# Patient Record
Sex: MALE | Race: WHITE | NOT HISPANIC OR LATINO | Employment: OTHER | URBAN - METROPOLITAN AREA
[De-identification: names, ages, dates, MRNs, and addresses within clinical notes are randomized per-mention and may not be internally consistent; named-entity substitution may affect disease eponyms.]

---

## 2017-08-28 ENCOUNTER — APPOINTMENT (EMERGENCY)
Dept: RADIOLOGY | Facility: HOSPITAL | Age: 77
DRG: 310 | End: 2017-08-28
Payer: MEDICARE

## 2017-08-28 ENCOUNTER — GENERIC CONVERSION - ENCOUNTER (OUTPATIENT)
Dept: OTHER | Facility: OTHER | Age: 77
End: 2017-08-28

## 2017-08-28 ENCOUNTER — HOSPITAL ENCOUNTER (INPATIENT)
Facility: HOSPITAL | Age: 77
LOS: 1 days | Discharge: HOME | DRG: 310 | End: 2017-08-29
Attending: EMERGENCY MEDICINE | Admitting: INTERNAL MEDICINE
Payer: MEDICARE

## 2017-08-28 DIAGNOSIS — R07.9 CHEST PAIN: ICD-10-CM

## 2017-08-28 DIAGNOSIS — R53.1 WEAKNESS: ICD-10-CM

## 2017-08-28 DIAGNOSIS — R42 DIZZY: ICD-10-CM

## 2017-08-28 DIAGNOSIS — R06.02 SHORTNESS OF BREATH: ICD-10-CM

## 2017-08-28 DIAGNOSIS — I48.91 ATRIAL FIBRILLATION WITH RVR (HCC): Primary | ICD-10-CM

## 2017-08-28 PROBLEM — I10 HTN (HYPERTENSION): Status: ACTIVE | Noted: 2017-08-28

## 2017-08-28 LAB
ALBUMIN SERPL BCP-MCNC: 3.8 G/DL (ref 3.5–5)
ALP SERPL-CCNC: 48 U/L (ref 46–116)
ALT SERPL W P-5'-P-CCNC: 27 U/L (ref 12–78)
ANION GAP SERPL CALCULATED.3IONS-SCNC: 11 MMOL/L (ref 4–13)
AST SERPL W P-5'-P-CCNC: 26 U/L (ref 5–45)
BASOPHILS # BLD AUTO: 0.04 THOUSANDS/ΜL (ref 0–0.1)
BASOPHILS NFR BLD AUTO: 0 % (ref 0–1)
BILIRUB SERPL-MCNC: 0.33 MG/DL (ref 0.2–1)
BUN SERPL-MCNC: 26 MG/DL (ref 5–25)
CALCIUM SERPL-MCNC: 9.4 MG/DL (ref 8.3–10.1)
CHLORIDE SERPL-SCNC: 101 MMOL/L (ref 100–108)
CO2 SERPL-SCNC: 26 MMOL/L (ref 21–32)
CREAT SERPL-MCNC: 1.11 MG/DL (ref 0.6–1.3)
EOSINOPHIL # BLD AUTO: 0.19 THOUSAND/ΜL (ref 0–0.61)
EOSINOPHIL NFR BLD AUTO: 2 % (ref 0–6)
ERYTHROCYTE [DISTWIDTH] IN BLOOD BY AUTOMATED COUNT: 14.7 % (ref 11.6–15.1)
GFR SERPL CREATININE-BSD FRML MDRD: 65 ML/MIN/1.73SQ M
GLUCOSE SERPL-MCNC: 113 MG/DL (ref 65–140)
HCT VFR BLD AUTO: 41.1 % (ref 36.5–49.3)
HGB BLD-MCNC: 14.2 G/DL (ref 12–17)
LYMPHOCYTES # BLD AUTO: 1.29 THOUSANDS/ΜL (ref 0.6–4.47)
LYMPHOCYTES NFR BLD AUTO: 12 % (ref 14–44)
MCH RBC QN AUTO: 31.5 PG (ref 26.8–34.3)
MCHC RBC AUTO-ENTMCNC: 34.5 G/DL (ref 31.4–37.4)
MCV RBC AUTO: 91 FL (ref 82–98)
MONOCYTES # BLD AUTO: 1.06 THOUSAND/ΜL (ref 0.17–1.22)
MONOCYTES NFR BLD AUTO: 9 % (ref 4–12)
NEUTROPHILS # BLD AUTO: 8.66 THOUSANDS/ΜL (ref 1.85–7.62)
NEUTS SEG NFR BLD AUTO: 77 % (ref 43–75)
NRBC BLD AUTO-RTO: 0 /100 WBCS
NT-PROBNP SERPL-MCNC: 131 PG/ML
PLATELET # BLD AUTO: 342 THOUSANDS/UL (ref 149–390)
PMV BLD AUTO: 10 FL (ref 8.9–12.7)
POTASSIUM SERPL-SCNC: 4.5 MMOL/L (ref 3.5–5.3)
PROT SERPL-MCNC: 6.8 G/DL (ref 6.4–8.2)
RBC # BLD AUTO: 4.51 MILLION/UL (ref 3.88–5.62)
SODIUM SERPL-SCNC: 138 MMOL/L (ref 136–145)
SPECIMEN SOURCE: NORMAL
TROPONIN I BLD-MCNC: 0 NG/ML (ref 0–0.08)
TSH SERPL DL<=0.05 MIU/L-ACNC: 3.84 UIU/ML (ref 0.36–3.74)
WBC # BLD AUTO: 11.24 THOUSAND/UL (ref 4.31–10.16)

## 2017-08-28 PROCEDURE — 84484 ASSAY OF TROPONIN QUANT: CPT

## 2017-08-28 PROCEDURE — 99285 EMERGENCY DEPT VISIT HI MDM: CPT

## 2017-08-28 PROCEDURE — 93005 ELECTROCARDIOGRAM TRACING: CPT

## 2017-08-28 PROCEDURE — 93005 ELECTROCARDIOGRAM TRACING: CPT | Performed by: EMERGENCY MEDICINE

## 2017-08-28 PROCEDURE — 84443 ASSAY THYROID STIM HORMONE: CPT | Performed by: EMERGENCY MEDICINE

## 2017-08-28 PROCEDURE — 80053 COMPREHEN METABOLIC PANEL: CPT

## 2017-08-28 PROCEDURE — 36415 COLL VENOUS BLD VENIPUNCTURE: CPT

## 2017-08-28 PROCEDURE — 96374 THER/PROPH/DIAG INJ IV PUSH: CPT

## 2017-08-28 PROCEDURE — 83880 ASSAY OF NATRIURETIC PEPTIDE: CPT | Performed by: EMERGENCY MEDICINE

## 2017-08-28 PROCEDURE — 71020 HB CHEST X-RAY 2VW FRONTAL&LATL: CPT

## 2017-08-28 PROCEDURE — 85025 COMPLETE CBC W/AUTO DIFF WBC: CPT

## 2017-08-28 RX ORDER — TAMSULOSIN HYDROCHLORIDE 0.4 MG/1
0.4 CAPSULE ORAL
Status: DISCONTINUED | OUTPATIENT
Start: 2017-08-29 | End: 2017-08-29 | Stop reason: HOSPADM

## 2017-08-28 RX ORDER — DILTIAZEM HYDROCHLORIDE 5 MG/ML
10 INJECTION INTRAVENOUS ONCE
Status: COMPLETED | OUTPATIENT
Start: 2017-08-28 | End: 2017-08-28

## 2017-08-28 RX ORDER — GABAPENTIN 300 MG/1
300 CAPSULE ORAL 2 TIMES DAILY
COMMUNITY
End: 2019-11-27 | Stop reason: ALTCHOICE

## 2017-08-28 RX ORDER — VALSARTAN 80 MG/1
80 TABLET ORAL DAILY
COMMUNITY
End: 2017-08-29 | Stop reason: HOSPADM

## 2017-08-28 RX ORDER — ACETAMINOPHEN 325 MG/1
650 TABLET ORAL EVERY 6 HOURS PRN
Status: DISCONTINUED | OUTPATIENT
Start: 2017-08-28 | End: 2017-08-29 | Stop reason: HOSPADM

## 2017-08-28 RX ORDER — GABAPENTIN 300 MG/1
300 CAPSULE ORAL 2 TIMES DAILY
Status: DISCONTINUED | OUTPATIENT
Start: 2017-08-28 | End: 2017-08-29 | Stop reason: HOSPADM

## 2017-08-28 RX ORDER — SIMVASTATIN 20 MG
20 TABLET ORAL
COMMUNITY
End: 2020-09-09

## 2017-08-28 RX ORDER — PRAVASTATIN SODIUM 40 MG
40 TABLET ORAL
Status: DISCONTINUED | OUTPATIENT
Start: 2017-08-29 | End: 2017-08-29 | Stop reason: HOSPADM

## 2017-08-28 RX ORDER — LORAZEPAM 2 MG/ML
0.5 INJECTION INTRAMUSCULAR 4 TIMES DAILY PRN
Status: DISCONTINUED | OUTPATIENT
Start: 2017-08-28 | End: 2017-08-29 | Stop reason: HOSPADM

## 2017-08-28 RX ORDER — TRAMADOL HYDROCHLORIDE 50 MG/1
50 TABLET ORAL EVERY 6 HOURS PRN
Status: DISCONTINUED | OUTPATIENT
Start: 2017-08-28 | End: 2017-08-29 | Stop reason: HOSPADM

## 2017-08-28 RX ORDER — ONDANSETRON 2 MG/ML
4 INJECTION INTRAMUSCULAR; INTRAVENOUS EVERY 6 HOURS PRN
Status: DISCONTINUED | OUTPATIENT
Start: 2017-08-28 | End: 2017-08-29 | Stop reason: HOSPADM

## 2017-08-28 RX ORDER — DILTIAZEM HYDROCHLORIDE 5 MG/ML
15 INJECTION INTRAVENOUS EVERY 4 HOURS PRN
Status: DISCONTINUED | OUTPATIENT
Start: 2017-08-28 | End: 2017-08-29 | Stop reason: HOSPADM

## 2017-08-28 RX ORDER — LEVOTHYROXINE SODIUM 0.03 MG/1
25 TABLET ORAL DAILY
COMMUNITY

## 2017-08-28 RX ORDER — TRAMADOL HYDROCHLORIDE 50 MG/1
50 TABLET ORAL EVERY 6 HOURS PRN
COMMUNITY
End: 2019-05-21

## 2017-08-28 RX ORDER — LEVOTHYROXINE SODIUM 0.03 MG/1
25 TABLET ORAL
Status: DISCONTINUED | OUTPATIENT
Start: 2017-08-29 | End: 2017-08-29 | Stop reason: HOSPADM

## 2017-08-28 RX ORDER — TAMSULOSIN HYDROCHLORIDE 0.4 MG/1
0.4 CAPSULE ORAL
COMMUNITY
End: 2018-05-24 | Stop reason: SDUPTHER

## 2017-08-28 RX ADMIN — GABAPENTIN 300 MG: 300 CAPSULE ORAL at 23:37

## 2017-08-28 RX ADMIN — ENOXAPARIN SODIUM 60 MG: 60 INJECTION SUBCUTANEOUS at 23:44

## 2017-08-28 RX ADMIN — METOPROLOL TARTRATE 25 MG: 25 TABLET ORAL at 22:02

## 2017-08-28 RX ADMIN — DILTIAZEM HYDROCHLORIDE 10 MG: 5 INJECTION INTRAVENOUS at 15:41

## 2017-08-29 ENCOUNTER — APPOINTMENT (INPATIENT)
Dept: NON INVASIVE DIAGNOSTICS | Facility: HOSPITAL | Age: 77
DRG: 310 | End: 2017-08-29
Payer: MEDICARE

## 2017-08-29 ENCOUNTER — GENERIC CONVERSION - ENCOUNTER (OUTPATIENT)
Dept: OTHER | Facility: OTHER | Age: 77
End: 2017-08-29

## 2017-08-29 VITALS
SYSTOLIC BLOOD PRESSURE: 136 MMHG | DIASTOLIC BLOOD PRESSURE: 83 MMHG | BODY MASS INDEX: 21.83 KG/M2 | HEART RATE: 60 BPM | TEMPERATURE: 97.7 F | WEIGHT: 127.87 LBS | HEIGHT: 64 IN | RESPIRATION RATE: 18 BRPM | OXYGEN SATURATION: 99 %

## 2017-08-29 LAB
ANION GAP SERPL CALCULATED.3IONS-SCNC: 9 MMOL/L (ref 4–13)
ATRIAL RATE: 159 BPM
ATRIAL RATE: 91 BPM
BUN SERPL-MCNC: 25 MG/DL (ref 5–25)
CALCIUM SERPL-MCNC: 8.9 MG/DL (ref 8.3–10.1)
CHLORIDE SERPL-SCNC: 105 MMOL/L (ref 100–108)
CO2 SERPL-SCNC: 27 MMOL/L (ref 21–32)
CREAT SERPL-MCNC: 1.04 MG/DL (ref 0.6–1.3)
GFR SERPL CREATININE-BSD FRML MDRD: 70 ML/MIN/1.73SQ M
GLUCOSE SERPL-MCNC: 92 MG/DL (ref 65–140)
MAGNESIUM SERPL-MCNC: 2.1 MG/DL (ref 1.6–2.6)
P AXIS: 84 DEGREES
PHOSPHATE SERPL-MCNC: 3.9 MG/DL (ref 2.3–4.1)
POTASSIUM SERPL-SCNC: 4 MMOL/L (ref 3.5–5.3)
QRS AXIS: 13 DEGREES
QRS AXIS: 25 DEGREES
QRSD INTERVAL: 74 MS
QRSD INTERVAL: 76 MS
QT INTERVAL: 288 MS
QT INTERVAL: 330 MS
QTC INTERVAL: 392 MS
QTC INTERVAL: 405 MS
SODIUM SERPL-SCNC: 141 MMOL/L (ref 136–145)
T WAVE AXIS: 53 DEGREES
T WAVE AXIS: 61 DEGREES
VENTRICULAR RATE: 119 BPM
VENTRICULAR RATE: 85 BPM

## 2017-08-29 PROCEDURE — 83735 ASSAY OF MAGNESIUM: CPT | Performed by: PHYSICIAN ASSISTANT

## 2017-08-29 PROCEDURE — 93306 TTE W/DOPPLER COMPLETE: CPT

## 2017-08-29 PROCEDURE — 84100 ASSAY OF PHOSPHORUS: CPT | Performed by: PHYSICIAN ASSISTANT

## 2017-08-29 PROCEDURE — 80048 BASIC METABOLIC PNL TOTAL CA: CPT | Performed by: PHYSICIAN ASSISTANT

## 2017-08-29 RX ORDER — METOPROLOL SUCCINATE 25 MG/1
12.5 TABLET, EXTENDED RELEASE ORAL 2 TIMES DAILY
Qty: 30 TABLET | Refills: 2 | Status: SHIPPED | OUTPATIENT
Start: 2017-08-29 | End: 2018-06-07

## 2017-08-29 RX ORDER — TRAMADOL HYDROCHLORIDE 50 MG/1
TABLET ORAL
Status: ON HOLD | COMMUNITY
Start: 2016-07-18 | End: 2017-08-29 | Stop reason: CLARIF

## 2017-08-29 RX ORDER — SIMVASTATIN 20 MG
TABLET ORAL
Status: ON HOLD | COMMUNITY
Start: 2016-07-09 | End: 2017-08-29 | Stop reason: CLARIF

## 2017-08-29 RX ORDER — EZETIMIBE AND SIMVASTATIN 10; 10 MG/1; MG/1
TABLET ORAL
Status: ON HOLD | COMMUNITY
Start: 2012-08-21 | End: 2017-08-29 | Stop reason: CLARIF

## 2017-08-29 RX ORDER — TAMSULOSIN HYDROCHLORIDE 0.4 MG/1
CAPSULE ORAL
Status: ON HOLD | COMMUNITY
Start: 2016-06-15 | End: 2017-08-29 | Stop reason: CLARIF

## 2017-08-29 RX ORDER — ASPIRIN 81 MG/1
81 TABLET ORAL DAILY
Refills: 0
Start: 2017-08-29 | End: 2018-06-07

## 2017-08-29 RX ADMIN — BIMATOPROST 1 DROP: 0.1 SOLUTION/ DROPS OPHTHALMIC at 00:36

## 2017-08-29 RX ADMIN — LORAZEPAM 0.5 MG: 2 INJECTION, SOLUTION INTRAMUSCULAR; INTRAVENOUS at 02:06

## 2017-08-29 RX ADMIN — ENOXAPARIN SODIUM 60 MG: 60 INJECTION SUBCUTANEOUS at 09:00

## 2017-08-29 RX ADMIN — METOPROLOL TARTRATE 25 MG: 25 TABLET ORAL at 08:59

## 2017-08-29 RX ADMIN — LEVOTHYROXINE SODIUM 25 MCG: 25 TABLET ORAL at 07:25

## 2017-08-29 RX ADMIN — GABAPENTIN 300 MG: 300 CAPSULE ORAL at 08:59

## 2017-10-02 ENCOUNTER — ALLSCRIPTS OFFICE VISIT (OUTPATIENT)
Dept: OTHER | Facility: OTHER | Age: 77
End: 2017-10-02

## 2017-10-27 NOTE — PROGRESS NOTES
Assessment  Assessed    1  PAF (paroxysmal atrial fibrillation) (427 31) (I48 0)   2  Hypertension (401 9) (I10)    Plan  Hypertension    · Metoprolol Succinate ER 25 MG Oral Tablet Extended Release 24 Hour; TAKE  1/2 TABLET TWICE DAILY   Rx By: Dasha Chauhan; Dispense: 90 Days ; #:90 Tablet Extended Release 24 Hour; Refill: 3;For: Hypertension; BHARTI = N; Faxed To: [facility]   PAF (paroxysmal atrial fibrillation)    · Eliquis 5 MG Oral Tablet; Take 1 tablet twice daily   Rx By: Dasha Chauhan; Dispense: 90 Days ; #:180 Tablet; Refill: 3;For: PAF (paroxysmal atrial fibrillation); BHARTI = N; Faxed To: [facility]    · (1) BASIC METABOLIC PROFILE; Status:Active; Requested for:13Nov2017;    Perform:[facility]; WEM:[id number]; Ordered; For:PAF (paroxysmal atrial fibrillation); Ordered By:Sarahy Mathur;   · (1) CBC/PLT/DIFF; Status:Active; Requested for:13Nov2017;    Perform:[facility]; ELEN:[id number]; Ordered; For:PAF (paroxysmal atrial fibrillation); Ordered By:Sarahy Mathur;   · (1) NT- BNP (PRO BRAIN NATRIURETIC PEPTIDE); Status:Active; Requested  for:13Nov2017;    Perform:[facility]; CDE:[id number]; Ordered; For:PAF (paroxysmal atrial fibrillation); Ordered By:Sarahy Mathur;    Discussion/Summary  Cardiology Discussion Summary Free Text Note Form [facility]:   1  Paroxysmal atrial fibrillation: Maintaining sinus rhythm, actually sinus bradycardia on ECG  Continue low-dose metoprolol  Had long discussion regarding indications, risks, benefits of anticoagulation, and patient/spouse are agreeable  CHADSVASC score = 3 (age, htn)  Top Will initiate Eliquis 5 mg twice daily  Will contact hematologist office to get prior progress note  Patient states he was cleared for anticoagulation  Normal left ventricular systolic function with ejection fraction 60% 08/2017    Hypertension: Well controlled, continue low-dose metoprolol  Dyslipidemia: No lipid panel on file, continue lovastatin, manage per PCP for now  Chief Complaint  Chief Complaint Free Text Note Form: PAF      History of Present Illness  Cardiology HPI Free Text Note Form [facility]: This is a very pleasant 71-year-old male, hospitalized at [facility] in August 2017 with chest discomfort and palpitations, noted to have new onset atrial fibrillation with rapid ventricular response  He went back into sinus rhythm, actually sinus bradycardia with diltiazem, and was maintained on low-dose metoprolol 12 5 mg twice daily for rate control  He has been following hematology, for an elevated PTT without a known diagnosis  Therefore anticoagulation was not started, and he was supposed to follow up with Hematology for clearance  states he recently saw Hematology, and there was no objection of starting anticoagulation  He states, his hematologist wanted to defer the final recommendations of anticoagulations to me  a symptomatic standpoint Esvin Marquez is doing very well with no symptoms at rest or with exertion  He has not had recurrent symptoms since discharge  Her      Review of Systems  Cardiology Male ROS:     Cardiac: No complaints of chest pain, no palpitations, no fainiting  Skin: No complaints of nonhealing sores or skin rash  Genitourinary: prostate problems   Psychological: No complaints of feeling depressed, anxiety, panic attacks, or difficulty concentrating  General: trouble sleeping, but-- No complaints of trouble sleeping, lack of energy, fatigue, appetite changes, weight changes, fever, frequent infections, or night sweats  Respiratory: cough/sputum  HEENT: No complaints of serious problems, hearing problems, nose problems, throat problems, or snoring  Gastrointestinal: No complaints of liver problems, nausea, vomiting, heartburn, constipation, bloody stools, diarrhea, problems swallowing, adbominal pain, or rectal bleeding     Hematologic: No complaints of bleeding disorders, anemia, blood clots, or excessive brusing  Neurological: No complaints of numbness, tingling, dizziness, weakness, seizures, headaches, syncope or fainting, AM fatigue, daytime sleepiness, no witnessed apnea episodes  Musculoskeletal: back pain       ROS Reviewed:   ROS reviewed  Active Problems  Problems    1  Acute URI (465 9) (J06 9)   2  Back pain (724 5) (M54 9)   3  Hypertension (401 9) (I10)    Past Medical History  Active Problems And Past Medical History Reviewed: The active problems and past medical history were reviewed and updated today  Family History  Family History Reviewed: The family history was reviewed and updated today  Social History  Problems    · Denied: History of Alcohol use   · Denied: History of Drug use   · Never a smoker  Social History Reviewed: The social history was reviewed and updated today  Current Meds   1  Aspirin 81 MG Oral Tablet Chewable; TAKE 1 TABLET DAILY; Therapy: 93NAJ7118 to Recorded   2  Centrum Silver Oral Tablet; TAKE 1 TABLET DAILY; Therapy: 72JSY4083 to Recorded   3  Fish Oil 1000 MG Oral Capsule; one po qd; Therapy: 80WNX1329 to Recorded   4  Gabapentin 300 MG Oral Capsule; TAKE 1 CAPSULE 3 TIMES DAILY; Therapy: 39DKQ3164 to Recorded   5  Levothyroxine Sodium 25 MCG Oral Tablet; TAKE 1 TABLET DAILY; Therapy: 03WXU5696 to Recorded   6  Lumigan 0 01 % Ophthalmic Solution; Therapy: 81WEX1362 to Recorded   7  Metoprolol Succinate ER 25 MG Oral Tablet Extended Release 24 Hour; TAKE 1/2   TABLET TWICE DAILY; Therapy: 49AJW1851 to ([id number])  Requested for: 97CKZ7714; Last   Rx:28Sep2017; Status: ACTIVE - Transmit to Pharmacy - Awaiting Verification Ordered   8  Simvastatin 20 MG Oral Tablet; TAKE 1 TABLET DAILY; Therapy: 24KZX4457 to (Evaluate:12Igs6503) Recorded   9  Tamsulosin HCl - 0 4 MG Oral Capsule; TAKE 1 CAPSULE Daily; Therapy: 06KQK7906 to (Evaluate:87Ewn9738) Recorded   10  Valsartan 80 MG Oral Tablet;  Take 1 tablet daily; Therapy: 19KWP2081 to Recorded  Medication List Reviewed: The medication list was reviewed and updated today  Allergies  Medication    1  No Known Drug Allergies    Vitals  Vital Signs    Recorded: 82IWU9475 01:14PM   Heart Rate 52, Apical   Pulse Quality Regular, Apical   Systolic 948, LUE, Sitting   Diastolic 76, LUE, Sitting   Height 5 ft 5 in   Weight 137 lb 2 oz   BMI Calculated 22 82   BSA Calculated 1 68     Physical Exam    Constitutional - General appearance: No acute distress, well appearing and well nourished  Eyes - Conjunctiva and Sclera examination: Conjunctiva pink, sclera anicteric  Neck - Normal, no JVD   Pulmonary - Respiratory effort: No signs of respiratory distress  -- Auscultation of lungs: Clear to auscultation  Cardiovascular - Auscultation of heart: Normal rate and rhythm, normal S1 and S2, no murmurs  -- Pedal pulses: Normal, 2+ bilaterally  -- Examination of extremities for edema and/or varicosities: Normal     Abdomen - Soft  Musculoskeletal - Gait and station: Normal gait  Skin - Skin: Normal without rashes  Skin is warm and well perfused  Neurologic - Speech normal  No focal deficits     Psychiatric - Orientation to person, place, and time: Normal       Results/Data  ECG Report: Sinus bradycardia, otherwise normal      Future Appointments    Date/Time Provider Specialty Site   02/21/2018 10:30 AM Lesly Duckworth MD Urology [address]     Signatures   Electronically signed by : Dhiraj Flores DO; Oct  2 2017  1:46PM EST                       (Author)

## 2017-11-08 ENCOUNTER — GENERIC CONVERSION - ENCOUNTER (OUTPATIENT)
Dept: OTHER | Facility: OTHER | Age: 77
End: 2017-11-08

## 2017-11-08 ENCOUNTER — TRANSCRIBE ORDERS (OUTPATIENT)
Dept: LAB | Facility: CLINIC | Age: 77
End: 2017-11-08

## 2017-11-08 ENCOUNTER — APPOINTMENT (OUTPATIENT)
Dept: LAB | Facility: CLINIC | Age: 77
End: 2017-11-08
Payer: MEDICARE

## 2017-11-08 DIAGNOSIS — I48.0 PAROXYSMAL ATRIAL FIBRILLATION (HCC): ICD-10-CM

## 2017-11-08 LAB
ANION GAP SERPL CALCULATED.3IONS-SCNC: 5 MMOL/L (ref 4–13)
BASOPHILS # BLD AUTO: 0.07 THOUSANDS/ΜL (ref 0–0.1)
BASOPHILS NFR BLD AUTO: 1 % (ref 0–1)
BUN SERPL-MCNC: 24 MG/DL (ref 5–25)
CALCIUM SERPL-MCNC: 9.4 MG/DL (ref 8.3–10.1)
CHLORIDE SERPL-SCNC: 103 MMOL/L (ref 100–108)
CO2 SERPL-SCNC: 32 MMOL/L (ref 21–32)
CREAT SERPL-MCNC: 1.15 MG/DL (ref 0.6–1.3)
EOSINOPHIL # BLD AUTO: 0.46 THOUSAND/ΜL (ref 0–0.61)
EOSINOPHIL NFR BLD AUTO: 6 % (ref 0–6)
ERYTHROCYTE [DISTWIDTH] IN BLOOD BY AUTOMATED COUNT: 13.3 % (ref 11.6–15.1)
GFR SERPL CREATININE-BSD FRML MDRD: 61 ML/MIN/1.73SQ M
GLUCOSE P FAST SERPL-MCNC: 72 MG/DL (ref 65–99)
HCT VFR BLD AUTO: 41.3 % (ref 36.5–49.3)
HGB BLD-MCNC: 13.7 G/DL (ref 12–17)
LYMPHOCYTES # BLD AUTO: 2.21 THOUSANDS/ΜL (ref 0.6–4.47)
LYMPHOCYTES NFR BLD AUTO: 31 % (ref 14–44)
MCH RBC QN AUTO: 30.9 PG (ref 26.8–34.3)
MCHC RBC AUTO-ENTMCNC: 33.2 G/DL (ref 31.4–37.4)
MCV RBC AUTO: 93 FL (ref 82–98)
MONOCYTES # BLD AUTO: 0.87 THOUSAND/ΜL (ref 0.17–1.22)
MONOCYTES NFR BLD AUTO: 12 % (ref 4–12)
NEUTROPHILS # BLD AUTO: 3.57 THOUSANDS/ΜL (ref 1.85–7.62)
NEUTS SEG NFR BLD AUTO: 50 % (ref 43–75)
NRBC BLD AUTO-RTO: 0 /100 WBCS
NT-PROBNP SERPL-MCNC: 119 PG/ML
PLATELET # BLD AUTO: 403 THOUSANDS/UL (ref 149–390)
PMV BLD AUTO: 9.7 FL (ref 8.9–12.7)
POTASSIUM SERPL-SCNC: 4.7 MMOL/L (ref 3.5–5.3)
RBC # BLD AUTO: 4.44 MILLION/UL (ref 3.88–5.62)
SODIUM SERPL-SCNC: 140 MMOL/L (ref 136–145)
WBC # BLD AUTO: 7.21 THOUSAND/UL (ref 4.31–10.16)

## 2017-11-08 PROCEDURE — 80048 BASIC METABOLIC PNL TOTAL CA: CPT

## 2017-11-08 PROCEDURE — 83880 ASSAY OF NATRIURETIC PEPTIDE: CPT

## 2017-11-08 PROCEDURE — 36415 COLL VENOUS BLD VENIPUNCTURE: CPT

## 2017-11-08 PROCEDURE — 85025 COMPLETE CBC W/AUTO DIFF WBC: CPT

## 2017-11-13 DIAGNOSIS — I48.0 PAROXYSMAL ATRIAL FIBRILLATION (HCC): ICD-10-CM

## 2017-11-29 ENCOUNTER — ALLSCRIPTS OFFICE VISIT (OUTPATIENT)
Dept: OTHER | Facility: OTHER | Age: 77
End: 2017-11-29

## 2017-11-30 NOTE — PROGRESS NOTES
Assessment  Assessed    1  No pertinent family history : Family History   2  Hypertension (401 9) (I10)   3  PAF (paroxysmal atrial fibrillation) (427 31) (I48 0)    Plan  PAF (paroxysmal atrial fibrillation)    · EKG/ECG- POC; Status:Complete;   Done: 31KMO7638 11:30AM   Perform: In Office; Last Updated By:Francia Lyman; 11/29/2017 11:30:31 AM;Ordered;(paroxysmal atrial fibrillation); Ordered By:Sarahy Mathur;   · Follow-up visit in 6 months Evaluation and Treatment  Follow-up  Status: Hold For -Scheduling  Requested for: 48HLB0344   Ordered;PAF (paroxysmal atrial fibrillation); Ordered By: Romina Sahu Performed:  Due: 14AVA9932    Discussion/Summary  Cardiology Discussion Summary Free Text Note Form [facility]:   1  Paroxysmal atrial fibrillation: Maintaining sinus rhythm, continue low-dose metoprolol  CHADSVASC score = 3 (age, htn)  Continue Eliquis 5 mg twice daily  Normal left ventricular systolic function with ejection fraction 60% 08/2017  Hypertension: Well controlled, continue low-dose metoprolol  Dyslipidemia: No lipid panel on file, continue lovastatin, manage per PCP for now    followup in 6 months  Routine blood work per PCP and Hematology  Chief Complaint  Chief Complaint Free Text Note Form: AF      History of Present Illness  Cardiology HPI Free Text Note Form [facility]: This is a very pleasant 52-year-old male, hospitalized at [address] [place] [address] in August 2017 with chest discomfort and palpitations, noted to have new onset atrial fibrillation with rapid ventricular response  He went back into sinus rhythm, actually sinus bradycardia with diltiazem, and was maintained on low-dose metoprolol 12 5 mg twice daily for rate control  He has been following hematology, for an elevated PTT without a known diagnosis  Therefore anticoagulation was not started, and he was supposed to follow up with Hematology for clearance    he was started on Eliquis, after he had told me he was cleared by his hematologist to start anticoagulation  At not been able to get a progress note as of yet  initiating Eliquis, the patient feels well without abnormal bleeding or heavy bruising  Follow-up blood work revealed stability of his hemoglobin  a symptomatic standpoint Juan M Galvan is doing very well with no symptoms at rest or with exertion  He has not had recurrent symptoms since discharge  Review of Systems  Cardiology Male ROS:    Cardiac: No complaints of chest pain, no palpitations, no fainiting  Skin: No complaints of nonhealing sores or skin rash  Genitourinary: No complaints of recurrent urinary tract infections, frequent urination at night, difficult urination, blood in urine, kidney stones, loss of bladder control, no kidney or prostate problems, no erectile dysfunction  Psychological: No complaints of feeling depressed, anxiety, panic attacks, or difficulty concentrating  General: trouble sleeping  Respiratory: cough/sputum, but-- no shortness of breath-- and-- no phlegm  HEENT: No complaints of serious problems, hearing problems, nose problems, throat problems, or snoring  Gastrointestinal: No complaints of liver problems, nausea, vomiting, heartburn, constipation, bloody stools, diarrhea, problems swallowing, adbominal pain, or rectal bleeding  Hematologic: No complaints of bleeding disorders, anemia, blood clots, or excessive brusing  Neurological: No complaints of numbness, tingling, dizziness, weakness, seizures, headaches, syncope or fainting, AM fatigue, daytime sleepiness, no witnessed apnea episodes  Musculoskeletal: back pain, but-- no arthritis-- and-- no swelling/pain    ROS Reviewed:   ROS reviewed  Active Problems  Problems    1  Acute URI (465 9) (J06 9)   2  Back pain (724 5) (M54 9)   3  Hypertension (401 9) (I10)   4  PAF (paroxysmal atrial fibrillation) (427 31) (I48 0)    Past Medical History  Active Problems And Past Medical History Reviewed:    The active problems and past medical history were reviewed and updated today  Family History  Family History    1  No pertinent family history  Family History Reviewed: The family history was reviewed and updated today  Social History  Problems    · Denied: History of Alcohol use   · Denied: History of Drug use   · Never a smoker  Social History Reviewed: The social history was reviewed and updated today  Current Meds   1  Centrum Silver Oral Tablet; TAKE 1 TABLET DAILY; Therapy: 65ADB7772 to Recorded   2  Eliquis 5 MG Oral Tablet; Take 1 tablet twice daily; Therapy: 39JFY4875 to (Evaluate:61Nuw4816); Last Rx:02Oct2017 Ordered   3  Fish Oil 1000 MG Oral Capsule; one po qd; Therapy: 86WPK9101 to Recorded   4  Gabapentin 300 MG Oral Capsule; TAKE 1 CAPSULE 3 TIMES DAILY; Therapy: 27LQS3974 to Recorded   5  Levothyroxine Sodium 25 MCG Oral Tablet; TAKE 1 TABLET DAILY; Therapy: 28RJW6863 to Recorded   6  Lumigan 0 01 % Ophthalmic Solution; Therapy: 23NUK2685 to Recorded   7  Metoprolol Succinate ER 25 MG Oral Tablet Extended Release 24 Hour; TAKE 1/2 TABLET TWICE DAILY; Therapy: 75IPX4721 to (Evaluate:87Gvr1708)  Requested for: 96XBT4766; Last Rx:02Oct2017 Ordered   8  Simvastatin 20 MG Oral Tablet; TAKE 1 TABLET DAILY; Therapy: 33OXY8480 to (Evaluate:03Vvx4445) Recorded   9  Tamsulosin HCl - 0 4 MG Oral Capsule; TAKE 1 CAPSULE Daily; Therapy: 30DKP4126 to (Evaluate:41Jbt1306) Recorded   10  Valsartan 80 MG Oral Tablet; Take 1 tablet daily; Therapy: 05BEL8181 to Recorded  Medication List Reviewed: The medication list was reviewed and updated today  Allergies  Medication    1  No Known Drug Allergies    Vitals  Vital Signs    Recorded: 16DFO4258 11:22AM   Heart Rate 60   Systolic 354, RUE   Diastolic 70, RUE   Height 5 ft 5 in   Weight 139 lb    BMI Calculated 23 13   BSA Calculated 1 69       Physical Exam   Constitutional - General appearance: No acute distress, well appearing and well nourished    Eyes - Conjunctiva and Sclera examination: Conjunctiva pink, sclera anicteric  Neck - Normal, no JVD   Pulmonary - Respiratory effort: No signs of respiratory distress  -- Auscultation of lungs: Clear to auscultation  Cardiovascular - Auscultation of heart: Normal rate and rhythm, normal S1 and S2, no murmurs  -- Pedal pulses: Normal, 2+ bilaterally  -- Examination of extremities for edema and/or varicosities: Normal    Abdomen - Soft  Musculoskeletal - Gait and station: Normal gait  Skin - Skin: Normal without rashes  Skin is warm and well perfused  Neurologic - Speech normal  No focal deficits  Psychiatric - Orientation to person, place, and time: Normal       Results/Data  ECG Report: A 12 lead ECG was performed and was normal  (1) CBC/PLT/DIFF 33TSN8335 10:11AM [facility] Number: [id number]     Test Name Result Flag Reference   WBC COUNT 7 21 Thousand/uL  4 31-10 16   RBC COUNT 4 44 Million/uL  3 88-5 62   HEMOGLOBIN 13 7 g/dL  12 0-17 0   HEMATOCRIT 41 3 %  36 5-49 3   MCV 93 fL  82-98   MCH 30 9 pg  26 8-34 3   MCHC 33 2 g/dL  31 4-37 4   RDW 13 3 %  11 6-15 1   MPV 9 7 fL  8 9-12 7   PLATELET COUNT 231 Thousands/uL H 149-390   nRBC AUTOMATED 0 /100 WBCs     NEUTROPHILS RELATIVE PERCENT 50 %  43-75   LYMPHOCYTES RELATIVE PERCENT 31 %  14-44   MONOCYTES RELATIVE PERCENT 12 %  4-12   EOSINOPHILS RELATIVE PERCENT 6 %  0-6   BASOPHILS RELATIVE PERCENT 1 %  0-1   NEUTROPHILS ABSOLUTE COUNT 3 57 Thousands/? ??L  1 85-7 62   LYMPHOCYTES ABSOLUTE COUNT 2 21 Thousands/? ??L  0 60-4 47   MONOCYTES ABSOLUTE COUNT 0 87 Thousand/? ??L  0 17-1 22   EOSINOPHILS ABSOLUTE COUNT 0 46 Thousand/? ??L  0 00-0 61   BASOPHILS ABSOLUTE COUNT 0 07 Thousands/? ??L  0 00-0 10     (1) BASIC METABOLIC PROFILE 86ZEC1434 10:11AM [facility] Order Number: [id number]     Test Name Result Flag Reference   SODIUM 140 mmol/L  136-145   POTASSIUM 4 7 mmol/L  3 5-5 3   CHLORIDE 103 mmol/L  100-108   CARBON DIOXIDE 32 mmol/L 21-32   ANION GAP (CALC) 5 mmol/L  4-13   BLOOD UREA NITROGEN 24 mg/dL  5-25   CREATININE 1 15 mg/dL  0 60-1 30   Standardized to IDMS reference method   CALCIUM 9 4 mg/dL  8 3-10 1   eGFR 61 ml/min/1 73sq m       [facility] Disease Education Program recommendations are as follows: GFR calculation is accurate only with a steady state creatinine Chronic Kidney disease less than 60 ml/min/1 73 sq  meters Kidney failure less than 15 ml/min/1 73 sq  meters  GLUCOSE FASTING 72 mg/dL  65-99   Specimen collection should occur prior to Sulfasalazine administration due to the potential for falsely depressed results  Specimen collection should occur prior to Sulfapyridine administration due to the potential for falsely elevated results       (1) NT- BNP (PRO BRAIN NATRIURETIC PEPTIDE) 66HBM9530 10:11AM Betty Nagel Order Number: [id number]     Test Name Result Flag Reference   NT-PRO  pg/mL  <450     Future Appointments    Date/Time Provider Specialty Site   02/21/2018 10:30 AM Whit Whittington MD Urology [address]       Signatures   Electronically signed by : Bob Phillips DO; Nov 29 2017 11:47AM EST                       (Author)

## 2018-01-13 VITALS
HEART RATE: 52 BPM | BODY MASS INDEX: 22.85 KG/M2 | DIASTOLIC BLOOD PRESSURE: 76 MMHG | HEIGHT: 65 IN | WEIGHT: 137.13 LBS | SYSTOLIC BLOOD PRESSURE: 130 MMHG

## 2018-01-14 VITALS
BODY MASS INDEX: 23.16 KG/M2 | HEIGHT: 65 IN | HEART RATE: 60 BPM | SYSTOLIC BLOOD PRESSURE: 126 MMHG | DIASTOLIC BLOOD PRESSURE: 70 MMHG | WEIGHT: 139 LBS

## 2018-01-17 NOTE — MISCELLANEOUS
Message   Recorded as Task   Date: 11/08/2017 02:06 PM, Created By: Jose Carlson   Task Name: Follow Up   Assigned To: Janeen Lyman   Regarding Patient: Bishop Quezada, Status: Active   Comment:    Janeen Lyman - 08 Nov 2017 2:06 PM     TASK CREATED  Caller: Self; General Medical Question; [phone] (Home)  phone call from patient  Seen in Oct by RP   started on Eliquis 5mg bid  Had requested blood work this AM   He was sched to have a tooth pulled today by dentist wouldn't do it today because of the blood thinne  It is tooth with crown, not able to recrown, not enough tooth left  He needs to have it pulled and an implant initiated  Is it too soon to have procedure done so soon after starting eliquis? Also is in need of knee surgery  He has appt with surgeon next foziaes  Will have surgeon forward request   please advise   Sarahy Mathur - 08 Nov 2017 2:19 PM     TASK REPLIED TO: Previously Assigned To Sarahy Mathur   Eliquis Takes effect right away, need to be off this medication 2 days before pulling tooth  Will also need to hold Eliquis 2 days prior to knee surgery  phone call to patient  Per Dr Murrieta Jump need to be off eliquis 2 days prior to having tooth pulled  Patient will notify dentist  patient understands and agrees  Active Problems    1  Acute URI (465 9) (J06 9)   2  Back pain (724 5) (M54 9)   3  Hypertension (401 9) (I10)   4  PAF (paroxysmal atrial fibrillation) (427 31) (I48 0)    Current Meds   1  Centrum Silver Oral Tablet; TAKE 1 TABLET DAILY; Therapy: 05XKS3539 to Recorded   2  Eliquis 5 MG Oral Tablet; Take 1 tablet twice daily; Therapy: 86ERM3898 to (Evaluate:42Lbf9316); Last Rx:02Oct2017 Ordered   3  Fish Oil 1000 MG Oral Capsule; one po qd; Therapy: 52OCI3178 to Recorded   4  Gabapentin 300 MG Oral Capsule; TAKE 1 CAPSULE 3 TIMES DAILY; Therapy: 96WDZ9107 to Recorded   5  Levothyroxine Sodium 25 MCG Oral Tablet; TAKE 1 TABLET DAILY;    Therapy: 21RVZ9355 to Recorded   6  Lumigan 0 01 % Ophthalmic Solution; Therapy: 78XBH8457 to Recorded   7  Metoprolol Succinate ER 25 MG Oral Tablet Extended Release 24 Hour; TAKE 1/2   TABLET TWICE DAILY; Therapy: 38QYD6255 to (Evaluate:27Sep2018)  Requested for: 63ZOX8300; Last   Rx:30Edy8105 Ordered   8  Simvastatin 20 MG Oral Tablet; TAKE 1 TABLET DAILY; Therapy: 47AKO7510 to (Evaluate:46Qpj0594) Recorded   9  Tamsulosin HCl - 0 4 MG Oral Capsule; TAKE 1 CAPSULE Daily; Therapy: 07LGS5631 to (Evaluate:21Glc8895) Recorded   10  Valsartan 80 MG Oral Tablet; Take 1 tablet daily; Therapy: 54VCS9717 to Recorded    Allergies    1   No Known Drug Allergies    Signatures   Electronically signed by : Luis Boggs, ; Nov 8 2017  3:52PM EST                       (Administrative)

## 2018-01-18 NOTE — PROGRESS NOTES
Assessment    1  Acute URI (465 9) (J06 9)    Plan  Acute URI    · Azithromycin 250 MG Oral Tablet; TAKE 2 TABLETS ON DAY 1 THEN TAKE 1  TABLET A DAY FOR 4 DAYS    Discussion/Summary  Discussion Summary:   Symptoms are likely consistent with acute upper respiratory viral infection  Advised to use Advil cold and sinus over-the-counter per label instructions for not more than 5 days  If trial of antibiotics given  Watch for any worsening of the symptoms or high-grade fever chills and if any, advised followup with PCP for reevaluation in 5-7 days  Medication Side Effects Reviewed: Possible side effects of new medications were reviewed with the patient/guardian today  Understands and agrees with treatment plan: The treatment plan was reviewed with the patient/guardian  The patient/guardian understands and agrees with the treatment plan      Chief Complaint    1  Cold Symptoms  Chief Complaint Free Text Note Form: pt reports having cold symptoms for the last few weeks      History of Present Illness  Hospital Based Practices Required Assessment:   Pain Assessment   the patient states they do not have pain  Abuse And Domestic Violence Screen    Yes, the patient is safe at home  The patient states no one is hurting them  Depression And Suicide Screen  No, the patient has not had thoughts of hurting themself  No, the patient has not felt depressed in the past 7 days  Prefered Language is  english  Primary Language is  english  Cold Symptoms: Toni Em presents with complaints of cold symptoms  Associated symptoms include sneezing, nasal congestion, runny nose, post nasal drainage, sore throat, dry cough, productive cough, facial pressure and fatigue, but no scratchy throat, no hoarseness, no facial pain, no headache, no plugged ear(s), no ear pain, no swollen lymph nodes, no wheezing, no shortness of breath, no weakness, no nausea, no vomiting, no diarrhea, no fever and no chills        Review of Systems  Focused-Male:   Constitutional: no fever or chills, feels well, no tiredness, no recent weight loss or weight gain  Active Problems    1  Back pain (724 5) (M54 9)    Social History    · Denied: History of Alcohol use   · Denied: History of Drug use   · Never a smoker    Current Meds   1  TraMADol HCl TABS; Therapy: (Recorded:10His4370) to Recorded    Allergies    1  No Known Drug Allergies    Vitals  Signs [Data Includes: Current Encounter]   Recorded: 09JMQ3664 12:45PM   Temperature: 97 1 F  Heart Rate: 97  Respiration: 20  Systolic: 305  Diastolic: 88  Height: 5 ft 5 in  Weight: 130 lb   BMI Calculated: 21 63  BSA Calculated: 1 65  O2 Saturation: 97  Pain Scale: 0    Physical Exam    Constitutional   General appearance: No acute distress, well appearing and well nourished  Ears, Nose, Mouth, and Throat   External inspection of ears and nose: Normal     Otoscopic examination: Tympanic membrance translucent with normal light reflex  Canals patent without erythema  Nasal mucosa, septum, and turbinates: Abnormal   There was a mucoid discharge from both nares  The bilateral nasal mucosa was edematous  Oropharynx: Normal with no erythema, edema, exudate or lesions  Pulmonary   Respiratory effort: No increased work of breathing or signs of respiratory distress  Auscultation of lungs: Clear to auscultation  Cardiovascular   Auscultation of heart: Normal rate and rhythm, normal S1 and S2, without murmurs         Signatures   Electronically signed by : MAGALIS Daniel ; Jan 14 2016  6:25PM EST                       (Author)

## 2018-05-10 NOTE — PROGRESS NOTES
GOt call from Enoc at Dr Joaquim Lamb office, pt w/ sinus philip 50 BPM today and feels fatigue, occasional LH  Advised he stop metoprolol (currently succinate 12 5 bid)  He will see him again soon to recheck BP and adjust BP meds if needed  Pt to make f/u appt w/ me

## 2018-05-12 ENCOUNTER — APPOINTMENT (EMERGENCY)
Dept: CT IMAGING | Facility: HOSPITAL | Age: 78
End: 2018-05-12
Payer: MEDICARE

## 2018-05-12 ENCOUNTER — HOSPITAL ENCOUNTER (EMERGENCY)
Facility: HOSPITAL | Age: 78
Discharge: HOME | End: 2018-05-12
Attending: EMERGENCY MEDICINE | Admitting: EMERGENCY MEDICINE
Payer: MEDICARE

## 2018-05-12 ENCOUNTER — APPOINTMENT (EMERGENCY)
Dept: RADIOLOGY | Facility: HOSPITAL | Age: 78
End: 2018-05-12
Payer: MEDICARE

## 2018-05-12 VITALS
SYSTOLIC BLOOD PRESSURE: 178 MMHG | TEMPERATURE: 97.7 F | RESPIRATION RATE: 18 BRPM | DIASTOLIC BLOOD PRESSURE: 82 MMHG | OXYGEN SATURATION: 99 % | HEART RATE: 52 BPM

## 2018-05-12 DIAGNOSIS — R42 DIZZINESS: Primary | ICD-10-CM

## 2018-05-12 DIAGNOSIS — R53.1 WEAKNESS: ICD-10-CM

## 2018-05-12 LAB
ALBUMIN SERPL BCP-MCNC: 4 G/DL (ref 3.5–5)
ALP SERPL-CCNC: 54 U/L (ref 46–116)
ALT SERPL W P-5'-P-CCNC: 23 U/L (ref 12–78)
ANION GAP SERPL CALCULATED.3IONS-SCNC: 5 MMOL/L (ref 4–13)
APTT PPP: 44 SECONDS (ref 23–35)
AST SERPL W P-5'-P-CCNC: 19 U/L (ref 5–45)
BASOPHILS # BLD AUTO: 0.05 THOUSANDS/ΜL (ref 0–0.1)
BASOPHILS NFR BLD AUTO: 1 % (ref 0–1)
BILIRUB SERPL-MCNC: 0.42 MG/DL (ref 0.2–1)
BILIRUB UR QL STRIP: NEGATIVE
BUN SERPL-MCNC: 19 MG/DL (ref 5–25)
CALCIUM SERPL-MCNC: 9.2 MG/DL (ref 8.3–10.1)
CHLORIDE SERPL-SCNC: 104 MMOL/L (ref 100–108)
CLARITY UR: CLEAR
CO2 SERPL-SCNC: 31 MMOL/L (ref 21–32)
COLOR UR: YELLOW
COLOR, POC: YELLOW
CREAT SERPL-MCNC: 1.14 MG/DL (ref 0.6–1.3)
EOSINOPHIL # BLD AUTO: 0.25 THOUSAND/ΜL (ref 0–0.61)
EOSINOPHIL NFR BLD AUTO: 5 % (ref 0–6)
ERYTHROCYTE [DISTWIDTH] IN BLOOD BY AUTOMATED COUNT: 14.2 % (ref 11.6–15.1)
GFR SERPL CREATININE-BSD FRML MDRD: 62 ML/MIN/1.73SQ M
GLUCOSE SERPL-MCNC: 101 MG/DL (ref 65–140)
GLUCOSE UR STRIP-MCNC: NEGATIVE MG/DL
HCT VFR BLD AUTO: 41 % (ref 36.5–49.3)
HGB BLD-MCNC: 13.7 G/DL (ref 12–17)
HGB UR QL STRIP.AUTO: NEGATIVE
INR PPP: 1.28 (ref 0.86–1.16)
KETONES UR STRIP-MCNC: NEGATIVE MG/DL
LEUKOCYTE ESTERASE UR QL STRIP: NEGATIVE
LYMPHOCYTES # BLD AUTO: 1.54 THOUSANDS/ΜL (ref 0.6–4.47)
LYMPHOCYTES NFR BLD AUTO: 28 % (ref 14–44)
MAGNESIUM SERPL-MCNC: 2.3 MG/DL (ref 1.6–2.6)
MCH RBC QN AUTO: 31.4 PG (ref 26.8–34.3)
MCHC RBC AUTO-ENTMCNC: 33.4 G/DL (ref 31.4–37.4)
MCV RBC AUTO: 94 FL (ref 82–98)
MONOCYTES # BLD AUTO: 0.62 THOUSAND/ΜL (ref 0.17–1.22)
MONOCYTES NFR BLD AUTO: 11 % (ref 4–12)
NEUTROPHILS # BLD AUTO: 3.09 THOUSANDS/ΜL (ref 1.85–7.62)
NEUTS SEG NFR BLD AUTO: 55 % (ref 43–75)
NITRITE UR QL STRIP: NEGATIVE
NRBC BLD AUTO-RTO: 0 /100 WBCS
NT-PROBNP SERPL-MCNC: 97 PG/ML
PH UR STRIP.AUTO: 7.5 [PH] (ref 4.5–8)
PLATELET # BLD AUTO: 317 THOUSANDS/UL (ref 149–390)
PMV BLD AUTO: 10.1 FL (ref 8.9–12.7)
POTASSIUM SERPL-SCNC: 4.6 MMOL/L (ref 3.5–5.3)
PROT SERPL-MCNC: 7.4 G/DL (ref 6.4–8.2)
PROT UR STRIP-MCNC: NEGATIVE MG/DL
PROTHROMBIN TIME: 16.1 SECONDS (ref 12.1–14.4)
RBC # BLD AUTO: 4.36 MILLION/UL (ref 3.88–5.62)
SODIUM SERPL-SCNC: 140 MMOL/L (ref 136–145)
SP GR UR STRIP.AUTO: 1.01 (ref 1–1.03)
TROPONIN I SERPL-MCNC: <0.02 NG/ML
TROPONIN I SERPL-MCNC: <0.02 NG/ML
UROBILINOGEN UR QL STRIP.AUTO: 0.2 E.U./DL
WBC # BLD AUTO: 5.55 THOUSAND/UL (ref 4.31–10.16)

## 2018-05-12 PROCEDURE — 85730 THROMBOPLASTIN TIME PARTIAL: CPT | Performed by: PHYSICIAN ASSISTANT

## 2018-05-12 PROCEDURE — 84484 ASSAY OF TROPONIN QUANT: CPT | Performed by: EMERGENCY MEDICINE

## 2018-05-12 PROCEDURE — 84484 ASSAY OF TROPONIN QUANT: CPT | Performed by: PHYSICIAN ASSISTANT

## 2018-05-12 PROCEDURE — 85610 PROTHROMBIN TIME: CPT | Performed by: PHYSICIAN ASSISTANT

## 2018-05-12 PROCEDURE — 36415 COLL VENOUS BLD VENIPUNCTURE: CPT | Performed by: EMERGENCY MEDICINE

## 2018-05-12 PROCEDURE — 80053 COMPREHEN METABOLIC PANEL: CPT | Performed by: EMERGENCY MEDICINE

## 2018-05-12 PROCEDURE — 83735 ASSAY OF MAGNESIUM: CPT | Performed by: PHYSICIAN ASSISTANT

## 2018-05-12 PROCEDURE — 96360 HYDRATION IV INFUSION INIT: CPT

## 2018-05-12 PROCEDURE — 71046 X-RAY EXAM CHEST 2 VIEWS: CPT

## 2018-05-12 PROCEDURE — 93005 ELECTROCARDIOGRAM TRACING: CPT

## 2018-05-12 PROCEDURE — 70450 CT HEAD/BRAIN W/O DYE: CPT

## 2018-05-12 PROCEDURE — 85025 COMPLETE CBC W/AUTO DIFF WBC: CPT | Performed by: EMERGENCY MEDICINE

## 2018-05-12 PROCEDURE — 99285 EMERGENCY DEPT VISIT HI MDM: CPT

## 2018-05-12 PROCEDURE — 81003 URINALYSIS AUTO W/O SCOPE: CPT

## 2018-05-12 PROCEDURE — 96361 HYDRATE IV INFUSION ADD-ON: CPT

## 2018-05-12 PROCEDURE — 83880 ASSAY OF NATRIURETIC PEPTIDE: CPT | Performed by: PHYSICIAN ASSISTANT

## 2018-05-12 RX ADMIN — SODIUM CHLORIDE 1000 ML: 0.9 INJECTION, SOLUTION INTRAVENOUS at 12:32

## 2018-05-12 NOTE — ED PROVIDER NOTES
History  Chief Complaint   Patient presents with    Dizziness     Patient reports ongoing dizziness since Sunday  Patient was seen at the pcp and had blood work done as well as an EKG  Blood work is currently still pending  Patient was also taken off Metoprolol after his cardiologist was consulted  Patients symptoms continued to be present so his pcp referred him to come to the department  Denies RINA, SHELIA, n/v  The patient is a 27-year-old male presents for evaluation dizziness, weakness and low heart rate  Patient was actually seen by his PCP this morning and sent to the emergency room for ongoing symptoms  He was seen by his cardiologist three days ago for low heart rate and metoprolol hypertension was held at that time  He has not taken any of this medications since  His symptoms are rather vague on history  He denies any chest pain or shortness of breath  He denies any abdominal pain  He denies any significant back pain or difficulty breathing  Does have a history of atrial fibrillation and is on anticoagulation  Denies any lower extremity edema  Denies any dysuria or hematuria  Denies any dark stools or blood in the urine  Prior to Admission Medications   Prescriptions Last Dose Informant Patient Reported? Taking?    Cholecalciferol (VITAMIN D3 PO)   Yes No   Sig: Take by mouth   Multiple Vitamins-Minerals (CENTRUM SILVER 50+MEN PO)   Yes No   Sig: Take by mouth   aspirin (ECOTRIN LOW STRENGTH) 81 mg EC tablet   No No   Sig: Take 1 tablet by mouth daily   bimatoprost (LUMIGAN) 0 01 % ophthalmic drops   Yes No   Sig: Administer 1 drop into the left eye daily at bedtime   gabapentin (NEURONTIN) 300 mg capsule   Yes No   Sig: Take 300 mg by mouth 2 (two) times a day   levothyroxine 25 mcg tablet   Yes No   Sig: Take 25 mcg by mouth daily   metoprolol succinate (TOPROL-XL) 25 mg 24 hr tablet   No No   Sig: Take 0 5 tablets by mouth 2 (two) times a day   simvastatin (ZOCOR) 20 mg tablet   Yes No   Sig: Take 20 mg by mouth daily at bedtime   tamsulosin (FLOMAX) 0 4 mg   Yes No   Sig: Take 0 4 mg by mouth daily with dinner   traMADol (ULTRAM) 50 mg tablet   Yes No   Sig: Take 50 mg by mouth every 6 (six) hours as needed for moderate pain      Facility-Administered Medications: None       Past Medical History:   Diagnosis Date    Back pain     Benign prostatic hypertrophy     Disease of thyroid gland     Hypertension     Restless leg        Past Surgical History:   Procedure Laterality Date    HERNIA REPAIR         History reviewed  No pertinent family history  I have reviewed and agree with the history as documented  Social History   Substance Use Topics    Smoking status: Never Smoker    Smokeless tobacco: Not on file    Alcohol use No        Review of Systems   Constitutional: Negative for activity change, appetite change and fatigue  HENT: Negative for nosebleeds, sneezing, sore throat, trouble swallowing and voice change  Eyes: Negative for photophobia, pain and visual disturbance  Respiratory: Negative for apnea, choking and stridor  Cardiovascular: Negative for palpitations and leg swelling  Gastrointestinal: Negative for anal bleeding and constipation  Endocrine: Negative for cold intolerance, heat intolerance, polydipsia and polyphagia  Genitourinary: Negative for decreased urine volume, enuresis, frequency, genital sores and urgency  Musculoskeletal: Negative for joint swelling and myalgias  Allergic/Immunologic: Negative for environmental allergies and food allergies  Neurological: Positive for dizziness, weakness and light-headedness  Negative for tremors, seizures and speech difficulty  Hematological: Negative for adenopathy  Psychiatric/Behavioral: Negative for behavioral problems, decreased concentration, dysphoric mood and hallucinations         Physical Exam  ED Triage Vitals   Temperature Pulse Respirations Blood Pressure SpO2   05/12/18 1138 05/12/18 1136 05/12/18 1136 05/12/18 1136 05/12/18 1136   97 7 °F (36 5 °C) 56 16 (!) 180/86 98 %      Temp Source Heart Rate Source Patient Position - Orthostatic VS BP Location FiO2 (%)   05/12/18 1138 05/12/18 1136 05/12/18 1136 05/12/18 1136 --   Oral Monitor Sitting Right arm       Pain Score       05/12/18 1136       No Pain           Orthostatic Vital Signs  Vitals:    05/12/18 1330 05/12/18 1345 05/12/18 1445 05/12/18 1447   BP:  (!) 179/79 160/80 160/80   Pulse: (!) 54 (!) 54 62 67   Patient Position - Orthostatic VS:           Physical Exam   Constitutional: He is oriented to person, place, and time  He appears well-developed and well-nourished  No distress  HENT:   Head: Normocephalic and atraumatic  Right Ear: External ear normal    Left Ear: External ear normal    Nose: Nose normal    Mouth/Throat: Oropharynx is clear and moist    Eyes: Conjunctivae and EOM are normal  Pupils are equal, round, and reactive to light  Neck: Normal range of motion  Neck supple  Cardiovascular: Normal rate, regular rhythm and normal heart sounds  Exam reveals no gallop and no friction rub  No murmur heard  Pulmonary/Chest: Effort normal and breath sounds normal  No respiratory distress  He has no wheezes  Abdominal: Soft  Bowel sounds are normal    Musculoskeletal: He exhibits no tenderness  Neurological: He is alert and oriented to person, place, and time  No cranial nerve deficit  Skin: Skin is warm and dry  He is not diaphoretic  Psychiatric: He has a normal mood and affect  His behavior is normal    Vitals reviewed        ED Medications  Medications   sodium chloride 0 9 % bolus 1,000 mL (0 mL Intravenous Stopped 5/12/18 1437)       Diagnostic Studies  Results Reviewed     Procedure Component Value Units Date/Time    Troponin I [[id number]]  (Normal) Collected:  05/12/18 1450    Lab Status:  Final result Specimen:  Blood from Arm, Right Updated:  05/12/18 1515     Troponin I <0 02 ng/mL     Narrative: Siemens Chemistry analyzer 99% cutoff is > 0 04 ng/mL in network labs    o cTnI 99% cutoff is useful only when applied to patients in the clinical setting of myocardial ischemia  o cTnI 99% cutoff should be interpreted in the context of clinical history, ECG findings and possibly cardiac imaging to establish correct diagnosis  o cTnI 99% cutoff may be suggestive but clearly not indicative of a coronary event without the clinical setting of myocardial ischemia      POCT urinalysis dipstick [[id number]]  (Normal) Resulted:  05/12/18 1316    Lab Status:  Final result Specimen:  Urine Updated:  05/12/18 1316     Color, UA yellow    B-type natriuretic peptide [[id number]]  (Normal) Collected:  05/12/18 1231    Lab Status:  Final result Specimen:  Blood from Arm, Right Updated:  05/12/18 1304     NT-proBNP 97 pg/mL     Magnesium [[id number]]  (Normal) Collected:  05/12/18 1231    Lab Status:  Final result Specimen:  Blood from Arm, Right Updated:  05/12/18 1304     Magnesium 2 3 mg/dL     ED Urine Macroscopic [[id number]] Collected:  05/12/18 1305    Lab Status:  Final result Specimen:  Urine Updated:  05/12/18 1303     Color, UA Yellow     Clarity, UA Clear     pH, UA 7 5     Leukocytes, UA Negative     Nitrite, UA Negative     Protein, UA Negative mg/dl      Glucose, UA Negative mg/dl      Ketones, UA Negative mg/dl      Urobilinogen, UA 0 2 E U /dl      Bilirubin, UA Negative     Blood, UA Negative     Specific Gravity, UA 1 010    Narrative:       CLINITEK RESULT    Protime-INR [[id number]]  (Abnormal) Collected:  05/12/18 1231    Lab Status:  Final result Specimen:  Blood from Arm, Right Updated:  05/12/18 1255     Protime 16 1 (H) seconds      INR 1 28 (H)    APTT [[id number]]  (Abnormal) Collected:  05/12/18 1231    Lab Status:  Final result Specimen:  Blood from Arm, Right Updated:  05/12/18 1255     PTT 44 (H) seconds     Troponin I [[id number]]  (Normal) Collected:  05/12/18 1145    Lab Status:  Final result Specimen: Blood from Arm, Right Updated:  05/12/18 1213     Troponin I <0 02 ng/mL     Narrative:         Siemens Chemistry analyzer 99% cutoff is > 0 04 ng/mL in network labs    o cTnI 99% cutoff is useful only when applied to patients in the clinical setting of myocardial ischemia  o cTnI 99% cutoff should be interpreted in the context of clinical history, ECG findings and possibly cardiac imaging to establish correct diagnosis  o cTnI 99% cutoff may be suggestive but clearly not indicative of a coronary event without the clinical setting of myocardial ischemia  Comprehensive metabolic panel [[id number]] Collected:  05/12/18 1145    Lab Status:  Final result Specimen:  Blood from Arm, Right Updated:  05/12/18 1210     Sodium 140 mmol/L      Potassium 4 6 mmol/L      Chloride 104 mmol/L      CO2 31 mmol/L      Anion Gap 5 mmol/L      BUN 19 mg/dL      Creatinine 1 14 mg/dL      Glucose 101 mg/dL      Calcium 9 2 mg/dL      AST 19 U/L      ALT 23 U/L      Alkaline Phosphatase 54 U/L      Total Protein 7 4 g/dL      Albumin 4 0 g/dL      Total Bilirubin 0 42 mg/dL      eGFR 62 ml/min/1 73sq m     Narrative:         National Kidney Disease Education Program recommendations are as follows:  GFR calculation is accurate only with a steady state creatinine  Chronic Kidney disease less than 60 ml/min/1 73 sq  meters  Kidney failure less than 15 ml/min/1 73 sq  meters      CBC and differential [[id number]] Collected:  05/12/18 1145    Lab Status:  Final result Specimen:  Blood from Arm, Right Updated:  05/12/18 1152     WBC 5 55 Thousand/uL      RBC 4 36 Million/uL      Hemoglobin 13 7 g/dL      Hematocrit 41 0 %      MCV 94 fL      MCH 31 4 pg      MCHC 33 4 g/dL      RDW 14 2 %      MPV 10 1 fL      Platelets 923 Thousands/uL      nRBC 0 /100 WBCs      Neutrophils Relative 55 %      Lymphocytes Relative 28 %      Monocytes Relative 11 %      Eosinophils Relative 5 %      Basophils Relative 1 %      Neutrophils Absolute 3 09 Thousands/µL      Lymphocytes Absolute 1 54 Thousands/µL      Monocytes Absolute 0 62 Thousand/µL      Eosinophils Absolute 0 25 Thousand/µL      Basophils Absolute 0 05 Thousands/µL                  X-ray chest 2 views   Final Result by Jennifer Campa MD (05/12 2336)      No acute cardiopulmonary disease  Workstation performed: [id number]         CT head without contrast   Final Result by Felipa Bingham DO (05/12 2732)      No acute intracranial abnormality  Workstation performed: [id number]                    Procedures  ECG 12 Lead Documentation  Date/Time: 5/12/2018 12:09 PM  Performed by: Margo Marshall by: Merlyn Macias     Previous ECG:     Previous ECG:  Compared to current    Comparison ECG info:  5-10-18    Similarity:  No change  Rate:     ECG rate:  51    ECG rate assessment: bradycardic    Rhythm:     Rhythm: sinus bradycardia    Ectopy:     Ectopy: PVCs      PVCs:  Frequent  ST segments:     ST segments:  Normal  T waves:     T waves: normal             Phone Contacts  ED Phone Contact    ED Course                               MDM  Number of Diagnoses or Management Options  Diagnosis management comments: Patient notes feeling much better after fluid administration  Was able to ambulate to the bathroom without difficulty  Notes he has urinated three times and dizziness has resolved  Reviewed lab work with patient and patient has follow up with PCP  He also reports that he has an appointment with cardiology soon       CritCare Time    Disposition  Final diagnoses:   Dizziness   Weakness     Time reflects when diagnosis was documented in both MDM as applicable and the Disposition within this note     Time User Action Codes Description Comment    5/12/2018  2:51 PM [place] Add [R42] Dizziness     5/12/2018  2:51 PM Sherbarbarae Sinks Add [R53 1] Weakness       ED Disposition     ED Disposition Condition Comment    Discharge  Yovany Tyrone discharge to home/self care  Condition at discharge: Stable        Follow-up Information     Follow up With Specialties Details Why [address] , DO Family Medicine Schedule an appointment as soon as possible for a visit  [address]  [facility]  [phone]          Patient's Medications   Discharge Prescriptions    No medications on file     No discharge procedures on file      ED Provider  Electronically Signed by           Cathlean Snellen, PA-C  05/12/18 0826

## 2018-05-12 NOTE — DISCHARGE INSTRUCTIONS
Dizziness   AMBULATORY CARE:   Dizziness  is a feeling of being off balance or unsteady  Common causes of dizziness are an inner ear fluid imbalance or a lack of oxygen in your blood  Dizziness may be acute (lasts 3 days or less) or chronic (lasts longer than 3 days)  You may have dizzy spells that last from seconds to a few hours  Common symptoms that may happen with dizziness:   · A feeling that your surroundings are moving even though you are standing still    · Ringing in your ears or hearing loss     · Feeling faint or lightheaded     · Weakness or unsteadiness     · Double vision or eye movements you cannot control    · Nausea or vomiting     · Confusion  Seek care immediately if:   · You have a headache and a stiff neck  · You have shaking chills and a fever  · You vomit over and over with no relief  · Your vomit or bowel movements are red or black  · You have pain in your chest, back, or abdomen  · You have numbness, especially in your face, arms, or legs  · You have trouble moving your arms or legs  · You are confused  Contact your healthcare provider if:   · You have a fever  · Your symptoms do not get better with treatment  · You have questions or concerns about your condition or care  Treatment for dizziness  depends on the cause  Your healthcare provider may give you oxygen or medicines to decrease your dizziness and nausea  He may also refer you to a specialist  Yael Matamoros may need to be admitted to the hospital for treatment  Manage your symptoms:   · Do not drive  or operate heavy machinery when you are dizzy  · Get up slowly  from sitting or lying down  · Drink plenty of liquids  Liquids help prevent dehydration  Ask how much liquid to drink each day and which liquids are best for you  Follow up with your healthcare provider as directed:  Write down your questions so you remember to ask them during your visits     © 2017 [address]  Information is for End User's use only and may not be sold, redistributed or otherwise used for commercial purposes  All illustrations and images included in CareNotes® are the copyrighted property of A D [employer] M [employer]  or Daniel Leahy  The above information is an  only  It is not intended as medical advice for individual conditions or treatments  Talk to your doctor, nurse or pharmacist before following any medical regimen to see if it is safe and effective for you

## 2018-05-13 LAB
ATRIAL RATE: 51 BPM
P AXIS: 66 DEGREES
PR INTERVAL: 128 MS
QRS AXIS: 55 DEGREES
QRSD INTERVAL: 70 MS
QT INTERVAL: 412 MS
QTC INTERVAL: 379 MS
T WAVE AXIS: 58 DEGREES
VENTRICULAR RATE: 51 BPM

## 2018-05-13 PROCEDURE — 93010 ELECTROCARDIOGRAM REPORT: CPT | Performed by: INTERNAL MEDICINE

## 2018-05-21 RX ORDER — APIXABAN 5 MG/1
TABLET, FILM COATED ORAL
COMMUNITY
Start: 2018-03-14 | End: 2018-09-10 | Stop reason: SDUPTHER

## 2018-05-21 RX ORDER — CHLORAL HYDRATE 500 MG
CAPSULE ORAL DAILY
COMMUNITY
Start: 2017-10-02

## 2018-05-21 RX ORDER — VALSARTAN 80 MG/1
TABLET ORAL EVERY 24 HOURS
COMMUNITY
End: 2018-08-15

## 2018-05-21 RX ORDER — METHOCARBAMOL 500 MG/1
TABLET, FILM COATED ORAL
COMMUNITY
Start: 2018-02-28 | End: 2019-05-21

## 2018-05-21 RX ORDER — PANTOPRAZOLE SODIUM 40 MG/1
GRANULE, DELAYED RELEASE ORAL
COMMUNITY
Start: 2012-08-21 | End: 2019-05-21

## 2018-05-21 RX ORDER — EZETIMIBE AND SIMVASTATIN 10; 10 MG/1; MG/1
1 TABLET ORAL
COMMUNITY
Start: 2012-08-21 | End: 2018-06-07

## 2018-05-21 RX ORDER — SENNOSIDES 8.6 MG
650 CAPSULE ORAL EVERY 8 HOURS
COMMUNITY
End: 2019-05-21

## 2018-05-24 ENCOUNTER — OFFICE VISIT (OUTPATIENT)
Dept: UROLOGY | Facility: MEDICAL CENTER | Age: 78
End: 2018-05-24
Payer: MEDICARE

## 2018-05-24 VITALS
BODY MASS INDEX: 23.22 KG/M2 | DIASTOLIC BLOOD PRESSURE: 92 MMHG | WEIGHT: 136 LBS | HEIGHT: 64 IN | SYSTOLIC BLOOD PRESSURE: 148 MMHG

## 2018-05-24 DIAGNOSIS — N13.8 BPH WITH OBSTRUCTION/LOWER URINARY TRACT SYMPTOMS: Primary | ICD-10-CM

## 2018-05-24 DIAGNOSIS — N40.1 BPH WITH OBSTRUCTION/LOWER URINARY TRACT SYMPTOMS: Primary | ICD-10-CM

## 2018-05-24 LAB
SL AMB  POCT GLUCOSE, UA: NORMAL
SL AMB LEUKOCYTE ESTERASE,UA: NORMAL
SL AMB POCT BILIRUBIN,UA: NORMAL
SL AMB POCT BLOOD,UA: NORMAL
SL AMB POCT CLARITY,UA: CLEAR
SL AMB POCT COLOR,UA: YELLOW
SL AMB POCT KETONES,UA: NORMAL
SL AMB POCT NITRITE,UA: NORMAL
SL AMB POCT PH,UA: 6.5
SL AMB POCT SPECIFIC GRAVITY,UA: 1
SL AMB POCT URINE PROTEIN: NORMAL
SL AMB POCT UROBILINOGEN: 0.2

## 2018-05-24 PROCEDURE — 81003 URINALYSIS AUTO W/O SCOPE: CPT | Performed by: UROLOGY

## 2018-05-24 PROCEDURE — 99214 OFFICE O/P EST MOD 30 MIN: CPT | Performed by: UROLOGY

## 2018-05-24 RX ORDER — TAMSULOSIN HYDROCHLORIDE 0.4 MG/1
0.4 CAPSULE ORAL
Qty: 90 CAPSULE | Refills: 3 | Status: SHIPPED | OUTPATIENT
Start: 2018-05-24 | End: 2019-05-21

## 2018-05-24 NOTE — PROGRESS NOTES
IPSS Questionnaire (AUA-7): Over the past month    1)  How often have you had a sensation of not emptying your bladder completely after you finish urinating? 0 - Not at all   2)  How often have you had to urinate again less than two hours after you finished urinating? 2 - Less than half the time   3)  How often have you found you stopped and started again several times when you urinated? 2 - Less than half the time   4) How difficult have you found it to postpone urination? 0 - Not at all   5) How often have you had a weak urinary stream?  0 - Not at all   6) How often have you had to push or strain to begin urination? 0 - Not at all   7) How many times did you most typically get up to urinate from the time you went to bed until the time you got up in the morning?   2 - 2 times   Total Score:  6

## 2018-05-24 NOTE — PROGRESS NOTES
Assessment/Plan:  1  BPH with obstructive symptoms  The patient continues to utilize tamsulosin without side effect although the patient did have a recent emergency room admission due to syncope but he notes that his blood pressure was high and his pulse was low  His urinary flow is good his AUA symptom score is low and Flomax seems to be effective  He will report any further lightheadedness and will discontinue this if this continues but for the time being we will assume that there is no relation between his lightheadedness and Flomax  No problem-specific Assessment & Plan notes found for this encounter  Diagnoses and all orders for this visit:    BPH with obstruction/lower urinary tract symptoms  -     POCT urine dip auto non-scope  -     tamsulosin (FLOMAX) 0 4 mg; Take 1 capsule (0 4 mg total) by mouth daily with dinner  -     Comprehensive metabolic panel; Future    Other orders  -     acetaminophen (TYLENOL) 650 mg CR tablet; Take 650 mg by mouth every 8 (eight) hours  -     ELIQUIS 5 MG;   -     ezetimibe-simvastatin (VYTORIN) 10-10 mg per tablet; Take 1 tablet by mouth  -     Omega-3 Fatty Acids (FISH OIL) 1,000 mg; Take by mouth daily  -     Bimatoprost (LUMIGAN OP); Lumigan  -     methocarbamol (ROBAXIN) 500 mg tablet;   -     Multiple Vitamins-Minerals (MULTIVITAMIN ADULT PO); i po daily  -     pantoprazole (PROTONIX) 40 mg;   -     valsartan (DIOVAN) 80 mg tablet; every 24 hours          Subjective:      Patient ID: Westley Johnson is a 69 y o  male  HPI 78-year-old male with weakening of the urinary stream urinary hesitancy intermittency postvoid dribbling and nocturia treated with alpha blockade utilizing tamsulosin 0 4 mg p o  daily now noting resolution of the weakening of the urinary stream with a good flow no intermittency or postvoid dribbling and no appreciable nocturia  AUA symptom score remains low    The patient reports an episode of lightheadedness for which he was seen in the emergency room and later discharged  The patient noted that he was hypertensive a slow pulse and denied any hypotension that may have been related to something like Flomax  We did discuss Flomax and hypotension and if symptoms continue Flomax can be suspended to see if the symptoms go away  At the present time the patient has no lightheadedness  The following portions of the patient's history were reviewed and updated as appropriate: allergies, current medications, past family history, past medical history, past social history, past surgical history and problem list     Review of Systems   Neurological: Positive for dizziness and light-headedness  All other systems reviewed and are negative  Objective:      /92   Ht 5' 4" (1 626 m)   Wt 61 7 kg (136 lb)   BMI 23 34 kg/m²          Physical Exam   Constitutional: He is oriented to person, place, and time  He appears well-developed and well-nourished  HENT:   Head: Normocephalic and atraumatic  Eyes: Conjunctivae and EOM are normal    Neck: Neck supple  Pulmonary/Chest: Effort normal  No respiratory distress  Abdominal: Soft  Genitourinary: Penis normal    Genitourinary Comments: Phallus normal uncircumcised without cutaneous lesions  Meatus patent and normally placed  Scrotum normal without cutaneous lesions  Testes and adnexa palpably normal without masses induration or palpable fluid collections  Perineum palpably normal   INDIRA reveals a normal anal verge with nonthrombosed external hemorrhoids circumferentially  Anal sphincter tone is normal   No rectal masses appreciated  Prostate 30 g a nodular nontender  Neurological: He is alert and oriented to person, place, and time  Psychiatric: He has a normal mood and affect   His behavior is normal  Judgment and thought content normal

## 2018-05-24 NOTE — LETTER
May 24, 2018     Anna Schmidt, [address] [place] [address]    Patient: Taylor Patricia   YOB: 1940   Date of Visit: 5/24/2018       Dear Dr Arturo Gruber: Thank you for referring Taylor Patricia to me for evaluation  Below are my notes for this consultation  If you have questions, please do not hesitate to call me  I look forward to following your patient along with you  Sincerely,        Diane Carver MD        CC: No Recipients  Diane Carver MD  5/24/2018 10:47 AM  Sign at close encounter  Assessment/Plan:  1  BPH with obstructive symptoms  The patient continues to utilize tamsulosin without side effect although the patient did have a recent emergency room admission due to syncope but he notes that his blood pressure was high and his pulse was low  His urinary flow is good his AUA symptom score is low and Flomax seems to be effective  He will report any further lightheadedness and will discontinue this if this continues but for the time being we will assume that there is no relation between his lightheadedness and Flomax  No problem-specific Assessment & Plan notes found for this encounter  Diagnoses and all orders for this visit:    BPH with obstruction/lower urinary tract symptoms  -     POCT urine dip auto non-scope  -     tamsulosin (FLOMAX) 0 4 mg; Take 1 capsule (0 4 mg total) by mouth daily with dinner  -     Comprehensive metabolic panel; Future    Other orders  -     acetaminophen (TYLENOL) 650 mg CR tablet; Take 650 mg by mouth every 8 (eight) hours  -     ELIQUIS 5 MG;   -     ezetimibe-simvastatin (VYTORIN) 10-10 mg per tablet; Take 1 tablet by mouth  -     Omega-3 Fatty Acids (FISH OIL) 1,000 mg; Take by mouth daily  -     Bimatoprost (LUMIGAN OP);  Lumigan  -     methocarbamol (ROBAXIN) 500 mg tablet;   -     Multiple Vitamins-Minerals (MULTIVITAMIN ADULT PO); i po daily  -     pantoprazole (PROTONIX) 40 mg;   -     valsartan (DIOVAN) 80 mg tablet; every 24 hours          Subjective:      Patient ID: Erma Upton is a 69 y o  male  HPI 76-year-old male with weakening of the urinary stream urinary hesitancy intermittency postvoid dribbling and nocturia treated with alpha blockade utilizing tamsulosin 0 4 mg p o  daily now noting resolution of the weakening of the urinary stream with a good flow no intermittency or postvoid dribbling and no appreciable nocturia  AUA symptom score remains low  The patient reports an episode of lightheadedness for which he was seen in the emergency room and later discharged  The patient noted that he was hypertensive a slow pulse and denied any hypotension that may have been related to something like Flomax  We did discuss Flomax and hypotension and if symptoms continue Flomax can be suspended to see if the symptoms go away  At the present time the patient has no lightheadedness  The following portions of the patient's history were reviewed and updated as appropriate: allergies, current medications, past family history, past medical history, past social history, past surgical history and problem list     Review of Systems   Neurological: Positive for dizziness and light-headedness  All other systems reviewed and are negative  Objective:      /92   Ht 5' 4" (1 626 m)   Wt 61 7 kg (136 lb)   BMI 23 34 kg/m²           Physical Exam   Constitutional: He is oriented to person, place, and time  He appears well-developed and well-nourished  HENT:   Head: Normocephalic and atraumatic  Eyes: Conjunctivae and EOM are normal    Neck: Neck supple  Pulmonary/Chest: Effort normal  No respiratory distress  Abdominal: Soft  Genitourinary: Penis normal    Neurological: He is alert and oriented to person, place, and time  Psychiatric: He has a normal mood and affect   His behavior is normal  Judgment and thought content normal

## 2018-06-07 ENCOUNTER — OFFICE VISIT (OUTPATIENT)
Dept: CARDIOLOGY CLINIC | Facility: CLINIC | Age: 78
End: 2018-06-07
Payer: MEDICARE

## 2018-06-07 VITALS
SYSTOLIC BLOOD PRESSURE: 140 MMHG | DIASTOLIC BLOOD PRESSURE: 70 MMHG | BODY MASS INDEX: 21.86 KG/M2 | HEIGHT: 66 IN | HEART RATE: 64 BPM | WEIGHT: 136 LBS

## 2018-06-07 DIAGNOSIS — I48.91 ATRIAL FIBRILLATION, UNSPECIFIED TYPE (HCC): Primary | ICD-10-CM

## 2018-06-07 DIAGNOSIS — E78.5 DYSLIPIDEMIA: ICD-10-CM

## 2018-06-07 DIAGNOSIS — I10 BENIGN ESSENTIAL HTN: ICD-10-CM

## 2018-06-07 PROCEDURE — 99214 OFFICE O/P EST MOD 30 MIN: CPT | Performed by: INTERNAL MEDICINE

## 2018-06-08 NOTE — PROGRESS NOTES
Cardiology Follow Up        Yovany Hansen      1940      [id number]      Discussion/Summary:    1  Paroxysmal atrial fibrillation:  Sinus by physical examination, heart rate in the 60s, no further lightheadedness after metoprolol discontinued  Continue off metoprolol, continue Eliquis anticoagulation which she is tolerating well  2   Hypertension:  Controlled    3  Dyslipidemia:  Continue simvastatin, routine lipid panel per PCP    Follow-up in 2 months  If doing well without any further lightheadedness, will follow up every 4-6 months thereafter  Interval History: This is a 73-year-old male with a history of atrial fibrillation diagnosed 08/2017, maintained on low-dose metoprolol up until May 2018  He presented to his PCPs office with symptoms of intermittent lightheadedness with bradycardia in the 40s  At that time his metoprolol was discontinued  He has been maintained on Eliquis anticoagulation  He presents today for follow-up  He has had no further lightheadedness since discontinuation of metoprolol  He has no complaints on today's visit  Vitals:  Vitals:    06/07/18 1608   BP: 140/70   Pulse: 64   Weight: 61 7 kg (136 lb)   Height: 5' 6" (1 676 m)         Past Medical History:   Diagnosis Date    Back pain     Benign prostatic hypertrophy     Disease of thyroid gland     Hypertension     Restless leg      Social History     Social History    Marital status: /Civil Union     Spouse name: N/A    Number of children: N/A    Years of education: N/A     Occupational History    Not on file       Social History Main Topics    Smoking status: Never Smoker    Smokeless tobacco: Never Used    Alcohol use No      Comment: CAFFEINE USE    Drug use: No    Sexual activity: Not on file     Other Topics Concern    Not on file     Social History Narrative    No narrative on file      Family History   Problem Relation Age of Onset    Cancer Mother     Throat cancer Father     Breast cancer Sister      Past Surgical History:   Procedure Laterality Date    ACHILLES TENDON SURGERY      HERNIA REPAIR         Current Outpatient Prescriptions:     acetaminophen (TYLENOL) 650 mg CR tablet, Take 650 mg by mouth every 8 (eight) hours, Disp: , Rfl:     Bimatoprost (LUMIGAN OP), Lumigan, Disp: , Rfl:     bimatoprost (LUMIGAN) 0 01 % ophthalmic drops, Administer 1 drop into the left eye daily at bedtime, Disp: , Rfl:     ELIQUIS 5 MG, , Disp: , Rfl:     gabapentin (NEURONTIN) 300 mg capsule, Take 300 mg by mouth 2 (two) times a day, Disp: , Rfl:     levothyroxine 25 mcg tablet, Take 25 mcg by mouth daily, Disp: , Rfl:     Multiple Vitamins-Minerals (CENTRUM SILVER 50+MEN PO), Take by mouth, Disp: , Rfl:     Omega-3 Fatty Acids (FISH OIL) 1,000 mg, Take by mouth daily, Disp: , Rfl:     simvastatin (ZOCOR) 20 mg tablet, Take 20 mg by mouth daily at bedtime, Disp: , Rfl:     tamsulosin (FLOMAX) 0 4 mg, Take 1 capsule (0 4 mg total) by mouth daily with dinner, Disp: 90 capsule, Rfl: 3    Cholecalciferol (VITAMIN D3 PO), Take by mouth, Disp: , Rfl:     methocarbamol (ROBAXIN) 500 mg tablet, , Disp: , Rfl:     pantoprazole (PROTONIX) 40 mg, , Disp: , Rfl:     traMADol (ULTRAM) 50 mg tablet, Take 50 mg by mouth every 6 (six) hours as needed for moderate pain, Disp: , Rfl:     valsartan (DIOVAN) 80 mg tablet, every 24 hours, Disp: , Rfl:         Review of Systems:  Review of Systems      Physical Exam:  Physical Exam

## 2018-08-15 ENCOUNTER — OFFICE VISIT (OUTPATIENT)
Dept: CARDIOLOGY CLINIC | Facility: CLINIC | Age: 78
End: 2018-08-15
Payer: MEDICARE

## 2018-08-15 VITALS
HEIGHT: 64 IN | BODY MASS INDEX: 23.22 KG/M2 | WEIGHT: 136 LBS | DIASTOLIC BLOOD PRESSURE: 50 MMHG | HEART RATE: 61 BPM | SYSTOLIC BLOOD PRESSURE: 102 MMHG

## 2018-08-15 DIAGNOSIS — E78.5 DYSLIPIDEMIA: ICD-10-CM

## 2018-08-15 DIAGNOSIS — I48.0 PAROXYSMAL ATRIAL FIBRILLATION (HCC): ICD-10-CM

## 2018-08-15 DIAGNOSIS — I48.91 ATRIAL FIBRILLATION, UNSPECIFIED TYPE (HCC): Primary | ICD-10-CM

## 2018-08-15 PROCEDURE — 99213 OFFICE O/P EST LOW 20 MIN: CPT | Performed by: INTERNAL MEDICINE

## 2018-08-17 PROCEDURE — 93000 ELECTROCARDIOGRAM COMPLETE: CPT | Performed by: INTERNAL MEDICINE

## 2018-08-17 NOTE — PROGRESS NOTES
Cardiology Follow Up        Luann Bailey      1940      [id number]      Discussion/Summary:    1  Paroxysmal atrial fibrillation:  Sinus by physical examination, heart rate in the 60s, no further lightheadedness after metoprolol discontinued earlier this year  Continue off metoprolol, continue Eliquis anticoagulation which she is tolerating well  2   Hypertension:  Controlled    3  Dyslipidemia:  Continue simvastatin, routine lipid panel per PCP    Follow-up in 1 year  Pt to call if any changes      Interval History: This is a 69-year-old male with a history of atrial fibrillation diagnosed 08/2017, maintained on low-dose metoprolol up until May 2018  He presented to his PCPs office with symptoms of intermittent lightheadedness with bradycardia in the 40s  At that time his metoprolol was discontinued  He has been maintained on Eliquis anticoagulation  He presents today for follow-up  He has had no further lightheadedness since discontinuation of metoprolol  He has no complaints on today's visit  Overall he feels well without palpitations, edema, chest pain  Vitals:  Vitals:    08/15/18 1555   BP: 102/50   Pulse: 61   Weight: 61 7 kg (136 lb)   Height: 5' 4" (1 626 m)         Past Medical History:   Diagnosis Date    Back pain     Benign prostatic hypertrophy     Disease of thyroid gland     Hypertension     Restless leg      Social History     Social History    Marital status: /Civil Union     Spouse name: N/A    Number of children: N/A    Years of education: N/A     Occupational History    Not on file       Social History Main Topics    Smoking status: Never Smoker    Smokeless tobacco: Never Used    Alcohol use No      Comment: CAFFEINE USE    Drug use: No    Sexual activity: Not on file     Other Topics Concern    Not on file     Social History Narrative    No narrative on file      Family History   Problem Relation Age of Onset    Cancer Mother     Throat cancer Father     Breast cancer Sister      Past Surgical History:   Procedure Laterality Date    ACHILLES TENDON SURGERY      HERNIA REPAIR         Current Outpatient Prescriptions:     acetaminophen (TYLENOL) 650 mg CR tablet, Take 650 mg by mouth every 8 (eight) hours, Disp: , Rfl:     Bimatoprost (LUMIGAN OP), Lumigan, Disp: , Rfl:     bimatoprost (LUMIGAN) 0 01 % ophthalmic drops, Administer 1 drop into the left eye daily at bedtime, Disp: , Rfl:     ELIQUIS 5 MG, , Disp: , Rfl:     gabapentin (NEURONTIN) 300 mg capsule, Take 300 mg by mouth 2 (two) times a day, Disp: , Rfl:     levothyroxine 25 mcg tablet, Take 25 mcg by mouth daily, Disp: , Rfl:     Multiple Vitamins-Minerals (CENTRUM SILVER 50+MEN PO), Take by mouth, Disp: , Rfl:     Omega-3 Fatty Acids (FISH OIL) 1,000 mg, Take by mouth daily, Disp: , Rfl:     simvastatin (ZOCOR) 20 mg tablet, Take 20 mg by mouth daily at bedtime, Disp: , Rfl:     Cholecalciferol (VITAMIN D3 PO), Take by mouth, Disp: , Rfl:     methocarbamol (ROBAXIN) 500 mg tablet, , Disp: , Rfl:     pantoprazole (PROTONIX) 40 mg, , Disp: , Rfl:     tamsulosin (FLOMAX) 0 4 mg, Take 1 capsule (0 4 mg total) by mouth daily with dinner (Patient not taking: Reported on 8/15/2018 ), Disp: 90 capsule, Rfl: 3    traMADol (ULTRAM) 50 mg tablet, Take 50 mg by mouth every 6 (six) hours as needed for moderate pain, Disp: , Rfl:         Review of Systems:  Review of Systems   Respiratory: Negative  Cardiovascular: Negative  All other systems reviewed and are negative  Physical Exam:  Physical Exam   Constitutional: He is oriented to person, place, and time  He appears well-developed and well-nourished  No distress  HENT:   Head: Normocephalic and atraumatic  Eyes: EOM are normal  Pupils are equal, round, and reactive to light  Right eye exhibits no discharge  No scleral icterus  Neck: Normal range of motion   Neck supple  No thyromegaly present  Cardiovascular: Normal rate, regular rhythm and normal heart sounds  Exam reveals no gallop and no friction rub  No murmur heard  Pulmonary/Chest: Effort normal and breath sounds normal    Abdominal: He exhibits no distension  There is no tenderness  There is no rebound and no guarding  Musculoskeletal: Normal range of motion  He exhibits no edema  Neurological: He is alert and oriented to person, place, and time  Coordination normal    Skin: Skin is warm and dry  No rash noted  He is not diaphoretic  No erythema  No pallor  Psychiatric: He has a normal mood and affect   His behavior is normal  Judgment and thought content normal

## 2018-09-10 DIAGNOSIS — I48.91 ATRIAL FIBRILLATION, UNSPECIFIED TYPE (HCC): Primary | ICD-10-CM

## 2018-09-10 RX ORDER — APIXABAN 5 MG/1
TABLET, FILM COATED ORAL
Qty: 180 TABLET | Refills: 3 | Status: SHIPPED | OUTPATIENT
Start: 2018-09-10 | End: 2019-11-04 | Stop reason: SDUPTHER

## 2019-02-05 ENCOUNTER — APPOINTMENT (OUTPATIENT)
Dept: LAB | Facility: CLINIC | Age: 79
End: 2019-02-05
Payer: MEDICARE

## 2019-02-05 ENCOUNTER — TRANSCRIBE ORDERS (OUTPATIENT)
Dept: LAB | Facility: CLINIC | Age: 79
End: 2019-02-05

## 2019-02-05 DIAGNOSIS — F45.8 ANXIETY HYPERVENTILATION: ICD-10-CM

## 2019-02-05 DIAGNOSIS — E03.9 MYXEDEMA HEART DISEASE: Primary | ICD-10-CM

## 2019-02-05 DIAGNOSIS — I51.9 MYXEDEMA HEART DISEASE: Primary | ICD-10-CM

## 2019-02-05 DIAGNOSIS — E78.2 MIXED HYPERLIPIDEMIA: ICD-10-CM

## 2019-02-05 DIAGNOSIS — R97.20 ELEVATED PROSTATE SPECIFIC ANTIGEN (PSA): ICD-10-CM

## 2019-02-05 DIAGNOSIS — F41.9 ANXIETY HYPERVENTILATION: ICD-10-CM

## 2019-02-05 LAB
ALBUMIN SERPL BCP-MCNC: 3.8 G/DL (ref 3.5–5)
ALP SERPL-CCNC: 52 U/L (ref 46–116)
ALT SERPL W P-5'-P-CCNC: 21 U/L (ref 12–78)
ANION GAP SERPL CALCULATED.3IONS-SCNC: 4 MMOL/L (ref 4–13)
AST SERPL W P-5'-P-CCNC: 14 U/L (ref 5–45)
BASOPHILS # BLD AUTO: 0.09 THOUSANDS/ΜL (ref 0–0.1)
BASOPHILS NFR BLD AUTO: 2 % (ref 0–1)
BILIRUB SERPL-MCNC: 0.59 MG/DL (ref 0.2–1)
BUN SERPL-MCNC: 25 MG/DL (ref 5–25)
CALCIUM SERPL-MCNC: 8.8 MG/DL (ref 8.3–10.1)
CHLORIDE SERPL-SCNC: 104 MMOL/L (ref 100–108)
CHOLEST SERPL-MCNC: 179 MG/DL (ref 50–200)
CK SERPL-CCNC: 122 U/L (ref 39–308)
CO2 SERPL-SCNC: 28 MMOL/L (ref 21–32)
CREAT SERPL-MCNC: 1.15 MG/DL (ref 0.6–1.3)
EOSINOPHIL # BLD AUTO: 0.38 THOUSAND/ΜL (ref 0–0.61)
EOSINOPHIL NFR BLD AUTO: 6 % (ref 0–6)
ERYTHROCYTE [DISTWIDTH] IN BLOOD BY AUTOMATED COUNT: 13.2 % (ref 11.6–15.1)
GFR SERPL CREATININE-BSD FRML MDRD: 61 ML/MIN/1.73SQ M
GLUCOSE P FAST SERPL-MCNC: 89 MG/DL (ref 65–99)
HCT VFR BLD AUTO: 42.1 % (ref 36.5–49.3)
HDLC SERPL-MCNC: 54 MG/DL (ref 40–60)
HGB BLD-MCNC: 13.5 G/DL (ref 12–17)
IMM GRANULOCYTES # BLD AUTO: 0.02 THOUSAND/UL (ref 0–0.2)
IMM GRANULOCYTES NFR BLD AUTO: 0 % (ref 0–2)
LDLC SERPL CALC-MCNC: 108 MG/DL (ref 0–100)
LYMPHOCYTES # BLD AUTO: 2.19 THOUSANDS/ΜL (ref 0.6–4.47)
LYMPHOCYTES NFR BLD AUTO: 36 % (ref 14–44)
MCH RBC QN AUTO: 30 PG (ref 26.8–34.3)
MCHC RBC AUTO-ENTMCNC: 32.1 G/DL (ref 31.4–37.4)
MCV RBC AUTO: 94 FL (ref 82–98)
MONOCYTES # BLD AUTO: 0.62 THOUSAND/ΜL (ref 0.17–1.22)
MONOCYTES NFR BLD AUTO: 10 % (ref 4–12)
NEUTROPHILS # BLD AUTO: 2.77 THOUSANDS/ΜL (ref 1.85–7.62)
NEUTS SEG NFR BLD AUTO: 46 % (ref 43–75)
NONHDLC SERPL-MCNC: 125 MG/DL
NRBC BLD AUTO-RTO: 0 /100 WBCS
PLATELET # BLD AUTO: 336 THOUSANDS/UL (ref 149–390)
PMV BLD AUTO: 9.8 FL (ref 8.9–12.7)
POTASSIUM SERPL-SCNC: 4.1 MMOL/L (ref 3.5–5.3)
PROT SERPL-MCNC: 7 G/DL (ref 6.4–8.2)
PSA SERPL-MCNC: 3.2 NG/ML (ref 0–4)
RBC # BLD AUTO: 4.5 MILLION/UL (ref 3.88–5.62)
SODIUM SERPL-SCNC: 136 MMOL/L (ref 136–145)
TRIGL SERPL-MCNC: 87 MG/DL
TSH SERPL DL<=0.05 MIU/L-ACNC: 3.23 UIU/ML (ref 0.36–3.74)
WBC # BLD AUTO: 6.07 THOUSAND/UL (ref 4.31–10.16)

## 2019-02-05 PROCEDURE — G0103 PSA SCREENING: HCPCS

## 2019-02-05 PROCEDURE — 80061 LIPID PANEL: CPT

## 2019-02-05 PROCEDURE — 85025 COMPLETE CBC W/AUTO DIFF WBC: CPT

## 2019-02-05 PROCEDURE — 36415 COLL VENOUS BLD VENIPUNCTURE: CPT

## 2019-02-05 PROCEDURE — 82550 ASSAY OF CK (CPK): CPT

## 2019-02-05 PROCEDURE — 80053 COMPREHEN METABOLIC PANEL: CPT

## 2019-02-05 PROCEDURE — 84443 ASSAY THYROID STIM HORMONE: CPT

## 2019-05-21 ENCOUNTER — HOSPITAL ENCOUNTER (EMERGENCY)
Facility: HOSPITAL | Age: 79
Discharge: HOME | End: 2019-05-21
Attending: EMERGENCY MEDICINE | Admitting: EMERGENCY MEDICINE
Payer: MEDICARE

## 2019-05-21 ENCOUNTER — APPOINTMENT (EMERGENCY)
Dept: RADIOLOGY | Facility: HOSPITAL | Age: 79
End: 2019-05-21
Payer: MEDICARE

## 2019-05-21 VITALS
DIASTOLIC BLOOD PRESSURE: 93 MMHG | HEART RATE: 69 BPM | RESPIRATION RATE: 16 BRPM | WEIGHT: 130 LBS | SYSTOLIC BLOOD PRESSURE: 173 MMHG | TEMPERATURE: 97.3 F | OXYGEN SATURATION: 99 % | BODY MASS INDEX: 22.31 KG/M2

## 2019-05-21 DIAGNOSIS — I10 HYPERTENSION: ICD-10-CM

## 2019-05-21 DIAGNOSIS — R07.9 CHEST PAIN WITH LOW RISK OF ACUTE CORONARY SYNDROME: Primary | ICD-10-CM

## 2019-05-21 LAB
ALBUMIN SERPL BCP-MCNC: 4.1 G/DL (ref 3.5–5)
ALP SERPL-CCNC: 62 U/L (ref 46–116)
ALT SERPL W P-5'-P-CCNC: 27 U/L (ref 12–78)
ANION GAP SERPL CALCULATED.3IONS-SCNC: 10 MMOL/L (ref 4–13)
AST SERPL W P-5'-P-CCNC: 22 U/L (ref 5–45)
BASOPHILS # BLD AUTO: 0.08 THOUSANDS/ΜL (ref 0–0.1)
BASOPHILS NFR BLD AUTO: 1 % (ref 0–1)
BILIRUB SERPL-MCNC: 0.51 MG/DL (ref 0.2–1)
BUN SERPL-MCNC: 18 MG/DL (ref 5–25)
CALCIUM SERPL-MCNC: 9.7 MG/DL (ref 8.3–10.1)
CHLORIDE SERPL-SCNC: 103 MMOL/L (ref 100–108)
CO2 SERPL-SCNC: 29 MMOL/L (ref 21–32)
CREAT SERPL-MCNC: 1.07 MG/DL (ref 0.6–1.3)
EOSINOPHIL # BLD AUTO: 0.2 THOUSAND/ΜL (ref 0–0.61)
EOSINOPHIL NFR BLD AUTO: 3 % (ref 0–6)
ERYTHROCYTE [DISTWIDTH] IN BLOOD BY AUTOMATED COUNT: 13.3 % (ref 11.6–15.1)
GFR SERPL CREATININE-BSD FRML MDRD: 67 ML/MIN/1.73SQ M
GLUCOSE SERPL-MCNC: 102 MG/DL (ref 65–140)
HCT VFR BLD AUTO: 46.1 % (ref 36.5–49.3)
HGB BLD-MCNC: 14.9 G/DL (ref 12–17)
IMM GRANULOCYTES # BLD AUTO: 0.01 THOUSAND/UL (ref 0–0.2)
IMM GRANULOCYTES NFR BLD AUTO: 0 % (ref 0–2)
LYMPHOCYTES # BLD AUTO: 1.43 THOUSANDS/ΜL (ref 0.6–4.47)
LYMPHOCYTES NFR BLD AUTO: 23 % (ref 14–44)
MCH RBC QN AUTO: 30.4 PG (ref 26.8–34.3)
MCHC RBC AUTO-ENTMCNC: 32.3 G/DL (ref 31.4–37.4)
MCV RBC AUTO: 94 FL (ref 82–98)
MONOCYTES # BLD AUTO: 0.69 THOUSAND/ΜL (ref 0.17–1.22)
MONOCYTES NFR BLD AUTO: 11 % (ref 4–12)
NEUTROPHILS # BLD AUTO: 3.82 THOUSANDS/ΜL (ref 1.85–7.62)
NEUTS SEG NFR BLD AUTO: 62 % (ref 43–75)
NRBC BLD AUTO-RTO: 0 /100 WBCS
NT-PROBNP SERPL-MCNC: 123 PG/ML
PLATELET # BLD AUTO: 403 THOUSANDS/UL (ref 149–390)
PMV BLD AUTO: 9.5 FL (ref 8.9–12.7)
POTASSIUM SERPL-SCNC: 5.1 MMOL/L (ref 3.5–5.3)
PROT SERPL-MCNC: 7.8 G/DL (ref 6.4–8.2)
RBC # BLD AUTO: 4.9 MILLION/UL (ref 3.88–5.62)
SODIUM SERPL-SCNC: 142 MMOL/L (ref 136–145)
TROPONIN I SERPL-MCNC: <0.02 NG/ML
TROPONIN I SERPL-MCNC: <0.02 NG/ML
WBC # BLD AUTO: 6.23 THOUSAND/UL (ref 4.31–10.16)

## 2019-05-21 PROCEDURE — 99283 EMERGENCY DEPT VISIT LOW MDM: CPT | Performed by: EMERGENCY MEDICINE

## 2019-05-21 PROCEDURE — 93005 ELECTROCARDIOGRAM TRACING: CPT

## 2019-05-21 PROCEDURE — 36415 COLL VENOUS BLD VENIPUNCTURE: CPT | Performed by: EMERGENCY MEDICINE

## 2019-05-21 PROCEDURE — 71046 X-RAY EXAM CHEST 2 VIEWS: CPT

## 2019-05-21 PROCEDURE — 84484 ASSAY OF TROPONIN QUANT: CPT | Performed by: EMERGENCY MEDICINE

## 2019-05-21 PROCEDURE — 99285 EMERGENCY DEPT VISIT HI MDM: CPT

## 2019-05-21 PROCEDURE — 83880 ASSAY OF NATRIURETIC PEPTIDE: CPT | Performed by: EMERGENCY MEDICINE

## 2019-05-21 PROCEDURE — 85025 COMPLETE CBC W/AUTO DIFF WBC: CPT | Performed by: EMERGENCY MEDICINE

## 2019-05-21 PROCEDURE — 80053 COMPREHEN METABOLIC PANEL: CPT | Performed by: EMERGENCY MEDICINE

## 2019-05-22 LAB
ATRIAL RATE: 51 BPM
ATRIAL RATE: 58 BPM
P AXIS: 63 DEGREES
P AXIS: 78 DEGREES
PR INTERVAL: 126 MS
PR INTERVAL: 126 MS
QRS AXIS: 30 DEGREES
QRS AXIS: 49 DEGREES
QRSD INTERVAL: 78 MS
QRSD INTERVAL: 84 MS
QT INTERVAL: 420 MS
QT INTERVAL: 440 MS
QTC INTERVAL: 405 MS
QTC INTERVAL: 412 MS
T WAVE AXIS: 30 DEGREES
T WAVE AXIS: 59 DEGREES
VENTRICULAR RATE: 51 BPM
VENTRICULAR RATE: 58 BPM

## 2019-05-22 PROCEDURE — 93010 ELECTROCARDIOGRAM REPORT: CPT | Performed by: INTERNAL MEDICINE

## 2019-05-23 ENCOUNTER — HOSPITAL ENCOUNTER (OUTPATIENT)
Dept: NON INVASIVE DIAGNOSTICS | Facility: HOSPITAL | Age: 79
Discharge: HOME | End: 2019-05-23
Attending: EMERGENCY MEDICINE
Payer: MEDICARE

## 2019-05-23 DIAGNOSIS — R07.9 CHEST PAIN WITH LOW RISK OF ACUTE CORONARY SYNDROME: ICD-10-CM

## 2019-05-23 LAB
CHEST PAIN STATEMENT: NORMAL
MAX DIASTOLIC BP: 70 MMHG
MAX HEART RATE: 141 BPM
MAX PREDICTED HEART RATE: 143 BPM
MAX. SYSTOLIC BP: 200 MMHG
PROTOCOL NAME: NORMAL
TARGET HR FORMULA: NORMAL
TEST INDICATION: NORMAL
TIME IN EXERCISE PHASE: NORMAL

## 2019-05-23 PROCEDURE — 93018 CV STRESS TEST I&R ONLY: CPT | Performed by: INTERNAL MEDICINE

## 2019-05-23 PROCEDURE — 93017 CV STRESS TEST TRACING ONLY: CPT

## 2019-05-23 PROCEDURE — 93016 CV STRESS TEST SUPVJ ONLY: CPT | Performed by: INTERNAL MEDICINE

## 2019-06-10 ENCOUNTER — TELEPHONE (OUTPATIENT)
Dept: BEHAVIORAL/MENTAL HEALTH CLINIC | Facility: CLINIC | Age: 79
End: 2019-06-10

## 2019-10-13 ENCOUNTER — APPOINTMENT (OUTPATIENT)
Dept: LAB | Facility: MEDICAL CENTER | Age: 79
End: 2019-10-13
Payer: MEDICARE

## 2019-10-13 ENCOUNTER — TRANSCRIBE ORDERS (OUTPATIENT)
Dept: ADMINISTRATIVE | Facility: HOSPITAL | Age: 79
End: 2019-10-13

## 2019-10-13 DIAGNOSIS — E03.9 HYPOTHYROIDISM, UNSPECIFIED TYPE: ICD-10-CM

## 2019-10-13 DIAGNOSIS — I48.91 ATRIAL FIBRILLATION, UNSPECIFIED TYPE (HCC): ICD-10-CM

## 2019-10-13 DIAGNOSIS — I10 HYPERTENSION, ESSENTIAL: ICD-10-CM

## 2019-10-13 DIAGNOSIS — E78.2 MIXED HYPERLIPIDEMIA: ICD-10-CM

## 2019-10-13 DIAGNOSIS — E03.9 HYPOTHYROIDISM, UNSPECIFIED TYPE: Primary | ICD-10-CM

## 2019-10-13 LAB
ALBUMIN SERPL BCP-MCNC: 4.1 G/DL (ref 3.5–5)
ALP SERPL-CCNC: 53 U/L (ref 46–116)
ALT SERPL W P-5'-P-CCNC: 23 U/L (ref 12–78)
ANION GAP SERPL CALCULATED.3IONS-SCNC: 5 MMOL/L (ref 4–13)
AST SERPL W P-5'-P-CCNC: 18 U/L (ref 5–45)
BASOPHILS # BLD AUTO: 0.08 THOUSANDS/ΜL (ref 0–0.1)
BASOPHILS NFR BLD AUTO: 1 % (ref 0–1)
BILIRUB SERPL-MCNC: 0.89 MG/DL (ref 0.2–1)
BUN SERPL-MCNC: 20 MG/DL (ref 5–25)
CALCIUM SERPL-MCNC: 8.9 MG/DL (ref 8.3–10.1)
CHLORIDE SERPL-SCNC: 107 MMOL/L (ref 100–108)
CHOLEST SERPL-MCNC: 174 MG/DL (ref 50–200)
CK SERPL-CCNC: 116 U/L (ref 39–308)
CO2 SERPL-SCNC: 30 MMOL/L (ref 21–32)
CREAT SERPL-MCNC: 1.08 MG/DL (ref 0.6–1.3)
EOSINOPHIL # BLD AUTO: 0.45 THOUSAND/ΜL (ref 0–0.61)
EOSINOPHIL NFR BLD AUTO: 7 % (ref 0–6)
ERYTHROCYTE [DISTWIDTH] IN BLOOD BY AUTOMATED COUNT: 13.8 % (ref 11.6–15.1)
GFR SERPL CREATININE-BSD FRML MDRD: 65 ML/MIN/1.73SQ M
GLUCOSE P FAST SERPL-MCNC: 94 MG/DL (ref 65–99)
HCT VFR BLD AUTO: 42.9 % (ref 36.5–49.3)
HDLC SERPL-MCNC: 58 MG/DL (ref 40–60)
HGB BLD-MCNC: 13.6 G/DL (ref 12–17)
IMM GRANULOCYTES # BLD AUTO: 0.02 THOUSAND/UL (ref 0–0.2)
IMM GRANULOCYTES NFR BLD AUTO: 0 % (ref 0–2)
LDLC SERPL CALC-MCNC: 98 MG/DL (ref 0–100)
LYMPHOCYTES # BLD AUTO: 1.99 THOUSANDS/ΜL (ref 0.6–4.47)
LYMPHOCYTES NFR BLD AUTO: 30 % (ref 14–44)
MCH RBC QN AUTO: 30 PG (ref 26.8–34.3)
MCHC RBC AUTO-ENTMCNC: 31.7 G/DL (ref 31.4–37.4)
MCV RBC AUTO: 95 FL (ref 82–98)
MONOCYTES # BLD AUTO: 0.75 THOUSAND/ΜL (ref 0.17–1.22)
MONOCYTES NFR BLD AUTO: 11 % (ref 4–12)
NEUTROPHILS # BLD AUTO: 3.35 THOUSANDS/ΜL (ref 1.85–7.62)
NEUTS SEG NFR BLD AUTO: 51 % (ref 43–75)
NONHDLC SERPL-MCNC: 116 MG/DL
NRBC BLD AUTO-RTO: 0 /100 WBCS
PLATELET # BLD AUTO: 340 THOUSANDS/UL (ref 149–390)
PMV BLD AUTO: 10.2 FL (ref 8.9–12.7)
POTASSIUM SERPL-SCNC: 4.2 MMOL/L (ref 3.5–5.3)
PROT SERPL-MCNC: 6.7 G/DL (ref 6.4–8.2)
RBC # BLD AUTO: 4.53 MILLION/UL (ref 3.88–5.62)
SODIUM SERPL-SCNC: 142 MMOL/L (ref 136–145)
TRIGL SERPL-MCNC: 89 MG/DL
TSH SERPL DL<=0.05 MIU/L-ACNC: 4.91 UIU/ML (ref 0.36–3.74)
WBC # BLD AUTO: 6.64 THOUSAND/UL (ref 4.31–10.16)

## 2019-10-13 PROCEDURE — 36415 COLL VENOUS BLD VENIPUNCTURE: CPT

## 2019-10-13 PROCEDURE — 80053 COMPREHEN METABOLIC PANEL: CPT

## 2019-10-13 PROCEDURE — 82550 ASSAY OF CK (CPK): CPT

## 2019-10-13 PROCEDURE — 85025 COMPLETE CBC W/AUTO DIFF WBC: CPT

## 2019-10-13 PROCEDURE — 84443 ASSAY THYROID STIM HORMONE: CPT

## 2019-10-13 PROCEDURE — 80061 LIPID PANEL: CPT

## 2019-10-15 RX ORDER — APIXABAN 5 MG/1
TABLET, FILM COATED ORAL
Qty: 180 TABLET | Refills: 4 | OUTPATIENT
Start: 2019-10-15

## 2019-10-15 NOTE — TELEPHONE ENCOUNTER
Patient not seen in more than 1 year  Eliquis refill rejected  Please let patient know to either make appointment, or get from PCP  If appointment is made, can refill  Thanks

## 2019-11-04 DIAGNOSIS — I48.91 ATRIAL FIBRILLATION, UNSPECIFIED TYPE (HCC): ICD-10-CM

## 2019-11-27 ENCOUNTER — OFFICE VISIT (OUTPATIENT)
Dept: CARDIOLOGY CLINIC | Facility: CLINIC | Age: 79
End: 2019-11-27
Payer: MEDICARE

## 2019-11-27 VITALS
OXYGEN SATURATION: 99 % | BODY MASS INDEX: 21.53 KG/M2 | HEART RATE: 62 BPM | DIASTOLIC BLOOD PRESSURE: 80 MMHG | SYSTOLIC BLOOD PRESSURE: 132 MMHG | WEIGHT: 134 LBS | HEIGHT: 66 IN

## 2019-11-27 DIAGNOSIS — E78.5 DYSLIPIDEMIA: ICD-10-CM

## 2019-11-27 DIAGNOSIS — I48.0 PAF (PAROXYSMAL ATRIAL FIBRILLATION) (HCC): Primary | ICD-10-CM

## 2019-11-27 DIAGNOSIS — I48.91 ATRIAL FIBRILLATION, UNSPECIFIED TYPE (HCC): ICD-10-CM

## 2019-11-27 PROCEDURE — 99214 OFFICE O/P EST MOD 30 MIN: CPT | Performed by: INTERNAL MEDICINE

## 2019-11-27 RX ORDER — PRAMIPEXOLE DIHYDROCHLORIDE 0.12 MG/1
TABLET ORAL DAILY
COMMUNITY
Start: 2019-08-12

## 2019-11-27 NOTE — PROGRESS NOTES
Cardiology Follow Up        Aung Medina      1940      [id number]      Discussion/Summary:    1  Paroxysmal atrial fibrillation  2  Hypertension  3  Dyslipidemia    · Regular rhythm by physical examination  Metoprolol discontinued in the past secondary to lightheadedness  Will continue off  Continue Eliquis anticoagulation which she is tolerating well  · Blood pressure controlled off antihypertensives  · Continue simvastatin for dyslipidemia, routine lipid panel per PCP  Reviewed prior level 10/13/2019, acceptable    Follow-up in 1 year      Interval History: This is a 61-year-old male with a history of atrial fibrillation diagnosed 08/2017, maintained on low-dose metoprolol up until May 2018  He presented to his PCPs office with symptoms of intermittent lightheadedness with bradycardia in the 40s  At that time his metoprolol was discontinued  He has been maintained on Eliquis anticoagulation  Kristin Alfredo presents today for follow-up with no complaints  His chronic but stable exertional dyspnea which she states is mild  He denies chest pain, dizziness, presyncope or syncope, lower extremity edema, palpitations  Denies any abnormal bleeding or heavy bruising while on Eliquis  He remains active, doing much work around his house           Vitals:  Vitals:    11/27/19 1530   BP: 132/80   BP Location: Left arm   Patient Position: Sitting   Cuff Size: Large   Pulse: 62   SpO2: 99%   Weight: 60 8 kg (134 lb)   Height: 5' 6" (1 676 m)         Past Medical History:   Diagnosis Date    Back pain     Benign prostatic hypertrophy     Disease of thyroid gland     Hypertension     Restless leg      Social History     Socioeconomic History    Marital status: /Civil Union     Spouse name: Not on file    Number of children: Not on file    Years of education: Not on file    Highest education level: Not on file   Occupational History    Not on file Social Needs    Financial resource strain: Not on file    Food insecurity:     Worry: Not on file     Inability: Not on file    Transportation needs:     Medical: Not on file     Non-medical: Not on file   Tobacco Use    Smoking status: Never Smoker    Smokeless tobacco: Never Used   Substance and Sexual Activity    Alcohol use: No     Comment: CAFFEINE USE    Drug use: No    Sexual activity: Not on file   Lifestyle    Physical activity:     Days per week: Not on file     Minutes per session: Not on file    Stress: Not on file   Relationships    Social connections:     Talks on phone: Not on file     Gets together: Not on file     Attends [employer] service: Not on file     Active member of club or organization: Not on file     Attends meetings of clubs or organizations: Not on file     Relationship status: Not on file    Intimate partner violence:     Fear of current or ex partner: Not on file     Emotionally abused: Not on file     Physically abused: Not on file     Forced sexual activity: Not on file   Other Topics Concern    Not on file   Social History Narrative    Not on file      Family History   Problem Relation Age of Onset    Cancer Mother     Throat cancer Father     Breast cancer Sister      Past Surgical History:   Procedure Laterality Date    ACHILLES TENDON SURGERY      HERNIA REPAIR         Current Outpatient Medications:     apixaban (ELIQUIS) 5 mg, Take 1 tablet (5 mg total) by mouth 2 (two) times a day, Disp: 60 tablet, Rfl: 11    bimatoprost (LUMIGAN) 0 01 % ophthalmic drops, Administer 1 drop into the left eye daily at bedtime, Disp: , Rfl:     levothyroxine 25 mcg tablet, Take 25 mcg by mouth daily, Disp: , Rfl:     Multiple Vitamins-Minerals (CENTRUM SILVER 50+MEN PO), Take by mouth, Disp: , Rfl:     Omega-3 Fatty Acids (FISH OIL) 1,000 mg, Take by mouth daily, Disp: , Rfl:     pramipexole (MIRAPEX) 0 125 mg tablet, Take by mouth Daily, Disp: , Rfl:     simvastatin (ZOCOR) 20 mg tablet, Take 20 mg by mouth daily at bedtime, Disp: , Rfl:         Review of Systems:  Review of Systems   Respiratory: Negative  Cardiovascular: Negative  All other systems reviewed and are negative  Physical Exam:  Physical Exam   Constitutional: He is oriented to person, place, and time  He appears well-developed and well-nourished  No distress  HENT:   Head: Normocephalic and atraumatic  Eyes: Pupils are equal, round, and reactive to light  EOM are normal  Right eye exhibits no discharge  No scleral icterus  Neck: Normal range of motion  Neck supple  No thyromegaly present  Cardiovascular: Normal rate, regular rhythm and normal heart sounds  Exam reveals no gallop and no friction rub  No murmur heard  Pulmonary/Chest: Effort normal and breath sounds normal    Abdominal: He exhibits no distension  There is no tenderness  There is no rebound and no guarding  Musculoskeletal: Normal range of motion  He exhibits no edema  Neurological: He is alert and oriented to person, place, and time  Coordination normal    Skin: Skin is warm and dry  No rash noted  He is not diaphoretic  No erythema  No pallor  Psychiatric: He has a normal mood and affect  His behavior is normal  Judgment and thought content normal        This note was completed in part utilizing [employer] Fluency Direct Software  Grammatical errors, random word insertions, spelling mistakes, and incomplete sentences can be an occasional consequence of this system secondary to software limitations, ambient noise, and hardware issues  If you have any questions or concerns about the content, text, or information contained within the body of this dictation, please contact the provider for clarification

## 2020-02-07 ENCOUNTER — TRANSCRIBE ORDERS (OUTPATIENT)
Dept: ADMINISTRATIVE | Facility: HOSPITAL | Age: 80
End: 2020-02-07

## 2020-02-07 DIAGNOSIS — R07.9 CHEST PAIN, UNSPECIFIED TYPE: Primary | ICD-10-CM

## 2020-02-07 DIAGNOSIS — R06.00 DYSPNEA, UNSPECIFIED TYPE: ICD-10-CM

## 2020-02-11 ENCOUNTER — HOSPITAL ENCOUNTER (OUTPATIENT)
Dept: RADIOLOGY | Facility: HOSPITAL | Age: 80
Discharge: HOME | End: 2020-02-11
Payer: MEDICARE

## 2020-02-11 ENCOUNTER — HOSPITAL ENCOUNTER (OUTPATIENT)
Dept: PULMONOLOGY | Facility: HOSPITAL | Age: 80
Discharge: HOME | End: 2020-02-11
Payer: MEDICARE

## 2020-02-11 DIAGNOSIS — R06.00 DYSPNEA, UNSPECIFIED TYPE: ICD-10-CM

## 2020-02-11 DIAGNOSIS — R07.9 CHEST PAIN, UNSPECIFIED TYPE: ICD-10-CM

## 2020-02-11 PROCEDURE — 94760 N-INVAS EAR/PLS OXIMETRY 1: CPT

## 2020-02-11 PROCEDURE — 94060 EVALUATION OF WHEEZING: CPT | Performed by: INTERNAL MEDICINE

## 2020-02-11 PROCEDURE — 94060 EVALUATION OF WHEEZING: CPT

## 2020-02-11 PROCEDURE — 94729 DIFFUSING CAPACITY: CPT | Performed by: INTERNAL MEDICINE

## 2020-02-11 PROCEDURE — 94729 DIFFUSING CAPACITY: CPT

## 2020-02-11 PROCEDURE — 94726 PLETHYSMOGRAPHY LUNG VOLUMES: CPT

## 2020-02-11 PROCEDURE — 94726 PLETHYSMOGRAPHY LUNG VOLUMES: CPT | Performed by: INTERNAL MEDICINE

## 2020-02-11 PROCEDURE — 71046 X-RAY EXAM CHEST 2 VIEWS: CPT

## 2020-02-11 RX ORDER — ALBUTEROL SULFATE 2.5 MG/3ML
SOLUTION RESPIRATORY (INHALATION)
Status: COMPLETED
Start: 2020-02-11 | End: 2020-02-11

## 2020-02-11 RX ADMIN — ALBUTEROL SULFATE 2.5 MG: 2.5 SOLUTION RESPIRATORY (INHALATION) at 17:59

## 2020-02-12 ENCOUNTER — OFFICE VISIT (OUTPATIENT)
Dept: CARDIOLOGY CLINIC | Facility: CLINIC | Age: 80
End: 2020-02-12
Payer: MEDICARE

## 2020-02-12 VITALS
WEIGHT: 136.8 LBS | SYSTOLIC BLOOD PRESSURE: 140 MMHG | BODY MASS INDEX: 21.98 KG/M2 | DIASTOLIC BLOOD PRESSURE: 80 MMHG | OXYGEN SATURATION: 99 % | HEART RATE: 56 BPM | HEIGHT: 66 IN

## 2020-02-12 DIAGNOSIS — I48.0 PAROXYSMAL ATRIAL FIBRILLATION (HCC): Primary | ICD-10-CM

## 2020-02-12 DIAGNOSIS — E78.5 DYSLIPIDEMIA: ICD-10-CM

## 2020-02-12 PROCEDURE — 99214 OFFICE O/P EST MOD 30 MIN: CPT | Performed by: INTERNAL MEDICINE

## 2020-02-12 NOTE — PROGRESS NOTES
Cardiology Follow Up        Elisa Rapp      1940      [id number]      Discussion/Summary:    1  Paroxysmal atrial fibrillation  2  Dyslipidemia  3  HTN  4  Cough with vague chest discomfort     · Suspect chest discomfort may be related to cough  He describes a very poorly, but firmly states it does not occur with exertion on a normal basis  Suspect noncardiac etiology  Patient reassured, and was asked to call me right away if he experiences any exertional chest pain or worsening chest discomfort  · Blood pressure mildly elevated  For now, will continue off antihypertensives  However, he was asked to monitors blood pressures at home and call me if they remain elevated  · Continue apixaban anticoagulation for paroxysmal atrial fibrillation      Follow-up as scheduled      Interval History: This is a 73-year-old male with a history of atrial fibrillation diagnosed 08/2017, maintained on low-dose metoprolol up until May 2018  [facility] presented to his PCPs office with symptoms of intermittent lightheadedness with bradycardia in the 40s   At that time his metoprolol was discontinued  [facility] has been maintained on Eliquis anticoagulation  He presents today for early evaluation after he had complaints of cough and chest discomfort for his PCP  He states he had an ECG there which was unremarkable  He states he has been coughing for a few weeks now, and has been having some level of discomfort in the chest which he has trouble describing  He denies a pain, pressure, or tightness sensation, only recalls a discomfort  He cannot tell me when he gets sick, but firmly denies that it occurs with exertional activities  He states he is quite active at home, going up and down stairs even from his basement to the 2nd floor, and engages in heavy lifting without any exertional symptoms whatsoever  He denies dyspnea, orthopnea, lower extremity edema    He underwent PFTs 02/07/2020 which was within normal limits  Chest x-ray 02/11/2020 as personally reviewed by me does not reveal any evidence of acute cardiopulmonary disease           Vitals:  Vitals:    02/12/20 0933   BP: 140/80   BP Location: Right arm   Patient Position: Sitting   Cuff Size: Large   Pulse: 56   SpO2: 99%   Weight: 62 1 kg (136 lb 12 8 oz)   Height: 5' 6" (1 676 m)         Past Medical History:   Diagnosis Date    Back pain     Benign prostatic hypertrophy     Disease of thyroid gland     Hypertension     Restless leg      Social History     Socioeconomic History    Marital status: /Civil Union     Spouse name: Not on file    Number of children: Not on file    Years of education: Not on file    Highest education level: Not on file   Occupational History    Not on file   Social Needs    Financial resource strain: Not on file    Food insecurity:     Worry: Not on file     Inability: Not on file    Transportation needs:     Medical: Not on file     Non-medical: Not on file   Tobacco Use    Smoking status: Never Smoker    Smokeless tobacco: Never Used   Substance and Sexual Activity    Alcohol use: No     Comment: CAFFEINE USE    Drug use: No    Sexual activity: Not on file   Lifestyle    Physical activity:     Days per week: Not on file     Minutes per session: Not on file    Stress: Not on file   Relationships    Social connections:     Talks on phone: Not on file     Gets together: Not on file     Attends [employer] service: Not on file     Active member of club or organization: Not on file     Attends meetings of clubs or organizations: Not on file     Relationship status: Not on file    Intimate partner violence:     Fear of current or ex partner: Not on file     Emotionally abused: Not on file     Physically abused: Not on file     Forced sexual activity: Not on file   Other Topics Concern    Not on file   Social History Narrative    Not on file      Family History   Problem Relation Age of Onset    Cancer Mother     Throat cancer Father     Breast cancer Sister      Past Surgical History:   Procedure Laterality Date    ACHILLES TENDON SURGERY      HERNIA REPAIR         Current Outpatient Medications:     apixaban (ELIQUIS) 5 mg, Take 1 tablet (5 mg total) by mouth 2 (two) times a day, Disp: 180 tablet, Rfl: 3    bimatoprost (LUMIGAN) 0 01 % ophthalmic drops, Administer 1 drop into the left eye daily at bedtime, Disp: , Rfl:     levothyroxine 25 mcg tablet, Take 25 mcg by mouth daily, Disp: , Rfl:     Multiple Vitamins-Minerals (CENTRUM SILVER 50+MEN PO), Take by mouth, Disp: , Rfl:     Omega-3 Fatty Acids (FISH OIL) 1,000 mg, Take by mouth daily, Disp: , Rfl:     pramipexole (MIRAPEX) 0 125 mg tablet, Take by mouth Daily, Disp: , Rfl:     simvastatin (ZOCOR) 20 mg tablet, Take 20 mg by mouth daily at bedtime, Disp: , Rfl:   No current facility-administered medications for this visit  Review of Systems:  Review of Systems   Respiratory: Positive for cough  Cardiovascular: Negative  All other systems reviewed and are negative  Physical Exam:  Physical Exam   Constitutional: He is oriented to person, place, and time  He appears well-developed and well-nourished  No distress  HENT:   Head: Normocephalic and atraumatic  Eyes: Pupils are equal, round, and reactive to light  EOM are normal  Right eye exhibits no discharge  No scleral icterus  Neck: Normal range of motion  Neck supple  No thyromegaly present  Cardiovascular: Normal rate, regular rhythm and normal heart sounds  Exam reveals no gallop and no friction rub  No murmur heard  Pulmonary/Chest: Effort normal and breath sounds normal    Abdominal: He exhibits no distension  There is no tenderness  There is no rebound and no guarding  Musculoskeletal: Normal range of motion  He exhibits no edema  Neurological: He is alert and oriented to person, place, and time   Coordination normal  Skin: Skin is warm and dry  No rash noted  He is not diaphoretic  No erythema  No pallor  Psychiatric: He has a normal mood and affect  His behavior is normal  Judgment and thought content normal        This note was completed in part utilizing [employer] Flu[employer] Direct Software  Grammatical errors, random word insertions, spelling mistakes, and incomplete sentences can be an occasional consequence of this system secondary to software limitations, ambient noise, and hardware issues  If you have any questions or concerns about the content, text, or information contained within the body of this dictation, please contact the provider for clarification

## 2020-02-12 NOTE — LETTER
February 12, 2020     [address] [place] [address]    Patient: Josh Sierra   YOB: 1940   Date of Visit: 2/12/2020       Dear Dr Violetta Davidson: Thank you for referring Josh Sierra to me for evaluation  Below are my notes for this consultation  If you have questions, please do not hesitate to call me  I look forward to following your patient along with you  Sincerely,        Rujul Corinne Hoar, DO        CC: No Recipients  Rujul Corinne Hoar, DO  2/12/2020 12:10 PM  Sign at close encounter                                             Cardiology Follow Up        Josh Sierra      1940      [id number]      Discussion/Summary:    1  Paroxysmal atrial fibrillation  2  Dyslipidemia  3  HTN  4  Cough with vague chest discomfort     · Suspect chest discomfort may be related to cough  He describes a very poorly, but firmly states it does not occur with exertion on a normal basis  Suspect noncardiac etiology  Patient reassured, and was asked to call me right away if he experiences any exertional chest pain or worsening chest discomfort  · Blood pressure mildly elevated  For now, will continue off antihypertensives  However, he was asked to monitors blood pressures at home and call me if they remain elevated  · Continue apixaban anticoagulation for paroxysmal atrial fibrillation      Follow-up as scheduled      Interval History: This is a 71-year-old male with a history of atrial fibrillation diagnosed 08/2017, maintained on low-dose metoprolol up until May 2018  Ignacio Morrow presented to his PCPs office with symptoms of intermittent lightheadedness with bradycardia in the 40s   At that time his metoprolol was discontinued  Ignacio Salcedochristiano has been maintained on Eliquis anticoagulation  He presents today for early evaluation after he had complaints of cough and chest discomfort for his PCP    He states he has been coughing for a few weeks now, and has been having some level of discomfort in the chest which he has trouble describing  He denies a pain, pressure, or tightness sensation, only recalls a discomfort  He cannot tell me when he gets sick, but firmly denies that it occurs with exertional activities  He states he is quite active at home, going up and down stairs even from his basement to the 2nd floor, and engages in heavy lifting without any exertional symptoms whatsoever  He denies dyspnea, orthopnea, lower extremity edema  He underwent PFTs 02/07/2020 which was within normal limits  Chest x-ray 02/11/2020 as personally reviewed by me does not reveal any evidence of acute cardiopulmonary disease           Vitals:  Vitals:    02/12/20 0933   BP: 140/80   BP Location: Right arm   Patient Position: Sitting   Cuff Size: Large   Pulse: 56   SpO2: 99%   Weight: 62 1 kg (136 lb 12 8 oz)   Height: 5' 6" (1 676 m)         Past Medical History:   Diagnosis Date    Back pain     Benign prostatic hypertrophy     Disease of thyroid gland     Hypertension     Restless leg      Social History     Socioeconomic History    Marital status: /Civil Union     Spouse name: Not on file    Number of children: Not on file    Years of education: Not on file    Highest education level: Not on file   Occupational History    Not on file   Social Needs    Financial resource strain: Not on file    Food insecurity:     Worry: Not on file     Inability: Not on file    Transportation needs:     Medical: Not on file     Non-medical: Not on file   Tobacco Use    Smoking status: Never Smoker    Smokeless tobacco: Never Used   Substance and Sexual Activity    Alcohol use: No     Comment: CAFFEINE USE    Drug use: No    Sexual activity: Not on file   Lifestyle    Physical activity:     Days per week: Not on file     Minutes per session: Not on file    Stress: Not on file   Relationships    Social connections:     Talks on phone: Not on file     Gets together: Not on file     Attends Gnosticist service: Not on file     Active member of club or organization: Not on file     Attends meetings of clubs or organizations: Not on file     Relationship status: Not on file    Intimate partner violence:     Fear of current or ex partner: Not on file     Emotionally abused: Not on file     Physically abused: Not on file     Forced sexual activity: Not on file   Other Topics Concern    Not on file   Social History Narrative    Not on file      Family History   Problem Relation Age of Onset    Cancer Mother     Throat cancer Father     Breast cancer Sister      Past Surgical History:   Procedure Laterality Date    ACHILLES TENDON SURGERY      HERNIA REPAIR         Current Outpatient Medications:     apixaban (ELIQUIS) 5 mg, Take 1 tablet (5 mg total) by mouth 2 (two) times a day, Disp: 180 tablet, Rfl: 3    bimatoprost (LUMIGAN) 0 01 % ophthalmic drops, Administer 1 drop into the left eye daily at bedtime, Disp: , Rfl:     levothyroxine 25 mcg tablet, Take 25 mcg by mouth daily, Disp: , Rfl:     Multiple Vitamins-Minerals (CENTRUM SILVER 50+MEN PO), Take by mouth, Disp: , Rfl:     Omega-3 Fatty Acids (FISH OIL) 1,000 mg, Take by mouth daily, Disp: , Rfl:     pramipexole (MIRAPEX) 0 125 mg tablet, Take by mouth Daily, Disp: , Rfl:     simvastatin (ZOCOR) 20 mg tablet, Take 20 mg by mouth daily at bedtime, Disp: , Rfl:   No current facility-administered medications for this visit  Review of Systems:  Review of Systems   Respiratory: Positive for cough  Cardiovascular: Negative  All other systems reviewed and are negative  Physical Exam:  Physical Exam   Constitutional: He is oriented to person, place, and time  He appears well-developed and well-nourished  No distress  HENT:   Head: Normocephalic and atraumatic  Eyes: Pupils are equal, round, and reactive to light  EOM are normal  Right eye exhibits no discharge  No scleral icterus  Neck: Normal range of motion   Neck supple  No thyromegaly present  Cardiovascular: Normal rate, regular rhythm and normal heart sounds  Exam reveals no gallop and no friction rub  No murmur heard  Pulmonary/Chest: Effort normal and breath sounds normal    Abdominal: He exhibits no distension  There is no tenderness  There is no rebound and no guarding  Musculoskeletal: Normal range of motion  He exhibits no edema  Neurological: He is alert and oriented to person, place, and time  Coordination normal    Skin: Skin is warm and dry  No rash noted  He is not diaphoretic  No erythema  No pallor  Psychiatric: He has a normal mood and affect  His behavior is normal  Judgment and thought content normal        This note was completed in part utilizing [employer] Direct Software  Grammatical errors, random word insertions, spelling mistakes, and incomplete sentences can be an occasional consequence of this system secondary to software limitations, ambient noise, and hardware issues  If you have any questions or concerns about the content, text, or information contained within the body of this dictation, please contact the provider for clarification

## 2020-02-18 ENCOUNTER — TRANSCRIBE ORDERS (OUTPATIENT)
Dept: ADMINISTRATIVE | Facility: HOSPITAL | Age: 80
End: 2020-02-18

## 2020-02-18 DIAGNOSIS — Q44.6 CYSTIC DISEASE OF LIVER: ICD-10-CM

## 2020-02-18 DIAGNOSIS — N28.1 ACQUIRED CYST OF KIDNEY: ICD-10-CM

## 2020-02-18 DIAGNOSIS — R93.89 ABNORMAL CXR: Primary | ICD-10-CM

## 2020-02-19 ENCOUNTER — HOSPITAL ENCOUNTER (OUTPATIENT)
Dept: CT IMAGING | Facility: HOSPITAL | Age: 80
Discharge: HOME | End: 2020-02-19
Payer: MEDICARE

## 2020-02-19 DIAGNOSIS — R93.89 ABNORMAL CXR: ICD-10-CM

## 2020-02-19 PROCEDURE — 71250 CT THORAX DX C-: CPT

## 2020-03-03 ENCOUNTER — TRANSCRIBE ORDERS (OUTPATIENT)
Dept: LAB | Facility: CLINIC | Age: 80
End: 2020-03-03

## 2020-03-03 ENCOUNTER — APPOINTMENT (OUTPATIENT)
Dept: LAB | Facility: CLINIC | Age: 80
End: 2020-03-03
Payer: MEDICARE

## 2020-03-03 DIAGNOSIS — R97.20 ELEVATED PROSTATE SPECIFIC ANTIGEN (PSA): ICD-10-CM

## 2020-03-03 DIAGNOSIS — E03.9 MYXEDEMA HEART DISEASE: Primary | ICD-10-CM

## 2020-03-03 DIAGNOSIS — E78.2 MIXED HYPERLIPIDEMIA: ICD-10-CM

## 2020-03-03 DIAGNOSIS — I10 ESSENTIAL HYPERTENSION, MALIGNANT: ICD-10-CM

## 2020-03-03 DIAGNOSIS — E03.9 MYXEDEMA HEART DISEASE: ICD-10-CM

## 2020-03-03 DIAGNOSIS — I51.9 MYXEDEMA HEART DISEASE: ICD-10-CM

## 2020-03-03 DIAGNOSIS — I51.9 MYXEDEMA HEART DISEASE: Primary | ICD-10-CM

## 2020-03-03 LAB
ALBUMIN SERPL BCP-MCNC: 3.8 G/DL (ref 3.5–5)
ALP SERPL-CCNC: 56 U/L (ref 46–116)
ALT SERPL W P-5'-P-CCNC: 26 U/L (ref 12–78)
ANION GAP SERPL CALCULATED.3IONS-SCNC: 4 MMOL/L (ref 4–13)
AST SERPL W P-5'-P-CCNC: 25 U/L (ref 5–45)
BASOPHILS # BLD AUTO: 0.08 THOUSANDS/ΜL (ref 0–0.1)
BASOPHILS NFR BLD AUTO: 1 % (ref 0–1)
BILIRUB SERPL-MCNC: 0.8 MG/DL (ref 0.2–1)
BUN SERPL-MCNC: 27 MG/DL (ref 5–25)
CALCIUM SERPL-MCNC: 8.8 MG/DL (ref 8.3–10.1)
CHLORIDE SERPL-SCNC: 108 MMOL/L (ref 100–108)
CHOLEST SERPL-MCNC: 166 MG/DL (ref 50–200)
CK MB SERPL-MCNC: 1.5 % (ref 0–2.5)
CK MB SERPL-MCNC: 5.8 NG/ML (ref 0–5)
CK SERPL-CCNC: 391 U/L (ref 39–308)
CO2 SERPL-SCNC: 26 MMOL/L (ref 21–32)
CREAT SERPL-MCNC: 1.06 MG/DL (ref 0.6–1.3)
EOSINOPHIL # BLD AUTO: 0.25 THOUSAND/ΜL (ref 0–0.61)
EOSINOPHIL NFR BLD AUTO: 4 % (ref 0–6)
ERYTHROCYTE [DISTWIDTH] IN BLOOD BY AUTOMATED COUNT: 13.4 % (ref 11.6–15.1)
GFR SERPL CREATININE-BSD FRML MDRD: 67 ML/MIN/1.73SQ M
GLUCOSE P FAST SERPL-MCNC: 89 MG/DL (ref 65–99)
HCT VFR BLD AUTO: 41.7 % (ref 36.5–49.3)
HDLC SERPL-MCNC: 65 MG/DL
HGB BLD-MCNC: 13.6 G/DL (ref 12–17)
IMM GRANULOCYTES # BLD AUTO: 0.03 THOUSAND/UL (ref 0–0.2)
IMM GRANULOCYTES NFR BLD AUTO: 0 % (ref 0–2)
LDLC SERPL CALC-MCNC: 91 MG/DL (ref 0–100)
LYMPHOCYTES # BLD AUTO: 1.83 THOUSANDS/ΜL (ref 0.6–4.47)
LYMPHOCYTES NFR BLD AUTO: 26 % (ref 14–44)
MCH RBC QN AUTO: 30.6 PG (ref 26.8–34.3)
MCHC RBC AUTO-ENTMCNC: 32.6 G/DL (ref 31.4–37.4)
MCV RBC AUTO: 94 FL (ref 82–98)
MONOCYTES # BLD AUTO: 0.71 THOUSAND/ΜL (ref 0.17–1.22)
MONOCYTES NFR BLD AUTO: 10 % (ref 4–12)
NEUTROPHILS # BLD AUTO: 4.15 THOUSANDS/ΜL (ref 1.85–7.62)
NEUTS SEG NFR BLD AUTO: 59 % (ref 43–75)
NONHDLC SERPL-MCNC: 101 MG/DL
NRBC BLD AUTO-RTO: 0 /100 WBCS
PLATELET # BLD AUTO: 359 THOUSANDS/UL (ref 149–390)
PMV BLD AUTO: 9.7 FL (ref 8.9–12.7)
POTASSIUM SERPL-SCNC: 4.2 MMOL/L (ref 3.5–5.3)
PROT SERPL-MCNC: 6.8 G/DL (ref 6.4–8.2)
PSA SERPL-MCNC: 4.1 NG/ML (ref 0–4)
RBC # BLD AUTO: 4.44 MILLION/UL (ref 3.88–5.62)
SODIUM SERPL-SCNC: 138 MMOL/L (ref 136–145)
TRIGL SERPL-MCNC: 48 MG/DL
TSH SERPL DL<=0.05 MIU/L-ACNC: 4.53 UIU/ML (ref 0.36–3.74)
WBC # BLD AUTO: 7.05 THOUSAND/UL (ref 4.31–10.16)

## 2020-03-03 PROCEDURE — 85025 COMPLETE CBC W/AUTO DIFF WBC: CPT

## 2020-03-03 PROCEDURE — G0103 PSA SCREENING: HCPCS

## 2020-03-03 PROCEDURE — 84443 ASSAY THYROID STIM HORMONE: CPT

## 2020-03-03 PROCEDURE — 82553 CREATINE MB FRACTION: CPT

## 2020-03-03 PROCEDURE — 80061 LIPID PANEL: CPT

## 2020-03-03 PROCEDURE — 82550 ASSAY OF CK (CPK): CPT

## 2020-03-03 PROCEDURE — 36415 COLL VENOUS BLD VENIPUNCTURE: CPT

## 2020-03-03 PROCEDURE — 80053 COMPREHEN METABOLIC PANEL: CPT

## 2020-03-18 ENCOUNTER — TELEPHONE (OUTPATIENT)
Dept: UROLOGY | Facility: AMBULATORY SURGERY CENTER | Age: 80
End: 2020-03-18

## 2020-04-20 ENCOUNTER — APPOINTMENT (OUTPATIENT)
Dept: LAB | Facility: CLINIC | Age: 80
End: 2020-04-20
Payer: MEDICARE

## 2020-04-20 ENCOUNTER — TRANSCRIBE ORDERS (OUTPATIENT)
Dept: ADMINISTRATIVE | Facility: HOSPITAL | Age: 80
End: 2020-04-20

## 2020-04-20 DIAGNOSIS — M79.10 MYALGIA: ICD-10-CM

## 2020-04-20 DIAGNOSIS — E78.2 MIXED HYPERLIPIDEMIA: Primary | ICD-10-CM

## 2020-04-20 DIAGNOSIS — E78.2 MIXED HYPERLIPIDEMIA: ICD-10-CM

## 2020-04-20 LAB
ALBUMIN SERPL BCP-MCNC: 4 G/DL (ref 3.5–5)
ALP SERPL-CCNC: 59 U/L (ref 46–116)
ALT SERPL W P-5'-P-CCNC: 24 U/L (ref 12–78)
ANION GAP SERPL CALCULATED.3IONS-SCNC: 4 MMOL/L (ref 4–13)
AST SERPL W P-5'-P-CCNC: 17 U/L (ref 5–45)
BILIRUB SERPL-MCNC: 0.62 MG/DL (ref 0.2–1)
BUN SERPL-MCNC: 22 MG/DL (ref 5–25)
CALCIUM SERPL-MCNC: 9.2 MG/DL (ref 8.3–10.1)
CHLORIDE SERPL-SCNC: 105 MMOL/L (ref 100–108)
CK SERPL-CCNC: 130 U/L (ref 39–308)
CO2 SERPL-SCNC: 30 MMOL/L (ref 21–32)
CREAT SERPL-MCNC: 1.1 MG/DL (ref 0.6–1.3)
GFR SERPL CREATININE-BSD FRML MDRD: 64 ML/MIN/1.73SQ M
GLUCOSE P FAST SERPL-MCNC: 121 MG/DL (ref 65–99)
POTASSIUM SERPL-SCNC: 4.7 MMOL/L (ref 3.5–5.3)
PROT SERPL-MCNC: 7 G/DL (ref 6.4–8.2)
SODIUM SERPL-SCNC: 139 MMOL/L (ref 136–145)

## 2020-04-20 PROCEDURE — 80053 COMPREHEN METABOLIC PANEL: CPT

## 2020-04-20 PROCEDURE — 82550 ASSAY OF CK (CPK): CPT

## 2020-04-20 PROCEDURE — 36415 COLL VENOUS BLD VENIPUNCTURE: CPT

## 2020-06-15 ENCOUNTER — HOSPITAL ENCOUNTER (OUTPATIENT)
Dept: ULTRASOUND IMAGING | Facility: HOSPITAL | Age: 80
Discharge: HOME | End: 2020-06-15
Payer: MEDICARE

## 2020-06-15 DIAGNOSIS — Q44.6 CYSTIC DISEASE OF LIVER: ICD-10-CM

## 2020-06-15 PROCEDURE — 76705 ECHO EXAM OF ABDOMEN: CPT

## 2020-06-16 ENCOUNTER — HOSPITAL ENCOUNTER (OUTPATIENT)
Dept: ULTRASOUND IMAGING | Facility: HOSPITAL | Age: 80
Discharge: HOME | End: 2020-06-16
Payer: MEDICARE

## 2020-06-16 DIAGNOSIS — N28.1 ACQUIRED CYST OF KIDNEY: ICD-10-CM

## 2020-06-16 PROCEDURE — 76770 US EXAM ABDO BACK WALL COMP: CPT

## 2020-06-18 ENCOUNTER — TELEPHONE (OUTPATIENT)
Dept: UROLOGY | Facility: MEDICAL CENTER | Age: 80
End: 2020-06-18

## 2020-06-29 ENCOUNTER — TELEMEDICINE (OUTPATIENT)
Dept: UROLOGY | Facility: MEDICAL CENTER | Age: 80
End: 2020-06-29
Payer: MEDICARE

## 2020-06-29 DIAGNOSIS — N40.1 BPH ASSOCIATED WITH NOCTURIA: ICD-10-CM

## 2020-06-29 DIAGNOSIS — R97.20 ELEVATED PSA, LESS THAN 10 NG/ML: Primary | ICD-10-CM

## 2020-06-29 DIAGNOSIS — R35.1 BPH ASSOCIATED WITH NOCTURIA: ICD-10-CM

## 2020-06-29 PROCEDURE — 99443 PR PHYS/QHP TELEPHONE EVALUATION 21-30 MIN: CPT | Performed by: UROLOGY

## 2020-07-20 ENCOUNTER — TRANSCRIBE ORDERS (OUTPATIENT)
Dept: LAB | Facility: CLINIC | Age: 80
End: 2020-07-20

## 2020-07-20 ENCOUNTER — APPOINTMENT (OUTPATIENT)
Dept: LAB | Facility: CLINIC | Age: 80
End: 2020-07-20
Payer: MEDICARE

## 2020-07-20 DIAGNOSIS — I10 ESSENTIAL (PRIMARY) HYPERTENSION: ICD-10-CM

## 2020-07-20 DIAGNOSIS — E78.2 MIXED HYPERLIPIDEMIA: ICD-10-CM

## 2020-07-20 DIAGNOSIS — E03.9 HYPOTHYROIDISM, UNSPECIFIED TYPE: ICD-10-CM

## 2020-07-20 DIAGNOSIS — E03.9 HYPOTHYROIDISM, UNSPECIFIED TYPE: Primary | ICD-10-CM

## 2020-07-20 DIAGNOSIS — M79.10 MYALGIA, UNSPECIFIED SITE: ICD-10-CM

## 2020-07-20 LAB
ALBUMIN SERPL BCP-MCNC: 3.7 G/DL (ref 3.5–5)
ALP SERPL-CCNC: 50 U/L (ref 46–116)
ALT SERPL W P-5'-P-CCNC: 20 U/L (ref 12–78)
ANION GAP SERPL CALCULATED.3IONS-SCNC: 6 MMOL/L (ref 4–13)
AST SERPL W P-5'-P-CCNC: 13 U/L (ref 5–45)
BASOPHILS # BLD AUTO: 0.08 THOUSANDS/ΜL (ref 0–0.1)
BASOPHILS NFR BLD AUTO: 1 % (ref 0–1)
BILIRUB SERPL-MCNC: 0.69 MG/DL (ref 0.2–1)
BUN SERPL-MCNC: 21 MG/DL (ref 5–25)
CALCIUM SERPL-MCNC: 8.7 MG/DL (ref 8.3–10.1)
CHLORIDE SERPL-SCNC: 105 MMOL/L (ref 100–108)
CHOLEST SERPL-MCNC: 218 MG/DL (ref 50–200)
CK SERPL-CCNC: 104 U/L (ref 39–308)
CO2 SERPL-SCNC: 28 MMOL/L (ref 21–32)
CREAT SERPL-MCNC: 1.1 MG/DL (ref 0.6–1.3)
EOSINOPHIL # BLD AUTO: 0.46 THOUSAND/ΜL (ref 0–0.61)
EOSINOPHIL NFR BLD AUTO: 7 % (ref 0–6)
ERYTHROCYTE [DISTWIDTH] IN BLOOD BY AUTOMATED COUNT: 14.1 % (ref 11.6–15.1)
GFR SERPL CREATININE-BSD FRML MDRD: 64 ML/MIN/1.73SQ M
GLUCOSE P FAST SERPL-MCNC: 89 MG/DL (ref 65–99)
HCT VFR BLD AUTO: 41.6 % (ref 36.5–49.3)
HDLC SERPL-MCNC: 55 MG/DL
HGB BLD-MCNC: 13.4 G/DL (ref 12–17)
IMM GRANULOCYTES # BLD AUTO: 0.02 THOUSAND/UL (ref 0–0.2)
IMM GRANULOCYTES NFR BLD AUTO: 0 % (ref 0–2)
LDLC SERPL CALC-MCNC: 145 MG/DL (ref 0–100)
LYMPHOCYTES # BLD AUTO: 1.87 THOUSANDS/ΜL (ref 0.6–4.47)
LYMPHOCYTES NFR BLD AUTO: 29 % (ref 14–44)
MCH RBC QN AUTO: 31.2 PG (ref 26.8–34.3)
MCHC RBC AUTO-ENTMCNC: 32.2 G/DL (ref 31.4–37.4)
MCV RBC AUTO: 97 FL (ref 82–98)
MONOCYTES # BLD AUTO: 0.6 THOUSAND/ΜL (ref 0.17–1.22)
MONOCYTES NFR BLD AUTO: 9 % (ref 4–12)
NEUTROPHILS # BLD AUTO: 3.36 THOUSANDS/ΜL (ref 1.85–7.62)
NEUTS SEG NFR BLD AUTO: 54 % (ref 43–75)
NONHDLC SERPL-MCNC: 163 MG/DL
NRBC BLD AUTO-RTO: 0 /100 WBCS
PLATELET # BLD AUTO: 345 THOUSANDS/UL (ref 149–390)
PMV BLD AUTO: 9.9 FL (ref 8.9–12.7)
POTASSIUM SERPL-SCNC: 4.1 MMOL/L (ref 3.5–5.3)
PROT SERPL-MCNC: 6.8 G/DL (ref 6.4–8.2)
RBC # BLD AUTO: 4.29 MILLION/UL (ref 3.88–5.62)
SODIUM SERPL-SCNC: 139 MMOL/L (ref 136–145)
TRIGL SERPL-MCNC: 92 MG/DL
TSH SERPL DL<=0.05 MIU/L-ACNC: 4.06 UIU/ML (ref 0.36–3.74)
WBC # BLD AUTO: 6.39 THOUSAND/UL (ref 4.31–10.16)

## 2020-07-20 PROCEDURE — 80053 COMPREHEN METABOLIC PANEL: CPT

## 2020-07-20 PROCEDURE — 80061 LIPID PANEL: CPT

## 2020-07-20 PROCEDURE — 82550 ASSAY OF CK (CPK): CPT

## 2020-07-20 PROCEDURE — 36415 COLL VENOUS BLD VENIPUNCTURE: CPT

## 2020-07-20 PROCEDURE — 84443 ASSAY THYROID STIM HORMONE: CPT

## 2020-07-20 PROCEDURE — 85025 COMPLETE CBC W/AUTO DIFF WBC: CPT

## 2020-09-09 ENCOUNTER — OFFICE VISIT (OUTPATIENT)
Dept: CARDIOLOGY CLINIC | Facility: CLINIC | Age: 80
End: 2020-09-09
Payer: MEDICARE

## 2020-09-09 VITALS
HEART RATE: 55 BPM | BODY MASS INDEX: 20.73 KG/M2 | TEMPERATURE: 98.2 F | SYSTOLIC BLOOD PRESSURE: 148 MMHG | WEIGHT: 129 LBS | HEIGHT: 66 IN | DIASTOLIC BLOOD PRESSURE: 70 MMHG

## 2020-09-09 DIAGNOSIS — I48.91 ATRIAL FIBRILLATION, UNSPECIFIED TYPE (HCC): ICD-10-CM

## 2020-09-09 DIAGNOSIS — Z01.818 PRE-OP EVALUATION: Primary | ICD-10-CM

## 2020-09-09 PROCEDURE — 93000 ELECTROCARDIOGRAM COMPLETE: CPT | Performed by: INTERNAL MEDICINE

## 2020-09-09 PROCEDURE — 99213 OFFICE O/P EST LOW 20 MIN: CPT | Performed by: INTERNAL MEDICINE

## 2020-09-09 NOTE — PROGRESS NOTES
Cardiology Follow Up        Bertha Poll      1940      [id number]      Discussion/Summary:    1  Paroxysmal atrial fibrillation  2  Dyslipidemia      · Sinus rhythm on ECG  Remain off metoprolol as he did feel lightheaded on it in the setting of sinus bradycardia  Continue Eliquis anticoagulation  · Suspect low cardiac risk for sinus surgery which is scheduled for in the near future  Okay to hold Eliquis 48 hours prior to surgery  Follow-up in 6 months      Interval History: This is a 71-year-old male with a history of atrial fibrillation diagnosed 08/2017, maintained on low-dose metoprolol up until May 2018  [facility] presented to his PCPs office with symptoms of intermittent lightheadedness with bradycardia in the 40s   At that time his metoprolol was discontinued  [facility] has been maintained on Eliquis anticoagulation  He underwent PFTs 02/07/2020 which was within normal limits  CT chest 02/19/2020 revealed a few scattered small lung nodules  He presents today for follow-up with no complaints  He denies chest pain, shortness of breath, dizziness, palpitations, lower extremity edema, claudication  He is tolerating Eliquis anticoagulation well without abnormal bleeding or heavy bruising           Vitals:  Vitals:    09/09/20 1133   BP: 148/70   Pulse: 55   Temp: 98 2 °F (36 8 °C)   Weight: 58 5 kg (129 lb)   Height: 5' 6" (1 676 m)         Past Medical History:   Diagnosis Date    Back pain     Benign prostatic hypertrophy     Disease of thyroid gland     Hypertension     Restless leg      Social History     Socioeconomic History    Marital status: /Civil Union     Spouse name: Not on file    Number of children: Not on file    Years of education: Not on file    Highest education level: Not on file   Occupational History    Not on file   Social Needs    Financial resource strain: Not on file    Food insecurity     Worry: Not on file Inability: Not on file    Transportation needs     Medical: Not on file     Non-medical: Not on file   Tobacco Use    Smoking status: Never Smoker    Smokeless tobacco: Never Used   Substance and Sexual Activity    Alcohol use: No     Comment: CAFFEINE USE    Drug use: No    Sexual activity: Not on file   Lifestyle    Physical activity     Days per week: Not on file     Minutes per session: Not on file    Stress: Not on file   Relationships    Social connections     Talks on phone: Not on file     Gets together: Not on file     Attends Jainism service: Not on file     Active member of club or organization: Not on file     Attends meetings of clubs or organizations: Not on file     Relationship status: Not on file    Intimate partner violence     Fear of current or ex partner: Not on file     Emotionally abused: Not on file     Physically abused: Not on file     Forced sexual activity: Not on file   Other Topics Concern    Not on file   Social History Narrative    Not on file      Family History   Problem Relation Age of Onset    Cancer Mother     Throat cancer Father     Breast cancer Sister      Past Surgical History:   Procedure Laterality Date    ACHILLES TENDON SURGERY      HERNIA REPAIR         Current Outpatient Medications:     apixaban (ELIQUIS) 5 mg, Take 1 tablet (5 mg total) by mouth 2 (two) times a day, Disp: 180 tablet, Rfl: 3    bimatoprost (LUMIGAN) 0 01 % ophthalmic drops, Administer 1 drop into the left eye daily at bedtime, Disp: , Rfl:     levothyroxine 25 mcg tablet, Take 25 mcg by mouth daily, Disp: , Rfl:     Multiple Vitamins-Minerals (CENTRUM SILVER 50+MEN PO), Take by mouth, Disp: , Rfl:     Omega-3 Fatty Acids (FISH OIL) 1,000 mg, Take by mouth daily, Disp: , Rfl:     pramipexole (MIRAPEX) 0 125 mg tablet, Take by mouth Daily, Disp: , Rfl:         Review of Systems:  Review of Systems   Respiratory: Negative  Cardiovascular: Negative      Musculoskeletal: Negative  All other systems reviewed and are negative  Physical Exam:  Physical Exam  Constitutional:       General: He is not in acute distress  Appearance: He is well-developed  He is not diaphoretic  HENT:      Head: Normocephalic and atraumatic  Eyes:      General: No scleral icterus  Right eye: No discharge  Pupils: Pupils are equal, round, and reactive to light  Neck:      Musculoskeletal: Normal range of motion and neck supple  Thyroid: No thyromegaly  Cardiovascular:      Rate and Rhythm: Normal rate and regular rhythm  Heart sounds: Normal heart sounds  No murmur  No friction rub  No gallop  Pulmonary:      Effort: Pulmonary effort is normal  No respiratory distress  Breath sounds: Normal breath sounds  No wheezing or rhonchi  Abdominal:      General: There is no distension  Tenderness: There is no abdominal tenderness  There is no guarding or rebound  Musculoskeletal: Normal range of motion  Skin:     General: Skin is warm and dry  Coloration: Skin is not pale  Findings: No erythema or rash  Neurological:      Mental Status: He is alert and oriented to person, place, and time  Coordination: Coordination normal    Psychiatric:         Behavior: Behavior normal          Thought Content: Thought content normal          Judgment: Judgment normal          This note was completed in part utilizing [employer] Direct Software  Grammatical errors, random word insertions, spelling mistakes, and incomplete sentences can be an occasional consequence of this system secondary to software limitations, ambient noise, and hardware issues  If you have any questions or concerns about the content, text, or information contained within the body of this dictation, please contact the provider for clarification

## 2020-11-15 DIAGNOSIS — I48.91 ATRIAL FIBRILLATION, UNSPECIFIED TYPE (HCC): ICD-10-CM

## 2020-11-16 RX ORDER — APIXABAN 5 MG/1
TABLET, FILM COATED ORAL
Qty: 180 TABLET | Refills: 3 | Status: SHIPPED | OUTPATIENT
Start: 2020-11-16 | End: 2021-11-11

## 2021-01-20 DIAGNOSIS — Z23 ENCOUNTER FOR IMMUNIZATION: ICD-10-CM

## 2021-01-22 ENCOUNTER — IMMUNIZATIONS (OUTPATIENT)
Dept: FAMILY MEDICINE CLINIC | Facility: HOSPITAL | Age: 81
End: 2021-01-22

## 2021-01-22 DIAGNOSIS — Z23 ENCOUNTER FOR IMMUNIZATION: Primary | ICD-10-CM

## 2021-01-22 PROCEDURE — 91301 SARS-COV-2 / COVID-19 MRNA VACCINE (MODERNA) 100 MCG: CPT

## 2021-01-22 PROCEDURE — 0011A SARS-COV-2 / COVID-19 MRNA VACCINE (MODERNA) 100 MCG: CPT

## 2021-01-25 ENCOUNTER — LAB (OUTPATIENT)
Dept: LAB | Facility: CLINIC | Age: 81
End: 2021-01-25
Payer: MEDICARE

## 2021-01-25 ENCOUNTER — TRANSCRIBE ORDERS (OUTPATIENT)
Dept: LAB | Facility: CLINIC | Age: 81
End: 2021-01-25

## 2021-01-25 DIAGNOSIS — I48.0 PAROXYSMAL ATRIAL FIBRILLATION (HCC): ICD-10-CM

## 2021-01-25 DIAGNOSIS — I10 ESSENTIAL HYPERTENSION, MALIGNANT: ICD-10-CM

## 2021-01-25 DIAGNOSIS — E03.9 MYXEDEMA HEART DISEASE: ICD-10-CM

## 2021-01-25 DIAGNOSIS — R97.20 ELEVATED PROSTATE SPECIFIC ANTIGEN (PSA): ICD-10-CM

## 2021-01-25 DIAGNOSIS — E78.2 MIXED HYPERLIPIDEMIA: ICD-10-CM

## 2021-01-25 DIAGNOSIS — G25.81 RESTLESS LEGS: ICD-10-CM

## 2021-01-25 DIAGNOSIS — I51.9 MYXEDEMA HEART DISEASE: ICD-10-CM

## 2021-01-25 DIAGNOSIS — R05.9 COUGH: ICD-10-CM

## 2021-01-25 DIAGNOSIS — I51.9 MYXEDEMA HEART DISEASE: Primary | ICD-10-CM

## 2021-01-25 DIAGNOSIS — E03.9 MYXEDEMA HEART DISEASE: Primary | ICD-10-CM

## 2021-01-25 DIAGNOSIS — R97.20 ELEVATED PSA, LESS THAN 10 NG/ML: ICD-10-CM

## 2021-01-25 LAB
ALBUMIN SERPL BCP-MCNC: 4.1 G/DL (ref 3.5–5)
ALP SERPL-CCNC: 60 U/L (ref 46–116)
ALT SERPL W P-5'-P-CCNC: 21 U/L (ref 12–78)
ANION GAP SERPL CALCULATED.3IONS-SCNC: 1 MMOL/L (ref 4–13)
AST SERPL W P-5'-P-CCNC: 17 U/L (ref 5–45)
BASOPHILS # BLD AUTO: 0.09 THOUSANDS/ΜL (ref 0–0.1)
BASOPHILS NFR BLD AUTO: 1 % (ref 0–1)
BILIRUB SERPL-MCNC: 0.74 MG/DL (ref 0.2–1)
BUN SERPL-MCNC: 20 MG/DL (ref 5–25)
CALCIUM SERPL-MCNC: 9.2 MG/DL (ref 8.3–10.1)
CHLORIDE SERPL-SCNC: 105 MMOL/L (ref 100–108)
CHOLEST SERPL-MCNC: 221 MG/DL (ref 50–200)
CK SERPL-CCNC: 96 U/L (ref 39–308)
CO2 SERPL-SCNC: 31 MMOL/L (ref 21–32)
CREAT SERPL-MCNC: 1.06 MG/DL (ref 0.6–1.3)
EOSINOPHIL # BLD AUTO: 0.35 THOUSAND/ΜL (ref 0–0.61)
EOSINOPHIL NFR BLD AUTO: 6 % (ref 0–6)
ERYTHROCYTE [DISTWIDTH] IN BLOOD BY AUTOMATED COUNT: 13.3 % (ref 11.6–15.1)
GFR SERPL CREATININE-BSD FRML MDRD: 66 ML/MIN/1.73SQ M
GLUCOSE P FAST SERPL-MCNC: 103 MG/DL (ref 65–99)
HCT VFR BLD AUTO: 45 % (ref 36.5–49.3)
HDLC SERPL-MCNC: 61 MG/DL
HGB BLD-MCNC: 14.5 G/DL (ref 12–17)
IMM GRANULOCYTES # BLD AUTO: 0.02 THOUSAND/UL (ref 0–0.2)
IMM GRANULOCYTES NFR BLD AUTO: 0 % (ref 0–2)
LDLC SERPL CALC-MCNC: 144 MG/DL (ref 0–100)
LYMPHOCYTES # BLD AUTO: 2.07 THOUSANDS/ΜL (ref 0.6–4.47)
LYMPHOCYTES NFR BLD AUTO: 33 % (ref 14–44)
MCH RBC QN AUTO: 30.4 PG (ref 26.8–34.3)
MCHC RBC AUTO-ENTMCNC: 32.2 G/DL (ref 31.4–37.4)
MCV RBC AUTO: 94 FL (ref 82–98)
MONOCYTES # BLD AUTO: 0.68 THOUSAND/ΜL (ref 0.17–1.22)
MONOCYTES NFR BLD AUTO: 11 % (ref 4–12)
NEUTROPHILS # BLD AUTO: 3.07 THOUSANDS/ΜL (ref 1.85–7.62)
NEUTS SEG NFR BLD AUTO: 49 % (ref 43–75)
NONHDLC SERPL-MCNC: 160 MG/DL
NRBC BLD AUTO-RTO: 0 /100 WBCS
PLATELET # BLD AUTO: 395 THOUSANDS/UL (ref 149–390)
PMV BLD AUTO: 9.8 FL (ref 8.9–12.7)
POTASSIUM SERPL-SCNC: 4.3 MMOL/L (ref 3.5–5.3)
PROT SERPL-MCNC: 7.2 G/DL (ref 6.4–8.2)
RBC # BLD AUTO: 4.77 MILLION/UL (ref 3.88–5.62)
SODIUM SERPL-SCNC: 137 MMOL/L (ref 136–145)
TRIGL SERPL-MCNC: 81 MG/DL
TSH SERPL DL<=0.05 MIU/L-ACNC: 3.94 UIU/ML (ref 0.36–3.74)
WBC # BLD AUTO: 6.28 THOUSAND/UL (ref 4.31–10.16)

## 2021-01-25 PROCEDURE — 84154 ASSAY OF PSA FREE: CPT

## 2021-01-25 PROCEDURE — 36415 COLL VENOUS BLD VENIPUNCTURE: CPT

## 2021-01-25 PROCEDURE — 80061 LIPID PANEL: CPT

## 2021-01-25 PROCEDURE — 80053 COMPREHEN METABOLIC PANEL: CPT

## 2021-01-25 PROCEDURE — 82550 ASSAY OF CK (CPK): CPT

## 2021-01-25 PROCEDURE — 84443 ASSAY THYROID STIM HORMONE: CPT

## 2021-01-25 PROCEDURE — 85025 COMPLETE CBC W/AUTO DIFF WBC: CPT

## 2021-01-25 PROCEDURE — 84153 ASSAY OF PSA TOTAL: CPT

## 2021-01-27 LAB
PSA FREE MFR SERPL: 16.7 %
PSA FREE SERPL-MCNC: 0.9 NG/ML
PSA SERPL-MCNC: 5.4 NG/ML (ref 0–4)

## 2021-02-10 ENCOUNTER — OFFICE VISIT (OUTPATIENT)
Dept: UROLOGY | Facility: MEDICAL CENTER | Age: 81
End: 2021-02-10
Payer: MEDICARE

## 2021-02-10 VITALS
HEIGHT: 66 IN | HEART RATE: 88 BPM | DIASTOLIC BLOOD PRESSURE: 82 MMHG | BODY MASS INDEX: 20.82 KG/M2 | SYSTOLIC BLOOD PRESSURE: 150 MMHG

## 2021-02-10 DIAGNOSIS — R97.20 ELEVATED PSA, LESS THAN 10 NG/ML: Primary | ICD-10-CM

## 2021-02-10 DIAGNOSIS — N13.8 BPH WITH OBSTRUCTION/LOWER URINARY TRACT SYMPTOMS: ICD-10-CM

## 2021-02-10 DIAGNOSIS — R97.20 INCREASED PROSTATE SPECIFIC ANTIGEN (PSA) VELOCITY: ICD-10-CM

## 2021-02-10 DIAGNOSIS — N40.1 BPH WITH OBSTRUCTION/LOWER URINARY TRACT SYMPTOMS: ICD-10-CM

## 2021-02-10 PROCEDURE — 99214 OFFICE O/P EST MOD 30 MIN: CPT | Performed by: UROLOGY

## 2021-02-10 RX ORDER — ALPRAZOLAM 0.5 MG/1
0.5 TABLET ORAL ONCE
Qty: 1 TABLET | Refills: 0 | Status: SHIPPED | OUTPATIENT
Start: 2021-02-10 | End: 2021-09-21 | Stop reason: HOSPADM

## 2021-02-10 RX ORDER — FLUTICASONE PROPIONATE 50 MCG
SPRAY, SUSPENSION (ML) NASAL
COMMUNITY
Start: 2020-12-06

## 2021-02-10 NOTE — PROGRESS NOTES
Assessment/Plan:   1  Elevated PSA/increased PSA velocity -PSA has now gone to 5 2  Patient  Has had 2 previous benign biopsies but his PSA seems to be rising  Patient will undergo MRI of the prostate and then will discuss results to determine whether repeat prostate biopsy or continued surveillance for elevated PSA should take place  2  BPH with obstructive symptoms -AUA symptom score is 8 with nocturia twice nightly 3/5 for frequency 2/5 for intermittency 1/5 for weakening of the stream   The patient perceives his voiding pattern to be adequate knee is pleased with his voiding pattern  3  Prostatic nodularity- left posterior midgland firm, right apex possibly indicative of nodule  No problem-specific Assessment & Plan notes found for this encounter  Diagnoses and all orders for this visit:    Elevated PSA, less than 10 ng/ml  -     MRI prostate multiparametric wo w contrast; Future  -     ALPRAZolam (XANAX) 0 5 mg tablet; Take 1 tablet (0 5 mg total) by mouth once for 1 dose Take 1-1/2 hour prior to MRI    Increased prostate specific antigen (PSA) velocity  -     MRI prostate multiparametric wo w contrast; Future  -     ALPRAZolam (XANAX) 0 5 mg tablet; Take 1 tablet (0 5 mg total) by mouth once for 1 dose Take 1-1/2 hour prior to MRI    BPH with obstruction/lower urinary tract symptoms    Other orders  -     fluticasone (FLONASE) 50 mcg/act nasal spray          Subjective:      Patient ID: Aung Medina is a 79 y o  male  HPI   79year-old male referred for evaluation of a rising PSA  The patient has had elevated PSA in the past and underwent prostate biopsies which were benign  The patient has known prostatic enlargement with an AUA symptom score of 8 and overall feels that he is voiding well  The   Patient returns to discuss results    The following portions of the patient's history were reviewed and updated as appropriate: allergies, current medications, past family history, past medical history, past social history, past surgical history and problem list     Review of Systems      Objective:      /82   Pulse 88   Ht 5' 6" (1 676 m)   BMI 20 82 kg/m²          Physical Exam

## 2021-02-19 ENCOUNTER — IMMUNIZATIONS (OUTPATIENT)
Dept: FAMILY MEDICINE CLINIC | Facility: HOSPITAL | Age: 81
End: 2021-02-19

## 2021-02-19 DIAGNOSIS — Z23 ENCOUNTER FOR IMMUNIZATION: Primary | ICD-10-CM

## 2021-02-19 PROCEDURE — 0012A SARS-COV-2 / COVID-19 MRNA VACCINE (MODERNA) 100 MCG: CPT

## 2021-02-19 PROCEDURE — 91301 SARS-COV-2 / COVID-19 MRNA VACCINE (MODERNA) 100 MCG: CPT

## 2021-02-24 ENCOUNTER — HOSPITAL ENCOUNTER (OUTPATIENT)
Dept: RADIOLOGY | Age: 81
Discharge: HOME | End: 2021-02-24
Payer: MEDICARE

## 2021-02-24 DIAGNOSIS — R97.20 ELEVATED PSA, LESS THAN 10 NG/ML: ICD-10-CM

## 2021-02-24 DIAGNOSIS — R97.20 INCREASED PROSTATE SPECIFIC ANTIGEN (PSA) VELOCITY: ICD-10-CM

## 2021-02-24 PROCEDURE — G1004 CDSM NDSC: HCPCS

## 2021-02-24 PROCEDURE — 72197 MRI PELVIS W/O & W/DYE: CPT

## 2021-02-24 PROCEDURE — A9585 GADOBUTROL INJECTION: HCPCS | Performed by: UROLOGY

## 2021-02-24 PROCEDURE — 76377 3D RENDER W/INTRP POSTPROCES: CPT

## 2021-02-24 RX ADMIN — GADOBUTROL 6 ML: 604.72 INJECTION INTRAVENOUS at 14:30

## 2021-03-03 ENCOUNTER — TELEPHONE (OUTPATIENT)
Dept: UROLOGY | Facility: MEDICAL CENTER | Age: 81
End: 2021-03-03

## 2021-03-03 ENCOUNTER — OFFICE VISIT (OUTPATIENT)
Dept: UROLOGY | Facility: MEDICAL CENTER | Age: 81
End: 2021-03-03
Payer: MEDICARE

## 2021-03-03 VITALS
WEIGHT: 133 LBS | SYSTOLIC BLOOD PRESSURE: 138 MMHG | DIASTOLIC BLOOD PRESSURE: 78 MMHG | BODY MASS INDEX: 21.38 KG/M2 | HEIGHT: 66 IN

## 2021-03-03 DIAGNOSIS — N40.1 BPH ASSOCIATED WITH NOCTURIA: ICD-10-CM

## 2021-03-03 DIAGNOSIS — R35.1 BPH ASSOCIATED WITH NOCTURIA: ICD-10-CM

## 2021-03-03 DIAGNOSIS — R97.20 ELEVATED PSA, LESS THAN 10 NG/ML: Primary | ICD-10-CM

## 2021-03-03 PROCEDURE — 99214 OFFICE O/P EST MOD 30 MIN: CPT | Performed by: UROLOGY

## 2021-03-03 NOTE — PROGRESS NOTES
H&P Exam - Urology   Aung Medina 79 y o  male MRN: [id number]  Unit/Bed#:  Encounter: [id number]    Assessment/Plan     Assessment:   elevated PSA with previous benign prostate biopsy   nodular prostate with PI-RADS 3 MRI of the prostate  Plan:   MRI fusion biopsy of the prostate    History of Present Illness   HPI:  Aung Medina is a 79 y o  male who presents with  A history of a previous benign biopsy for an elevated PSA     The patient presented to the office initially to review his MRI of the prostate because of an elevated PSA and previous benign biopsy  The patient was found to have a 1 4 x 1 x 0 6 cm nodule in the anterior left transitional zone consistent with a PI-RADS 3 lesion  PSA is currently 5 4 up from 3 2 2 years ago  Patient agrees to MRI fusion biopsy understanding risks of bleeding infection urinary retention need for catheter need for operative intervention the possibility of false negative biopsies or rectal injury  He understands that he will stop his anticoagulation approximately 3-4 days prior to the biopsy      Review of Systems    Historical Information   Past Medical History:   Diagnosis Date    Back pain     Benign prostatic hypertrophy     Disease of thyroid gland     Elevated PSA 3/3/2021    Hypertension     Restless leg      Past Surgical History:   Procedure Laterality Date    ACHILLES TENDON SURGERY      HERNIA REPAIR       Social History   Social History     Substance and Sexual Activity   Alcohol Use No    Comment: CAFFEINE USE     Social History     Substance and Sexual Activity   Drug Use No     Social History     Tobacco Use   Smoking Status Never Smoker   Smokeless Tobacco Never Used     Family History:   Family History   Problem Relation Age of Onset    Cancer Mother     Throat cancer Father     Breast cancer Sister        Meds/Allergies   all medications and allergies reviewed  Allergies   Allergen Reactions    No Active Allergies        Objective   Vitals: Blood pressure 138/78, height 5' 6" (1 676 m), weight 60 3 kg (133 lb)  [unfilled]    Invasive Devices     None                 Physical Exam  Vitals signs reviewed  Constitutional:       General: He is not in acute distress  Appearance: Normal appearance  He is normal weight  He is not ill-appearing, toxic-appearing or diaphoretic  HENT:      Head: Normocephalic and atraumatic  Nose: Nose normal       Mouth/Throat:      Mouth: Mucous membranes are moist    Eyes:      Extraocular Movements: Extraocular movements intact  Neck:      Musculoskeletal: Normal range of motion and neck supple  No neck rigidity or muscular tenderness  Cardiovascular:      Rate and Rhythm: Normal rate and regular rhythm  Pulmonary:      Effort: Pulmonary effort is normal       Breath sounds: Normal breath sounds  Abdominal:      Palpations: Abdomen is soft  Genitourinary:     Penis: Normal     Musculoskeletal: Normal range of motion  Neurological:      General: No focal deficit present  Mental Status: He is alert and oriented to person, place, and time  Mental status is at baseline  Psychiatric:         Mood and Affect: Mood normal          Behavior: Behavior normal          Thought Content: Thought content normal          Judgment: Judgment normal          Lab Results: I have personally reviewed pertinent reports  Imaging: I have personally reviewed pertinent films in PACS MRI prostate multiparametric wo w contrast  Status: Final result   PACS Images     Show images for MRI prostate multiparametric wo w contrast   Study Result    MULTIPARAMETRIC MRI OF THE PROSTATE WITH AND WITHOUT CONTRAST-WITH 3-D POSTPROCESSING      INDICATION:   R97 20: Elevated prostate specific antigen (PSA)      COMPARISON: None     PSA LEVEL: 5 4 ng/ml   PRIOR BIOPSY DATE: Previous biopsy report is not available    BIOPSY RESULTS:       TECHNIQUE: The following pulse sequences were obtained:  Small field-of-view axial T1-weighted and multiplanar T2-weighted images; DWI axial and ADC map; large field of view axial T2 weighted images; T1w in-phase and opposed-phase axials of entire pelvis   and dynamic 3D T1w axial before and during IV contrast injection         CONTRAST:  Gadobutrol (Gadavist) 6 mL of Gadobutrol injection (SINGLE-DOSE)     TECHNICAL LIMITATIONS: The evaluation is limited as the coronal and sagittal images are markedly degraded due to motion which makes the evaluation limited     FINDINGS:     PROSTATE:     Size: 5 7 x 4 2 x 4 cm = 45 03 cc  Post-biopsy hemorrhage:  None  Central gland enlargement (BPH): The central gland is a heterogenous     Focal lesions - localization as follows:     Lesion: 1       Size: 1 4 x 1 x 0 6 cm, 0 59 cc seen in image 16 series 4  Location: Anterior left transition zone in the mid gland       T2-weighted images: Score 2: Linear or wedge-shaped hypointensity or diffuse mild hypointensity, usually indistinct margin  Diffusion-weighted images: Score 4: Focal markedly hypointense on ADC and markedly hyperintense on high b-value DWI; less than 1 5 cm in greatest dimension  Dynamic post-contrast images: (-) Lesion that does not enhance early compared to the surrounding prostate or enhances diffusely so that the margins of the enhancing area do not correspond to a finding on T2-weighted images and/or DWI  PI-RADS Assessment Category: PICKLIST: IF PZ, select DWI Score  If DWI score is 3 and DCE is (+), then bump 3-->4  If TZ, select T2 score  If T2W score is 3 and DWI is 5, bump 3->4; IF T2 score is 2 and DWI is 4 or 5, bump 2->3     Extra-prostatic extension (EPE): PICKLIST     SEMINAL VESICLES: Unremarkable     Note: Clinically significant cancer is defined on pathology/histology as [place] score greater than or equal to 7, and/or volume of greater than or equal to 0 5 mL, and/or extraprostatic extension      URINARY BLADDER: Unremarkable      LYMPH NODES: No pelvic lymphadenopathy      BONES: No suspicious osseous lesion      1 4 x 1 x 0 6 cm nodule in the anterior left transition zone which demonstrates partially obscured margins on the axial plane and not assessed on the coronal sagittal planes     IMPRESSION:  This is a markedly limited study due to degradation of the coronal and sagittal images due to extensive motion  This makes evaluation suboptimal        1 4 x 1 x 0 6 cm nodule in the anterior left transition zone which demonstrates partially obscured margins on the axial plane and not assessed on the coronal sagittal planes     1  PI-RADSv2 1 Category 3 - Intermediate (the presence of clinically significant cancer is equivocal)      2  No extraprostatic tumor, seminal vesicle invasion, pelvic lymphadenopathy, or pelvic osseous metastatic disease      3  Calculated prostate volume of 45 03 cc      Short interval follow-up at 3-6  months may be considered in an attempt to achieve better quality     Prostate gland boundaries and areas of concern for significant prostate cancer were segmented using 3D advanced post-processing on an independent [employer] system workstation with active physician participation  The segmentation was performed should   MR-ultrasound fusion biopsy be required      Workstation performed: [id number]      Imaging    MRI prostate multiparametric wo w contrast (Order: [id number]) - 2/24/2021  Result History    MRI prostate multiparametric wo w contrast (Order #[id number]) on 3/3/2021 - Order Result History Report   Order Report    Order Details  Order Questions    Question Answer Comment   Release to patient through [facility]    Is order priority selected as STAT? No    Exam reason Elevated rising PSA with previous benign biopsy    Note:  Enter reason for exam   Exam reason Elevated rising PSA with previous benign biopsy    Note:  Enter reason for exam   What is the patient's sedation requirement?  No Sedation           Reason for Exam    Elevated rising PSA with previous benign biopsy; Enlarged prostate; Elevated rising PSA with previous benign biopsy   Dx: Elevated PSA, less than 10 ng/ml [R97 20 (ICD-10-CM)]; Increased prostate specific antigen (PSA) velocity [R97 20 (ICD-10-CM)]   Signed by    Signed Date/Time  Phone Pager   Kim Beavers 3/03/2021 09:59 [phone]    Exam Information    Status Exam Begun  Exam Ended  Performing Tech   Final [99] 2/24/2021 13:27 2/24/2021 14:27 Nichole [place]   Screening Form Questions     Answer Comment   What are your symptoms? High psa and enlarged prostate-dr  Requested    Do you have a cardiac pacemaker or internal defibrillator? NA    Please list /make/model:     Have you had any HEART surgery? None    Please list make/model:     Heart surgery: If "other", please describe:     Have you had any BRAIN surgery? None    Please list  or describe implant:     Brain surgery: If "other", please describe:     Have you had any EYE surgery? None    Eye surgery: If "other", please describe:     Have you ever injured your eyes with metal or metal fragments (grinding,metallic slivers)? No    Eye injury: Please describe:     Have you had any EAR surgery? None    Ear surgery: If "other", please describe:      Do you have an electronic "stimulation" device? None    Please provide  information:     Have you had any abdominal or pelvic surgery? No    Have you had any of the following abdominal or pelvic surgeries:     Abdominal/pelvic surgery: If "other", please describe:     Do you have any ORTHOPEDIC implants/devices? None    Do you have the following: Bullets/BBs/fragments/shrapnel    Do you have liver disease? None    Do you have kidney disease? None    Have you had a problem with a previous MRI? Claustrophobia    Does the patient require pre-medication? Yes    Do you have a personal history of cancer?  None    If "other", please specify:       Are you wearing medication patches?   No    Are you wearing any of the following: None    Did the patient provide this information? No    Who provided this information (if not the patient)? Leda Kern    Relationship to the patient: Spouse    Contact number: [phone]    MRI STAFF ONLY- Prior imaging reviewed in PACS (initials): rebeca left finger bb ice pack placed    MRI STAFF ONLY- Epic chart reviewed (initials): rebeca    MRI STAFF ONLY: The patient was scheduled to receive MRI contrast and has received the Medication Guide  Yes    External Results Report    Open External Results Report    Encounter    View Encounter             Patient Care Timeline    No data selected in time range  Pre-op Summary    Pre-op           Recovery Summary    Recovery             Routing History    None       EKG, Pathology, and Other Studies: I have personally reviewed pertinent reports  VTE Prophylaxis: Sequential compression device Diandra Schmidt)     Code Status: [unfilled]  Advance Directive and Living Will:      Power of :    POLST:      Counseling / Coordination of Care  Total floor / unit time spent today 30 minutes  Greater than 50% of total time was spent with the patient and / or family counseling and / or coordination of care  A description of the counseling / coordination of care: Mulugeta Alonzo

## 2021-03-03 NOTE — ADDENDUM NOTE
Addended by: Edgar Bryant on: 3/3/2021 02:08 PM     Modules accepted: Orders, Level of Service, SmartSet

## 2021-03-03 NOTE — PROGRESS NOTES
100% counseling 15min    Patient return to the office today to discuss results of MRI of the prostate  Formal results had not been entered into epic and therefore we could not discuss the prostate other than the fact that I demonstrated the films to the patient and indicated there appeared to be posterior zone heterogeneity bilaterally  We also reviewed the patient's PSA and he has had a rise in PSA recently  I will review the patient's MRI once a radiologist report is available and contact the patient for appropriate follow-up either with repeat PSA or with MRI fusion biopsy if appropriate  The patient is on anticoagulants which would have to be  Managed if biopsy is recommended

## 2021-03-03 NOTE — H&P
100% counseling 15min    Patient return to the office today to discuss results of MRI of the prostate  Formal results had not been entered into epic and therefore we could not discuss the prostate other than the fact that I demonstrated the films to the patient and indicated there appeared to be posterior zone heterogeneity bilaterally  We also reviewed the patient's PSA and he has had a rise in PSA recently  I will review the patient's MRI once a radiologist report is available and contact the patient for appropriate follow-up either with repeat PSA or with MRI fusion biopsy if appropriate  The patient is on anticoagulants which would have to be  Managed if biopsy is recommended  H&P Exam - Urology   Arlene Mayorga 79 y o  male MRN: [id number]  Unit/Bed#:  Encounter: [id number]    Assessment/Plan     Assessment:   elevated PSA with previous benign prostate biopsy   nodular prostate with PI-RADS 3 MRI of the prostate  Plan:   MRI fusion biopsy of the prostate    History of Present Illness   HPI:  Arlene Mayorga is a 79 y o  male who presents with  A history of a previous benign biopsy for an elevated PSA     The patient presented to the office initially to review his MRI of the prostate because of an elevated PSA and previous benign biopsy  The patient was found to have a 1 4 x 1 x 0 6 cm nodule in the anterior left transitional zone consistent with a PI-RADS 3 lesion  PSA is currently 5 4 up from 3 2 2 years ago  Patient agrees to MRI fusion biopsy understanding risks of bleeding infection urinary retention need for catheter need for operative intervention the possibility of false negative biopsies or rectal injury  He understands that he will stop his anticoagulation approximately 3-4 days prior to the biopsy      Review of Systems    Historical Information   Past Medical History:   Diagnosis Date    Back pain     Benign prostatic hypertrophy     Disease of thyroid gland     Elevated PSA 3/3/2021    Hypertension     Restless leg      Past Surgical History:   Procedure Laterality Date    ACHILLES TENDON SURGERY      HERNIA REPAIR       Social History   Social History     Substance and Sexual Activity   Alcohol Use No    Comment: CAFFEINE USE     Social History     Substance and Sexual Activity   Drug Use No     Social History     Tobacco Use   Smoking Status Never Smoker   Smokeless Tobacco Never Used     Family History:   Family History   Problem Relation Age of Onset    Cancer Mother     Throat cancer Father     Breast cancer Sister        Meds/Allergies   all medications and allergies reviewed  Allergies   Allergen Reactions    No Active Allergies        Objective   Vitals: Blood pressure 138/78, height 5' 6" (1 676 m), weight 60 3 kg (133 lb)  [unfilled]    Invasive Devices     None                 Physical Exam  Vitals signs reviewed  Constitutional:       General: He is not in acute distress  Appearance: Normal appearance  He is normal weight  He is not ill-appearing, toxic-appearing or diaphoretic  HENT:      Head: Normocephalic and atraumatic  Nose: Nose normal       Mouth/Throat:      Mouth: Mucous membranes are moist    Eyes:      Extraocular Movements: Extraocular movements intact  Neck:      Musculoskeletal: Normal range of motion and neck supple  No neck rigidity or muscular tenderness  Cardiovascular:      Rate and Rhythm: Normal rate and regular rhythm  Pulmonary:      Effort: Pulmonary effort is normal       Breath sounds: Normal breath sounds  Abdominal:      Palpations: Abdomen is soft  Genitourinary:     Penis: Normal     Musculoskeletal: Normal range of motion  Neurological:      General: No focal deficit present  Mental Status: He is alert and oriented to person, place, and time  Mental status is at baseline  Psychiatric:         Mood and Affect: Mood normal          Behavior: Behavior normal          Thought Content:  Thought content normal  Judgment: Judgment normal          Lab Results: I have personally reviewed pertinent reports  Imaging: I have personally reviewed pertinent films in PACS MRI prostate multiparametric wo w contrast  Status: Final result   PACS Images     Show images for MRI prostate multiparametric wo w contrast   Study Result    MULTIPARAMETRIC MRI OF THE PROSTATE WITH AND WITHOUT CONTRAST-WITH 3-D POSTPROCESSING      INDICATION:   R97 20: Elevated prostate specific antigen (PSA)      COMPARISON: None     PSA LEVEL: 5 4 ng/ml   PRIOR BIOPSY DATE: Previous biopsy report is not available  BIOPSY RESULTS:       TECHNIQUE: The following pulse sequences were obtained:  Small field-of-view axial T1-weighted and multiplanar T2-weighted images; DWI axial and ADC map; large field of view axial T2 weighted images; T1w in-phase and opposed-phase axials of entire pelvis   and dynamic 3D T1w axial before and during IV contrast injection         CONTRAST:  Gadobutrol (Gadavist) 6 mL of Gadobutrol injection (SINGLE-DOSE)     TECHNICAL LIMITATIONS: The evaluation is limited as the coronal and sagittal images are markedly degraded due to motion which makes the evaluation limited     FINDINGS:     PROSTATE:     Size: 5 7 x 4 2 x 4 cm = 45 03 cc  Post-biopsy hemorrhage:  None  Central gland enlargement (BPH): The central gland is a heterogenous     Focal lesions - localization as follows:     Lesion: 1       Size: 1 4 x 1 x 0 6 cm, 0 59 cc seen in image 16 series 4  Location: Anterior left transition zone in the mid gland       T2-weighted images: Score 2: Linear or wedge-shaped hypointensity or diffuse mild hypointensity, usually indistinct margin  Diffusion-weighted images: Score 4: Focal markedly hypointense on ADC and markedly hyperintense on high b-value DWI; less than 1 5 cm in greatest dimension         Dynamic post-contrast images: (-) Lesion that does not enhance early compared to the surrounding prostate or enhances diffusely so that the margins of the enhancing area do not correspond to a finding on T2-weighted images and/or DWI  PI-RADS Assessment Category: PICKLIST: IF PZ, select DWI Score  If DWI score is 3 and DCE is (+), then bump 3-->4  If TZ, select T2 score  If T2W score is 3 and DWI is 5, bump 3->4; IF T2 score is 2 and DWI is 4 or 5, bump 2->3  Extra-prostatic extension (EPE): PICKLIST     SEMINAL VESICLES: Unremarkable     Note: Clinically significant cancer is defined on pathology/histology as [place] score greater than or equal to 7, and/or volume of greater than or equal to 0 5 mL, and/or extraprostatic extension      URINARY BLADDER: Unremarkable      LYMPH NODES: No pelvic lymphadenopathy      BONES: No suspicious osseous lesion      1 4 x 1 x 0 6 cm nodule in the anterior left transition zone which demonstrates partially obscured margins on the axial plane and not assessed on the coronal sagittal planes     IMPRESSION:  This is a markedly limited study due to degradation of the coronal and sagittal images due to extensive motion  This makes evaluation suboptimal        1 4 x 1 x 0 6 cm nodule in the anterior left transition zone which demonstrates partially obscured margins on the axial plane and not assessed on the coronal sagittal planes     1  PI-RADSv2 1 Category 3 - Intermediate (the presence of clinically significant cancer is equivocal)      2  No extraprostatic tumor, seminal vesicle invasion, pelvic lymphadenopathy, or pelvic osseous metastatic disease      3  Calculated prostate volume of 45 03 cc      Short interval follow-up at 3-6  months may be considered in an attempt to achieve better quality     Prostate gland boundaries and areas of concern for significant prostate cancer were segmented using 3D advanced post-processing on an independent [employer] system workstation with active physician participation    The segmentation was performed should   MR-ultrasound fusion biopsy be required      Workstation performed: [id number]      Imaging    MRI prostate multiparametric wo w contrast (Order: [id number]) - 2/24/2021  Result History    MRI prostate multiparametric wo w contrast (Order #[id number]) on 3/3/2021 - Order Result History Report   Order Report    Order Details  Order Questions    Question Answer Comment   Release to patient through [facility]    Is order priority selected as STAT? No    Exam reason Elevated rising PSA with previous benign biopsy    Note:  Enter reason for exam   Exam reason Elevated rising PSA with previous benign biopsy    Note:  Enter reason for exam   What is the patient's sedation requirement? No Sedation           Reason for Exam    Elevated rising PSA with previous benign biopsy; Enlarged prostate; Elevated rising PSA with previous benign biopsy   Dx: Elevated PSA, less than 10 ng/ml [R97 20 (ICD-10-CM)]; Increased prostate specific antigen (PSA) velocity [R97 20 (ICD-10-CM)]   Signed by    Signed Date/Time  Phone Pager   Lucien Hodgkin 3/03/2021 09:59 [phone]    Exam Information    Status Exam Begun  Exam Ended  Performing Tech   Final [99] 2/24/2021 13:27 2/24/2021 14:27 Adam Ho   Screening Form Questions     Answer Comment   What are your symptoms? High psa and enlarged prostate-dr  Requested    Do you have a cardiac pacemaker or internal defibrillator? NA    Please list /make/model:     Have you had any HEART surgery? None    Please list make/model:     Heart surgery: If "other", please describe:     Have you had any BRAIN surgery? None    Please list  or describe implant:     Brain surgery: If "other", please describe:     Have you had any EYE surgery? None    Eye surgery: If "other", please describe:     Have you ever injured your eyes with metal or metal fragments (grinding,metallic slivers)? No    Eye injury: Please describe:     Have you had any EAR surgery?  None    Ear surgery: If "other", please describe:      Do you have an electronic "stimulation" device? None    Please provide  information:     Have you had any abdominal or pelvic surgery? No    Have you had any of the following abdominal or pelvic surgeries:     Abdominal/pelvic surgery: If "other", please describe:     Do you have any ORTHOPEDIC implants/devices? None    Do you have the following: Bullets/BBs/fragments/shrapnel    Do you have liver disease? None    Do you have kidney disease? None    Have you had a problem with a previous MRI? Claustrophobia    Does the patient require pre-medication? Yes    Do you have a personal history of cancer? None    If "other", please specify:       Are you wearing medication patches?   No    Are you wearing any of the following: None    Did the patient provide this information? No    Who provided this information (if not the patient)? Leda Kern    Relationship to the patient: Spouse    Contact number: [phone]    MRI STAFF ONLY- Prior imaging reviewed in PACS (initials): bb left finger bb ice pack placed    MRI STAFF ONLY- Epic chart reviewed (initials): bb    MRI STAFF ONLY: The patient was scheduled to receive MRI contrast and has received the Medication Guide  Yes    External Results Report    Open External Results Report    Encounter    View Encounter             Patient Care Timeline    No data selected in time range  Pre-op Summary    Pre-op           Recovery Summary    Recovery             Routing History    None       EKG, Pathology, and Other Studies: I have personally reviewed pertinent reports  VTE Prophylaxis: Sequential compression device Anupam Sky     Code Status: [unfilled]  Advance Directive and Living Will:      Power of :    POLST:      Counseling / Coordination of Care  Total floor / unit time spent today 30 minutes  Greater than 50% of total time was spent with the patient and / or family counseling and / or coordination of care    A description of the counseling / coordination of care: Mulugeta Alonzo

## 2021-03-03 NOTE — LETTER
March 3, 2021     8 [facility]    Patient: Valentina Pedraza   YOB: 1940   Date of Visit: 3/3/2021       Dear Dr Jasmina Taylor: Thank you for referring Valentina Pedraza to me for evaluation  Below are my notes for this consultation  If you have questions, please do not hesitate to call me  I look forward to following your patient along with you  Sincerely,        Heydi Samson MD        CC: Jasmeet Quintanilla MD  3/3/2021  2:06 PM  Signed  H&P Exam - Urology   Valentina Pedraza 79 y o  male MRN: [id number]  Unit/Bed#:  Encounter: [id number]    Assessment/Plan     Assessment:   elevated PSA with previous benign prostate biopsy   nodular prostate with PI-RADS 3 MRI of the prostate  Plan:   MRI fusion biopsy of the prostate    History of Present Illness   HPI:  Valentina Pedraza is a 79 y o  male who presents with  A history of a previous benign biopsy for an elevated PSA     The patient presented to the office initially to review his MRI of the prostate because of an elevated PSA and previous benign biopsy  The patient was found to have a 1 4 x 1 x 0 6 cm nodule in the anterior left transitional zone consistent with a PI-RADS 3 lesion  PSA is currently 5 4 up from 3 2 2 years ago  Patient agrees to MRI fusion biopsy understanding risks of bleeding infection urinary retention need for catheter need for operative intervention the possibility of false negative biopsies or rectal injury  He understands that he will stop his anticoagulation approximately 3-4 days prior to the biopsy      Review of Systems    Historical Information   Past Medical History:   Diagnosis Date    Back pain     Benign prostatic hypertrophy     Disease of thyroid gland     Elevated PSA 3/3/2021    Hypertension     Restless leg      Past Surgical History:   Procedure Laterality Date    ACHILLES TENDON SURGERY      HERNIA REPAIR       Social History Social History     Substance and Sexual Activity   Alcohol Use No    Comment: CAFFEINE USE     Social History     Substance and Sexual Activity   Drug Use No     Social History     Tobacco Use   Smoking Status Never Smoker   Smokeless Tobacco Never Used     Family History:   Family History   Problem Relation Age of Onset    Cancer Mother     Throat cancer Father     Breast cancer Sister        Meds/Allergies   all medications and allergies reviewed  Allergies   Allergen Reactions    No Active Allergies        Objective   Vitals: Blood pressure 138/78, height 5' 6" (1 676 m), weight 60 3 kg (133 lb)  [unfilled]    Invasive Devices     None                 Physical Exam  Vitals signs reviewed  Constitutional:       General: He is not in acute distress  Appearance: Normal appearance  He is normal weight  He is not ill-appearing, toxic-appearing or diaphoretic  HENT:      Head: Normocephalic and atraumatic  Nose: Nose normal       Mouth/Throat:      Mouth: Mucous membranes are moist    Eyes:      Extraocular Movements: Extraocular movements intact  Neck:      Musculoskeletal: Normal range of motion and neck supple  No neck rigidity or muscular tenderness  Cardiovascular:      Rate and Rhythm: Normal rate and regular rhythm  Pulmonary:      Effort: Pulmonary effort is normal       Breath sounds: Normal breath sounds  Abdominal:      Palpations: Abdomen is soft  Genitourinary:     Penis: Normal     Musculoskeletal: Normal range of motion  Neurological:      General: No focal deficit present  Mental Status: He is alert and oriented to person, place, and time  Mental status is at baseline  Psychiatric:         Mood and Affect: Mood normal          Behavior: Behavior normal          Thought Content: Thought content normal          Judgment: Judgment normal          Lab Results: I have personally reviewed pertinent reports      Imaging: I have personally reviewed pertinent films in PACS MRI prostate multiparametric wo w contrast  Status: Final result   PACS Images     Show images for MRI prostate multiparametric wo w contrast   Study Result    MULTIPARAMETRIC MRI OF THE PROSTATE WITH AND WITHOUT CONTRAST-WITH 3-D POSTPROCESSING      INDICATION:   R97 20: Elevated prostate specific antigen (PSA)      COMPARISON: None     PSA LEVEL: 5 4 ng/ml   PRIOR BIOPSY DATE: Previous biopsy report is not available  BIOPSY RESULTS:       TECHNIQUE: The following pulse sequences were obtained:  Small field-of-view axial T1-weighted and multiplanar T2-weighted images; DWI axial and ADC map; large field of view axial T2 weighted images; T1w in-phase and opposed-phase axials of entire pelvis   and dynamic 3D T1w axial before and during IV contrast injection         CONTRAST:  Gadobutrol (Gadavist) 6 mL of Gadobutrol injection (SINGLE-DOSE)     TECHNICAL LIMITATIONS: The evaluation is limited as the coronal and sagittal images are markedly degraded due to motion which makes the evaluation limited     FINDINGS:     PROSTATE:     Size: 5 7 x 4 2 x 4 cm = 45 03 cc  Post-biopsy hemorrhage:  None  Central gland enlargement (BPH): The central gland is a heterogenous     Focal lesions - localization as follows:     Lesion: 1       Size: 1 4 x 1 x 0 6 cm, 0 59 cc seen in image 16 series 4  Location: Anterior left transition zone in the mid gland       T2-weighted images: Score 2: Linear or wedge-shaped hypointensity or diffuse mild hypointensity, usually indistinct margin  Diffusion-weighted images: Score 4: Focal markedly hypointense on ADC and markedly hyperintense on high b-value DWI; less than 1 5 cm in greatest dimension  Dynamic post-contrast images: (-) Lesion that does not enhance early compared to the surrounding prostate or enhances diffusely so that the margins of the enhancing area do not correspond to a finding on T2-weighted images and/or DWI    PI-RADS Assessment Category: PICKLIST: IF PZ, select DWI Score  If DWI score is 3 and DCE is (+), then bump 3-->4  If TZ, select T2 score  If T2W score is 3 and DWI is 5, bump 3->4; IF T2 score is 2 and DWI is 4 or 5, bump 2->3  Extra-prostatic extension (EPE): PICKLIST     SEMINAL VESICLES: Unremarkable     Note: Clinically significant cancer is defined on pathology/histology as Arlington score greater than or equal to 7, and/or volume of greater than or equal to 0 5 mL, and/or extraprostatic extension      URINARY BLADDER: Unremarkable      LYMPH NODES: No pelvic lymphadenopathy      BONES: No suspicious osseous lesion      1 4 x 1 x 0 6 cm nodule in the anterior left transition zone which demonstrates partially obscured margins on the axial plane and not assessed on the coronal sagittal planes     IMPRESSION:  This is a markedly limited study due to degradation of the coronal and sagittal images due to extensive motion  This makes evaluation suboptimal        1 4 x 1 x 0 6 cm nodule in the anterior left transition zone which demonstrates partially obscured margins on the axial plane and not assessed on the coronal sagittal planes     1  PI-RADSv2 1 Category 3 - Intermediate (the presence of clinically significant cancer is equivocal)      2  No extraprostatic tumor, seminal vesicle invasion, pelvic lymphadenopathy, or pelvic osseous metastatic disease      3  Calculated prostate volume of 45 03 cc      Short interval follow-up at 3-6  months may be considered in an attempt to achieve better quality     Prostate gland boundaries and areas of concern for significant prostate cancer were segmented using 3D advanced post-processing on an independent [employer] system workstation with active physician participation    The segmentation was performed should   MR-ultrasound fusion biopsy be required      Workstation performed: [id number]      Imaging    MRI prostate multiparametric wo w contrast (Order: [id number]) - 2/24/2021  Result History    MRI prostate multiparametric wo w contrast (Order #[id number]) on 3/3/2021 - Order Result History Report   Order Report    Order Details  Order Questions    Question Answer Comment   Release to patient through [employer]    Is order priority selected as STAT? No    Exam reason Elevated rising PSA with previous benign biopsy    Note:  Enter reason for exam   Exam reason Elevated rising PSA with previous benign biopsy    Note:  Enter reason for exam   What is the patient's sedation requirement? No Sedation           Reason for Exam    Elevated rising PSA with previous benign biopsy; Enlarged prostate; Elevated rising PSA with previous benign biopsy   Dx: Elevated PSA, less than 10 ng/ml [R97 20 (ICD-10-CM)]; Increased prostate specific antigen (PSA) velocity [R97 20 (ICD-10-CM)]   Signed by    Signed Date/Time  Phone Pager   Frank Sylvester 3/03/2021 09:59 [phone]    Exam Information    Status Exam Begun  Exam Ended  Performing Tech   Final [99] 2/24/2021 13:27 2/24/2021 14:27 Haim Argueta   Screening Form Questions     Answer Comment   What are your symptoms? High psa and enlarged prostate-dr  Requested    Do you have a cardiac pacemaker or internal defibrillator? NA    Please list /make/model:     Have you had any HEART surgery? None    Please list make/model:     Heart surgery: If "other", please describe:     Have you had any BRAIN surgery? None    Please list  or describe implant:     Brain surgery: If "other", please describe:     Have you had any EYE surgery? None    Eye surgery: If "other", please describe:     Have you ever injured your eyes with metal or metal fragments (grinding,metallic slivers)? No    Eye injury: Please describe:     Have you had any EAR surgery? None    Ear surgery: If "other", please describe:      Do you have an electronic "stimulation" device? None    Please provide  information:     Have you had any abdominal or pelvic surgery?  No Have you had any of the following abdominal or pelvic surgeries:     Abdominal/pelvic surgery: If "other", please describe:     Do you have any ORTHOPEDIC implants/devices? None    Do you have the following: Bullets/BBs/fragments/shrapnel    Do you have liver disease? None    Do you have kidney disease? None    Have you had a problem with a previous MRI? Claustrophobia    Does the patient require pre-medication? Yes    Do you have a personal history of cancer? None    If "other", please specify:       Are you wearing medication patches?   No    Are you wearing any of the following: None    Did the patient provide this information? No    Who provided this information (if not the patient)? Leda Kern    Relationship to the patient: Spouse    Contact number: [phone]    MRI STAFF ONLY- Prior imaging reviewed in PACS (initials): rebeca left finger bb ice pack placed    MRI STAFF ONLY- Epic chart reviewed (initials): rebeca    MRI STAFF ONLY: The patient was scheduled to receive MRI contrast and has received the Medication Guide  Yes    External Results Report    Open External Results Report    Encounter    View Encounter             Patient Care Timeline    No data selected in time range  Pre-op Summary    Pre-op           Recovery Summary    Recovery             Routing History    None       EKG, Pathology, and Other Studies: I have personally reviewed pertinent reports  VTE Prophylaxis: Sequential compression device Omayra Hurt     Code Status: [unfilled]  Advance Directive and Living Will:      Power of :    POLST:      Counseling / Coordination of Care  Total floor / unit time spent today 30 minutes  Greater than 50% of total time was spent with the patient and / or family counseling and / or coordination of care  A description of the counseling / coordination of care: Sindi Marx MD  3/3/2021  9:53 AM  Signed  100% counseling 15min    Patient return to the office today to discuss results of MRI of the prostate  Formal results had not been entered into epic and therefore we could not discuss the prostate other than the fact that I demonstrated the films to the patient and indicated there appeared to be posterior zone heterogeneity bilaterally  We also reviewed the patient's PSA and he has had a rise in PSA recently  I will review the patient's MRI once a radiologist report is available and contact the patient for appropriate follow-up either with repeat PSA or with MRI fusion biopsy if appropriate  The patient is on anticoagulants which would have to be  Managed if biopsy is recommended

## 2021-03-05 ENCOUNTER — TELEPHONE (OUTPATIENT)
Dept: UROLOGY | Facility: MEDICAL CENTER | Age: 81
End: 2021-03-05

## 2021-03-05 NOTE — TELEPHONE ENCOUNTER
I spoke to the patient and scheduled him 5/18/2021 at [facility] with Dr Nichelle Matthews for TransP MRI Fusion with IV sed

## 2021-03-10 ENCOUNTER — OFFICE VISIT (OUTPATIENT)
Dept: CARDIOLOGY CLINIC | Facility: CLINIC | Age: 81
End: 2021-03-10
Payer: MEDICARE

## 2021-03-10 VITALS
BODY MASS INDEX: 21.21 KG/M2 | SYSTOLIC BLOOD PRESSURE: 164 MMHG | TEMPERATURE: 97.5 F | DIASTOLIC BLOOD PRESSURE: 76 MMHG | WEIGHT: 131.4 LBS | HEART RATE: 60 BPM

## 2021-03-10 DIAGNOSIS — I48.0 PAF (PAROXYSMAL ATRIAL FIBRILLATION) (HCC): Primary | ICD-10-CM

## 2021-03-10 PROCEDURE — 99213 OFFICE O/P EST LOW 20 MIN: CPT | Performed by: INTERNAL MEDICINE

## 2021-03-10 NOTE — PROGRESS NOTES
Cardiology   MD Raysa Davison MD Ather Mansoor, MD Gentry Massa, DO, Milton Watson DO, Edd Delacruz DO, [facility] [place]  -------------------------------------------------------------------  [facility], [place] [address]  [place]  [phone]                        [facility]                     1940                     [id number]          Discussion/Summary:     1  Paroxysmal atrial fibrillation  2  Dyslipidemia        · Regular rhythm on examination  Patient felt lightheaded on metoprolol in the setting of sinus bradycardia, therefore maintained off  Continue anticoagulation with Eliquis which she is tolerating well   · He states he will have prostate surgery in the summer  He has been cleared to hold Eliquis 2 days prior to his surgery unless a significant cardiovascular changes before then         Follow-up in 1 year        Interval History:      This is a 71-year-old male with a history of atrial fibrillation diagnosed 08/2017, maintained on low-dose metoprolol up until May 2018  Sarah Pacheco presented to his PCPs office with symptoms of intermittent lightheadedness with bradycardia in the 40s   At that time his metoprolol was discontinued  Sarah Pacheco has been maintained on Eliquis anticoagulation      He underwent PFTs 02/07/2020 which was within normal limits  CT chest 02/19/2020 revealed a few scattered small lung nodules  He presents today for follow-up with no complaints  He denies chest pain, shortness of breath, dizziness, palpitations, lower extremity                 Vitals:  Vitals:    03/10/21 1118   BP: 164/76   BP Location: Right arm   Patient Position: Sitting   Cuff Size: Adult   Pulse: 60   Temp: 97 5 °F (36 4 °C)   Weight: 59 6 kg (131 lb 6 4 oz)         Past Medical History:   Diagnosis Date    Back pain     Benign prostatic hypertrophy     Disease of thyroid gland     Elevated PSA 3/3/2021    Hypertension     Restless leg      Social History     Socioeconomic History    Marital status: /Civil Union     Spouse name: Not on file    Number of children: Not on file    Years of education: Not on file    Highest education level: Not on file   Occupational History    Not on file   Social Needs    Financial resource strain: Not on file    Food insecurity     Worry: Not on file     Inability: Not on file    Transportation needs     Medical: Not on file     Non-medical: Not on file   Tobacco Use    Smoking status: Never Smoker    Smokeless tobacco: Never Used   Substance and Sexual Activity    Alcohol use: No     Comment: CAFFEINE USE    Drug use: No    Sexual activity: Not on file   Lifestyle    Physical activity     Days per week: Not on file     Minutes per session: Not on file    Stress: Not on file   Relationships    Social connections     Talks on phone: Not on file     Gets together: Not on file     Attends Religion service: Not on file     Active member of club or organization: Not on file     Attends meetings of clubs or organizations: Not on file     Relationship status: Not on file    Intimate partner violence     Fear of current or ex partner: Not on file     Emotionally abused: Not on file     Physically abused: Not on file     Forced sexual activity: Not on file   Other Topics Concern    Not on file   Social History Narrative    Not on file      Family History   Problem Relation Age of Onset    Cancer Mother     Throat cancer Father     Breast cancer Sister      Past Surgical History:   Procedure Laterality Date    ACHILLES TENDON SURGERY      HERNIA REPAIR         Current Outpatient Medications:     bimatoprost (LUMIGAN) 0 01 % ophthalmic drops, Administer 1 drop into the left eye daily at bedtime, Disp: , Rfl:     Eliquis 5 MG, TAKE 1 TABLET TWICE A DAY, Disp: 180 tablet, Rfl: 3    fluticasone (FLONASE) 50 mcg/act nasal spray, , Disp: , Rfl:     levothyroxine 25 mcg tablet, Take 25 mcg by mouth daily, Disp: , Rfl:     Multiple Vitamins-Minerals (CENTRUM SILVER 50+MEN PO), Take by mouth, Disp: , Rfl:     Omega-3 Fatty Acids (FISH OIL) 1,000 mg, Take by mouth daily, Disp: , Rfl:     ALPRAZolam (XANAX) 0 5 mg tablet, Take 1 tablet (0 5 mg total) by mouth once for 1 dose Take 1-1/2 hour prior to MRI (Patient not taking: Reported on 3/3/2021), Disp: 1 tablet, Rfl: 0    pramipexole (MIRAPEX) 0 125 mg tablet, Take by mouth Daily, Disp: , Rfl:         Review of Systems:  Review of Systems   Respiratory: Negative  Cardiovascular: Negative  All other systems reviewed and are negative  Physical Exam:  Physical Exam  Constitutional:       General: He is not in acute distress  Appearance: He is well-developed  He is not diaphoretic  HENT:      Head: Normocephalic and atraumatic  Eyes:      General: No scleral icterus  Right eye: No discharge  Pupils: Pupils are equal, round, and reactive to light  Neck:      Musculoskeletal: Normal range of motion and neck supple  Thyroid: No thyromegaly  Cardiovascular:      Rate and Rhythm: Normal rate and regular rhythm  Heart sounds: Normal heart sounds  No murmur  No friction rub  No gallop  Pulmonary:      Effort: Pulmonary effort is normal       Breath sounds: Normal breath sounds  Abdominal:      General: There is no distension  Tenderness: There is no abdominal tenderness  There is no guarding or rebound  Musculoskeletal: Normal range of motion  Skin:     General: Skin is warm and dry  Coloration: Skin is not pale  Findings: No erythema or rash  Neurological:      Mental Status: He is alert and oriented to person, place, and time  Coordination: Coordination normal    Psychiatric:         Behavior: Behavior normal          Thought Content:  Thought content normal          Judgment: Judgment normal          This note was completed in part utilizing M-Modal Fluency Direct Software  Grammatical errors, random word insertions, spelling mistakes, and incomplete sentences can be an occasional consequence of this system secondary to software limitations, ambient noise, and hardware issues  If you have any questions or concerns about the content, text, or information contained within the body of this dictation, please contact the provider for clarification

## 2021-06-10 ENCOUNTER — CLINICAL SUPPORT (OUTPATIENT)
Dept: URGENT CARE | Facility: MEDICAL CENTER | Age: 81
End: 2021-06-10
Attending: UROLOGY
Payer: MEDICARE

## 2021-06-10 ENCOUNTER — APPOINTMENT (OUTPATIENT)
Dept: LAB | Facility: MEDICAL CENTER | Age: 81
End: 2021-06-10
Attending: UROLOGY
Payer: MEDICARE

## 2021-06-10 DIAGNOSIS — R97.20 ELEVATED PSA, LESS THAN 10 NG/ML: ICD-10-CM

## 2021-06-10 LAB
ALBUMIN SERPL BCP-MCNC: 4.2 G/DL (ref 3.5–5)
ALP SERPL-CCNC: 63 U/L (ref 46–116)
ALT SERPL W P-5'-P-CCNC: 21 U/L (ref 12–78)
ANION GAP SERPL CALCULATED.3IONS-SCNC: 3 MMOL/L (ref 4–13)
AST SERPL W P-5'-P-CCNC: 16 U/L (ref 5–45)
ATRIAL RATE: 55 BPM
BASOPHILS # BLD AUTO: 0.11 THOUSANDS/ΜL (ref 0–0.1)
BASOPHILS NFR BLD AUTO: 1 % (ref 0–1)
BILIRUB SERPL-MCNC: 0.72 MG/DL (ref 0.2–1)
BUN SERPL-MCNC: 22 MG/DL (ref 5–25)
CALCIUM SERPL-MCNC: 9.5 MG/DL (ref 8.3–10.1)
CHLORIDE SERPL-SCNC: 106 MMOL/L (ref 100–108)
CO2 SERPL-SCNC: 29 MMOL/L (ref 21–32)
CREAT SERPL-MCNC: 1 MG/DL (ref 0.6–1.3)
EOSINOPHIL # BLD AUTO: 0.66 THOUSAND/ΜL (ref 0–0.61)
EOSINOPHIL NFR BLD AUTO: 8 % (ref 0–6)
ERYTHROCYTE [DISTWIDTH] IN BLOOD BY AUTOMATED COUNT: 13.5 % (ref 11.6–15.1)
GFR SERPL CREATININE-BSD FRML MDRD: 71 ML/MIN/1.73SQ M
GLUCOSE P FAST SERPL-MCNC: 96 MG/DL (ref 65–99)
HCT VFR BLD AUTO: 46 % (ref 36.5–49.3)
HGB BLD-MCNC: 15.1 G/DL (ref 12–17)
IMM GRANULOCYTES # BLD AUTO: 0.04 THOUSAND/UL (ref 0–0.2)
IMM GRANULOCYTES NFR BLD AUTO: 1 % (ref 0–2)
LYMPHOCYTES # BLD AUTO: 2.05 THOUSANDS/ΜL (ref 0.6–4.47)
LYMPHOCYTES NFR BLD AUTO: 26 % (ref 14–44)
MCH RBC QN AUTO: 31 PG (ref 26.8–34.3)
MCHC RBC AUTO-ENTMCNC: 32.8 G/DL (ref 31.4–37.4)
MCV RBC AUTO: 95 FL (ref 82–98)
MONOCYTES # BLD AUTO: 0.84 THOUSAND/ΜL (ref 0.17–1.22)
MONOCYTES NFR BLD AUTO: 11 % (ref 4–12)
NEUTROPHILS # BLD AUTO: 4.27 THOUSANDS/ΜL (ref 1.85–7.62)
NEUTS SEG NFR BLD AUTO: 53 % (ref 43–75)
NRBC BLD AUTO-RTO: 0 /100 WBCS
P AXIS: 61 DEGREES
PLATELET # BLD AUTO: 411 THOUSANDS/UL (ref 149–390)
PMV BLD AUTO: 9.8 FL (ref 8.9–12.7)
POTASSIUM SERPL-SCNC: 4.4 MMOL/L (ref 3.5–5.3)
PR INTERVAL: 122 MS
PROT SERPL-MCNC: 7.6 G/DL (ref 6.4–8.2)
QRS AXIS: 13 DEGREES
QRSD INTERVAL: 84 MS
QT INTERVAL: 428 MS
QTC INTERVAL: 409 MS
RBC # BLD AUTO: 4.87 MILLION/UL (ref 3.88–5.62)
SODIUM SERPL-SCNC: 138 MMOL/L (ref 136–145)
T WAVE AXIS: 50 DEGREES
VENTRICULAR RATE: 55 BPM
WBC # BLD AUTO: 7.97 THOUSAND/UL (ref 4.31–10.16)

## 2021-06-10 PROCEDURE — 93005 ELECTROCARDIOGRAM TRACING: CPT

## 2021-06-10 PROCEDURE — 80053 COMPREHEN METABOLIC PANEL: CPT

## 2021-06-10 PROCEDURE — 93010 ELECTROCARDIOGRAM REPORT: CPT | Performed by: INTERNAL MEDICINE

## 2021-06-10 PROCEDURE — 36415 COLL VENOUS BLD VENIPUNCTURE: CPT

## 2021-06-10 PROCEDURE — 85025 COMPLETE CBC W/AUTO DIFF WBC: CPT

## 2021-06-14 ENCOUNTER — ANESTHESIA EVENT (OUTPATIENT)
Dept: GASTROENTEROLOGY | Facility: HOSPITAL | Age: 81
End: 2021-06-14
Payer: MEDICARE

## 2021-06-15 ENCOUNTER — ANESTHESIA (OUTPATIENT)
Dept: GASTROENTEROLOGY | Facility: HOSPITAL | Age: 81
End: 2021-06-15
Payer: MEDICARE

## 2021-06-15 ENCOUNTER — HOSPITAL ENCOUNTER (OUTPATIENT)
Facility: HOSPITAL | Age: 81
Setting detail: OUTPATIENT SURGERY
Discharge: HOME | End: 2021-06-15
Attending: UROLOGY | Admitting: UROLOGY
Payer: MEDICARE

## 2021-06-15 VITALS
HEART RATE: 71 BPM | BODY MASS INDEX: 21.66 KG/M2 | HEIGHT: 65 IN | RESPIRATION RATE: 18 BRPM | WEIGHT: 130 LBS | DIASTOLIC BLOOD PRESSURE: 61 MMHG | TEMPERATURE: 97.1 F | SYSTOLIC BLOOD PRESSURE: 111 MMHG | OXYGEN SATURATION: 95 %

## 2021-06-15 DIAGNOSIS — R97.20 ELEVATED PSA, LESS THAN 10 NG/ML: ICD-10-CM

## 2021-06-15 PROCEDURE — G0416 PROSTATE BIOPSY, ANY MTHD: HCPCS | Performed by: PATHOLOGY

## 2021-06-15 PROCEDURE — 55700 PR BIOPSY OF PROSTATE,NEEDLE/PUNCH: CPT | Performed by: UROLOGY

## 2021-06-15 PROCEDURE — 88344 IMHCHEM/IMCYTCHM EA MLT ANTB: CPT | Performed by: PATHOLOGY

## 2021-06-15 PROCEDURE — NC001 PR NO CHARGE: Performed by: UROLOGY

## 2021-06-15 PROCEDURE — 76942 ECHO GUIDE FOR BIOPSY: CPT | Performed by: UROLOGY

## 2021-06-15 RX ORDER — SODIUM CHLORIDE, SODIUM LACTATE, POTASSIUM CHLORIDE, CALCIUM CHLORIDE 600; 310; 30; 20 MG/100ML; MG/100ML; MG/100ML; MG/100ML
INJECTION, SOLUTION INTRAVENOUS CONTINUOUS PRN
Status: DISCONTINUED | OUTPATIENT
Start: 2021-06-15 | End: 2021-06-15

## 2021-06-15 RX ORDER — LIDOCAINE HYDROCHLORIDE 10 MG/ML
INJECTION, SOLUTION EPIDURAL; INFILTRATION; INTRACAUDAL; PERINEURAL AS NEEDED
Status: DISCONTINUED | OUTPATIENT
Start: 2021-06-15 | End: 2021-06-15

## 2021-06-15 RX ORDER — SODIUM CHLORIDE 9 MG/ML
INJECTION, SOLUTION INTRAVENOUS CONTINUOUS PRN
Status: DISCONTINUED | OUTPATIENT
Start: 2021-06-15 | End: 2021-06-15

## 2021-06-15 RX ORDER — HYDROCODONE BITARTRATE AND ACETAMINOPHEN 5; 325 MG/1; MG/1
1 TABLET ORAL EVERY 6 HOURS PRN
Status: DISCONTINUED | OUTPATIENT
Start: 2021-06-15 | End: 2021-06-15 | Stop reason: HOSPADM

## 2021-06-15 RX ORDER — CEFAZOLIN SODIUM 2 G/50ML
2000 SOLUTION INTRAVENOUS ONCE
Status: COMPLETED | OUTPATIENT
Start: 2021-06-15 | End: 2021-06-15

## 2021-06-15 RX ORDER — KETAMINE HYDROCHLORIDE 50 MG/ML
INJECTION, SOLUTION, CONCENTRATE INTRAMUSCULAR; INTRAVENOUS AS NEEDED
Status: DISCONTINUED | OUTPATIENT
Start: 2021-06-15 | End: 2021-06-15

## 2021-06-15 RX ORDER — PROPOFOL 10 MG/ML
INJECTION, EMULSION INTRAVENOUS CONTINUOUS PRN
Status: DISCONTINUED | OUTPATIENT
Start: 2021-06-15 | End: 2021-06-15

## 2021-06-15 RX ORDER — LIDOCAINE HYDROCHLORIDE 20 MG/ML
INJECTION, SOLUTION EPIDURAL; INFILTRATION; INTRACAUDAL; PERINEURAL AS NEEDED
Status: DISCONTINUED | OUTPATIENT
Start: 2021-06-15 | End: 2021-06-15 | Stop reason: HOSPADM

## 2021-06-15 RX ORDER — PROPOFOL 10 MG/ML
INJECTION, EMULSION INTRAVENOUS AS NEEDED
Status: DISCONTINUED | OUTPATIENT
Start: 2021-06-15 | End: 2021-06-15

## 2021-06-15 RX ADMIN — PROPOFOL 60 MG: 10 INJECTION, EMULSION INTRAVENOUS at 08:09

## 2021-06-15 RX ADMIN — SODIUM CHLORIDE: 0.9 INJECTION, SOLUTION INTRAVENOUS at 07:50

## 2021-06-15 RX ADMIN — CEFAZOLIN SODIUM 2000 MG: 2 SOLUTION INTRAVENOUS at 07:50

## 2021-06-15 RX ADMIN — KETAMINE HYDROCHLORIDE 20 MG: 50 INJECTION, SOLUTION INTRAMUSCULAR; INTRAVENOUS at 08:17

## 2021-06-15 RX ADMIN — PROPOFOL 100 MCG/KG/MIN: 10 INJECTION, EMULSION INTRAVENOUS at 08:07

## 2021-06-15 RX ADMIN — LIDOCAINE HYDROCHLORIDE 50 MG: 10 INJECTION, SOLUTION EPIDURAL; INFILTRATION; INTRACAUDAL; PERINEURAL at 08:09

## 2021-06-15 NOTE — H&P
Jami Farrell MD   Physician   Specialty:  Urology   H&P      Signed   Encounter Date:  3/3/2021         Related encounter: Office Visit from 3/3/2021 in [facility]         Signed         history and physical updated by jayden Grey MD 6/15/2021-cardiovascular regular rate rhythm, S1-S2 audible no murmurs or gallops  Lungs clear to auscultation bilaterally  The risks of bleeding infection urinary retention and the procedure of transperineal prostate biopsy with MR fusion explained he gives informed consent  100% counseling 15min     Patient return to the office today to discuss results of MRI of the prostate  Formal results had not been entered into epic and therefore we could not discuss the prostate other than the fact that I demonstrated the films to the patient and indicated there appeared to be posterior zone heterogeneity bilaterally  We also reviewed the patient's PSA and he has had a rise in PSA recently  I will review the patient's MRI once a radiologist report is available and contact the patient for appropriate follow-up either with repeat PSA or with MRI fusion biopsy if appropriate  The patient is on anticoagulants which would have to be  Managed if biopsy is recommended      H&P Exam - Urology   Bari Littlejohn 79 y o  male MRN: [id number]  Unit/Bed#:  Encounter: [id number]     Assessment/Plan      Assessment:   elevated PSA with previous benign prostate biopsy   nodular prostate with PI-RADS 3 MRI of the prostate  Plan:   MRI fusion biopsy of the prostate     History of Present Illness   HPI:  Bari Littlejohn is a 79 y o  male who presents with  A history of a previous benign biopsy for an elevated PSA     The patient presented to the office initially to review his MRI of the prostate because of an elevated PSA and previous benign biopsy    The patient was found to have a 1 4 x 1 x 0 6 cm nodule in the anterior left transitional zone consistent with a PI-RADS 3 lesion  PSA is currently 5 4 up from 3 2 2 years ago  Patient agrees to MRI fusion biopsy understanding risks of bleeding infection urinary retention need for catheter need for operative intervention the possibility of false negative biopsies or rectal injury  He understands that he will stop his anticoagulation approximately 3-4 days prior to the biopsy      Review of Systems     Historical Information        Past Medical History:   Diagnosis Date    Back pain      Benign prostatic hypertrophy      Disease of thyroid gland      Elevated PSA 3/3/2021    Hypertension      Restless leg              Past Surgical History:   Procedure Laterality Date    ACHILLES TENDON SURGERY        HERNIA REPAIR          Social History   Social History           Substance and Sexual Activity   Alcohol Use No     Comment: CAFFEINE USE      Social History          Substance and Sexual Activity   Drug Use No      Social History          Tobacco Use   Smoking Status Never Smoker   Smokeless Tobacco Never Used      Family History:         Family History   Problem Relation Age of Onset    Cancer Mother      Throat cancer Father      Breast cancer Sister           Meds/Allergies   all medications and allergies reviewed  Allergies   Allergen Reactions    No Active Allergies           Objective   Vitals: Blood pressure 138/78, height 5' 6" (1 676 m), weight 60 3 kg (133 lb)      [unfilled]         Invasive Devices      None                      Physical Exam  Vitals signs reviewed  Constitutional:       General: He is not in acute distress  Appearance: Normal appearance  He is normal weight  He is not ill-appearing, toxic-appearing or diaphoretic  HENT:      Head: Normocephalic and atraumatic  Nose: Nose normal       Mouth/Throat:      Mouth: Mucous membranes are moist    Eyes:      Extraocular Movements: Extraocular movements intact  Neck:      Musculoskeletal: Normal range of motion and neck supple   No neck rigidity or muscular tenderness  Cardiovascular:      Rate and Rhythm: Normal rate and regular rhythm  Pulmonary:      Effort: Pulmonary effort is normal       Breath sounds: Normal breath sounds  Abdominal:      Palpations: Abdomen is soft  Genitourinary:     Penis: Normal     Musculoskeletal: Normal range of motion  Neurological:      General: No focal deficit present  Mental Status: He is alert and oriented to person, place, and time  Mental status is at baseline  Psychiatric:         Mood and Affect: Mood normal          Behavior: Behavior normal          Thought Content: Thought content normal          Judgment: Judgment normal             Lab Results: I have personally reviewed pertinent reports  Imaging: I have personally reviewed pertinent films in PACS   MRI prostate multiparametric wo w contrast  Status: Final result   PACS Images      Show images for MRI prostate multiparametric wo w contrast   Study Result     MULTIPARAMETRIC MRI OF THE PROSTATE WITH AND WITHOUT CONTRAST-WITH 3-D POSTPROCESSING      INDICATION:   A59 75: Elevated prostate specific antigen (PSA)      COMPARISON: None     PSA LEVEL: 5 4 ng/ml   PRIOR BIOPSY DATE: Previous biopsy report is not available  BIOPSY RESULTS:       TECHNIQUE: The following pulse sequences were obtained:  Small field-of-view axial T1-weighted and multiplanar T2-weighted images; DWI axial and ADC map; large field of view axial T2 weighted images; T1w in-phase and opposed-phase axials of entire pelvis   and dynamic 3D T1w axial before and during IV contrast injection         CONTRAST:  Gadobutrol (Gadavist) 6 mL of Gadobutrol injection (SINGLE-DOSE)     TECHNICAL LIMITATIONS: The evaluation is limited as the coronal and sagittal images are markedly degraded due to motion which makes the evaluation limited     FINDINGS:     PROSTATE:     Size: 5 7 x 4 2 x 4 cm = 45 03 cc      Post-biopsy hemorrhage:  None     Central gland enlargement (BPH):  The central gland is a heterogenous     Focal lesions - localization as follows:     Lesion: 1       Size: 1 4 x 1 x 0 6 cm, 0 59 cc seen in image 16 series 4        Location: Anterior left transition zone in the mid gland       T2-weighted images: Score 2: Linear or wedge-shaped hypointensity or diffuse mild hypointensity, usually indistinct margin        Diffusion-weighted images: Score 4: Focal markedly hypointense on ADC and markedly hyperintense on high b-value DWI; less than 1 5 cm in greatest dimension        Dynamic post-contrast images: (-) Lesion that does not enhance early compared to the surrounding prostate or enhances diffusely so that the margins of the enhancing area do not correspond to a finding on T2-weighted images and/or DWI  PI-RADS Assessment Category: PICKLIST: IF PZ, select DWI Score  If DWI score is 3 and DCE is (+), then bump 3-->4  If TZ, select T2 score  If T2W score is 3 and DWI is 5, bump 3->4; IF T2 score is 2 and DWI is 4 or 5, bump 2->3  Extra-prostatic extension (EPE): PICKLIST     SEMINAL VESICLES: Unremarkable     Note: Clinically significant cancer is defined on pathology/histology as Newcastle score greater than or equal to 7, and/or volume of greater than or equal to 0 5 mL, and/or extraprostatic extension      URINARY BLADDER: Unremarkable      LYMPH NODES: No pelvic lymphadenopathy      BONES: No suspicious osseous lesion      1 4 x 1 x 0 6 cm nodule in the anterior left transition zone which demonstrates partially obscured margins on the axial plane and not assessed on the coronal sagittal planes     IMPRESSION:  This is a markedly limited study due to degradation of the coronal and sagittal images due to extensive motion   This makes evaluation suboptimal        1 4 x 1 x 0 6 cm nodule in the anterior left transition zone which demonstrates partially obscured margins on the axial plane and not assessed on the coronal sagittal planes     1   PI-RADSv2 1 Category 3 - Intermediate (the presence of clinically significant cancer is equivocal)      2  No extraprostatic tumor, seminal vesicle invasion, pelvic lymphadenopathy, or pelvic osseous metastatic disease      3  Calculated prostate volume of 45 03 cc      Short interval follow-up at 3-6  months may be considered in an attempt to achieve better quality     Prostate gland boundaries and areas of concern for significant prostate cancer were segmented using 3D advanced post-processing on an independent [employer] system workstation with active physician participation  Abelardo Burton segmentation was performed should   MR-ultrasound fusion biopsy be required      Workstation performed: [id number]      Imaging     MRI prostate multiparametric wo w contrast (Order: [id number]) - 2/24/2021  Result History     MRI prostate multiparametric wo w contrast (Order #[id number]) on 3/3/2021 - Order Result History Report   Order Report     Order Details  Order Questions     Question Answer Comment   Release to patient through [employer]     Is order priority selected as STAT? No     Exam reason Elevated rising PSA with previous benign biopsy     Note:  Enter reason for exam   Exam reason Elevated rising PSA with previous benign biopsy     Note:  Enter reason for exam   What is the patient's sedation requirement? No Sedation             Reason for Exam     Elevated rising PSA with previous benign biopsy; Enlarged prostate; Elevated rising PSA with previous benign biopsy   Dx: Elevated PSA, less than 10 ng/ml [R97 20 (ICD-10-CM)]; Increased prostate specific antigen (PSA) velocity [R97 20 (ICD-10-CM)]   Signed by     Signed Date/Time   Phone Pager   Olkesandr Nunez 3/03/2021 09:59 [phone]     Exam Information     Status Exam Begun   Exam Ended   Performing Tech   Final [99] 2/24/2021 13:27 2/24/2021 14:27 Jeremy Truong   Screening Form Questions              Answer Comment   What are your symptoms?  High psa and enlarged prostate-dr Dos Santos     Do you have a cardiac pacemaker or internal defibrillator? NA     Please list /make/model:       Have you had any HEART surgery? None     Please list make/model:       Heart surgery: If "other", please describe:       Have you had any BRAIN surgery? None     Please list  or describe implant:       Brain surgery: If "other", please describe:       Have you had any EYE surgery? None     Eye surgery: If "other", please describe:       Have you ever injured your eyes with metal or metal fragments (grinding,metallic slivers)? No     Eye injury: Please describe:       Have you had any EAR surgery? None     Ear surgery: If "other", please describe:        Do you have an electronic "stimulation" device? None     Please provide  information:       Have you had any abdominal or pelvic surgery? No     Have you had any of the following abdominal or pelvic surgeries:       Abdominal/pelvic surgery: If "other", please describe:       Do you have any ORTHOPEDIC implants/devices? None     Do you have the following: Bullets/BBs/fragments/shrapnel     Do you have liver disease? None     Do you have kidney disease? None     Have you had a problem with a previous MRI? Claustrophobia     Does the patient require pre-medication? Yes     Do you have a personal history of cancer? None     If "other", please specify:         Are you wearing medication patches?   No     Are you wearing any of the following: None     Did the patient provide this information? No     Who provided this information (if not the patient)? Leda Kern     Relationship to the patient: Spouse     Contact number: [phone]     MRI STAFF ONLY- Prior imaging reviewed in PACS (initials): bb left finger bb ice pack placed     MRI STAFF ONLY- Epic chart reviewed (initials): bb     MRI STAFF ONLY: The patient was scheduled to receive MRI contrast and has received the Medication Guide    Yes     External Results Report     Open External Results Report    Encounter     View Encounter               Patient Care Timeline     No data selected in time range  Pre-op Summary     Pre-op            Recovery Summary     Recovery              Routing History     None         EKG, Pathology, and Other Studies: I have personally reviewed pertinent reports  VTE Prophylaxis: Sequential compression device Yuliya Ricardo)      Code Status: [unfilled]  Advance Directive and Living Will:      Power of :    POLST:       Counseling / Coordination of Care  Total floor / unit time spent today 30 minutes  Greater than 50% of total time was spent with the patient and / or family counseling and / or coordination of care  A description of the counseling / coordination of care:

## 2021-06-15 NOTE — OP NOTE
OPERATIVE REPORT  PATIENT NAME: Dewayne Burt    :  1940  MRN: [id number]  Pt Location: [facility] ROOM 01    SURGERY DATE: 6/15/2021    Surgeon(s) and Role:     * Rogers Lester MD - Primary    Preop Diagnosis:  Elevated PSA, less than 10 ng/ml [R97 20] left anterior medial prostate lesion on MRI    Post-Op Diagnosis Codes:     * Elevated PSA, less than 10 ng/ml [R97 20] left anterior medial prostate lesion on MRI    Procedure(s) (LRB):  TRANSPERINEAL MRI FUSION PROSTATE BIOPSY (N/A)    Specimen(s):  ID Type Source Tests Collected by Time Destination   1 : JACKSON Tissue Prostate TISSUE EXAM Sherryle Alice, MD 6/15/2021 0806    2 : L ant lat Tissue Prostate TISSUE EXAM Sherryle Alice, MD 6/15/2021 0806    3 : L post lat Tissue Prostate TISSUE EXAM Sherryle Alice, MD 6/15/2021 0806    4 : L post med Tissue Prostate TISSUE EXAM Sherryle Alice, MD 6/15/2021 0806    5 : L base Tissue Prostate TISSUE EXAM Sherryle Alice, MD 6/15/2021 0806    6 : R ant med Tissue Prostate TISSUE EXAM Sherryle Alice, MD 6/15/2021 0806    7 : R ant lat Tissue Prostate TISSUE EXAM Sherryle Alice, MD 6/15/2021 0806    8 : R post lat Tissue Prostate TISSUE EXAM Sherryle Alice, MD 6/15/2021 0806    9 : R post med Tissue Prostate TISSUE Mars Dover MD 6/15/2021 0806    10 : R base Tissue Prostate TISSUE EXAM Sherryle Alice, MD 6/15/2021 0807        Estimated Blood Loss:   Minimal    Drains:  * No LDAs found *    Anesthesia Type:   IV Sedation with Anesthesia    Operative Indications:  Elevated PSA, less than 10 ng/ml [R97 20]  Left anterior medial lesion on MRI    Operative Findings:  Hypoechoic region matching with MRI lesion-multiple biopsies taken of that region of interest   Standard biopsies taken systematically of the rest of the prostate      Complications:   None    Procedure and Technique:  The patient is  is here for transperineal prostate biopsy guided by biplanar transrectal ultrasound and MR fusion using the Precision Point Perineologic kit and the Uron[employer] system  The procedure, as well as the risks of bleeding, infection, and urinary retention have been explained he gives informed consent  The patient was brought to the operating room and identified properly  IV sedation was induced, and he was placed in the dorsal lithotomy position  The perineum was shaved, a ChloraPrep scrub was used of the perineum and anal regions, and dried  IO band was used to tape the scrotum in a cranial direction to keep it out of the way the perineum  A time-out was performed  Care was taken when placing the patient in the dorsal lithotomy position to make sure all pressure points were protected     I then prepared the biplanar ultrasound probe with ultrasound gel covering mostly the distal sensor, and covered it with a condom  The condom was secured with tape, and I placed the Precision Point device with a clamp over the midportion of the ultrasound once near the handle  The aperture and guide needle were also prepared to be used later, and I placed the transrectal ultrasound probe into the rectum and visualized the prostate in sagittal and transverse views  This was used with a split screen  The screen was switched to one-view which was transverse, and a volumetric sweep was performed  Transfer of this image to the MR fusion software was completed,  The perineum was anesthetized with 2% xylocaine 10 cc both superficially and deep with a spinal needle on both sides of the median raphe  I first took 4-5 biopsies using the guidance system of the region of interest, which was in the left anterior medial zone  After this was done, I took biopsies of the left anterior lateral zone, left posterior lateral zone left posterior medial zone and left base  I then, starting with the right side, rotated the probe to the patient's right at a 45 degree angle and placed the entry guide needle    Starting on the right side, I took biopsies of the anterior medial zone, anterior lateral zone, in the second to the top aperture position  I then switched the guide needle to the second to the lowest aperture setting, then under biplanar guidance I took 1 specimen each from the right and left posterior lateral regions, and took biopsies from the posterior medial and base on each side  This was done bilaterally  These were all peripheral zone biopsies  Extra biopsies were taken in zones where the initial biopsy was suboptimal tissue  Care was taken to try to include the entire length of the prostate as much as possible for complete coverage  Excellent prostate tissue cores were obtained, and specimens were sent to pathology  I withdrew the guide needle and I held pressure on the perineum for 1 minutes using a folded towel  The perineum was then cleansed with sterile water     I was present for the entire procedure    Patient Disposition:  PACU  and hemodynamically stable    SIGNATURE: Fela Tsai MD  DATE: Luisa 15, 2021  TIME: 8:39 AM

## 2021-06-15 NOTE — ANESTHESIA POSTPROCEDURE EVALUATION
Post-Op Assessment Note    CV Status:  Stable  Pain Score: 0    Pain management: adequate     Mental Status:  Sleepy and awake   Hydration Status:  Euvolemic   PONV Controlled:  Controlled   Airway Patency:  Patent      Post Op Vitals Reviewed: Yes      Staff: Anesthesiologist, CRNA         No complications documented      BP      Temp (!) 97 1 °F (36 2 °C) (06/15/21 0844)    Pulse 71 (06/15/21 0844)   Resp 18 (06/15/21 0844)    SpO2 95 % (06/15/21 0844)

## 2021-06-15 NOTE — ANESTHESIA PREPROCEDURE EVALUATION
Procedure:  TRANSPERINEAL MRI FUSION PROSTATE BIOPSY (N/A Abdomen)    Relevant Problems   CARDIO   (+) A-fib (HCC)   (+) HTN (hypertension)      /RENAL   (+) Benign prostatic hypertrophy             Anesthesia Plan  ASA Score- 3     Anesthesia Type- IV sedation with anesthesia with ASA Monitors  Additional Monitors:   Airway Plan:           Plan Factors-    Chart reviewed  Induction- intravenous  Postoperative Plan-     Informed Consent- Anesthetic plan and risks discussed with patient  I personally reviewed this patient with the CRNA  Discussed and agreed on the Anesthesia Plan with the ITALO Zhou

## 2021-06-18 ENCOUNTER — TELEPHONE (OUTPATIENT)
Dept: UROLOGY | Facility: MEDICAL CENTER | Age: 81
End: 2021-06-18

## 2021-06-18 DIAGNOSIS — C61 PROSTATE CANCER (HCC): Primary | ICD-10-CM

## 2021-06-18 NOTE — TELEPHONE ENCOUNTER
Pt managed by Bakari Schneider, pt called stating he's reviewed his pathology results and feels he needs to be seen sooner than 6/30,please contact pt directly

## 2021-06-21 NOTE — TELEPHONE ENCOUNTER
Per Dr Paticia Canavan recommendation, pt can tentatively be scheduled for IMRT  He has an appt with Dr Gisela Jefferson on 6/30 to discuss results of Transperineal MRI Fusion Prostate Biopsy on 6/15/21  Pt called and advised of above

## 2021-06-24 RX ORDER — TRAZODONE HYDROCHLORIDE 50 MG/1
TABLET ORAL
COMMUNITY
Start: 2021-06-02

## 2021-06-24 RX ORDER — ROSUVASTATIN CALCIUM 10 MG/1
10 TABLET, COATED ORAL
COMMUNITY
Start: 2021-05-07

## 2021-06-28 NOTE — TELEPHONE ENCOUNTER
Called Kimo  Made him aware of Dr Weiss Falling referral for Radiation Oncology  He stated he spoke to Kem Blanco and was instructed to keep follow up with Dr Marco Antonio Del Valle to discuss results but was not aware of referral to Radiation Oncology  Made him aware that order is placed per Dr Marco Antonio Del Valle and Radiation Oncology will be contacting him to schedule consult

## 2021-06-30 ENCOUNTER — OFFICE VISIT (OUTPATIENT)
Dept: UROLOGY | Facility: MEDICAL CENTER | Age: 81
End: 2021-06-30
Payer: MEDICARE

## 2021-06-30 VITALS
BODY MASS INDEX: 21.66 KG/M2 | HEIGHT: 65 IN | WEIGHT: 130 LBS | SYSTOLIC BLOOD PRESSURE: 120 MMHG | DIASTOLIC BLOOD PRESSURE: 80 MMHG | HEART RATE: 79 BPM

## 2021-06-30 DIAGNOSIS — C61 PROSTATE CANCER (HCC): Primary | ICD-10-CM

## 2021-06-30 PROCEDURE — 99214 OFFICE O/P EST MOD 30 MIN: CPT | Performed by: UROLOGY

## 2021-06-30 RX ORDER — ASPIRIN 81 MG/1
81 TABLET ORAL DAILY
COMMUNITY
End: 2022-01-28

## 2021-06-30 NOTE — PROGRESS NOTES
100 percent counseling 30 minutes    Patient returns for discussion of prostate biopsy results  The patient had a PI-RADS 3 lesion on prostate MRI and a PSA of 5 4  Prostate biopsy did reveal 1 area of [place] pattern score 7 (4+3) and 2 areas of [place] pattern score 6 disease  The patient presented today along with his wife for discussion of options for therapy  We discussed active surveillance as well as short course androgen deprivation therapy/IMRT  He has an appointment to discuss this with Radiation Oncology  He is aware the possible side effects of recurrent disease radiation cystitis radiation proctitis possible bleeding or change in voiding patterns  He understands the low chance of distant spread with his disease at the present time but understands that completion of staging should include a nuclear medicine bone scan inasmuch as he had [place] pattern score 7 disease  We discussed robotic prostatectomy especially in view of his age  He comes from a long line of longevity and is in basically good shape himself from a physical examination standpoint although he is on Eliquis  In short the patient has adenocarcinoma the prostate with [place] score being high enough to warrant intervention    He is referred for radiation oncology/IMRT and after bone scan will undergo shot of Lupron as an adjunct to IMRT

## 2021-07-08 ENCOUNTER — HOSPITAL ENCOUNTER (OUTPATIENT)
Dept: NUCLEAR MEDICINE | Facility: HOSPITAL | Age: 81
Discharge: HOME | End: 2021-07-08
Attending: UROLOGY
Payer: MEDICARE

## 2021-07-08 DIAGNOSIS — C61 PROSTATE CANCER (HCC): ICD-10-CM

## 2021-07-08 PROCEDURE — A9503 TC99M MEDRONATE: HCPCS

## 2021-07-08 PROCEDURE — G1004 CDSM NDSC: HCPCS

## 2021-07-08 PROCEDURE — 78306 BONE IMAGING WHOLE BODY: CPT

## 2021-07-21 PROBLEM — C61 PROSTATE CANCER (HCC): Status: ACTIVE | Noted: 2021-07-21

## 2021-07-22 ENCOUNTER — TELEPHONE (OUTPATIENT)
Dept: RADIATION ONCOLOGY | Facility: CLINIC | Age: 81
End: 2021-07-22

## 2021-07-22 ENCOUNTER — CLINICAL SUPPORT (OUTPATIENT)
Dept: RADIATION ONCOLOGY | Facility: CLINIC | Age: 81
End: 2021-07-22
Attending: RADIOLOGY
Payer: MEDICARE

## 2021-07-22 VITALS
WEIGHT: 132.06 LBS | RESPIRATION RATE: 18 BRPM | DIASTOLIC BLOOD PRESSURE: 89 MMHG | TEMPERATURE: 97.1 F | SYSTOLIC BLOOD PRESSURE: 173 MMHG | HEART RATE: 58 BPM | HEIGHT: 65 IN | BODY MASS INDEX: 22 KG/M2

## 2021-07-22 DIAGNOSIS — C61 PROSTATE CANCER (HCC): ICD-10-CM

## 2021-07-22 DIAGNOSIS — C61 PROSTATE CANCER (HCC): Primary | ICD-10-CM

## 2021-07-22 PROCEDURE — 99204 OFFICE O/P NEW MOD 45 MIN: CPT | Performed by: RADIOLOGY

## 2021-07-22 PROCEDURE — 99211 OFF/OP EST MAY X REQ PHY/QHP: CPT | Performed by: RADIOLOGY

## 2021-07-22 NOTE — PROGRESS NOTES
Lorena Connolly 1940 is a 79 y o  male with newly diagnosed Lisset 4+3=7 prostate cancer  He presents today for radiation oncology consult  PSA's  1/25/21   5 4  3/3/20   4 1  2/5/19   3 2    6/29/20 Dr Sp Mccloud- Elevated PSA of 4 1 He has had at least 2 biopsies in the past that were benign and a variable PSA  He has known prostatomegaly is on alpha blockade and other than nocturia x 3-4 he is voiding adequately  Repeat PSA and INDIAR in 6 months    2/10/21 Dr Sp Mccloud- PSA has now gone to 5 2  Plan for MRI prostate then discuss results to determine whether repeat biopsy or continued surveillance  AUA score 8 with nocturia x 2, he perceives his voiding pattern to be adequate  Prostatic nodularity- left posterior midgland firm, right apex possibly indicative of nodule    2/24/21 MRI prostate-  -This is a markedly limited study due to degradation of the coronal and sagittal images due to extensive motion  This makes evaluation suboptimal     -1 4 x 1 x 0 6 cm nodule in the anterior left transition zone which demonstrates partially obscured margins on the axial plane and not assessed on the coronal sagittal planes  1  PI-RADSv2 1 Category 3 - Intermediate (the presence of clinically significant cancer is equivocal)  2  No extraprostatic tumor, seminal vesicle invasion, pelvic lymphadenopathy, or pelvic osseous metastatic disease  3  Calculated prostate volume of 45 03 cc     3/3/21 Dr Sp Mccloud- The patient was found to have a 1 4 x 1 x 0 6 cm nodule in the anterior left transitional zone consistent with a PI-RADS 3 lesion  PSA is currently 5 4 up from 3 2 2 years ago  Patient agrees to MRI fusion biopsy    6/15/21 prostate biopsy    6/30/21 Dr Sp Mccloud- Prostate biopsy revealed 1 area of Lisset 7 (4+3) and 2 areas of [place] 6  Discussed active surveillance as well as short course ADT/IMRT  He has an appt to discuss with rad onc  Plan for bone scan   We discussed robotic prostatectomy especially in view of his age  He comes from a long line of longevity and is in basically good shape himself from a physical examination standpoint although he is on Eliquis  After bone scan will undergo shot of Lupron as an adjunct to IMRT     7/8/21 bone scan-   1  Heterogeneous radiotracer uptake in the lower thoracic and lumbar spine  This is more focal in the upper and mid lumbar spine as noted above  This may well be related to degenerative changes but correlation with CT is advised to exclude an   underlying lesion  It CT abdomen pelvis is performed for staging purposes these areas could be assessed at that time  7/23/21 Dr Tahmina Valdivia      Oncology History   Prostate cancer [facility])   6/15/2021 Initial Diagnosis    Prostate cancer ([facility] [address] )     6/15/2021 Biopsy    Final Diagnosis   A  Right JACKSON prostate, needle core biopsy:  - Benign prostate glands and stroma  B  Left anterior lateral prostate, needle core biopsy:  - Prostatic adenocarcinoma, Lawrence grade 4 + 3 = 7 (50% pattern 3 and 50% pattern 4, Grade group 3), involving one of one core and 5% of the tissue   - Perineural invasion is absent  C  Left posterior lateral prostate, needle core biopsy:  - Benign prostate glands and stroma  D  Left posterior medial prostate, needle core biopsy:  - Benign prostate glands and stroma  E  Left base prostate, needle core biopsy:  - Benign prostate glands and stroma  F  Right anterior medial prostate, needle core biopsy:  - Benign prostate glands and stroma  G  Right anterior lateral prostate, needle core biopsy:  - Prostatic adenocarcinoma, Lisset grade 3 + 3 = 6 (Grade group 1), involving one of one core and less than 5% of the tissue   - Perineural invasion is absent  H  Right posterior lateral prostate, needle core biopsy:  - Atypical small acinar proliferation (ASAP), suspicious but not diagnostic for malignancy       I  Right posterior medial prostate, needle core biopsy:  - Benign prostate glands and stroma  J  Right base prostate, needle core biopsy:  - Benign prostate glands and stroma  Clinical Trial: no      Health Maintenance   Topic Date Due    Hepatitis C Screening  Never done   [facility] Medicare Annual Wellness Visit (AWV)  Never done    Fall Risk  Never done    Pneumococcal Vaccine: 65+ Years (2 of 2 - PPSV23) 06/17/2017    Influenza Vaccine (1) 09/01/2021    BMI: Adult  06/30/2022    Depression Screening PHQ  07/22/2022    DTaP,Tdap,and Td Vaccines (2 - Td or Tdap) 01/25/2029    COVID-19 Vaccine  Completed    HIB Vaccine  Aged Out    Hepatitis B Vaccine  Aged Out    IPV Vaccine  Aged Out    Hepatitis A Vaccine  Aged Out    Meningococcal ACWY Vaccine  Aged Out    HPV Vaccine  Aged Out       Past Medical History:   Diagnosis Date    Back pain     Benign prostatic hypertrophy     Disease of thyroid gland     Elevated PSA 3/3/2021    Hyperlipidemia     Hypertension     Prostate cancer ([address] ) 7/21/2021    Restless leg        Past Surgical History:   Procedure Laterality Date    ACHILLES TENDON SURGERY      HERNIA REPAIR      KNEE ARTHROSCOPY Right     shoulder    MA BIOPSY OF PROSTATE,NEEDLE/PUNCH N/A 6/15/2021    Procedure: TRANSPERINEAL MRI FUSION PROSTATE BIOPSY;  Surgeon: Jennifer Ortega MD;  [facility]: [facility];  Service: Urology    SKIN BIOPSY      TONSILLECTOMY         Family History   Problem Relation Age of Onset    Cancer Mother     Throat cancer Father     Breast cancer Sister        Social History     Tobacco Use    Smoking status: Never Smoker    Smokeless tobacco: Never Used   Vaping Use    Vaping Use: Never used   Substance Use Topics    Alcohol use: Yes     Alcohol/week: 4 0 standard drinks     Types: 4 Cans of beer per week     Comment: CAFFEINE USE/3-4 beers/wk    Occasional wine or drink    Drug use: No          Current Outpatient Medications:     bimatoprost (LUMIGAN) 0 01 % ophthalmic drops, Administer 1 drop into the left eye daily at bedtime, Disp: , Rfl:     Eliquis 5 MG, TAKE 1 TABLET TWICE A DAY, Disp: 180 tablet, Rfl: 3    levothyroxine 25 mcg tablet, Take 25 mcg by mouth daily, Disp: , Rfl:     Multiple Vitamins-Minerals (CENTRUM SILVER 50+MEN PO), Take by mouth, Disp: , Rfl:     Omega-3 Fatty Acids (FISH OIL) 1,000 mg, Take by mouth daily, Disp: , Rfl:     rosuvastatin (CRESTOR) 10 MG tablet, 10 mg Pt takes med every other day--conversation with PCP (Dr Edin Patel) November 2021 appt/Labs, Disp: , Rfl:     ALPRAZolam (XANAX) 0 5 mg tablet, Take 1 tablet (0 5 mg total) by mouth once for 1 dose Take 1-1/2 hour prior to MRI (Patient not taking: Reported on 3/3/2021), Disp: 1 tablet, Rfl: 0    aspirin (ECOTRIN LOW STRENGTH) 81 mg EC tablet, Take 81 mg by mouth daily (Patient not taking: Reported on 7/22/2021), Disp: , Rfl:     fluticasone (FLONASE) 50 mcg/act nasal spray, , Disp: , Rfl:     pramipexole (MIRAPEX) 0 125 mg tablet, Take by mouth Daily (Patient not taking: Reported on 7/22/2021), Disp: , Rfl:     traZODone (DESYREL) 50 mg tablet, , Disp: , Rfl:     Allergies   Allergen Reactions    No Active Allergies         Review of Systems:  Review of Systems   Constitutional: Negative  HENT: Positive for hearing loss (Pt notes "hard of hearing" but does not want to use a hearing aid) and postnasal drip ('Mucousy throat" for years  )  Eyes:        Wears glasses  Glaucoma hx/eye drops used  Respiratory: Positive for cough (Due to post nasal drip)  Cardiovascular: Positive for chest pain (Occasional per pt-not new)  Gastrointestinal: Negative  Endocrine: Negative  Genitourinary: Positive for frequency  Nocturia x 3  Pt does not feel his stream is any different  Musculoskeletal: Positive for arthralgias (Left hip discomfort x 2-3 wks  Right shoulder discomfort x months  ) and back pain (Long hx of "back discomfort")  Allergic/Immunologic: Negative  Neurological: Negative      Hematological: Does not bruise/bleed easily (Eliquis )  Psychiatric/Behavioral: Positive for sleep disturbance (Insomnia x years  )  Vitals:    07/22/21 1033   BP: (!) 173/89   BP Location: Left arm   Patient Position: Sitting   Pulse: 58   Resp: 18   Temp: (!) 97 1 °F (36 2 °C)   Weight: 59 9 kg (132 lb 0 9 oz)   Height: 5' 5" (1 651 m)       Pain Score: 0-No pain    IPSS Questionnaire (AUA-7): Over the past month    1)  How often have you had a sensation of not emptying your bladder completely after you finish urinating? 0 - Not at all   2)  How often have you had to urinate again less than two hours after you finished urinating? 2 - Less than half the time   3)  How often have you found you stopped and started again several times when you urinated? 0 - Not at all   4) How difficult have you found it to postpone urination? 0 - Not at all   5) How often have you had a weak urinary stream?  0 - Not at all   6) How often have you had to push or strain to begin urination? 0 - Not at all   7) How many times did you most typically get up to urinate from the time you went to bed until the time you got up in the morning? 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

## 2021-07-22 NOTE — TELEPHONE ENCOUNTER
Ruby Rayo is scheduled for Lupron 7/23/21 with Dr Jaye Simpson   Will schedule [employer] once results of CT on 7/31/21 are final

## 2021-07-22 NOTE — LETTER
2021     Francois Spatz, MD  [facility]  [address]  [address]    Patient: Westley Johnson   YOB: 1940   Date of Visit: 2021       Dear Dr Joelle Mosqueda: Thank you for referring Westley Johnson to me for evaluation  Below are my notes for this consultation  If you have questions, please do not hesitate to call me  I look forward to following your patient along with you  Sincerely,        Mendez Durand MD        CC: No Recipients  Mendez Durand MD  2021 12:27 PM  Sign when Signing Visit  Consultation - Radiation Oncology      [id number] : 1940  Encounter: [id number]  Patient Information: [address] [facility]  Chief Complaint   Patient presents with    Consult     Rad Onc     Cancer Staging  No matching staging information was found for the patient  History of Present Illness   Westley Johnson 1940 is a 79 y o  male with newly diagnosed Lisset 4+3=7 prostate cancer  He presents today for radiation oncology consult      PSA's  21   5 4  3/3/20   4 1  19   3 2     20 Dr Joelle Mosqueda- Elevated PSA of 4 1 He has had at least 2 biopsies in the past that were benign and a variable PSA  He has known prostatomegaly is on alpha blockade and other than nocturia x 3-4 he is voiding adequately  Repeat PSA and INDIRA in 6 months     2/10/21 Dr Joelle Mosqueda- PSA has now gone to 5 2  Plan for MRI prostate then discuss results to determine whether repeat biopsy or continued surveillance   AUA score 8 with nocturia x 2, he perceives his voiding pattern to be adequate  Prostatic nodularity- left posterior midgland firm, right apex possibly indicative of nodule     21 MRI prostate-  -This is a markedly limited study due to degradation of the coronal and sagittal images due to extensive motion   This makes evaluation suboptimal     -1 4 x 1 x 0 6 cm nodule in the anterior left transition zone which demonstrates partially obscured margins on the axial plane and not assessed on the coronal sagittal planes  1  PI-RADSv2 1 Category 3 - Intermediate (the presence of clinically significant cancer is equivocal)  2  No extraprostatic tumor, seminal vesicle invasion, pelvic lymphadenopathy, or pelvic osseous metastatic disease  3  Calculated prostate volume of 45 03 cc      3/3/21 Dr Nickie Stout- The patient was found to have a 1 4 x 1 x 0 6 cm nodule in the anterior left transitional zone consistent with a PI-RADS 3 lesion   PSA is currently 5 4 up from 3 2 2 years ago  Christian Anderson agrees to MRI fusion biopsy     6/15/21 prostate biopsy     6/30/21 Dr Nickie Stout- Prostate biopsy revealed 1 area of Lisset 7 (4+3) and 2 areas of [place] 6  Discussed active surveillance as well as short course ADT/IMRT  He has an appt to discuss with rad onc  Plan for bone scan  We discussed robotic prostatectomy especially in view of his age  He comes from a long line of longevity and is in basically good shape himself from a physical examination standpoint although he is on Eliquis  After bone scan will undergo shot of Lupron as an adjunct to IMRT      7/8/21 bone scan-   1   Heterogeneous radiotracer uptake in the lower thoracic and lumbar spine   This is more focal in the upper and mid lumbar spine as noted above   This may well be related to degenerative changes but correlation with CT is advised to exclude an   underlying lesion   It CT abdomen pelvis is performed for staging purposes these areas could be assessed at that time      7/23/21 Dr Salgado Single  He is not sexually active  He has nocturia 3 times per night  No dysuria, hematuria  Historical Information   Oncology History   Prostate cancer ([facility] [address] )   6/15/2021 Initial Diagnosis    Prostate cancer ([facility] [address] )     6/15/2021 Biopsy    Final Diagnosis   A  Right JACKSON prostate, needle core biopsy:  - Benign prostate glands and stroma  B   Left anterior lateral prostate, needle core biopsy:  - Prostatic adenocarcinoma, Fountain grade 4 + 3 = 7 (50% pattern 3 and 50% pattern 4, Grade group 3), involving one of one core and 5% of the tissue   - Perineural invasion is absent  C  Left posterior lateral prostate, needle core biopsy:  - Benign prostate glands and stroma  D  Left posterior medial prostate, needle core biopsy:  - Benign prostate glands and stroma  E  Left base prostate, needle core biopsy:  - Benign prostate glands and stroma  F  Right anterior medial prostate, needle core biopsy:  - Benign prostate glands and stroma  G  Right anterior lateral prostate, needle core biopsy:  - Prostatic adenocarcinoma, Fountain grade 3 + 3 = 6 (Grade group 1), involving one of one core and less than 5% of the tissue   - Perineural invasion is absent  H  Right posterior lateral prostate, needle core biopsy:  - Atypical small acinar proliferation (ASAP), suspicious but not diagnostic for malignancy  I  Right posterior medial prostate, needle core biopsy:  - Benign prostate glands and stroma  J  Right base prostate, needle core biopsy:  - Benign prostate glands and stroma                Past Medical History:   Diagnosis Date    Back pain     Benign prostatic hypertrophy     Disease of thyroid gland     Elevated PSA 3/3/2021    Hyperlipidemia     Hypertension     Prostate cancer ([address] ) 7/21/2021    Restless leg      Past Surgical History:   Procedure Laterality Date    ACHILLES TENDON SURGERY      HERNIA REPAIR      KNEE ARTHROSCOPY Right     shoulder    SC BIOPSY OF PROSTATE,NEEDLE/PUNCH N/A 6/15/2021    Procedure: TRANSPERINEAL MRI FUSION PROSTATE BIOPSY;  Surgeon: Brittany Galvin MD;  Location: [facility];  Service: Urology    SKIN BIOPSY      TONSILLECTOMY         Family History   Problem Relation Age of Onset    Cancer Mother     Throat cancer Father     Breast cancer Sister        Social History   Social History     Substance and Sexual Activity   Alcohol Use Yes    Alcohol/week: 4 0 standard drinks    Types: 4 Cans of beer per week    Comment: CAFFEINE USE/3-4 beers/wk  Occasional wine or drink     Social History     Substance and Sexual Activity   Drug Use No     Social History     Tobacco Use   Smoking Status Never Smoker   Smokeless Tobacco Never Used         Meds/Allergies     Current Outpatient Medications:     bimatoprost (LUMIGAN) 0 01 % ophthalmic drops, Administer 1 drop into the left eye daily at bedtime, Disp: , Rfl:     Eliquis 5 MG, TAKE 1 TABLET TWICE A DAY, Disp: 180 tablet, Rfl: 3    levothyroxine 25 mcg tablet, Take 25 mcg by mouth daily, Disp: , Rfl:     Multiple Vitamins-Minerals (CENTRUM SILVER 50+MEN PO), Take by mouth, Disp: , Rfl:     Omega-3 Fatty Acids (FISH OIL) 1,000 mg, Take by mouth daily, Disp: , Rfl:     rosuvastatin (CRESTOR) 10 MG tablet, 10 mg Pt takes med every other day--conversation with PCP (Dr Rissa Spencer) November 2021 appt/Labs, Disp: , Rfl:     ALPRAZolam (XANAX) 0 5 mg tablet, Take 1 tablet (0 5 mg total) by mouth once for 1 dose Take 1-1/2 hour prior to MRI (Patient not taking: Reported on 3/3/2021), Disp: 1 tablet, Rfl: 0    aspirin (ECOTRIN LOW STRENGTH) 81 mg EC tablet, Take 81 mg by mouth daily (Patient not taking: Reported on 7/22/2021), Disp: , Rfl:     fluticasone (FLONASE) 50 mcg/act nasal spray, , Disp: , Rfl:     pramipexole (MIRAPEX) 0 125 mg tablet, Take by mouth Daily (Patient not taking: Reported on 7/22/2021), Disp: , Rfl:     traZODone (DESYREL) 50 mg tablet, , Disp: , Rfl:   Allergies   Allergen Reactions    No Active Allergies          Review of Systems   Constitutional: Negative  HENT: Positive for hearing loss (Pt notes "hard of hearing" but does not want to use a hearing aid) and postnasal drip ('Mucousy throat" for years  )  Eyes:        Wears glasses  Glaucoma hx/eye drops used  Respiratory: Positive for cough (Due to post nasal drip)      Cardiovascular: Positive for chest pain (Occasional per pt-not new)  Gastrointestinal: Negative  Endocrine: Negative  Genitourinary: Positive for frequency  Nocturia x 3  Pt does not feel his stream is any different  Musculoskeletal: Positive for arthralgias (Left hip discomfort x 2-3 wks  Right shoulder discomfort x months  ) and back pain (Long hx of "back discomfort")  Allergic/Immunologic: Negative  Neurological: Negative  Hematological: Does not bruise/bleed easily (Eliquis )  Psychiatric/Behavioral: Positive for sleep disturbance (Insomnia x years  )        Vitals       Vitals:     07/22/21 1033   BP: (!) 173/89   BP Location: Left arm   Patient Position: Sitting   Pulse: 58   Resp: 18   Temp: (!) 97 1 °F (36 2 °C)   Weight: 59 9 kg (132 lb 0 9 oz)   Height: 5' 5" (1 651 m)            Pain Score: 0-No pain     IPSS Questionnaire (AUA-7): Over the past month     1)  How often have you had a sensation of not emptying your bladder completely after you finish urinating? 0 - Not at all   2)  How often have you had to urinate again less than two hours after you finished urinating? 2 - Less than half the time   3)  How often have you found you stopped and started again several times when you urinated? 0 - Not at all   4) How difficult have you found it to postpone urination? 0 - Not at all   5) How often have you had a weak urinary stream?  0 - Not at all   6) How often have you had to push or strain to begin urination? 0 - Not at all   7) How many times did you most typically get up to urinate from the time you went to bed until the time you got up in the morning?   3 - 3 times   Total Score:  5           OBJECTIVE:   BP (!) 173/89 (BP Location: Left arm, Patient Position: Sitting)   Pulse 58   Temp (!) 97 1 °F (36 2 °C)   Resp 18   Ht 5' 5" (1 651 m)   Wt 59 9 kg (132 lb 0 9 oz)   BMI 21 98 kg/m²   Pain Assessment:  0  Performance Status: ECOG/Zubrod/WHO: 0 - Asymptomatic    Physical Exam  Constitutional: General: He is not in acute distress  Appearance: He is well-developed  He is not diaphoretic  HENT:      Head: Normocephalic and atraumatic  Mouth/Throat:      Pharynx: No oropharyngeal exudate  Eyes:      General: No scleral icterus  Conjunctiva/sclera: Conjunctivae normal       Pupils: Pupils are equal, round, and reactive to light  Neck:      Thyroid: No thyromegaly  Trachea: No tracheal deviation  Cardiovascular:      Rate and Rhythm: Normal rate and regular rhythm  Heart sounds: Normal heart sounds  Pulmonary:      Effort: Pulmonary effort is normal  No respiratory distress  Breath sounds: Normal breath sounds  No wheezing or rales  Abdominal:      General: Bowel sounds are normal  There is no distension  Palpations: Abdomen is soft  There is no mass  Tenderness: There is no abdominal tenderness  Genitourinary:     Comments: On rectal exam his prostate gland is enlarged  He has some firmness  On the left side  Musculoskeletal:         General: No tenderness  Normal range of motion  Cervical back: Normal range of motion and neck supple  Lymphadenopathy:      Cervical: No cervical adenopathy  Skin:     General: Skin is warm and dry  Coloration: Skin is not pale  Findings: No erythema or rash  Neurological:      Mental Status: He is alert and oriented to person, place, and time  Cranial Nerves: No cranial nerve deficit  Coordination: Coordination normal    Psychiatric:         Behavior: Behavior normal          Thought Content:  Thought content normal          Judgment: Judgment normal             RESULTS  Lab Results    Chemistry        Component Value Date/Time    K 4 4 06/10/2021 0918     06/10/2021 0918    CO2 29 06/10/2021 0918    BUN 22 06/10/2021 0918    CREATININE 1 00 06/10/2021 0918        Component Value Date/Time    CALCIUM 9 5 06/10/2021 0918    ALKPHOS 63 06/10/2021 0918    AST 16 06/10/2021 0918    ALT 21 06/10/2021 0918            Lab Results   Component Value Date    WBC 7 97 06/10/2021    HGB 15 1 06/10/2021    HCT 46 0 06/10/2021    MCV 95 06/10/2021     (H) 06/10/2021         Imaging Studies  NM bone scan whole body    Result Date: 7/8/2021  Narrative: BONE SCAN  WHOLE BODY INDICATION: C61: Malignant neoplasm of prostate PREVIOUS FILM CORRELATION:    MRI prostate 2/24/2021 TECHNIQUE:   This study was performed following the intravenous administration of 26 mCi Tc-99m labeled MDP  Delayed, anterior and posterior whole body images were acquired, 2-3 hours after radiopharmaceutical administration  Additional delayed oblique static images acquired of the bilateral ribs and pelvis  FINDINGS:  Scattered heterogeneous foci of radiotracer uptake in the spine most extensive in the lower thoracic and lumbar spine  In the lumbar spine, this is most prominent at the upper lumbar spine approximately L1 on the left and mid lumbar spine likely L3 on the right  Foci of radiotracer uptake at the bilateral wrists, right knee and feet are likely related to arthritic changes given the distribution  Renal activity is symmetric  Impression: 1  Heterogeneous radiotracer uptake in the lower thoracic and lumbar spine  This is more focal in the upper and mid lumbar spine as noted above  This may well be related to degenerative changes but correlation with CT is advised to exclude an underlying lesion  It CT abdomen pelvis is performed for staging purposes these areas could be assessed at that time  The study was marked in EPIC for significant notification  Workstation performed: [id number]         Pathology:  Final Diagnosis   A  Right JACKSON prostate, needle core biopsy:  - Benign prostate glands and stroma      B  Left anterior lateral prostate, needle core biopsy:  - Prostatic adenocarcinoma, Lisset grade 4 + 3 = 7 (50% pattern 3 and 50% pattern 4, Grade group 3), involving one of one core and 5% of the tissue    - Perineural invasion is absent      C  Left posterior lateral prostate, needle core biopsy:  - Benign prostate glands and stroma      D  Left posterior medial prostate, needle core biopsy:  - Benign prostate glands and stroma      E  Left base prostate, needle core biopsy:  - Benign prostate glands and stroma      F  Right anterior medial prostate, needle core biopsy:  - Benign prostate glands and stroma      G  Right anterior lateral prostate, needle core biopsy:  - Prostatic adenocarcinoma, Lisset grade 3 + 3 = 6 (Grade group 1), involving one of one core and less than 5% of the tissue   - Perineural invasion is absent      H  Right posterior lateral prostate, needle core biopsy:  - Atypical small acinar proliferation (ASAP), suspicious but not diagnostic for malignancy      I  Right posterior medial prostate, needle core biopsy:  - Benign prostate glands and stroma      J  Right base prostate, needle core biopsy:  - Benign prostate glands and stroma                 ASSESSMENT  1  Prostate cancer [facility])  Ambulatory referral to Radiation Oncology     Cancer Staging  No matching staging information was found for the patient  PLAN/DISCUSSION  No orders of the defined types were placed in this encounter  Carlos Perez is a 79y o  year old male with   Clinical T2 NX M0 unfavorable intermediate risk prostate carcinoma  He has undergone bone scan which reveals some spinal uptake felt to 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  Recommendation was for CT scan which I will order today  We discussed should there be no distant metastasis options for localized unfavorable intermediate risk prostate cancer  Overall he has very good performance status and siblings who have long lung Jevity  We discussed local options would include surgery, radiation therapy  We discussed both dose escalated external beam radiation therapy as well as brachytherapy    He realizes our  Center does not perform brachytherapy however could be referred to a center that does  I reviewed with him both standard fractionation as well as a hypo fractionated regimen should he meet dosimetric criteria  Fiducial marker placement was reviewed with him to assist with daily IGRT  In addition I recommended SpaceOAR placement as he may be a candidate for a hypo fractionated regimen  Possible side effects both acute and long-term were discussed in detail with the patient and his wife  This can include but not limited to fatigue, urinary frequency, urgency, dysuria, cystitis, proctitis, enteritis and sexual dysfunction  We discussed in light of his unfavorable intermediate risk ( 4+3) short course ADT therapy would also be recommended  He has follow-up with Urology tomorrow  We discussed should he proceed with treatment he will return  For simulation 1 week after fiducial marker and SpaceOAR placement  The  nurse navigator will assist with  Appointment   Coordination  I believe all of their questions were answered to their satisfaction  Melina Nuñez MD  7/22/2021,11:27 AM      Portions of the record may have been created with voice recognition software  Occasional wrong word or "sound a like" substitutions may have occurred due to the inherent limitations of voice recognition software  Read the chart carefully and recognize, using context, where substitutions have occurred

## 2021-07-22 NOTE — TELEPHONE ENCOUNTER
Per Dr Edwards Police, patient to receive radiation as long as CT scan is negative for metastatic disease  He will need SpaceOar, markers, and ADT

## 2021-07-22 NOTE — PROGRESS NOTES
Consultation - Radiation Oncology      VAT:[id number] : 1940  Encounter: [id number]  Patient Information: [address]  Chief Complaint   Patient presents with    Consult     Rad Onc     Cancer Staging  No matching staging information was found for the patient  History of Present Illness   Mary Ellis 1940 is a 79 y o  male with newly diagnosed Lisset 4+3=7 prostate cancer  He presents today for radiation oncology consult      PSA's  21   5 4  3/3/20   4 1  19   3 2     20 Dr Cindi Vernon- Elevated PSA of 4 1 He has had at least 2 biopsies in the past that were benign and a variable PSA  He has known prostatomegaly is on alpha blockade and other than nocturia x 3-4 he is voiding adequately  Repeat PSA and INDIRA in 6 months     2/10/21 Dr Cindi Vernon- PSA has now gone to 5 2  Plan for MRI prostate then discuss results to determine whether repeat biopsy or continued surveillance  AUA score 8 with nocturia x 2, he perceives his voiding pattern to be adequate  Prostatic nodularity- left posterior midgland firm, right apex possibly indicative of nodule     21 MRI prostate-  -This is a markedly limited study due to degradation of the coronal and sagittal images due to extensive motion   This makes evaluation suboptimal     -1 4 x 1 x 0 6 cm nodule in the anterior left transition zone which demonstrates partially obscured margins on the axial plane and not assessed on the coronal sagittal planes  1  PI-RADSv2 1 Category 3 - Intermediate (the presence of clinically significant cancer is equivocal)  2  No extraprostatic tumor, seminal vesicle invasion, pelvic lymphadenopathy, or pelvic osseous metastatic disease  3  Calculated prostate volume of 45 03 cc      3/3/21 Dr Cindi Vernon- The patient was found to have a 1 4 x 1 x 0 6 cm nodule in the anterior left transitional zone consistent with a PI-RADS 3 lesion   PSA is currently 5 4 up from 3 2 2 years ago   Patient agrees to MRI fusion biopsy     6/15/21 prostate biopsy     6/30/21 Dr Karishma Daigle- Prostate biopsy revealed 1 area of Lisset 7 (4+3) and 2 areas of [place] 6  Discussed active surveillance as well as short course ADT/IMRT  He has an appt to discuss with rad onc  Plan for bone scan  We discussed robotic prostatectomy especially in view of his age  He comes from a long line of longevity and is in basically good shape himself from a physical examination standpoint although he is on Eliquis  After bone scan will undergo shot of Lupron as an adjunct to IMRT      7/8/21 bone scan-   1   Heterogeneous radiotracer uptake in the lower thoracic and lumbar spine   This is more focal in the upper and mid lumbar spine as noted above   This may well be related to degenerative changes but correlation with CT is advised to exclude an   underlying lesion   It CT abdomen pelvis is performed for staging purposes these areas could be assessed at that time      7/23/21 Dr Kimmie Darling  He is not sexually active  He has nocturia 3 times per night  No dysuria, hematuria  Historical Information   Oncology History   Prostate cancer ([facility] [address] )   6/15/2021 Initial Diagnosis    Prostate cancer ([facility] [address] )     6/15/2021 Biopsy    Final Diagnosis   A  Right JACKSON prostate, needle core biopsy:  - Benign prostate glands and stroma  B  Left anterior lateral prostate, needle core biopsy:  - Prostatic adenocarcinoma, Lisset grade 4 + 3 = 7 (50% pattern 3 and 50% pattern 4, Grade group 3), involving one of one core and 5% of the tissue   - Perineural invasion is absent  C  Left posterior lateral prostate, needle core biopsy:  - Benign prostate glands and stroma  D  Left posterior medial prostate, needle core biopsy:  - Benign prostate glands and stroma  E  Left base prostate, needle core biopsy:  - Benign prostate glands and stroma       F  Right anterior medial prostate, needle core biopsy:  - Benign prostate glands and stroma  G  Right anterior lateral prostate, needle core biopsy:  - Prostatic adenocarcinoma, Lisset grade 3 + 3 = 6 (Grade group 1), involving one of one core and less than 5% of the tissue   - Perineural invasion is absent  H  Right posterior lateral prostate, needle core biopsy:  - Atypical small acinar proliferation (ASAP), suspicious but not diagnostic for malignancy  I  Right posterior medial prostate, needle core biopsy:  - Benign prostate glands and stroma  J  Right base prostate, needle core biopsy:  - Benign prostate glands and stroma  Past Medical History:   Diagnosis Date    Back pain     Benign prostatic hypertrophy     Disease of thyroid gland     Elevated PSA 3/3/2021    Hyperlipidemia     Hypertension     Prostate cancer ([facility] [address] ) 7/21/2021    Restless leg      Past Surgical History:   Procedure Laterality Date    ACHILLES TENDON SURGERY      HERNIA REPAIR      KNEE ARTHROSCOPY Right     shoulder    MD BIOPSY OF PROSTATE,NEEDLE/PUNCH N/A 6/15/2021    Procedure: TRANSPERINEAL MRI FUSION PROSTATE BIOPSY;  Surgeon: Romy Royal MD;  [facility]: [facility];  Service: Urology    SKIN BIOPSY      TONSILLECTOMY         Family History   Problem Relation Age of Onset    Cancer Mother     Throat cancer Father     Breast cancer Sister        Social History   Social History     Substance and Sexual Activity   Alcohol Use Yes    Alcohol/week: 4 0 standard drinks    Types: 4 Cans of beer per week    Comment: CAFFEINE USE/3-4 beers/wk    Occasional wine or drink     Social History     Substance and Sexual Activity   Drug Use No     Social History     Tobacco Use   Smoking Status Never Smoker   Smokeless Tobacco Never Used         Meds/Allergies     Current Outpatient Medications:     bimatoprost (LUMIGAN) 0 01 % ophthalmic drops, Administer 1 drop into the left eye daily at bedtime, Disp: , Rfl:     Eliquis 5 MG, TAKE 1 TABLET TWICE A DAY, Disp: 180 tablet, Rfl: 3   levothyroxine 25 mcg tablet, Take 25 mcg by mouth daily, Disp: , Rfl:     Multiple Vitamins-Minerals (CENTRUM SILVER 50+MEN PO), Take by mouth, Disp: , Rfl:     Omega-3 Fatty Acids (FISH OIL) 1,000 mg, Take by mouth daily, Disp: , Rfl:     rosuvastatin (CRESTOR) 10 MG tablet, 10 mg Pt takes med every other day--conversation with PCP (Dr Eugena Claude) November 2021 appt/Labs, Disp: , Rfl:     ALPRAZolam (XANAX) 0 5 mg tablet, Take 1 tablet (0 5 mg total) by mouth once for 1 dose Take 1-1/2 hour prior to MRI (Patient not taking: Reported on 3/3/2021), Disp: 1 tablet, Rfl: 0    aspirin (ECOTRIN LOW STRENGTH) 81 mg EC tablet, Take 81 mg by mouth daily (Patient not taking: Reported on 7/22/2021), Disp: , Rfl:     fluticasone (FLONASE) 50 mcg/act nasal spray, , Disp: , Rfl:     pramipexole (MIRAPEX) 0 125 mg tablet, Take by mouth Daily (Patient not taking: Reported on 7/22/2021), Disp: , Rfl:     traZODone (DESYREL) 50 mg tablet, , Disp: , Rfl:   Allergies   Allergen Reactions    No Active Allergies          Review of Systems   Constitutional: Negative  HENT: Positive for hearing loss (Pt notes "hard of hearing" but does not want to use a hearing aid) and postnasal drip ('Mucousy throat" for years  )  Eyes:        Wears glasses  Glaucoma hx/eye drops used  Respiratory: Positive for cough (Due to post nasal drip)  Cardiovascular: Positive for chest pain (Occasional per pt-not new)  Gastrointestinal: Negative  Endocrine: Negative  Genitourinary: Positive for frequency  Nocturia x 3  Pt does not feel his stream is any different  Musculoskeletal: Positive for arthralgias (Left hip discomfort x 2-3 wks  Right shoulder discomfort x months  ) and back pain (Long hx of "back discomfort")  Allergic/Immunologic: Negative  Neurological: Negative  Hematological: Does not bruise/bleed easily (Eliquis )  Psychiatric/Behavioral: Positive for sleep disturbance (Insomnia x years  )        Vitals       Vitals:     07/22/21 1033   BP: (!) 173/89   BP Location: Left arm   Patient Position: Sitting   Pulse: 58   Resp: 18   Temp: (!) 97 1 °F (36 2 °C)   Weight: 59 9 kg (132 lb 0 9 oz)   Height: 5' 5" (1 651 m)            Pain Score: 0-No pain     IPSS Questionnaire (AUA-7): Over the past month     1)  How often have you had a sensation of not emptying your bladder completely after you finish urinating? 0 - Not at all   2)  How often have you had to urinate again less than two hours after you finished urinating? 2 - Less than half the time   3)  How often have you found you stopped and started again several times when you urinated? 0 - Not at all   4) How difficult have you found it to postpone urination? 0 - Not at all   5) How often have you had a weak urinary stream?  0 - Not at all   6) How often have you had to push or strain to begin urination? 0 - Not at all   7) How many times did you most typically get up to urinate from the time you went to bed until the time you got up in the morning? 3 - 3 times   Total Score:  5           OBJECTIVE:   BP (!) 173/89 (BP Location: Left arm, Patient Position: Sitting)   Pulse 58   Temp (!) 97 1 °F (36 2 °C)   Resp 18   Ht 5' 5" (1 651 m)   Wt 59 9 kg (132 lb 0 9 oz)   BMI 21 98 kg/m²   Pain Assessment:  0  Performance Status: ECOG/Zubrod/WHO: 0 - Asymptomatic    Physical Exam  Constitutional:       General: He is not in acute distress  Appearance: He is well-developed  He is not diaphoretic  HENT:      Head: Normocephalic and atraumatic  Mouth/Throat:      Pharynx: No oropharyngeal exudate  Eyes:      General: No scleral icterus  Conjunctiva/sclera: Conjunctivae normal       Pupils: Pupils are equal, round, and reactive to light  Neck:      Thyroid: No thyromegaly  Trachea: No tracheal deviation  Cardiovascular:      Rate and Rhythm: Normal rate and regular rhythm  Heart sounds: Normal heart sounds     Pulmonary: Effort: Pulmonary effort is normal  No respiratory distress  Breath sounds: Normal breath sounds  No wheezing or rales  Abdominal:      General: Bowel sounds are normal  There is no distension  Palpations: Abdomen is soft  There is no mass  Tenderness: There is no abdominal tenderness  Genitourinary:     Comments: On rectal exam his prostate gland is enlarged  He has some firmness  On the left side  Musculoskeletal:         General: No tenderness  Normal range of motion  Cervical back: Normal range of motion and neck supple  Lymphadenopathy:      Cervical: No cervical adenopathy  Skin:     General: Skin is warm and dry  Coloration: Skin is not pale  Findings: No erythema or rash  Neurological:      Mental Status: He is alert and oriented to person, place, and time  Cranial Nerves: No cranial nerve deficit  Coordination: Coordination normal    Psychiatric:         Behavior: Behavior normal          Thought Content: Thought content normal          Judgment: Judgment normal             RESULTS  Lab Results    Chemistry        Component Value Date/Time    K 4 4 06/10/2021 0918     06/10/2021 0918    CO2 29 06/10/2021 0918    BUN 22 06/10/2021 0918    CREATININE 1 00 06/10/2021 0918        Component Value Date/Time    CALCIUM 9 5 06/10/2021 0918    ALKPHOS 63 06/10/2021 0918    AST 16 06/10/2021 0918    ALT 21 06/10/2021 0918            Lab Results   Component Value Date    WBC 7 97 06/10/2021    HGB 15 1 06/10/2021    HCT 46 0 06/10/2021    MCV 95 06/10/2021     (H) 06/10/2021         Imaging Studies  NM bone scan whole body    Result Date: 7/8/2021  Narrative: BONE SCAN  WHOLE BODY INDICATION: C61: Malignant neoplasm of prostate PREVIOUS FILM CORRELATION:    MRI prostate 2/24/2021 TECHNIQUE:   This study was performed following the intravenous administration of 26 mCi Tc-99m labeled MDP    Delayed, anterior and posterior whole body images were acquired, 2-3 hours after radiopharmaceutical administration  Additional delayed oblique static images acquired of the bilateral ribs and pelvis  FINDINGS:  Scattered heterogeneous foci of radiotracer uptake in the spine most extensive in the lower thoracic and lumbar spine  In the lumbar spine, this is most prominent at the upper lumbar spine approximately L1 on the left and mid lumbar spine likely L3 on the right  Foci of radiotracer uptake at the bilateral wrists, right knee and feet are likely related to arthritic changes given the distribution  Renal activity is symmetric  Impression: 1  Heterogeneous radiotracer uptake in the lower thoracic and lumbar spine  This is more focal in the upper and mid lumbar spine as noted above  This may well be related to degenerative changes but correlation with CT is advised to exclude an underlying lesion  It CT abdomen pelvis is performed for staging purposes these areas could be assessed at that time  The study was marked in EPIC for significant notification  Workstation performed: [id number]         Pathology:  Final Diagnosis   A  Right JACKSON prostate, needle core biopsy:  - Benign prostate glands and stroma      B  Left anterior lateral prostate, needle core biopsy:  - Prostatic adenocarcinoma, [place] grade 4 + 3 = 7 (50% pattern 3 and 50% pattern 4, Grade group 3), involving one of one core and 5% of the tissue   - Perineural invasion is absent      C  Left posterior lateral prostate, needle core biopsy:  - Benign prostate glands and stroma      D  Left posterior medial prostate, needle core biopsy:  - Benign prostate glands and stroma      E  Left base prostate, needle core biopsy:  - Benign prostate glands and stroma      F  Right anterior medial prostate, needle core biopsy:  - Benign prostate glands and stroma      G   Right anterior lateral prostate, needle core biopsy:  - Prostatic adenocarcinoma, [place] grade 3 + 3 = 6 (Grade group 1), involving one of one core and less than 5% of the tissue   - Perineural invasion is absent      H  Right posterior lateral prostate, needle core biopsy:  - Atypical small acinar proliferation (ASAP), suspicious but not diagnostic for malignancy      I  Right posterior medial prostate, needle core biopsy:  - Benign prostate glands and stroma      J  Right base prostate, needle core biopsy:  - Benign prostate glands and stroma                 ASSESSMENT  1  Prostate cancer [facility])  Ambulatory referral to Radiation Oncology     Cancer Staging  No matching staging information was found for the patient  PLAN/DISCUSSION  No orders of the defined types were placed in this encounter  Carlos Perez is a 79y o  year old male with   Clinical T2 NX M0 unfavorable intermediate risk prostate carcinoma  He has undergone bone scan which reveals some spinal uptake felt to 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  Recommendation was for CT scan which I will order today  We discussed should there be no distant metastasis options for localized unfavorable intermediate risk prostate cancer  Overall he has very good performance status and siblings who have long lung Jevity  We discussed local options would include surgery, radiation therapy  We discussed both dose escalated external beam radiation therapy as well as brachytherapy  He realizes our  Center does not perform brachytherapy however could be referred to a center that does  I reviewed with him both standard fractionation as well as a hypo fractionated regimen should he meet dosimetric criteria  Fiducial marker placement was reviewed with him to assist with daily IGRT  In addition I recommended SpaceOAR placement as he may be a candidate for a hypo fractionated regimen  Possible side effects both acute and long-term were discussed in detail with the patient and his wife    This can include but not limited to fatigue, urinary frequency, urgency, dysuria, cystitis, proctitis, enteritis and sexual dysfunction  We discussed in light of his unfavorable intermediate risk ( 4+3) short course ADT therapy would also be recommended  He has follow-up with Urology tomorrow  We discussed should he proceed with treatment he will return  For simulation 1 week after fiducial marker and SpaceOAR placement  The  nurse navigator will assist with  Appointment   Coordination  I believe all of their questions were answered to their satisfaction  Lexi Sutton MD  7/22/2021,11:27 AM      Portions of the record may have been created with voice recognition software  Occasional wrong word or "sound a like" substitutions may have occurred due to the inherent limitations of voice recognition software  Read the chart carefully and recognize, using context, where substitutions have occurred

## 2021-07-23 ENCOUNTER — OFFICE VISIT (OUTPATIENT)
Dept: UROLOGY | Facility: MEDICAL CENTER | Age: 81
End: 2021-07-23
Payer: MEDICARE

## 2021-07-23 VITALS
DIASTOLIC BLOOD PRESSURE: 80 MMHG | SYSTOLIC BLOOD PRESSURE: 130 MMHG | HEART RATE: 67 BPM | WEIGHT: 130 LBS | BODY MASS INDEX: 21.66 KG/M2 | HEIGHT: 65 IN

## 2021-07-23 DIAGNOSIS — C61 PROSTATE CANCER (HCC): Primary | ICD-10-CM

## 2021-07-23 PROCEDURE — 99214 OFFICE O/P EST MOD 30 MIN: CPT | Performed by: UROLOGY

## 2021-07-23 NOTE — PROGRESS NOTES
H&P Exam - Urology   Lorena Connolly 79 y o  male MRN: [id number]  Unit/Bed#:  Encounter: [id number]    Assessment/Plan     Assessment:  Adenocarcinoma the prostate [place] pattern score 7 (4+3)  Plan:  Six months of androgen deprivation therapy followed by IMRT  Plan insertion of gold fiducial prostatic markers and Hydrogel SpaceOAR performed using transrectal ultrasound guidance through a transperineal approach    Patient is due to get CT 07/31/2021 and if this shows no evidence of any extraprostatic or metastatic prostate disease than the patient will undergo administration of 45 mg Lupron IM and be scheduled for IMRT after SpaceOAR Hydrogel and fiducial marker placement  Patient is aware the possibility of bleeding infection need to abort the fiducial marker placement or Hydrogel placement potential for rectal injury potential for need for catheter or operative intervention  The patient agrees to proceed with Hydrogel and fiducial markers as described  History of Present Illness   HPI:  Lorena Connolly is a 79 y o  male who presents with a history of adenocarcinoma of the prostate diagnosed after prostate biopsy was performed on a PI-RADS 3 lesion on MRI with a PSA of 5 4  One core of [place] pattern score 7 (4+3) adenocarcinoma as well as 2 cores of [place] pattern score 6 disease were noted  The patient underwent bone scan which revealed no definite area of metastatic disease and will be undergoing CT scan as well  If this study is negative the patient will move on word with 6 months of androgen deprivation IMRT after Hydrogel SpaceOAR and fiducial placement  Review of Systems   Musculoskeletal: Positive for arthralgias  Left hip discomfort   All other systems reviewed and are negative        Historical Information   Past Medical History:   Diagnosis Date    Back pain     Benign prostatic hypertrophy     Disease of thyroid gland     Elevated PSA 3/3/2021    Hyperlipidemia     Hypertension  Prostate cancer ([facility] [address] ) 7/21/2021    Restless leg      Past Surgical History:   Procedure Laterality Date    ACHILLES TENDON SURGERY      HERNIA REPAIR      KNEE ARTHROSCOPY Right     shoulder    [place] BIOPSY OF PROSTATE,NEEDLE/PUNCH N/A 6/15/2021    Procedure: TRANSPERINEAL MRI FUSION PROSTATE BIOPSY;  Surgeon: Romy Royal MD;  Location: [facility];  Service: Urology    SKIN BIOPSY      TONSILLECTOMY       Social History   Social History     Substance and Sexual Activity   Alcohol Use Yes    Alcohol/week: 4 0 standard drinks    Types: 4 Cans of beer per week    Comment: CAFFEINE USE/3-4 beers/wk  Occasional wine or drink     Social History     Substance and Sexual Activity   Drug Use No     Social History     Tobacco Use   Smoking Status Never Smoker   Smokeless Tobacco Never Used     Family History:   Family History   Problem Relation Age of Onset    Cancer Mother     Throat cancer Father     Breast cancer Sister        Meds/Allergies   all medications and allergies reviewed  Allergies   Allergen Reactions    No Active Allergies        Objective   Vitals: Blood pressure 130/80, pulse 67, height 5' 5" (1 651 m), weight 59 kg (130 lb)  [unfilled]    Invasive Devices     None                 Physical Exam  Vitals reviewed  Constitutional:       General: He is not in acute distress  Appearance: Normal appearance  He is normal weight  He is not ill-appearing, toxic-appearing or diaphoretic  HENT:      Head: Normocephalic and atraumatic  Nose: Nose normal       Mouth/Throat:      Mouth: Mucous membranes are moist    Eyes:      Extraocular Movements: Extraocular movements intact  Cardiovascular:      Rate and Rhythm: Normal rate and regular rhythm  Pulses: Normal pulses  Pulmonary:      Effort: Pulmonary effort is normal  No respiratory distress  Breath sounds: No wheezing or rales  Abdominal:      General: Abdomen is flat  Palpations: Abdomen is soft  Musculoskeletal:      Cervical back: Normal range of motion  Neurological:      Mental Status: He is alert and oriented to person, place, and time  Psychiatric:         Mood and Affect: Mood normal          Behavior: Behavior normal          Thought Content: Thought content normal          Judgment: Judgment normal          Lab Results: I have personally reviewed pertinent reports  Imaging: I have personally reviewed pertinent films in PACS  EKG, Pathology, and Other Studies: I have personally reviewed pertinent reports  VTE Prophylaxis: Sequential compression device Nanda Comber)     Code Status: @[facility]US@  Advance Directive and Living Will:      Power of :    POLST:      Counseling / Coordination of Care  Total floor / unit time spent today 30 minutes  Greater than 50% of total time was spent with the patient and / or family counseling and / or coordination of care  A description of the counseling / coordination of care: Carlyle Alexandra

## 2021-07-31 ENCOUNTER — HOSPITAL ENCOUNTER (OUTPATIENT)
Dept: CT IMAGING | Facility: HOSPITAL | Age: 81
Discharge: HOME | End: 2021-07-31
Attending: RADIOLOGY
Payer: MEDICARE

## 2021-07-31 DIAGNOSIS — C61 PROSTATE CANCER (HCC): ICD-10-CM

## 2021-07-31 PROCEDURE — G1004 CDSM NDSC: HCPCS

## 2021-07-31 PROCEDURE — 74177 CT ABD & PELVIS W/CONTRAST: CPT

## 2021-07-31 RX ADMIN — IOHEXOL 100 ML: 350 INJECTION, SOLUTION INTRAVENOUS at 10:17

## 2021-08-04 ENCOUNTER — TELEPHONE (OUTPATIENT)
Dept: UROLOGY | Facility: MEDICAL CENTER | Age: 81
End: 2021-08-04

## 2021-08-04 ENCOUNTER — TELEPHONE (OUTPATIENT)
Dept: OTHER | Facility: OTHER | Age: 81
End: 2021-08-04

## 2021-08-04 NOTE — TELEPHONE ENCOUNTER
Pt would like a call from both Uro and Rad Onc regarding his Biopsy results  Pt was told to call if he did not hear anything from the offices  Pt would like to know what his next steps will be  Please call

## 2021-08-04 NOTE — TELEPHONE ENCOUNTER
Called pt and advised results of CT from 7/31 are not yet finalized  Per Dr Ishan oBnner note on 7/23/21    "Patient is due to get CT 07/31/2021 and if this shows no evidence of any extraprostatic or metastatic prostate disease than the patient will undergo administration of 45 mg Lupron IM and be scheduled for IMRT after SpaceOAR Hydrogel and fiducial marker placement "    Pt was told he would be called with CT results when they are received and reviewed by Dr Pedro Luis Harvey

## 2021-08-05 ENCOUNTER — TELEPHONE (OUTPATIENT)
Dept: RADIATION ONCOLOGY | Facility: CLINIC | Age: 81
End: 2021-08-05

## 2021-08-05 ENCOUNTER — TELEPHONE (OUTPATIENT)
Dept: RADIATION ONCOLOGY | Facility: HOSPITAL | Age: 81
End: 2021-08-05

## 2021-08-05 NOTE — TELEPHONE ENCOUNTER
Marin Menon,    I talked with Mr Enriqueta Jonas today regarding his CT scan results  Dr Calvin Leon is okay with proceeding with the markers and SpaceOAR gel placement  He never got the ADT on 7/23/21 at urology office visit  Mr Enriqueta Jonas said that Dr Shea Guzman wanted to wait until the CT scan was final  He needs a urology appointment for ADT       Thanks

## 2021-08-05 NOTE — TELEPHONE ENCOUNTER
Adolph Olmos from radiology called with significant findings on patient from ct scan on 7/31  Report also sent to Dr Le's email

## 2021-08-05 NOTE — TELEPHONE ENCOUNTER
Patient notified of 7/31/21 CT abdomen and pelvis results  Patient denies any recent injury/accident to the left hamstring/below left buttock region  He states that for the last month he feels like he's sitting on his wallet in that area  Patient instructed to follow up with his primary care physician for this  Patient notified that Dr Steve Strickland is ok with proceeding with radiation therapy for his prostate cancer  He will need fiducial marker and SpaceOAR placement  He informed me that he did not get his hormone shot when seen by Dr Hayden Hagen on 7/23/21 because MD wanted to wait for CT results  Patient informed that I will reach out to the urology nurse navigator, Emily Allen to help with getting him back to urology for ADT  Patient was appreciative of call

## 2021-08-05 NOTE — TELEPHONE ENCOUNTER
Appt scheduled for tomorrow for Lupron  He is scheduled with Dr Chito Vivar in September  Will he need to keep that or reschedule in 6 months?

## 2021-08-06 ENCOUNTER — PROCEDURE VISIT (OUTPATIENT)
Dept: UROLOGY | Facility: MEDICAL CENTER | Age: 81
End: 2021-08-06
Payer: MEDICARE

## 2021-08-06 ENCOUNTER — TELEPHONE (OUTPATIENT)
Dept: UROLOGY | Facility: MEDICAL CENTER | Age: 81
End: 2021-08-06

## 2021-08-06 VITALS
BODY MASS INDEX: 21.83 KG/M2 | WEIGHT: 131 LBS | HEART RATE: 65 BPM | SYSTOLIC BLOOD PRESSURE: 162 MMHG | HEIGHT: 65 IN | DIASTOLIC BLOOD PRESSURE: 82 MMHG

## 2021-08-06 DIAGNOSIS — C61 PROSTATE CANCER (HCC): Primary | ICD-10-CM

## 2021-08-06 DIAGNOSIS — R97.20 ELEVATED PSA, LESS THAN 10 NG/ML: ICD-10-CM

## 2021-08-06 PROCEDURE — 96402 CHEMO HORMON ANTINEOPL SQ/IM: CPT

## 2021-08-06 NOTE — PROGRESS NOTES
8/6/2021  Mary Ellis is a 79 y o  male  [id number]    Diagnosis:  Chief Complaint     Prostate Cancer; Lupron Injection        Patient presents for Lupron injection managed by Dr Cindi Vernon  Plan:  Follow up in 6 months with PSA prior  Medication administration:    Lupron 45mg for 6 months given IM in R buttocks  Pt tolerated well        Administrations This Visit     leuprolide (LUPRON DEPOT 6 MONTH KIT) IM injection kit 45 mg     Admin Date  08/06/2021 Action  Given Dose  45 mg Route  Intramuscular Administered By  Delvis Xie LPN                Vitals:    08/06/21 0928   BP: 162/82   Pulse: 65   Weight: 59 4 kg (131 lb)   Height: 5' 5" (1 651 m)       Delvis Xie LPN

## 2021-08-06 NOTE — TELEPHONE ENCOUNTER
Per Dr Holloway Moder note, he will need short course ADT (6 months)  I will scheudle Urology follow up once Radiation SIM has been scheduled

## 2021-08-06 NOTE — TELEPHONE ENCOUNTER
Patient is  scheduled for his procedure on 9/21/2021 at [facility] with Dr Olga Hudson      -instructions given verbally and mailed  -patient aware he will need a  and to be NPO  -CBC, CMP, PT/PTT, PSA and EKG 2 weeks prior    -patient will stop his Aspirin 7 days p[rior and Eliquis 2 days prior  -MCR/ - no auth required

## 2021-08-09 ENCOUNTER — PATIENT OUTREACH (OUTPATIENT)
Dept: UROLOGY | Facility: AMBULATORY SURGERY CENTER | Age: 81
End: 2021-08-09

## 2021-08-09 DIAGNOSIS — C61 PROSTATE CANCER (HCC): Primary | ICD-10-CM

## 2021-08-09 NOTE — TELEPHONE ENCOUNTER
Patient called back to say he wants to keep the surgery date for 9/21/21 and does not want to reschedule  He will await instructions and packet

## 2021-08-09 NOTE — PROGRESS NOTES
Called Kimo  He is scheduled for SpaceOAR, Radiation SIM and Urology follow up  Answered all of his questions  He is aware of the SpaceOAR prep  He is aware to have PSA and testosterone level prior to his follow up with Dr Olga Uriostegui in February

## 2021-08-09 NOTE — TELEPHONE ENCOUNTER
Patient called in stated 9/21/21 for procedure does not work and he would have to reschedule   Patient can be reached at [phone]

## 2021-08-09 NOTE — TELEPHONE ENCOUNTER
I called and touched base with patient to let him know that I will forward this message to [facility] and that she will call him to discuss a new surgery date once she returns to the office  Patient understood and was in agreement to plan

## 2021-08-13 ENCOUNTER — APPOINTMENT (OUTPATIENT)
Dept: RADIOLOGY | Facility: MEDICAL CENTER | Age: 81
End: 2021-08-13
Payer: MEDICARE

## 2021-08-13 ENCOUNTER — OFFICE VISIT (OUTPATIENT)
Dept: OBGYN CLINIC | Facility: MEDICAL CENTER | Age: 81
End: 2021-08-13
Payer: MEDICARE

## 2021-08-13 VITALS
BODY MASS INDEX: 21.73 KG/M2 | DIASTOLIC BLOOD PRESSURE: 76 MMHG | HEIGHT: 65 IN | HEART RATE: 67 BPM | SYSTOLIC BLOOD PRESSURE: 170 MMHG | WEIGHT: 130.4 LBS

## 2021-08-13 DIAGNOSIS — M25.552 PAIN IN LEFT HIP: ICD-10-CM

## 2021-08-13 DIAGNOSIS — M25.552 PAIN IN LEFT HIP: Primary | ICD-10-CM

## 2021-08-13 PROCEDURE — 99203 OFFICE O/P NEW LOW 30 MIN: CPT | Performed by: ORTHOPAEDIC SURGERY

## 2021-08-13 PROCEDURE — 73502 X-RAY EXAM HIP UNI 2-3 VIEWS: CPT

## 2021-08-13 PROCEDURE — 1124F ACP DISCUSS-NO DSCNMKR DOCD: CPT | Performed by: ORTHOPAEDIC SURGERY

## 2021-08-13 NOTE — PROGRESS NOTES
Assessment/Plan     1  Pain in left hip      Orders Placed This Encounter   Procedures    XR hip/pelv 2-3 vws left if performed    Ambulatory referral to Physical Therapy     Mr Cheryl Bee has mild L hip pain that is improving  We discussed treatment options as well as risks and benefits of treatment options today  He would like to monitor his symptoms for now  A prescription for physical therapy was provided and if his pain persists he will try physical therapy  If his pain persists despite that he will return to the office for further workup, treatment  Return if symptoms worsen or fail to improve  I answered all of the patient's questions during the visit and provided education of the patient's condition during the visit  The patient verbalized understanding of the information given and agrees with the plan  This note was dictated using [employer] software  It may contain errors including improperly dictated words  Please contact physician directly for any questions  History of Present Illness   Chief complaint: No chief complaint on file  HPI: Mary Ellis is a 79 y o  male that c/o left hip pain  He was referred by his PCP Dr Ev Zapata  He states he has been having posterolateral left hip for one month, denies any falls or trauma  He states the pain is better  He denies any groin pain  Denies any leg length discrepancy  Pain is worse with prolong sitting, walking and laying on the left side  He states when he is sitting he feels he is sitting on something  He has been taking Tylenol as needed for pain  He has no history of having injections, physical therapy or surgeries on the left hip  He as recently been diagnosed with Prostate cancer will be starting treatments in soon  ROS:    See HPI for musculoskeletal review     All other systems reviewed are negative     Historical Information   Past Medical History:   Diagnosis Date    Back pain     Benign prostatic hypertrophy     Disease of thyroid gland     Elevated PSA 3/3/2021    Hyperlipidemia     Hypertension     Prostate cancer ([address] ) 7/21/2021    Restless leg      Past Surgical History:   Procedure Laterality Date    ACHILLES TENDON SURGERY      HERNIA REPAIR      KNEE ARTHROSCOPY Right     shoulder    ID BIOPSY OF PROSTATE,NEEDLE/PUNCH N/A 6/15/2021    Procedure: TRANSPERINEAL MRI FUSION PROSTATE BIOPSY;  Surgeon: Maximiliano Burns MD;  Location: [facility];  Service: Urology    SKIN BIOPSY      TONSILLECTOMY       Social History   Social History     Substance and Sexual Activity   Alcohol Use Yes    Alcohol/week: 4 0 standard drinks    Types: 4 Cans of beer per week    Comment: CAFFEINE USE/3-4 beers/wk    Occasional wine or drink     Social History     Substance and Sexual Activity   Drug Use No     Social History     Tobacco Use   Smoking Status Never Smoker   Smokeless Tobacco Never Used     Family History:   Family History   Problem Relation Age of Onset    Cancer Mother     Throat cancer Father     Breast cancer Sister        Current Outpatient Medications on File Prior to Visit   Medication Sig Dispense Refill    ALPRAZolam (XANAX) 0 5 mg tablet Take 1 tablet (0 5 mg total) by mouth once for 1 dose Take 1-1/2 hour prior to MRI (Patient not taking: Reported on 3/3/2021) 1 tablet 0    aspirin (ECOTRIN LOW STRENGTH) 81 mg EC tablet Take 81 mg by mouth daily (Patient not taking: Reported on 7/22/2021)      bimatoprost (LUMIGAN) 0 01 % ophthalmic drops Administer 1 drop into the left eye daily at bedtime      Eliquis 5 MG TAKE 1 TABLET TWICE A  tablet 3    fluticasone (FLONASE) 50 mcg/act nasal spray  (Patient not taking: Reported on 6/30/2021)      levothyroxine 25 mcg tablet Take 25 mcg by mouth daily      Multiple Vitamins-Minerals (CENTRUM SILVER 50+MEN PO) Take by mouth      Omega-3 Fatty Acids (FISH OIL) 1,000 mg Take by mouth daily      pramipexole (MIRAPEX) 0 125 mg tablet Take by mouth Daily (Patient not taking: Reported on 7/22/2021)      rosuvastatin (CRESTOR) 10 MG tablet 10 mg Pt takes med every other day--conversation with PCP (Dr Hailee Cortes)  November 2021 appt/Labs      traZODone (DESYREL) 50 mg tablet  (Patient not taking: Reported on 7/23/2021)       No current facility-administered medications on file prior to visit  Allergies   Allergen Reactions    No Active Allergies        Objective   Vitals: Blood pressure 170/76, pulse 67, height 5' 5" (1 651 m), weight 59 1 kg (130 lb 6 4 oz)  ,Body mass index is 21 7 kg/m²  PE:  AAOx 3  WDWN  Hearing intact, no drainage from eyes  Regular rate  no audible wheezing  no abdominal distension  LE compartments soft, skin intact    left hip:   No dislocation/deformity  Neg  Stinchfield  ROM: full  Neg  Cuate Test  Neg  Impingement test  No TTP over greater trochanter  Abduction: 5/5  Neg  Shey's test  No TTP over SIJ  Mild TTP over external rotator musculature    bilateralLE:    Sensation grossly intact L4, S1  Palpable posterior tibial pulse  AT/GS intact    Back:    No TTP over lumbar spinous processes, paraspinal musculature  SLR: Neg       Imaging Studies: I have personally reviewed pertinent films in PACS   XR lefthip:  Mild DJD        Scribe Attestation    I,:  Yajaira Johnson am acting as a scribe while in the presence of the attending physician :       I,:  Samuel Ventura DO personally performed the services described in this documentation    as scribed in my presence :

## 2021-08-31 ENCOUNTER — TELEPHONE (OUTPATIENT)
Dept: UROLOGY | Facility: AMBULATORY SURGERY CENTER | Age: 81
End: 2021-08-31

## 2021-08-31 NOTE — TELEPHONE ENCOUNTER
Call placed to patient and spoke with him  Informed him of the recommendations of the PA-C  Pt is aware and thankful for this information

## 2021-08-31 NOTE — TELEPHONE ENCOUNTER
Patient managed by Dr Anne Luke [facility] patient called in asking if he should get the booster prior to his radiation treatment   Patient can be reached at [phone]

## 2021-08-31 NOTE — TELEPHONE ENCOUNTER
Discussed with Infectious Disease team in the hospital today  No contraindication for the booster and radiation therapy  Thanks!

## 2021-09-08 ENCOUNTER — HOSPITAL ENCOUNTER (OUTPATIENT)
Dept: NON INVASIVE DIAGNOSTICS | Facility: HOSPITAL | Age: 81
Discharge: HOME | End: 2021-09-08
Attending: UROLOGY
Payer: MEDICARE

## 2021-09-08 ENCOUNTER — LAB (OUTPATIENT)
Dept: LAB | Facility: HOSPITAL | Age: 81
End: 2021-09-08
Attending: UROLOGY
Payer: MEDICARE

## 2021-09-08 DIAGNOSIS — Z79.01 LONG TERM (CURRENT) USE OF ANTICOAGULANTS: ICD-10-CM

## 2021-09-08 DIAGNOSIS — Z01.810 PRE-OPERATIVE CARDIOVASCULAR EXAMINATION: ICD-10-CM

## 2021-09-08 DIAGNOSIS — Z01.812 PRE-OPERATIVE LABORATORY EXAMINATION: ICD-10-CM

## 2021-09-08 DIAGNOSIS — C61 MALIGNANT NEOPLASM OF PROSTATE (HCC): ICD-10-CM

## 2021-09-08 LAB
ALBUMIN SERPL BCP-MCNC: 3.8 G/DL (ref 3.5–5)
ALP SERPL-CCNC: 56 U/L (ref 46–116)
ALT SERPL W P-5'-P-CCNC: 23 U/L (ref 12–78)
ANION GAP SERPL CALCULATED.3IONS-SCNC: 6 MMOL/L (ref 4–13)
APTT PPP: 39 SECONDS (ref 23–37)
AST SERPL W P-5'-P-CCNC: 18 U/L (ref 5–45)
ATRIAL RATE: 72 BPM
BASOPHILS # BLD AUTO: 0.09 THOUSANDS/ΜL (ref 0–0.1)
BASOPHILS NFR BLD AUTO: 1 % (ref 0–1)
BILIRUB SERPL-MCNC: 0.65 MG/DL (ref 0.2–1)
BUN SERPL-MCNC: 18 MG/DL (ref 5–25)
CALCIUM SERPL-MCNC: 8.8 MG/DL (ref 8.3–10.1)
CHLORIDE SERPL-SCNC: 102 MMOL/L (ref 100–108)
CO2 SERPL-SCNC: 31 MMOL/L (ref 21–32)
CREAT SERPL-MCNC: 1.01 MG/DL (ref 0.6–1.3)
EOSINOPHIL # BLD AUTO: 0.47 THOUSAND/ΜL (ref 0–0.61)
EOSINOPHIL NFR BLD AUTO: 7 % (ref 0–6)
ERYTHROCYTE [DISTWIDTH] IN BLOOD BY AUTOMATED COUNT: 13.2 % (ref 11.6–15.1)
GFR SERPL CREATININE-BSD FRML MDRD: 70 ML/MIN/1.73SQ M
GLUCOSE P FAST SERPL-MCNC: 101 MG/DL (ref 65–99)
HCT VFR BLD AUTO: 42.8 % (ref 36.5–49.3)
HGB BLD-MCNC: 13.9 G/DL (ref 12–17)
IMM GRANULOCYTES # BLD AUTO: 0.04 THOUSAND/UL (ref 0–0.2)
IMM GRANULOCYTES NFR BLD AUTO: 1 % (ref 0–2)
INR PPP: 1.08 (ref 0.84–1.19)
LYMPHOCYTES # BLD AUTO: 1.79 THOUSANDS/ΜL (ref 0.6–4.47)
LYMPHOCYTES NFR BLD AUTO: 28 % (ref 14–44)
MCH RBC QN AUTO: 30.5 PG (ref 26.8–34.3)
MCHC RBC AUTO-ENTMCNC: 32.5 G/DL (ref 31.4–37.4)
MCV RBC AUTO: 94 FL (ref 82–98)
MONOCYTES # BLD AUTO: 0.69 THOUSAND/ΜL (ref 0.17–1.22)
MONOCYTES NFR BLD AUTO: 11 % (ref 4–12)
NEUTROPHILS # BLD AUTO: 3.4 THOUSANDS/ΜL (ref 1.85–7.62)
NEUTS SEG NFR BLD AUTO: 52 % (ref 43–75)
NRBC BLD AUTO-RTO: 0 /100 WBCS
P AXIS: 65 DEGREES
PLATELET # BLD AUTO: 371 THOUSANDS/UL (ref 149–390)
PMV BLD AUTO: 10 FL (ref 8.9–12.7)
POTASSIUM SERPL-SCNC: 4.3 MMOL/L (ref 3.5–5.3)
PR INTERVAL: 128 MS
PROT SERPL-MCNC: 7 G/DL (ref 6.4–8.2)
PROTHROMBIN TIME: 13.8 SECONDS (ref 11.6–14.5)
PSA SERPL-MCNC: 2.1 NG/ML (ref 0–4)
QRS AXIS: 6 DEGREES
QRSD INTERVAL: 76 MS
QT INTERVAL: 406 MS
QTC INTERVAL: 444 MS
RBC # BLD AUTO: 4.55 MILLION/UL (ref 3.88–5.62)
SODIUM SERPL-SCNC: 139 MMOL/L (ref 136–145)
T WAVE AXIS: 58 DEGREES
VENTRICULAR RATE: 72 BPM
WBC # BLD AUTO: 6.48 THOUSAND/UL (ref 4.31–10.16)

## 2021-09-08 PROCEDURE — 85025 COMPLETE CBC W/AUTO DIFF WBC: CPT

## 2021-09-08 PROCEDURE — 93005 ELECTROCARDIOGRAM TRACING: CPT

## 2021-09-08 PROCEDURE — 85730 THROMBOPLASTIN TIME PARTIAL: CPT

## 2021-09-08 PROCEDURE — 36415 COLL VENOUS BLD VENIPUNCTURE: CPT

## 2021-09-08 PROCEDURE — 93010 ELECTROCARDIOGRAM REPORT: CPT | Performed by: INTERNAL MEDICINE

## 2021-09-08 PROCEDURE — 84153 ASSAY OF PSA TOTAL: CPT

## 2021-09-08 PROCEDURE — 80053 COMPREHEN METABOLIC PANEL: CPT

## 2021-09-08 PROCEDURE — 85610 PROTHROMBIN TIME: CPT

## 2021-09-15 ENCOUNTER — TELEPHONE (OUTPATIENT)
Dept: UROLOGY | Facility: AMBULATORY SURGERY CENTER | Age: 81
End: 2021-09-15

## 2021-09-15 NOTE — TELEPHONE ENCOUNTER
Called and spoke to pt stating     "My name is Michael Bejarano     I am calling in regrades to your prep instructions for your appointment at the [facility] [address]  on 9/21/21 with Dr Isra Mancilla  under IV SE please make sure to stop all blood 7 day prior to you appointment  Nothing to eat after midnight the day before the procedure   And please make sure you have a    Pt will need to use an enema with in 2 hour of the appointment  If you have any questions please call [phone] and ask for Michael Bejarano "    he expressed understanding of instructions

## 2021-09-20 NOTE — H&P
HISTORY AND PHYSICAL  ? ? Patient Name: Radha Rivera  Patient MRN: [id number]  Attending Provider: Chikis Rodriguez MD  Service: Urology  Chief Complaint    Prostate cancer    HPI   Radha Rivera is a 79 y o  male with prostate cancer  He has elected radiation therapy  I plan insertion of fiducial markers and SpaceOAR  Potential risks and complications discussed, and informed consent was given by the patient  Medications  Meds/Allergies   No current facility-administered medications for this encounter  Prior to Admission Medications   Prescriptions Last Dose Informant Patient Reported? Taking?    ALPRAZolam (XANAX) 0 5 mg tablet   No No   Sig: Take 1 tablet (0 5 mg total) by mouth once for 1 dose Take 1-1/2 hour prior to MRI   Patient not taking: Reported on 3/3/2021   Eliquis 5 MG 9/17/2021 Self No No   Sig: TAKE 1 TABLET TWICE A DAY   Multiple Vitamins-Minerals (CENTRUM SILVER 50+MEN PO) 9/20/2021 at Unknown time Self Yes Yes   Sig: Take by mouth   Omega-3 Fatty Acids (FISH OIL) 1,000 mg 9/20/2021 at Unknown time Self Yes Yes   Sig: Take by mouth daily   aspirin (ECOTRIN LOW STRENGTH) 81 mg EC tablet   Yes No   Sig: Take 81 mg by mouth daily   Patient not taking: Reported on 7/22/2021   bimatoprost (LUMIGAN) 0 01 % ophthalmic drops 9/20/2021 at Unknown time Self Yes Yes   Sig: Administer 1 drop into the left eye daily at bedtime   fluticasone (FLONASE) 50 mcg/act nasal spray  Self Yes No   Patient not taking: Reported on 6/30/2021   levothyroxine 25 mcg tablet 9/20/2021 at Unknown time Self Yes Yes   Sig: Take 25 mcg by mouth daily   pramipexole (MIRAPEX) 0 125 mg tablet  Self Yes No   Sig: Take by mouth Daily   Patient not taking: Reported on 7/22/2021   rosuvastatin (CRESTOR) 10 MG tablet 9/20/2021 at Unknown time  Yes Yes   Sig: 10 mg Pt takes med every other day--conversation with PCP (Dr Noe Olivier)  November 2021 appt/Labs   traZODone (DESYREL) 50 mg tablet   Yes No   Patient not taking: Reported on 7/23/2021      Facility-Administered Medications: None     No current facility-administered medications for this encounter  Review of Systems  10 point review of systems negative except as noted in HPI  Allergies  Allergies   Allergen Reactions    No Active Allergies      PMH  Past Medical History:   Diagnosis Date    Back pain     Benign prostatic hypertrophy     Disease of thyroid gland     Elevated PSA 3/3/2021    Hyperlipidemia     Hypertension     Prostate cancer ([address] ) 7/21/2021    Restless leg      Past surgical history  Past Surgical History:   Procedure Laterality Date    ACHILLES TENDON SURGERY      HERNIA REPAIR      KNEE ARTHROSCOPY Right     shoulder    ME BIOPSY OF PROSTATE,NEEDLE/PUNCH N/A 6/15/2021    Procedure: TRANSPERINEAL MRI FUSION PROSTATE BIOPSY;  Surgeon: Radha East MD;  Location: [facility];  Service: Urology    SKIN BIOPSY      TONSILLECTOMY       Social history  Social History     Tobacco Use    Smoking status: Never Smoker    Smokeless tobacco: Never Used   Vaping Use    Vaping Use: Never used   Substance Use Topics    Alcohol use: Yes     Alcohol/week: 4 0 standard drinks     Types: 4 Cans of beer per week     Comment: CAFFEINE USE/3-4 beers/wk  Occasional wine or drink    Drug use: No     ?  Physical Exam      BP (!) 187/94   Pulse 60   Temp (!) 96 4 °F (35 8 °C) (Tympanic)   Resp 18   SpO2 99%   General appearance: alert and oriented, in no acute distress  Head: Normocephalic, without obvious abnormality, atraumatic  Neck: supple, symmetrical, trachea midline  Back: symmetric, no curvature  ROM normal  No CVA tenderness    Lungs: clear to auscultation bilaterally  Heart: regular rate and rhythm  Abdomen: soft, non-tender; bowel sounds normal; no masses,  no organomegaly  Male genitalia: normal  Extremities: extremities normal, warm and well-perfused; no cyanosis, clubbing, or edema  Neurologic: Grossly normal     Yair Garcia MD

## 2021-09-21 ENCOUNTER — ANESTHESIA (OUTPATIENT)
Dept: GASTROENTEROLOGY | Facility: HOSPITAL | Age: 81
End: 2021-09-21
Payer: MEDICARE

## 2021-09-21 ENCOUNTER — HOSPITAL ENCOUNTER (OUTPATIENT)
Facility: HOSPITAL | Age: 81
Setting detail: OUTPATIENT SURGERY
Discharge: HOME | End: 2021-09-21
Attending: UROLOGY | Admitting: UROLOGY
Payer: MEDICARE

## 2021-09-21 ENCOUNTER — ANESTHESIA EVENT (OUTPATIENT)
Dept: GASTROENTEROLOGY | Facility: HOSPITAL | Age: 81
End: 2021-09-21
Payer: MEDICARE

## 2021-09-21 VITALS
DIASTOLIC BLOOD PRESSURE: 83 MMHG | TEMPERATURE: 96.3 F | SYSTOLIC BLOOD PRESSURE: 143 MMHG | HEART RATE: 76 BPM | RESPIRATION RATE: 16 BRPM | OXYGEN SATURATION: 98 %

## 2021-09-21 PROBLEM — G47.30 SLEEP APNEA: Status: ACTIVE | Noted: 2021-09-21

## 2021-09-21 PROCEDURE — NC001 PR NO CHARGE: Performed by: UROLOGY

## 2021-09-21 PROCEDURE — A4648 IMPLANTABLE TISSUE MARKER: HCPCS | Performed by: UROLOGY

## 2021-09-21 PROCEDURE — 55876 PLACE RT DEVICE/MARKER PROS: CPT | Performed by: UROLOGY

## 2021-09-21 PROCEDURE — 55874 TPRNL PLMT BIODEGRDABL MATRL: CPT | Performed by: UROLOGY

## 2021-09-21 DEVICE — STERILE PLACEMENT NEEDLES (17GA ETW X 20CM) WITH BONE WAX AND (1.2 X 3MM) SOFT TISSUE GOLD MARKER [3]
Type: IMPLANTABLE DEVICE | Site: PERIANAL | Status: FUNCTIONAL
Brand: FIDUCIAL MARKER KIT

## 2021-09-21 RX ORDER — BUPIVACAINE HYDROCHLORIDE 5 MG/ML
INJECTION, SOLUTION EPIDURAL; INTRACAUDAL AS NEEDED
Status: DISCONTINUED | OUTPATIENT
Start: 2021-09-21 | End: 2021-09-21 | Stop reason: HOSPADM

## 2021-09-21 RX ORDER — SODIUM CHLORIDE 9 MG/ML
INJECTION, SOLUTION INTRAVENOUS CONTINUOUS PRN
Status: DISCONTINUED | OUTPATIENT
Start: 2021-09-21 | End: 2021-09-21

## 2021-09-21 RX ORDER — ACETAMINOPHEN 325 MG/1
650 TABLET ORAL EVERY 6 HOURS PRN
Status: CANCELLED | OUTPATIENT
Start: 2021-09-21

## 2021-09-21 RX ORDER — ONDANSETRON 2 MG/ML
4 INJECTION INTRAMUSCULAR; INTRAVENOUS EVERY 6 HOURS PRN
Status: CANCELLED | OUTPATIENT
Start: 2021-09-21

## 2021-09-21 RX ORDER — FENTANYL CITRATE 50 UG/ML
INJECTION, SOLUTION INTRAMUSCULAR; INTRAVENOUS AS NEEDED
Status: DISCONTINUED | OUTPATIENT
Start: 2021-09-21 | End: 2021-09-21

## 2021-09-21 RX ORDER — LIDOCAINE HYDROCHLORIDE AND EPINEPHRINE BITARTRATE 20; .01 MG/ML; MG/ML
INJECTION, SOLUTION SUBCUTANEOUS AS NEEDED
Status: DISCONTINUED | OUTPATIENT
Start: 2021-09-21 | End: 2021-09-21 | Stop reason: HOSPADM

## 2021-09-21 RX ORDER — GLYCOPYRROLATE 0.2 MG/ML
INJECTION INTRAMUSCULAR; INTRAVENOUS AS NEEDED
Status: DISCONTINUED | OUTPATIENT
Start: 2021-09-21 | End: 2021-09-21

## 2021-09-21 RX ORDER — PROPOFOL 10 MG/ML
INJECTION, EMULSION INTRAVENOUS CONTINUOUS PRN
Status: DISCONTINUED | OUTPATIENT
Start: 2021-09-21 | End: 2021-09-21

## 2021-09-21 RX ORDER — PROPOFOL 10 MG/ML
INJECTION, EMULSION INTRAVENOUS AS NEEDED
Status: DISCONTINUED | OUTPATIENT
Start: 2021-09-21 | End: 2021-09-21

## 2021-09-21 RX ORDER — OXYCODONE HYDROCHLORIDE 5 MG/1
5 TABLET ORAL EVERY 4 HOURS PRN
Status: CANCELLED | OUTPATIENT
Start: 2021-09-21

## 2021-09-21 RX ADMIN — PROPOFOL 100 MCG/KG/MIN: 10 INJECTION, EMULSION INTRAVENOUS at 08:26

## 2021-09-21 RX ADMIN — PROPOFOL 30 MG: 10 INJECTION, EMULSION INTRAVENOUS at 08:26

## 2021-09-21 RX ADMIN — SODIUM CHLORIDE: 0.9 INJECTION, SOLUTION INTRAVENOUS at 08:45

## 2021-09-21 RX ADMIN — Medication 1000 MG: at 08:07

## 2021-09-21 RX ADMIN — SODIUM CHLORIDE: 0.9 INJECTION, SOLUTION INTRAVENOUS at 07:30

## 2021-09-21 RX ADMIN — FENTANYL CITRATE 50 MCG: 50 INJECTION INTRAMUSCULAR; INTRAVENOUS at 08:30

## 2021-09-21 RX ADMIN — GLYCOPYRROLATE 0.2 MG: 0.2 INJECTION, SOLUTION INTRAMUSCULAR; INTRAVENOUS at 08:30

## 2021-09-21 NOTE — INTERVAL H&P NOTE
H&P reviewed  After examining the patient I find no changes in the patients condition since the H&P had been written  Vitals:    09/21/21 0719   BP: (!) 187/94   Pulse: 60   Resp: 18   Temp: (!) 96 4 °F (35 8 °C)   SpO2: 99%   Procedure reviewed  Risks were discussed in detail  He has stopped his Eliquis  Consent form signed

## 2021-09-21 NOTE — ANESTHESIA PREPROCEDURE EVALUATION
Procedure:  INSERTION OF FIDUCIAL MARKER (TRANSRECTAL ULTRASOUND GUIDANCE), SPACEOAR (N/A Anus)    Relevant Problems   ANESTHESIA (within normal limits)      CARDIO   (+) A-fib (HCC)   (+) HTN (hypertension)      ENDO   (+) Hypothyroidism   (-) Diabetes mellitus, type 2 (HCC)   (-) Hyperthyroidism      GI/HEPATIC   (-) Gastroesophageal reflux disease      /RENAL   (+) Benign prostatic hypertrophy   (+) Prostate cancer (HCC)      HEMATOLOGY (within normal limits)      NEURO/PSYCH   (-) CVA (cerebral vascular accident) ([facility] [address] )   (-) Seizures ([facility] )      PULMONARY   (+) Sleep apnea (Patient states he has had a positive sleep study  He does not tolerate CPAP )   (-) Asthma   (-) Chronic obstructive pulmonary disease (HCC)        Physical Exam    Airway    Mallampati score: III  TM Distance: >3 FB  Neck ROM: full     Dental       Cardiovascular  Rhythm: regular, Rate: normal, No murmur,     Pulmonary  Breath sounds clear to auscultation,     Other Findings        Anesthesia Plan  ASA Score- 3     Anesthesia Type- IV sedation with anesthesia with ASA Monitors  Additional Monitors:   Airway Plan:           Plan Factors-Exercise tolerance (METS): >4 METS  Chart reviewed  EKG reviewed  Existing labs reviewed  Patient is not a current smoker  Obstructive sleep apnea risk education given perioperatively  Induction- intravenous  Postoperative Plan-     Informed Consent- Anesthetic plan and risks discussed with patient  I personally reviewed this patient with the CRNA  Discussed and agreed on the Anesthesia Plan with the CRNA  April Hernandez

## 2021-09-21 NOTE — DISCHARGE INSTRUCTIONS
Rest and drink plenty of fluids  Use Tylenol or ibuprofen for pain  An ice pack can help in the perineum  Some blood in the urine and semen can occur  Call the office for fever, difficulty voiding or heavy bleeding  Resume your anticoagulation in 48 hours if the urine is clear

## 2021-09-21 NOTE — ANESTHESIA POSTPROCEDURE EVALUATION
Post-Op Assessment Note    CV Status:  Stable  Pain Score: 0    Pain management: adequate     Mental Status:  Alert and awake   Hydration Status:  Euvolemic   PONV Controlled:  Controlled   Airway Patency:  Patent      Post Op Vitals Reviewed: Yes      Staff: CRNA         No complications documented      /83 (09/21/21 0901)    Temp     Pulse 71 (09/21/21 0901)   Resp 14 (09/21/21 0901)    SpO2 98 % (09/21/21 0901)

## 2021-09-28 ENCOUNTER — APPOINTMENT (OUTPATIENT)
Dept: RADIATION ONCOLOGY | Facility: CLINIC | Age: 81
End: 2021-09-28
Attending: RADIOLOGY
Payer: MEDICARE

## 2021-09-28 PROCEDURE — 77334 RADIATION TREATMENT AID(S): CPT | Performed by: RADIOLOGY

## 2021-09-28 PROCEDURE — 77399 UNLISTED PX MED RADJ PHYSICS: CPT | Performed by: RADIOLOGY

## 2021-10-04 ENCOUNTER — APPOINTMENT (OUTPATIENT)
Dept: RADIATION ONCOLOGY | Facility: CLINIC | Age: 81
End: 2021-10-04
Payer: MEDICARE

## 2021-10-04 PROCEDURE — 77301 RADIOTHERAPY DOSE PLAN IMRT: CPT | Performed by: RADIOLOGY

## 2021-10-04 PROCEDURE — 77300 RADIATION THERAPY DOSE PLAN: CPT | Performed by: RADIOLOGY

## 2021-10-04 PROCEDURE — 77338 DESIGN MLC DEVICE FOR IMRT: CPT | Performed by: RADIOLOGY

## 2021-10-06 ENCOUNTER — APPOINTMENT (OUTPATIENT)
Dept: RADIATION ONCOLOGY | Facility: CLINIC | Age: 81
End: 2021-10-06
Attending: RADIOLOGY
Payer: MEDICARE

## 2021-10-06 PROCEDURE — 77385 HB NTSTY MODUL RAD TX DLVR SMPL: CPT | Performed by: RADIOLOGY

## 2021-10-06 PROCEDURE — 77014 CHG CT GUIDANCE PLACEMENT RAD THERAPY FIELDS: CPT | Performed by: RADIOLOGY

## 2021-10-06 PROCEDURE — 77427 RADIATION TX MANAGEMENT X5: CPT | Performed by: RADIOLOGY

## 2021-10-07 ENCOUNTER — APPOINTMENT (OUTPATIENT)
Dept: RADIATION ONCOLOGY | Facility: CLINIC | Age: 81
End: 2021-10-07
Attending: RADIOLOGY
Payer: MEDICARE

## 2021-10-07 PROCEDURE — 77385 HB NTSTY MODUL RAD TX DLVR SMPL: CPT | Performed by: INTERNAL MEDICINE

## 2021-10-07 PROCEDURE — 77014 CHG CT GUIDANCE PLACEMENT RAD THERAPY FIELDS: CPT | Performed by: INTERNAL MEDICINE

## 2021-10-08 ENCOUNTER — APPOINTMENT (OUTPATIENT)
Dept: RADIATION ONCOLOGY | Facility: CLINIC | Age: 81
End: 2021-10-08
Attending: RADIOLOGY
Payer: MEDICARE

## 2021-10-08 DIAGNOSIS — C61 PROSTATE CANCER (HCC): Primary | ICD-10-CM

## 2021-10-08 PROCEDURE — 77014 CHG CT GUIDANCE PLACEMENT RAD THERAPY FIELDS: CPT | Performed by: INTERNAL MEDICINE

## 2021-10-08 PROCEDURE — 77385 HB NTSTY MODUL RAD TX DLVR SMPL: CPT | Performed by: INTERNAL MEDICINE

## 2021-10-11 ENCOUNTER — APPOINTMENT (OUTPATIENT)
Dept: RADIATION ONCOLOGY | Facility: CLINIC | Age: 81
End: 2021-10-11
Attending: RADIOLOGY
Payer: MEDICARE

## 2021-10-11 ENCOUNTER — IMMUNIZATIONS (OUTPATIENT)
Dept: FAMILY MEDICINE CLINIC | Facility: CLINIC | Age: 81
End: 2021-10-11

## 2021-10-11 DIAGNOSIS — Z23 ENCOUNTER FOR IMMUNIZATION: Primary | ICD-10-CM

## 2021-10-11 PROCEDURE — 91301 SARSCOV2 VAC 100MCG/0.5ML IM: CPT

## 2021-10-11 PROCEDURE — 77385 HB NTSTY MODUL RAD TX DLVR SMPL: CPT | Performed by: RADIOLOGY

## 2021-10-11 PROCEDURE — 77014 CHG CT GUIDANCE PLACEMENT RAD THERAPY FIELDS: CPT | Performed by: RADIOLOGY

## 2021-10-12 ENCOUNTER — APPOINTMENT (OUTPATIENT)
Dept: RADIATION ONCOLOGY | Facility: CLINIC | Age: 81
End: 2021-10-12
Attending: RADIOLOGY
Payer: MEDICARE

## 2021-10-12 PROCEDURE — 77336 RADIATION PHYSICS CONSULT: CPT | Performed by: RADIOLOGY

## 2021-10-12 PROCEDURE — 77385 HB NTSTY MODUL RAD TX DLVR SMPL: CPT | Performed by: RADIOLOGY

## 2021-10-12 PROCEDURE — 77014 CHG CT GUIDANCE PLACEMENT RAD THERAPY FIELDS: CPT | Performed by: RADIOLOGY

## 2021-10-13 ENCOUNTER — APPOINTMENT (OUTPATIENT)
Dept: RADIATION ONCOLOGY | Facility: CLINIC | Age: 81
End: 2021-10-13
Attending: RADIOLOGY
Payer: MEDICARE

## 2021-10-13 PROCEDURE — 77385 HB NTSTY MODUL RAD TX DLVR SMPL: CPT | Performed by: RADIOLOGY

## 2021-10-13 PROCEDURE — 77014 CHG CT GUIDANCE PLACEMENT RAD THERAPY FIELDS: CPT | Performed by: RADIOLOGY

## 2021-10-13 PROCEDURE — 77427 RADIATION TX MANAGEMENT X5: CPT | Performed by: RADIOLOGY

## 2021-10-14 ENCOUNTER — APPOINTMENT (OUTPATIENT)
Dept: RADIATION ONCOLOGY | Facility: CLINIC | Age: 81
End: 2021-10-14
Attending: RADIOLOGY
Payer: MEDICARE

## 2021-10-14 PROCEDURE — 77014 CHG CT GUIDANCE PLACEMENT RAD THERAPY FIELDS: CPT | Performed by: RADIOLOGY

## 2021-10-14 PROCEDURE — 77385 HB NTSTY MODUL RAD TX DLVR SMPL: CPT | Performed by: RADIOLOGY

## 2021-10-15 ENCOUNTER — APPOINTMENT (OUTPATIENT)
Dept: RADIATION ONCOLOGY | Facility: CLINIC | Age: 81
End: 2021-10-15
Attending: RADIOLOGY
Payer: MEDICARE

## 2021-10-15 PROCEDURE — 77014 CHG CT GUIDANCE PLACEMENT RAD THERAPY FIELDS: CPT | Performed by: INTERNAL MEDICINE

## 2021-10-15 PROCEDURE — 77385 HB NTSTY MODUL RAD TX DLVR SMPL: CPT | Performed by: INTERNAL MEDICINE

## 2021-10-18 ENCOUNTER — APPOINTMENT (OUTPATIENT)
Dept: RADIATION ONCOLOGY | Facility: CLINIC | Age: 81
End: 2021-10-18
Attending: RADIOLOGY
Payer: MEDICARE

## 2021-10-19 ENCOUNTER — APPOINTMENT (OUTPATIENT)
Dept: RADIATION ONCOLOGY | Facility: CLINIC | Age: 81
End: 2021-10-19
Attending: RADIOLOGY
Payer: MEDICARE

## 2021-10-19 ENCOUNTER — TELEPHONE (OUTPATIENT)
Dept: RADIATION ONCOLOGY | Facility: CLINIC | Age: 81
End: 2021-10-19

## 2021-10-19 DIAGNOSIS — C61 PROSTATE CANCER (HCC): Primary | ICD-10-CM

## 2021-10-19 PROCEDURE — 77014 CHG CT GUIDANCE PLACEMENT RAD THERAPY FIELDS: CPT | Performed by: RADIOLOGY

## 2021-10-19 PROCEDURE — 77385 HB NTSTY MODUL RAD TX DLVR SMPL: CPT | Performed by: RADIOLOGY

## 2021-10-19 RX ORDER — TAMSULOSIN HYDROCHLORIDE 0.4 MG/1
0.4 CAPSULE ORAL
Qty: 30 CAPSULE | Refills: 1 | Status: SHIPPED | OUTPATIENT
Start: 2021-10-19 | End: 2021-11-11 | Stop reason: SDUPTHER

## 2021-10-20 ENCOUNTER — APPOINTMENT (OUTPATIENT)
Dept: RADIATION ONCOLOGY | Facility: CLINIC | Age: 81
End: 2021-10-20
Attending: RADIOLOGY
Payer: MEDICARE

## 2021-10-20 ENCOUNTER — APPOINTMENT (OUTPATIENT)
Dept: LAB | Facility: CLINIC | Age: 81
End: 2021-10-20
Payer: MEDICARE

## 2021-10-20 DIAGNOSIS — C61 PROSTATE CANCER (HCC): ICD-10-CM

## 2021-10-20 LAB
BASOPHILS # BLD AUTO: 0.07 THOUSANDS/ΜL (ref 0–0.1)
BASOPHILS NFR BLD AUTO: 1 % (ref 0–1)
EOSINOPHIL # BLD AUTO: 0.29 THOUSAND/ΜL (ref 0–0.61)
EOSINOPHIL NFR BLD AUTO: 5 % (ref 0–6)
ERYTHROCYTE [DISTWIDTH] IN BLOOD BY AUTOMATED COUNT: 13.2 % (ref 11.6–15.1)
HCT VFR BLD AUTO: 39.7 % (ref 36.5–49.3)
HGB BLD-MCNC: 12.8 G/DL (ref 12–17)
IMM GRANULOCYTES # BLD AUTO: 0.04 THOUSAND/UL (ref 0–0.2)
IMM GRANULOCYTES NFR BLD AUTO: 1 % (ref 0–2)
LYMPHOCYTES # BLD AUTO: 1.24 THOUSANDS/ΜL (ref 0.6–4.47)
LYMPHOCYTES NFR BLD AUTO: 23 % (ref 14–44)
MCH RBC QN AUTO: 30.1 PG (ref 26.8–34.3)
MCHC RBC AUTO-ENTMCNC: 32.2 G/DL (ref 31.4–37.4)
MCV RBC AUTO: 93 FL (ref 82–98)
MONOCYTES # BLD AUTO: 0.61 THOUSAND/ΜL (ref 0.17–1.22)
MONOCYTES NFR BLD AUTO: 11 % (ref 4–12)
NEUTROPHILS # BLD AUTO: 3.17 THOUSANDS/ΜL (ref 1.85–7.62)
NEUTS SEG NFR BLD AUTO: 59 % (ref 43–75)
NRBC BLD AUTO-RTO: 0 /100 WBCS
PLATELET # BLD AUTO: 362 THOUSANDS/UL (ref 149–390)
PMV BLD AUTO: 10 FL (ref 8.9–12.7)
RBC # BLD AUTO: 4.25 MILLION/UL (ref 3.88–5.62)
WBC # BLD AUTO: 5.42 THOUSAND/UL (ref 4.31–10.16)

## 2021-10-20 PROCEDURE — 77336 RADIATION PHYSICS CONSULT: CPT | Performed by: RADIOLOGY

## 2021-10-20 PROCEDURE — 77014 CHG CT GUIDANCE PLACEMENT RAD THERAPY FIELDS: CPT | Performed by: RADIOLOGY

## 2021-10-20 PROCEDURE — 77385 HB NTSTY MODUL RAD TX DLVR SMPL: CPT | Performed by: RADIOLOGY

## 2021-10-20 PROCEDURE — 85025 COMPLETE CBC W/AUTO DIFF WBC: CPT

## 2021-10-20 PROCEDURE — 36415 COLL VENOUS BLD VENIPUNCTURE: CPT

## 2021-10-21 ENCOUNTER — APPOINTMENT (OUTPATIENT)
Dept: RADIATION ONCOLOGY | Facility: CLINIC | Age: 81
End: 2021-10-21
Attending: RADIOLOGY
Payer: MEDICARE

## 2021-10-21 ENCOUNTER — APPOINTMENT (OUTPATIENT)
Dept: LAB | Facility: CLINIC | Age: 81
End: 2021-10-21
Payer: MEDICARE

## 2021-10-21 DIAGNOSIS — R94.02 ABNORMAL BRAIN SCAN: ICD-10-CM

## 2021-10-21 DIAGNOSIS — R07.89 OTHER CHEST PAIN: ICD-10-CM

## 2021-10-21 DIAGNOSIS — I10 ESSENTIAL HYPERTENSION, MALIGNANT: ICD-10-CM

## 2021-10-21 DIAGNOSIS — F41.9 ANXIETY HYPERVENTILATION: ICD-10-CM

## 2021-10-21 DIAGNOSIS — F45.8 ANXIETY HYPERVENTILATION: ICD-10-CM

## 2021-10-21 LAB
ALBUMIN SERPL BCP-MCNC: 3.6 G/DL (ref 3.5–5)
ALP SERPL-CCNC: 56 U/L (ref 46–116)
ALT SERPL W P-5'-P-CCNC: 21 U/L (ref 12–78)
ANION GAP SERPL CALCULATED.3IONS-SCNC: 2 MMOL/L (ref 4–13)
AST SERPL W P-5'-P-CCNC: 17 U/L (ref 5–45)
BASOPHILS # BLD AUTO: 0.09 THOUSANDS/ΜL (ref 0–0.1)
BASOPHILS NFR BLD AUTO: 2 % (ref 0–1)
BILIRUB SERPL-MCNC: 0.53 MG/DL (ref 0.2–1)
BUN SERPL-MCNC: 19 MG/DL (ref 5–25)
CALCIUM SERPL-MCNC: 9.5 MG/DL (ref 8.3–10.1)
CHLORIDE SERPL-SCNC: 104 MMOL/L (ref 100–108)
CHOLEST SERPL-MCNC: 173 MG/DL (ref 50–200)
CK SERPL-CCNC: 100 U/L (ref 39–308)
CO2 SERPL-SCNC: 32 MMOL/L (ref 21–32)
CREAT SERPL-MCNC: 1.01 MG/DL (ref 0.6–1.3)
EOSINOPHIL # BLD AUTO: 0.32 THOUSAND/ΜL (ref 0–0.61)
EOSINOPHIL NFR BLD AUTO: 5 % (ref 0–6)
ERYTHROCYTE [DISTWIDTH] IN BLOOD BY AUTOMATED COUNT: 13.2 % (ref 11.6–15.1)
EST. AVERAGE GLUCOSE BLD GHB EST-MCNC: 114 MG/DL
GFR SERPL CREATININE-BSD FRML MDRD: 70 ML/MIN/1.73SQ M
GLUCOSE P FAST SERPL-MCNC: 105 MG/DL (ref 65–99)
HBA1C MFR BLD: 5.6 %
HCT VFR BLD AUTO: 41.1 % (ref 36.5–49.3)
HDLC SERPL-MCNC: 61 MG/DL
HGB BLD-MCNC: 13.3 G/DL (ref 12–17)
IMM GRANULOCYTES # BLD AUTO: 0.05 THOUSAND/UL (ref 0–0.2)
IMM GRANULOCYTES NFR BLD AUTO: 1 % (ref 0–2)
LDLC SERPL CALC-MCNC: 93 MG/DL (ref 0–100)
LYMPHOCYTES # BLD AUTO: 1.25 THOUSANDS/ΜL (ref 0.6–4.47)
LYMPHOCYTES NFR BLD AUTO: 21 % (ref 14–44)
MCH RBC QN AUTO: 29.6 PG (ref 26.8–34.3)
MCHC RBC AUTO-ENTMCNC: 32.4 G/DL (ref 31.4–37.4)
MCV RBC AUTO: 92 FL (ref 82–98)
MONOCYTES # BLD AUTO: 0.68 THOUSAND/ΜL (ref 0.17–1.22)
MONOCYTES NFR BLD AUTO: 11 % (ref 4–12)
NEUTROPHILS # BLD AUTO: 3.61 THOUSANDS/ΜL (ref 1.85–7.62)
NEUTS SEG NFR BLD AUTO: 60 % (ref 43–75)
NONHDLC SERPL-MCNC: 112 MG/DL
NRBC BLD AUTO-RTO: 0 /100 WBCS
PLATELET # BLD AUTO: 370 THOUSANDS/UL (ref 149–390)
PMV BLD AUTO: 9.9 FL (ref 8.9–12.7)
POTASSIUM SERPL-SCNC: 4.4 MMOL/L (ref 3.5–5.3)
PROT SERPL-MCNC: 7.4 G/DL (ref 6.4–8.2)
RBC # BLD AUTO: 4.49 MILLION/UL (ref 3.88–5.62)
SODIUM SERPL-SCNC: 138 MMOL/L (ref 136–145)
T4 FREE SERPL-MCNC: 1.03 NG/DL (ref 0.76–1.46)
TRIGL SERPL-MCNC: 96 MG/DL
TSH SERPL DL<=0.05 MIU/L-ACNC: 3.6 UIU/ML (ref 0.36–3.74)
WBC # BLD AUTO: 6 THOUSAND/UL (ref 4.31–10.16)

## 2021-10-21 PROCEDURE — 77385 HB NTSTY MODUL RAD TX DLVR SMPL: CPT | Performed by: RADIOLOGY

## 2021-10-21 PROCEDURE — 36415 COLL VENOUS BLD VENIPUNCTURE: CPT

## 2021-10-21 PROCEDURE — 85025 COMPLETE CBC W/AUTO DIFF WBC: CPT

## 2021-10-21 PROCEDURE — 82550 ASSAY OF CK (CPK): CPT

## 2021-10-21 PROCEDURE — 77014 CHG CT GUIDANCE PLACEMENT RAD THERAPY FIELDS: CPT | Performed by: RADIOLOGY

## 2021-10-21 PROCEDURE — 80053 COMPREHEN METABOLIC PANEL: CPT

## 2021-10-21 PROCEDURE — 77427 RADIATION TX MANAGEMENT X5: CPT | Performed by: INTERNAL MEDICINE

## 2021-10-22 ENCOUNTER — APPOINTMENT (OUTPATIENT)
Dept: RADIATION ONCOLOGY | Facility: CLINIC | Age: 81
End: 2021-10-22
Attending: RADIOLOGY
Payer: MEDICARE

## 2021-10-22 PROCEDURE — 77014 CHG CT GUIDANCE PLACEMENT RAD THERAPY FIELDS: CPT | Performed by: INTERNAL MEDICINE

## 2021-10-22 PROCEDURE — 77385 HB NTSTY MODUL RAD TX DLVR SMPL: CPT | Performed by: INTERNAL MEDICINE

## 2021-10-25 ENCOUNTER — APPOINTMENT (OUTPATIENT)
Dept: RADIATION ONCOLOGY | Facility: CLINIC | Age: 81
End: 2021-10-25
Attending: RADIOLOGY
Payer: MEDICARE

## 2021-10-25 PROCEDURE — 77014 CHG CT GUIDANCE PLACEMENT RAD THERAPY FIELDS: CPT | Performed by: RADIOLOGY

## 2021-10-25 PROCEDURE — 77385 HB NTSTY MODUL RAD TX DLVR SMPL: CPT | Performed by: RADIOLOGY

## 2021-10-26 ENCOUNTER — APPOINTMENT (OUTPATIENT)
Dept: RADIATION ONCOLOGY | Facility: CLINIC | Age: 81
End: 2021-10-26
Attending: RADIOLOGY
Payer: MEDICARE

## 2021-10-26 PROCEDURE — 77385 HB NTSTY MODUL RAD TX DLVR SMPL: CPT | Performed by: INTERNAL MEDICINE

## 2021-10-26 PROCEDURE — 77014 CHG CT GUIDANCE PLACEMENT RAD THERAPY FIELDS: CPT | Performed by: INTERNAL MEDICINE

## 2021-10-27 ENCOUNTER — APPOINTMENT (OUTPATIENT)
Dept: RADIATION ONCOLOGY | Facility: CLINIC | Age: 81
End: 2021-10-27
Attending: RADIOLOGY
Payer: MEDICARE

## 2021-10-27 ENCOUNTER — TELEPHONE (OUTPATIENT)
Dept: UROLOGY | Facility: MEDICAL CENTER | Age: 81
End: 2021-10-27

## 2021-10-27 PROCEDURE — 77385 HB NTSTY MODUL RAD TX DLVR SMPL: CPT | Performed by: RADIOLOGY

## 2021-10-27 PROCEDURE — 77336 RADIATION PHYSICS CONSULT: CPT | Performed by: INTERNAL MEDICINE

## 2021-10-27 PROCEDURE — 77014 CHG CT GUIDANCE PLACEMENT RAD THERAPY FIELDS: CPT | Performed by: RADIOLOGY

## 2021-10-28 ENCOUNTER — APPOINTMENT (OUTPATIENT)
Dept: RADIATION ONCOLOGY | Facility: CLINIC | Age: 81
End: 2021-10-28
Attending: RADIOLOGY
Payer: MEDICARE

## 2021-10-28 PROCEDURE — 77385 HB NTSTY MODUL RAD TX DLVR SMPL: CPT | Performed by: RADIOLOGY

## 2021-10-28 PROCEDURE — 77427 RADIATION TX MANAGEMENT X5: CPT | Performed by: RADIOLOGY

## 2021-10-28 PROCEDURE — 77014 CHG CT GUIDANCE PLACEMENT RAD THERAPY FIELDS: CPT | Performed by: RADIOLOGY

## 2021-10-29 ENCOUNTER — APPOINTMENT (OUTPATIENT)
Dept: RADIATION ONCOLOGY | Facility: CLINIC | Age: 81
End: 2021-10-29
Attending: RADIOLOGY
Payer: MEDICARE

## 2021-10-29 PROCEDURE — 77014 CHG CT GUIDANCE PLACEMENT RAD THERAPY FIELDS: CPT | Performed by: RADIOLOGY

## 2021-10-29 PROCEDURE — 77385 HB NTSTY MODUL RAD TX DLVR SMPL: CPT | Performed by: RADIOLOGY

## 2021-11-01 ENCOUNTER — APPOINTMENT (OUTPATIENT)
Dept: RADIATION ONCOLOGY | Facility: CLINIC | Age: 81
End: 2021-11-01
Attending: RADIOLOGY
Payer: MEDICARE

## 2021-11-01 PROCEDURE — 77385 HB NTSTY MODUL RAD TX DLVR SMPL: CPT | Performed by: RADIOLOGY

## 2021-11-01 PROCEDURE — 77014 CHG CT GUIDANCE PLACEMENT RAD THERAPY FIELDS: CPT | Performed by: RADIOLOGY

## 2021-11-02 ENCOUNTER — APPOINTMENT (OUTPATIENT)
Dept: RADIATION ONCOLOGY | Facility: CLINIC | Age: 81
End: 2021-11-02
Attending: RADIOLOGY
Payer: MEDICARE

## 2021-11-02 PROCEDURE — 77385 HB NTSTY MODUL RAD TX DLVR SMPL: CPT | Performed by: RADIOLOGY

## 2021-11-02 PROCEDURE — 77014 CHG CT GUIDANCE PLACEMENT RAD THERAPY FIELDS: CPT | Performed by: RADIOLOGY

## 2021-11-03 ENCOUNTER — APPOINTMENT (OUTPATIENT)
Dept: RADIATION ONCOLOGY | Facility: CLINIC | Age: 81
End: 2021-11-03
Attending: RADIOLOGY
Payer: MEDICARE

## 2021-11-03 PROCEDURE — 77385 HB NTSTY MODUL RAD TX DLVR SMPL: CPT | Performed by: RADIOLOGY

## 2021-11-03 PROCEDURE — 77336 RADIATION PHYSICS CONSULT: CPT | Performed by: RADIOLOGY

## 2021-11-04 ENCOUNTER — APPOINTMENT (OUTPATIENT)
Dept: RADIATION ONCOLOGY | Facility: CLINIC | Age: 81
End: 2021-11-04
Attending: RADIOLOGY
Payer: MEDICARE

## 2021-11-04 PROCEDURE — 77427 RADIATION TX MANAGEMENT X5: CPT | Performed by: RADIOLOGY

## 2021-11-04 PROCEDURE — 77385 HB NTSTY MODUL RAD TX DLVR SMPL: CPT | Performed by: RADIOLOGY

## 2021-11-04 PROCEDURE — 77014 CHG CT GUIDANCE PLACEMENT RAD THERAPY FIELDS: CPT | Performed by: RADIOLOGY

## 2021-11-05 ENCOUNTER — APPOINTMENT (OUTPATIENT)
Dept: RADIATION ONCOLOGY | Facility: CLINIC | Age: 81
End: 2021-11-05
Attending: RADIOLOGY
Payer: MEDICARE

## 2021-11-05 PROCEDURE — 77385 HB NTSTY MODUL RAD TX DLVR SMPL: CPT | Performed by: RADIOLOGY

## 2021-11-05 PROCEDURE — 77014 CHG CT GUIDANCE PLACEMENT RAD THERAPY FIELDS: CPT | Performed by: RADIOLOGY

## 2021-11-08 ENCOUNTER — APPOINTMENT (OUTPATIENT)
Dept: RADIATION ONCOLOGY | Facility: CLINIC | Age: 81
End: 2021-11-08
Attending: RADIOLOGY
Payer: MEDICARE

## 2021-11-08 PROCEDURE — 77385 HB NTSTY MODUL RAD TX DLVR SMPL: CPT | Performed by: RADIOLOGY

## 2021-11-08 PROCEDURE — 77014 CHG CT GUIDANCE PLACEMENT RAD THERAPY FIELDS: CPT | Performed by: RADIOLOGY

## 2021-11-09 ENCOUNTER — APPOINTMENT (OUTPATIENT)
Dept: RADIATION ONCOLOGY | Facility: CLINIC | Age: 81
End: 2021-11-09
Attending: RADIOLOGY
Payer: MEDICARE

## 2021-11-09 PROCEDURE — 77385 HB NTSTY MODUL RAD TX DLVR SMPL: CPT | Performed by: RADIOLOGY

## 2021-11-09 PROCEDURE — 77014 CHG CT GUIDANCE PLACEMENT RAD THERAPY FIELDS: CPT | Performed by: RADIOLOGY

## 2021-11-10 ENCOUNTER — APPOINTMENT (OUTPATIENT)
Dept: RADIATION ONCOLOGY | Facility: CLINIC | Age: 81
End: 2021-11-10
Attending: RADIOLOGY
Payer: MEDICARE

## 2021-11-10 DIAGNOSIS — I48.91 ATRIAL FIBRILLATION, UNSPECIFIED TYPE (HCC): ICD-10-CM

## 2021-11-10 PROCEDURE — 77336 RADIATION PHYSICS CONSULT: CPT | Performed by: RADIOLOGY

## 2021-11-10 PROCEDURE — 77385 HB NTSTY MODUL RAD TX DLVR SMPL: CPT | Performed by: RADIOLOGY

## 2021-11-10 PROCEDURE — 77014 CHG CT GUIDANCE PLACEMENT RAD THERAPY FIELDS: CPT | Performed by: RADIOLOGY

## 2021-11-11 ENCOUNTER — APPOINTMENT (OUTPATIENT)
Dept: RADIATION ONCOLOGY | Facility: CLINIC | Age: 81
End: 2021-11-11
Attending: RADIOLOGY
Payer: MEDICARE

## 2021-11-11 DIAGNOSIS — C61 PROSTATE CANCER (HCC): Primary | ICD-10-CM

## 2021-11-11 PROCEDURE — 77385 HB NTSTY MODUL RAD TX DLVR SMPL: CPT | Performed by: RADIOLOGY

## 2021-11-11 PROCEDURE — 77014 CHG CT GUIDANCE PLACEMENT RAD THERAPY FIELDS: CPT | Performed by: RADIOLOGY

## 2021-11-11 RX ORDER — APIXABAN 5 MG/1
TABLET, FILM COATED ORAL
Qty: 180 TABLET | Refills: 3 | Status: SHIPPED | OUTPATIENT
Start: 2021-11-11

## 2021-11-11 RX ORDER — TAMSULOSIN HYDROCHLORIDE 0.4 MG/1
0.4 CAPSULE ORAL
Qty: 30 CAPSULE | Refills: 2 | Status: SHIPPED | OUTPATIENT
Start: 2021-11-11 | End: 2022-08-09 | Stop reason: ALTCHOICE

## 2021-11-12 ENCOUNTER — APPOINTMENT (OUTPATIENT)
Dept: RADIATION ONCOLOGY | Facility: CLINIC | Age: 81
End: 2021-11-12
Attending: RADIOLOGY
Payer: MEDICARE

## 2021-11-12 ENCOUNTER — TELEPHONE (OUTPATIENT)
Dept: OTHER | Facility: OTHER | Age: 81
End: 2021-11-12

## 2021-11-12 PROCEDURE — 77014 CHG CT GUIDANCE PLACEMENT RAD THERAPY FIELDS: CPT | Performed by: RADIOLOGY

## 2021-11-12 PROCEDURE — 77385 HB NTSTY MODUL RAD TX DLVR SMPL: CPT | Performed by: RADIOLOGY

## 2021-11-15 ENCOUNTER — APPOINTMENT (OUTPATIENT)
Dept: RADIATION ONCOLOGY | Facility: CLINIC | Age: 81
End: 2021-11-15
Attending: RADIOLOGY
Payer: MEDICARE

## 2021-11-15 PROCEDURE — 77014 CHG CT GUIDANCE PLACEMENT RAD THERAPY FIELDS: CPT | Performed by: RADIOLOGY

## 2021-11-15 PROCEDURE — 77385 HB NTSTY MODUL RAD TX DLVR SMPL: CPT | Performed by: RADIOLOGY

## 2021-11-15 PROCEDURE — 77336 RADIATION PHYSICS CONSULT: CPT | Performed by: RADIOLOGY

## 2021-11-16 DIAGNOSIS — C61 PROSTATE CANCER (HCC): ICD-10-CM

## 2021-11-16 RX ORDER — TAMSULOSIN HYDROCHLORIDE 0.4 MG/1
0.4 CAPSULE ORAL
Qty: 90 CAPSULE | Refills: 2 | Status: CANCELLED | OUTPATIENT
Start: 2021-11-16

## 2022-01-03 ENCOUNTER — TELEMEDICINE (OUTPATIENT)
Dept: RADIATION ONCOLOGY | Facility: CLINIC | Age: 82
End: 2022-01-03
Attending: RADIOLOGY

## 2022-01-03 DIAGNOSIS — C61 PROSTATE CANCER (HCC): Primary | ICD-10-CM

## 2022-01-03 PROCEDURE — 99024 POSTOP FOLLOW-UP VISIT: CPT | Performed by: RADIOLOGY

## 2022-01-03 NOTE — PROGRESS NOTES
Virtual Brief Visit    Patient is located in the following state in which I hold an active license [place]      Assessment/Plan:  The patient is 6 weeks post radiation therapy for prostate carcinoma given in a moderate hypo fractionated regimen  He has tolerated treatment well  No significant urinary symptoms nor symptoms of proctitis  He is scheduled to undergo PSA this month prior to his visit with Urology  We discussed that should this be in good range he will have follow-up PSA in 6 months and return to our office for follow-up thereafter  Problem List Items Addressed This Visit        Genitourinary    Prostate cancer [facility]) - Primary          Recent Visits  No visits were found meeting these conditions  Showing recent visits within past 7 days and meeting all other requirements  Today's Visits  Date Type Provider Dept   01/03/22 Margo Handley MD [facility]   Showing today's visits and meeting all other requirements  Future Appointments  No visits were found meeting these conditions    Showing future appointments within next 150 days and meeting all other requirements         I spent 15 minutes directly with the patient during this visit

## 2022-01-19 ENCOUNTER — APPOINTMENT (OUTPATIENT)
Dept: LAB | Facility: CLINIC | Age: 82
End: 2022-01-19
Payer: MEDICARE

## 2022-01-19 DIAGNOSIS — C61 PROSTATE CANCER (HCC): ICD-10-CM

## 2022-01-19 DIAGNOSIS — R97.20 ELEVATED PSA, LESS THAN 10 NG/ML: ICD-10-CM

## 2022-01-19 LAB
PSA SERPL-MCNC: <0.1 NG/ML (ref 0–4)
TESTOST SERPL-MCNC: <8 NG/DL (ref 95–948)

## 2022-01-19 PROCEDURE — 84403 ASSAY OF TOTAL TESTOSTERONE: CPT

## 2022-01-19 PROCEDURE — 36415 COLL VENOUS BLD VENIPUNCTURE: CPT

## 2022-01-19 PROCEDURE — 84153 ASSAY OF PSA TOTAL: CPT

## 2022-01-28 RX ORDER — AMLODIPINE BESYLATE 2.5 MG/1
TABLET ORAL DAILY
COMMUNITY
Start: 2021-10-27

## 2022-02-02 ENCOUNTER — OFFICE VISIT (OUTPATIENT)
Dept: UROLOGY | Facility: MEDICAL CENTER | Age: 82
End: 2022-02-02
Payer: MEDICARE

## 2022-02-02 VITALS
WEIGHT: 137 LBS | SYSTOLIC BLOOD PRESSURE: 150 MMHG | HEART RATE: 76 BPM | HEIGHT: 65 IN | DIASTOLIC BLOOD PRESSURE: 86 MMHG | BODY MASS INDEX: 22.82 KG/M2

## 2022-02-02 DIAGNOSIS — E29.1 HYPOGONADISM IN MALE: ICD-10-CM

## 2022-02-02 DIAGNOSIS — Z92.3 HISTORY OF RADIATION THERAPY: ICD-10-CM

## 2022-02-02 DIAGNOSIS — C61 PROSTATE CANCER (HCC): Primary | ICD-10-CM

## 2022-02-02 LAB
SL AMB  POCT GLUCOSE, UA: NORMAL
SL AMB LEUKOCYTE ESTERASE,UA: NORMAL
SL AMB POCT BILIRUBIN,UA: NORMAL
SL AMB POCT BLOOD,UA: NORMAL
SL AMB POCT CLARITY,UA: CLEAR
SL AMB POCT COLOR,UA: YELLOW
SL AMB POCT KETONES,UA: NORMAL
SL AMB POCT NITRITE,UA: NORMAL
SL AMB POCT PH,UA: 7
SL AMB POCT SPECIFIC GRAVITY,UA: 1.02
SL AMB POCT URINE PROTEIN: NORMAL
SL AMB POCT UROBILINOGEN: 0.2

## 2022-02-02 PROCEDURE — 99214 OFFICE O/P EST MOD 30 MIN: CPT | Performed by: UROLOGY

## 2022-02-02 PROCEDURE — 81003 URINALYSIS AUTO W/O SCOPE: CPT | Performed by: UROLOGY

## 2022-02-02 NOTE — PROGRESS NOTES
Assessment/Plan:  1  Adenocarcinoma the prostate [place] pattern score 7 and 6 status post 6 months of androgen deprivation therapy and IMRT  PSA currently undetectable however the patient remains hypo gonadal   Repeat PSA with repeat testosterone 6 months  2  History of radiation therapy-as above    3  Hypogonadism-Lupron induced-follow testosterone levels  4  Prostate enlargement with weakening of the urinary stream previously being placed on tamsulosin by Radiation Oncology  Patient would like to stop tamsulosin see if his stream D grades  He will reinstitute therapy if he has any urinary problems after being off of that medication  No problem-specific Assessment & Plan notes found for this encounter  Diagnoses and all orders for this visit:    Prostate cancer ([facility] [address] )  -     PSA Total, Diagnostic; Future  -     Testosterone, free, total; Future    History of radiation therapy  -     POCT urine dip auto non-scope    Hypogonadism in male    Other orders  -     amLODIPine (NORVASC) 2 5 mg tablet;  (Patient not taking: Reported on 2/2/2022 )          Subjective:      Patient ID: Tiffanie Hannah is a [de-identified] y o  male  HPI  69-year-old gentleman with [place] pattern 6 or 7 and 6 adenocarcinoma the prostate status post ADT 6 months and IMRT  PSA remains undetectable the patient remains on tamsulosin for some urinary frequency and the patient denies any dysuria gross hematuria or incontinence  He wishes to try and see if his voiding pattern changes coming off of tamsulosin  Patient also has a very low testosterone level  The following portions of the patient's history were reviewed and updated as appropriate: allergies, current medications, past family history, past medical history, past social history, past surgical history and problem list     Review of Systems   HENT: Positive for hearing loss  Genitourinary: Positive for frequency  All other systems reviewed and are negative  Objective:      /86   Pulse 76   Ht 5' 5" (1 651 m)   Wt 62 1 kg (137 lb)   BMI 22 80 kg/m²          Physical Exam  Vitals reviewed  Constitutional:       General: He is not in acute distress  Appearance: He is normal weight  He is not ill-appearing, toxic-appearing or diaphoretic  HENT:      Head: Normocephalic and atraumatic  Nose: Nose normal       Mouth/Throat:      Mouth: Mucous membranes are moist    Eyes:      Extraocular Movements: Extraocular movements intact  Pulmonary:      Effort: Pulmonary effort is normal  No respiratory distress  Abdominal:      Palpations: Abdomen is soft  Neurological:      Mental Status: He is alert and oriented to person, place, and time  Psychiatric:         Mood and Affect: Mood normal          Behavior: Behavior normal          Thought Content:  Thought content normal          Judgment: Judgment normal

## 2022-03-09 ENCOUNTER — OFFICE VISIT (OUTPATIENT)
Dept: CARDIOLOGY CLINIC | Facility: CLINIC | Age: 82
End: 2022-03-09
Payer: MEDICARE

## 2022-03-09 VITALS
HEIGHT: 65 IN | WEIGHT: 138.6 LBS | DIASTOLIC BLOOD PRESSURE: 68 MMHG | SYSTOLIC BLOOD PRESSURE: 116 MMHG | HEART RATE: 62 BPM | BODY MASS INDEX: 23.09 KG/M2

## 2022-03-09 DIAGNOSIS — I10 BENIGN ESSENTIAL HTN: ICD-10-CM

## 2022-03-09 DIAGNOSIS — I48.91 ATRIAL FIBRILLATION, UNSPECIFIED TYPE (HCC): Primary | ICD-10-CM

## 2022-03-09 DIAGNOSIS — E78.5 DYSLIPIDEMIA: ICD-10-CM

## 2022-03-09 PROCEDURE — 99214 OFFICE O/P EST MOD 30 MIN: CPT | Performed by: INTERNAL MEDICINE

## 2022-03-09 PROCEDURE — 93000 ELECTROCARDIOGRAM COMPLETE: CPT | Performed by: INTERNAL MEDICINE

## 2022-03-09 NOTE — PROGRESS NOTES
Cardiology             Jake Sanchez  1940  [id number]                       Discussion/Summary:     1  Paroxysmal atrial fibrillation  2  Dyslipidemia  3  Hypertension        · Sinus rhythm on ECG today  Okay to remain off AV node blockers   · Continue Eliquis anticoagulation  · Blood pressure well controlled, continue amlodipine  · Continue rosuvastatin for dyslipidemia  Prior lipid panel reviewed 10/2021, well controlled      Follow-up in 1 year        Interval History:      This is a 67year-old male with a history of atrial fibrillation diagnosed 08/2017, maintained on low-dose metoprolol up until May 2018  Preeti Gross presented to his PCPs office with symptoms of intermittent lightheadedness with bradycardia in the 40s   At that time his metoprolol was discontinued  Preeti Gross has been maintained on Eliquis anticoagulation      He underwent PFTs 02/07/2020 which was within normal limits   CT chest 02/19/2020 revealed a few scattered small lung nodules  He was diagnosed with prostate cancer, and has been undergoing radiation therapy  He presents today for follow-up with no complaints  He denies chest pain, shortness of breath, dizziness, palpitations, lower extremity edema               Vitals:  Vitals:    03/09/22 0950   BP: 116/68   BP Location: Right arm   Patient Position: Sitting   Cuff Size: Adult   Pulse: 62   Weight: 62 9 kg (138 lb 9 6 oz)   Height: 5' 5" (1 651 m)         Past Medical History:   Diagnosis Date    Back pain     Benign prostatic hypertrophy     Disease of thyroid gland     Elevated PSA 3/3/2021    Hyperlipidemia     Hypertension     Prostate cancer ([facility] [place] [facility] [address] ) 7/21/2021    Restless leg      Social History     Socioeconomic History    Marital status: /Civil Union     Spouse name: Not on file    Number of children: Not on file    Years of education: Not on file    Highest education level: Not on file   Occupational History    Not on file   Tobacco Use    Smoking status: Never Smoker    Smokeless tobacco: Never Used   Vaping Use    Vaping Use: Never used   Substance and Sexual Activity    Alcohol use: Yes     Alcohol/week: 4 0 standard drinks     Types: 4 Cans of beer per week     Comment: CAFFEINE USE/3-4 beers/wk    Occasional wine or drink    Drug use: No    Sexual activity: Not on file   Other Topics Concern    Not on file   Social History Narrative    Not on file     Social Determinants of Health     Financial Resource Strain: Not on file   Food Insecurity: Not on file   Transportation Needs: Not on file   Physical Activity: Not on file   Stress: Not on file   Social Connections: Not on file   Intimate Partner Violence: Not on file   Housing Stability: Not on file      Family History   Problem Relation Age of Onset    Cancer Mother     Throat cancer Father     Breast cancer Sister      Past Surgical History:   Procedure Laterality Date    ACHILLES TENDON SURGERY      HERNIA REPAIR      KNEE ARTHROSCOPY Right     shoulder    [place] BIOPSY OF PROSTATE,NEEDLE/PUNCH N/A 6/15/2021    Procedure: TRANSPERINEAL MRI FUSION PROSTATE BIOPSY;  Surgeon: Sheryle Chambers, MD;  [facility];  Service: Urology    [place] PLACE [address] [place] [address], PROSTATE N/A 9/21/2021    Procedure: INSERTION OF FIDUCIAL MARKER (TRANSRECTAL ULTRASOUND GUIDANCE), SPACEOAR;  Surgeon: Judy Francois MD;  [facility];  Service: Urology    SKIN BIOPSY      TONSILLECTOMY         Current Outpatient Medications:     amLODIPine (NORVASC) 2 5 mg tablet,  , Disp: , Rfl:     bimatoprost (LUMIGAN) 0 01 % ophthalmic drops, Administer 1 drop into the left eye daily at bedtime, Disp: , Rfl:     Eliquis 5 MG, TAKE 1 TABLET TWICE A DAY, Disp: 180 tablet, Rfl: 3    levothyroxine 25 mcg tablet, Take 25 mcg by mouth daily, Disp: , Rfl:     Multiple Vitamins-Minerals (CENTRUM SILVER 50+MEN PO), Take by mouth, Disp: , Rfl:     Omega-3 Fatty Acids (FISH OIL) 1,000 mg, Take by mouth daily, Disp: , Rfl:     pramipexole (MIRAPEX) 0 125 mg tablet, Take by mouth Daily  , Disp: , Rfl:     rosuvastatin (CRESTOR) 10 MG tablet, 10 mg Pt takes med every other day--conversation with PCP (Dr Alyse Hollingsworth) November 2021 appt/Labs, Disp: , Rfl:     traZODone (DESYREL) 50 mg tablet,  , Disp: , Rfl:     fluticasone (FLONASE) 50 mcg/act nasal spray, , Disp: , Rfl:     tamsulosin (FLOMAX) 0 4 mg, Take 1 capsule (0 4 mg total) by mouth daily with dinner, Disp: 30 capsule, Rfl: 2        Review of Systems:  Review of Systems   Respiratory: Negative  Cardiovascular: Negative  All other systems reviewed and are negative  Physical Exam:  Physical Exam  Constitutional:       General: He is not in acute distress  Appearance: He is well-developed  He is not diaphoretic  HENT:      Head: Normocephalic and atraumatic  Eyes:      General: No scleral icterus  Right eye: No discharge  Pupils: Pupils are equal, round, and reactive to light  Neck:      Thyroid: No thyromegaly  Cardiovascular:      Rate and Rhythm: Normal rate and regular rhythm  Heart sounds: Normal heart sounds  No murmur heard  No friction rub  No gallop  Pulmonary:      Effort: Pulmonary effort is normal       Breath sounds: Normal breath sounds  Abdominal:      General: There is no distension  Tenderness: There is no abdominal tenderness  There is no guarding or rebound  Musculoskeletal:         General: Normal range of motion  Cervical back: Normal range of motion and neck supple  Skin:     General: Skin is warm and dry  Coloration: Skin is not pale  Findings: No erythema or rash  Neurological:      Mental Status: He is alert and oriented to person, place, and time  Coordination: Coordination normal    Psychiatric:         Behavior: Behavior normal          Thought Content:  Thought content normal          Judgment: Judgment normal          This note was completed in part utilizing M-Modal Fluency Direct Software  Grammatical errors, random word insertions, spelling mistakes, and incomplete sentences can be an occasional consequence of this system secondary to software limitations, ambient noise, and hardware issues  If you have any questions or concerns about the content, text, or information contained within the body of this dictation, please contact the provider for clarification

## 2022-03-22 ENCOUNTER — APPOINTMENT (OUTPATIENT)
Dept: LAB | Facility: CLINIC | Age: 82
End: 2022-03-22
Payer: MEDICARE

## 2022-03-22 DIAGNOSIS — I10 ESSENTIAL HYPERTENSION, MALIGNANT: ICD-10-CM

## 2022-03-22 DIAGNOSIS — E03.9 MYXEDEMA HEART DISEASE: ICD-10-CM

## 2022-03-22 DIAGNOSIS — E78.2 MIXED HYPERLIPIDEMIA: ICD-10-CM

## 2022-03-22 DIAGNOSIS — I48.0 PAROXYSMAL ATRIAL FIBRILLATION (HCC): ICD-10-CM

## 2022-03-22 DIAGNOSIS — G25.81 RESTLESS LEGS: ICD-10-CM

## 2022-03-22 DIAGNOSIS — I51.9 MYXEDEMA HEART DISEASE: ICD-10-CM

## 2022-03-22 LAB
ALBUMIN SERPL BCP-MCNC: 3.9 G/DL (ref 3.5–5)
ALP SERPL-CCNC: 58 U/L (ref 46–116)
ALT SERPL W P-5'-P-CCNC: 23 U/L (ref 12–78)
ANION GAP SERPL CALCULATED.3IONS-SCNC: 3 MMOL/L (ref 4–13)
AST SERPL W P-5'-P-CCNC: 18 U/L (ref 5–45)
BASOPHILS # BLD AUTO: 0.07 THOUSANDS/ΜL (ref 0–0.1)
BASOPHILS NFR BLD AUTO: 1 % (ref 0–1)
BILIRUB SERPL-MCNC: 0.73 MG/DL (ref 0.2–1)
BUN SERPL-MCNC: 20 MG/DL (ref 5–25)
CALCIUM SERPL-MCNC: 9.1 MG/DL (ref 8.3–10.1)
CHLORIDE SERPL-SCNC: 107 MMOL/L (ref 100–108)
CHOLEST SERPL-MCNC: 185 MG/DL
CK SERPL-CCNC: 92 U/L (ref 39–308)
CO2 SERPL-SCNC: 29 MMOL/L (ref 21–32)
CREAT SERPL-MCNC: 0.96 MG/DL (ref 0.6–1.3)
EOSINOPHIL # BLD AUTO: 0.34 THOUSAND/ΜL (ref 0–0.61)
EOSINOPHIL NFR BLD AUTO: 7 % (ref 0–6)
ERYTHROCYTE [DISTWIDTH] IN BLOOD BY AUTOMATED COUNT: 13.2 % (ref 11.6–15.1)
EST. AVERAGE GLUCOSE BLD GHB EST-MCNC: 117 MG/DL
GFR SERPL CREATININE-BSD FRML MDRD: 74 ML/MIN/1.73SQ M
GLUCOSE P FAST SERPL-MCNC: 103 MG/DL (ref 65–99)
HBA1C MFR BLD: 5.7 %
HCT VFR BLD AUTO: 40.2 % (ref 36.5–49.3)
HDLC SERPL-MCNC: 64 MG/DL
HGB BLD-MCNC: 13.1 G/DL (ref 12–17)
IMM GRANULOCYTES # BLD AUTO: 0.02 THOUSAND/UL (ref 0–0.2)
IMM GRANULOCYTES NFR BLD AUTO: 0 % (ref 0–2)
LDLC SERPL CALC-MCNC: 100 MG/DL (ref 0–100)
LYMPHOCYTES # BLD AUTO: 1.22 THOUSANDS/ΜL (ref 0.6–4.47)
LYMPHOCYTES NFR BLD AUTO: 24 % (ref 14–44)
MCH RBC QN AUTO: 30.4 PG (ref 26.8–34.3)
MCHC RBC AUTO-ENTMCNC: 32.6 G/DL (ref 31.4–37.4)
MCV RBC AUTO: 93 FL (ref 82–98)
MONOCYTES # BLD AUTO: 0.64 THOUSAND/ΜL (ref 0.17–1.22)
MONOCYTES NFR BLD AUTO: 12 % (ref 4–12)
NEUTROPHILS # BLD AUTO: 2.87 THOUSANDS/ΜL (ref 1.85–7.62)
NEUTS SEG NFR BLD AUTO: 56 % (ref 43–75)
NONHDLC SERPL-MCNC: 121 MG/DL
NRBC BLD AUTO-RTO: 0 /100 WBCS
PLATELET # BLD AUTO: 353 THOUSANDS/UL (ref 149–390)
PMV BLD AUTO: 9.7 FL (ref 8.9–12.7)
POTASSIUM SERPL-SCNC: 4 MMOL/L (ref 3.5–5.3)
PROT SERPL-MCNC: 6.9 G/DL (ref 6.4–8.2)
RBC # BLD AUTO: 4.31 MILLION/UL (ref 3.88–5.62)
SODIUM SERPL-SCNC: 139 MMOL/L (ref 136–145)
TRIGL SERPL-MCNC: 105 MG/DL
TSH SERPL DL<=0.05 MIU/L-ACNC: 4.04 UIU/ML (ref 0.36–3.74)
WBC # BLD AUTO: 5.16 THOUSAND/UL (ref 4.31–10.16)

## 2022-03-22 PROCEDURE — 80053 COMPREHEN METABOLIC PANEL: CPT

## 2022-03-22 PROCEDURE — 85025 COMPLETE CBC W/AUTO DIFF WBC: CPT

## 2022-03-22 PROCEDURE — 36415 COLL VENOUS BLD VENIPUNCTURE: CPT

## 2022-03-22 PROCEDURE — 80061 LIPID PANEL: CPT

## 2022-03-22 PROCEDURE — 84443 ASSAY THYROID STIM HORMONE: CPT

## 2022-03-22 PROCEDURE — 83036 HEMOGLOBIN GLYCOSYLATED A1C: CPT

## 2022-03-22 PROCEDURE — 82550 ASSAY OF CK (CPK): CPT

## 2022-07-02 ENCOUNTER — APPOINTMENT (EMERGENCY)
Dept: CT IMAGING | Facility: HOSPITAL | Age: 82
DRG: 149 | End: 2022-07-02
Payer: MEDICARE

## 2022-07-02 ENCOUNTER — HOSPITAL ENCOUNTER (INPATIENT)
Facility: HOSPITAL | Age: 82
LOS: 1 days | Discharge: HOME | DRG: 149 | End: 2022-07-03
Attending: EMERGENCY MEDICINE | Admitting: STUDENT IN AN ORGANIZED HEALTH CARE EDUCATION/TRAINING PROGRAM
Payer: MEDICARE

## 2022-07-02 DIAGNOSIS — Z91.81 AT RISK FOR FALLING: ICD-10-CM

## 2022-07-02 DIAGNOSIS — R42 VERTIGO: Primary | ICD-10-CM

## 2022-07-02 DIAGNOSIS — R11.2 NAUSEA AND VOMITING: ICD-10-CM

## 2022-07-02 LAB
ALBUMIN SERPL BCP-MCNC: 3.7 G/DL (ref 3.5–5)
ALP SERPL-CCNC: 69 U/L (ref 46–116)
ALT SERPL W P-5'-P-CCNC: 23 U/L (ref 12–78)
ANION GAP SERPL CALCULATED.3IONS-SCNC: 13 MMOL/L (ref 4–13)
APTT PPP: 38 SECONDS (ref 23–37)
AST SERPL W P-5'-P-CCNC: 16 U/L (ref 5–45)
ATRIAL RATE: 54 BPM
BASOPHILS # BLD AUTO: 0.07 THOUSANDS/ΜL (ref 0–0.1)
BASOPHILS NFR BLD AUTO: 1 % (ref 0–1)
BILIRUB SERPL-MCNC: 0.45 MG/DL (ref 0.2–1)
BUN SERPL-MCNC: 25 MG/DL (ref 5–25)
CALCIUM SERPL-MCNC: 9.1 MG/DL (ref 8.3–10.1)
CARDIAC TROPONIN I PNL SERPL HS: 6 NG/L
CHLORIDE SERPL-SCNC: 99 MMOL/L (ref 100–108)
CO2 SERPL-SCNC: 26 MMOL/L (ref 21–32)
CREAT SERPL-MCNC: 0.91 MG/DL (ref 0.6–1.3)
EOSINOPHIL # BLD AUTO: 0.14 THOUSAND/ΜL (ref 0–0.61)
EOSINOPHIL NFR BLD AUTO: 2 % (ref 0–6)
ERYTHROCYTE [DISTWIDTH] IN BLOOD BY AUTOMATED COUNT: 13 % (ref 11.6–15.1)
GFR SERPL CREATININE-BSD FRML MDRD: 79 ML/MIN/1.73SQ M
GLUCOSE SERPL-MCNC: 138 MG/DL (ref 65–140)
HCT VFR BLD AUTO: 40.8 % (ref 36.5–49.3)
HGB BLD-MCNC: 13.7 G/DL (ref 12–17)
IMM GRANULOCYTES # BLD AUTO: 0.04 THOUSAND/UL (ref 0–0.2)
IMM GRANULOCYTES NFR BLD AUTO: 1 % (ref 0–2)
INR PPP: 1.09 (ref 0.84–1.19)
LYMPHOCYTES # BLD AUTO: 1.06 THOUSANDS/ΜL (ref 0.6–4.47)
LYMPHOCYTES NFR BLD AUTO: 15 % (ref 14–44)
MCH RBC QN AUTO: 30.6 PG (ref 26.8–34.3)
MCHC RBC AUTO-ENTMCNC: 33.6 G/DL (ref 31.4–37.4)
MCV RBC AUTO: 91 FL (ref 82–98)
MONOCYTES # BLD AUTO: 0.54 THOUSAND/ΜL (ref 0.17–1.22)
MONOCYTES NFR BLD AUTO: 8 % (ref 4–12)
NEUTROPHILS # BLD AUTO: 5.31 THOUSANDS/ΜL (ref 1.85–7.62)
NEUTS SEG NFR BLD AUTO: 73 % (ref 43–75)
NRBC BLD AUTO-RTO: 0 /100 WBCS
P AXIS: 76 DEGREES
PLATELET # BLD AUTO: 346 THOUSANDS/UL (ref 149–390)
PMV BLD AUTO: 9.5 FL (ref 8.9–12.7)
POTASSIUM SERPL-SCNC: 3.7 MMOL/L (ref 3.5–5.3)
PR INTERVAL: 122 MS
PROT SERPL-MCNC: 7.1 G/DL (ref 6.4–8.2)
PROTHROMBIN TIME: 13.9 SECONDS (ref 11.6–14.5)
QRS AXIS: 50 DEGREES
QRSD INTERVAL: 76 MS
QT INTERVAL: 460 MS
QTC INTERVAL: 436 MS
RBC # BLD AUTO: 4.47 MILLION/UL (ref 3.88–5.62)
SODIUM SERPL-SCNC: 138 MMOL/L (ref 136–145)
T WAVE AXIS: 58 DEGREES
VENTRICULAR RATE: 54 BPM
WBC # BLD AUTO: 7.16 THOUSAND/UL (ref 4.31–10.16)

## 2022-07-02 PROCEDURE — 70498 CT ANGIOGRAPHY NECK: CPT

## 2022-07-02 PROCEDURE — 99285 EMERGENCY DEPT VISIT HI MDM: CPT | Performed by: EMERGENCY MEDICINE

## 2022-07-02 PROCEDURE — 80053 COMPREHEN METABOLIC PANEL: CPT | Performed by: EMERGENCY MEDICINE

## 2022-07-02 PROCEDURE — 84484 ASSAY OF TROPONIN QUANT: CPT | Performed by: EMERGENCY MEDICINE

## 2022-07-02 PROCEDURE — 99285 EMERGENCY DEPT VISIT HI MDM: CPT

## 2022-07-02 PROCEDURE — 70496 CT ANGIOGRAPHY HEAD: CPT

## 2022-07-02 PROCEDURE — 93005 ELECTROCARDIOGRAM TRACING: CPT

## 2022-07-02 PROCEDURE — 96374 THER/PROPH/DIAG INJ IV PUSH: CPT

## 2022-07-02 PROCEDURE — 85610 PROTHROMBIN TIME: CPT | Performed by: EMERGENCY MEDICINE

## 2022-07-02 PROCEDURE — 93010 ELECTROCARDIOGRAM REPORT: CPT

## 2022-07-02 PROCEDURE — 36415 COLL VENOUS BLD VENIPUNCTURE: CPT | Performed by: EMERGENCY MEDICINE

## 2022-07-02 PROCEDURE — 85730 THROMBOPLASTIN TIME PARTIAL: CPT | Performed by: EMERGENCY MEDICINE

## 2022-07-02 PROCEDURE — 85025 COMPLETE CBC W/AUTO DIFF WBC: CPT | Performed by: EMERGENCY MEDICINE

## 2022-07-02 PROCEDURE — 99223 1ST HOSP IP/OBS HIGH 75: CPT | Performed by: STUDENT IN AN ORGANIZED HEALTH CARE EDUCATION/TRAINING PROGRAM

## 2022-07-02 PROCEDURE — G1004 CDSM NDSC: HCPCS

## 2022-07-02 RX ORDER — METHOCARBAMOL 500 MG/1
500 TABLET, FILM COATED ORAL EVERY 6 HOURS PRN
Status: DISCONTINUED | OUTPATIENT
Start: 2022-07-02 | End: 2022-07-03 | Stop reason: HOSPADM

## 2022-07-02 RX ORDER — ONDANSETRON 2 MG/ML
4 INJECTION INTRAMUSCULAR; INTRAVENOUS EVERY 6 HOURS PRN
Status: DISCONTINUED | OUTPATIENT
Start: 2022-07-02 | End: 2022-07-03 | Stop reason: HOSPADM

## 2022-07-02 RX ORDER — LORAZEPAM 2 MG/ML
0.5 INJECTION INTRAMUSCULAR DAILY PRN
Status: DISCONTINUED | OUTPATIENT
Start: 2022-07-02 | End: 2022-07-03 | Stop reason: HOSPADM

## 2022-07-02 RX ORDER — LEVOTHYROXINE SODIUM 0.03 MG/1
25 TABLET ORAL
Status: DISCONTINUED | OUTPATIENT
Start: 2022-07-03 | End: 2022-07-03 | Stop reason: HOSPADM

## 2022-07-02 RX ORDER — MECLIZINE HYDROCHLORIDE 25 MG/1
25 TABLET ORAL ONCE
Status: COMPLETED | OUTPATIENT
Start: 2022-07-02 | End: 2022-07-02

## 2022-07-02 RX ORDER — MECLIZINE HCL 12.5 MG/1
25 TABLET ORAL EVERY 8 HOURS PRN
Status: DISCONTINUED | OUTPATIENT
Start: 2022-07-02 | End: 2022-07-03 | Stop reason: HOSPADM

## 2022-07-02 RX ORDER — ONDANSETRON 2 MG/ML
4 INJECTION INTRAMUSCULAR; INTRAVENOUS ONCE
Status: COMPLETED | OUTPATIENT
Start: 2022-07-02 | End: 2022-07-02

## 2022-07-02 RX ORDER — ASPIRIN 81 MG/1
81 TABLET, CHEWABLE ORAL DAILY
Status: DISCONTINUED | OUTPATIENT
Start: 2022-07-03 | End: 2022-07-03 | Stop reason: HOSPADM

## 2022-07-02 RX ORDER — TRAZODONE HYDROCHLORIDE 50 MG/1
50 TABLET ORAL
Status: DISCONTINUED | OUTPATIENT
Start: 2022-07-02 | End: 2022-07-03 | Stop reason: HOSPADM

## 2022-07-02 RX ORDER — ACETAMINOPHEN 325 MG/1
650 TABLET ORAL EVERY 4 HOURS PRN
Status: DISCONTINUED | OUTPATIENT
Start: 2022-07-02 | End: 2022-07-03 | Stop reason: HOSPADM

## 2022-07-02 RX ORDER — ATORVASTATIN CALCIUM 40 MG/1
40 TABLET, FILM COATED ORAL EVERY EVENING
Status: DISCONTINUED | OUTPATIENT
Start: 2022-07-02 | End: 2022-07-03 | Stop reason: HOSPADM

## 2022-07-02 RX ORDER — PRAMIPEXOLE DIHYDROCHLORIDE 0.25 MG/1
0.12 TABLET ORAL DAILY PRN
Status: DISCONTINUED | OUTPATIENT
Start: 2022-07-02 | End: 2022-07-03 | Stop reason: HOSPADM

## 2022-07-02 RX ORDER — TAMSULOSIN HYDROCHLORIDE 0.4 MG/1
0.4 CAPSULE ORAL
Status: DISCONTINUED | OUTPATIENT
Start: 2022-07-02 | End: 2022-07-03 | Stop reason: HOSPADM

## 2022-07-02 RX ORDER — SODIUM CHLORIDE 9 MG/ML
75 INJECTION, SOLUTION INTRAVENOUS CONTINUOUS
Status: DISCONTINUED | OUTPATIENT
Start: 2022-07-02 | End: 2022-07-03

## 2022-07-02 RX ADMIN — ONDANSETRON 4 MG: 2 INJECTION INTRAMUSCULAR; INTRAVENOUS at 16:23

## 2022-07-02 RX ADMIN — MECLIZINE HYDROCHLORIDE 25 MG: 25 TABLET ORAL at 18:04

## 2022-07-02 RX ADMIN — SODIUM CHLORIDE 75 ML/HR: 0.9 INJECTION, SOLUTION INTRAVENOUS at 19:06

## 2022-07-02 RX ADMIN — ATORVASTATIN CALCIUM 40 MG: 40 TABLET, FILM COATED ORAL at 19:05

## 2022-07-02 RX ADMIN — TAMSULOSIN HYDROCHLORIDE 0.4 MG: 0.4 CAPSULE ORAL at 19:05

## 2022-07-02 RX ADMIN — APIXABAN 5 MG: 5 TABLET, FILM COATED ORAL at 19:05

## 2022-07-02 RX ADMIN — IOHEXOL 65 ML: 350 INJECTION, SOLUTION INTRAVENOUS at 16:44

## 2022-07-02 NOTE — H&P
[facility]  H&P- Timothy Lin 1940, [de-identified] y o  male MRN: [id number]  Unit/Bed#: ED 22 Encounter: [id number]  Primary Care Provider: Palma Olvera DO   Date and time admitted to hospital: 7/2/2022  3:21 PM    * Vertigo  Assessment & Plan  78-year-old male with past history of atrial fibrillation, hypertension, hyperlipidemia and BPH presented with acute onset of dizziness  CTA head and neck were negative for any acute processes  ED discussed with neurology recommending stroke pathway  Permissive hypertension, monitor on telemetry  Brain MRI ordered, 2D echo  Neurochecks  Aspirin, statin  Check lipid panel, A1c  PT and OT to evaluate  P r n  Meclizine as needed for dizziness  Neuro consulted    HTN (hypertension)  Assessment & Plan  Hold amlodipine in setting of permissive hypertension    A-fib (HCC)  Assessment & Plan  History of paroxysmal AFib, continue Eliquis  Not on AV saige blockers per Cardiology    Hypothyroidism  Assessment & Plan  Continue levothyroxine    Benign prostatic hypertrophy  Assessment & Plan  Continue Flomax bedtime    VTE Prophylaxis: Apixaban (Eliquis)  / sequential compression device   Code Status: FC  POLST: There is no POLST form on file for this patient (pre-hospital)  Discussion with family: wife bedside    Anticipated Length of Stay:  Patient will be admitted on an Inpatient basis with an anticipated length of stay of 2 midnights  Justification for Hospital Stay: Dizziness, CVA rule out    Total Time for Visit, including Counseling / Coordination of Care: 45 minutes  Greater than 50% of this total time spent on direct patient counseling and coordination of care  Chief Complaint:   Dizziness    History of Present Illness:    Timothy Lin is a [de-identified] y o  male who presents with dizziness  Past med history of atrial fibrillation, hypertension, hyperlipidemia and hypothyroidism    Patient reports that he was today outside on his porch doing Sudoku when he felt off  He proceeded to go inside and rest but developed worsening dizziness not prompted by movement were sudden positional changes  He states that when he closes eyes, he continues to feel the room spinning  He denies any tinnitus, hearing changes and vision changes  ED course, CTA head and neck were negative for any acute processes, ED discussed with neurology recommending stroke pathway  Patient denies chest pains, shortness of breath, change in bowel movements but does endorse an episode of emesis this morning and while in the ED upon trying to get up  Review of Systems:    Review of Systems   Constitutional: Negative for activity change, appetite change, chills and fever  HENT: Negative for congestion, sinus pressure and sore throat  Eyes: Negative for pain and discharge  Respiratory: Negative for cough, shortness of breath and wheezing  Cardiovascular: Negative for chest pain, palpitations and leg swelling  Gastrointestinal: Positive for nausea  Negative for abdominal distention, abdominal pain, blood in stool, diarrhea and vomiting  Endocrine: Negative for cold intolerance, heat intolerance, polydipsia, polyphagia and polyuria  Genitourinary: Negative for dysuria, frequency, hematuria and urgency  Musculoskeletal: Negative for arthralgias and back pain  Skin: Negative for color change and pallor  Neurological: Positive for dizziness, weakness and light-headedness  Negative for seizures and syncope  Psychiatric/Behavioral: Negative for agitation and confusion         Past Medical and Surgical History:     Past Medical History:   Diagnosis Date    Back pain     Benign prostatic hypertrophy     Disease of thyroid gland     Elevated PSA 3/3/2021    Hyperlipidemia     Hypertension     Prostate cancer ([facility] [address] ) 7/21/2021    Restless leg        Past Surgical History:   Procedure Laterality Date    ACHILLES TENDON SURGERY      HERNIA REPAIR      KNEE ARTHROSCOPY Right shoulder    [place] BIOPSY OF PROST[place],NEEDLE/PUNCH N/A 6/15/2021    Procedure: TRANSPERINEAL MRI FUSION PROSTATE BIOPSY;  Surgeon: Estela Rodriguez MD;  [facility];  Service: Urology    [place] [address] [address], PR[place] N/A 9/21/2021    Procedure: INSERTION OF FIDUCIAL MARKER (TRANSRECTAL ULTRASOUND GUIDANCE), Ap Jones;  Surgeon: Kerry Kate MD;  [facility];  Service: Urology    SKIN BIOPSY      TONSILLECTOMY         Meds/Allergies:    Prior to Admission medications    Medication Sig Start Date End Date Taking? Authorizing Provider   amLODIPine (NORVASC) 2 5 mg tablet   10/27/21  Yes Historical Provider, MD   Eliquis 5 MG TAKE 1 TABLET TWICE A DAY 11/11/21  Yes Sarahy Mathur DO   levothyroxine 25 mcg tablet Take 25 mcg by mouth daily   Yes Historical Provider, MD   Multiple Vitamins-Minerals (CENTRUM SILVER 50+MEN PO) Take by mouth   Yes Historical Provider, MD   Omega-3 Fatty Acids (FISH OIL) 1,000 mg Take by mouth daily 10/2/17  Yes Historical Provider, MD   pramipexole (MIRAPEX) 0 125 mg tablet Take by mouth Daily   8/12/19  Yes Historical Provider, MD   rosuvastatin (CRESTOR) 10 MG tablet 10 mg Pt takes med every other day--conversation with PCP (Dr Varinder Lazcano)  November 2021 appt/Labs 5/7/21  Yes Historical Provider, MD   traZODone (DESYREL) 50 mg tablet   6/2/21  Yes Historical Provider, MD   bimatoprost (LUMIGAN) 0 01 % ophthalmic drops Administer 1 drop into the left eye daily at bedtime  Patient not taking: Reported on 7/2/2022    Historical Provider, MD   fluticasone Creed Anna) 50 mcg/act nasal spray  12/6/20   Historical Provider, MD   tamsulosin (FLOMAX) 0 4 mg Take 1 capsule (0 4 mg total) by mouth daily with dinner 11/11/21   Malu Berumen MD     I have reviewed home medications with patient personally      Allergies: No Known Allergies    Social History:     Marital Status: /Civil Union   Occupation: Retired  Patient Pre-hospital Living Situation: home  Patient Pre-hospital Level of Mobility: ambulatory  Patient Pre-hospital Diet Restrictions: none  Substance Use History:   Social History     Substance and Sexual Activity   Alcohol Use Yes    Alcohol/week: 4 0 standard drinks    Types: 4 Cans of beer per week    Comment: CAFFEINE USE/3-4 beers/wk  Occasional wine or drink     Social History     Tobacco Use   Smoking Status Never Smoker   Smokeless Tobacco Never Used     Social History     Substance and Sexual Activity   Drug Use No       Family History:    Family History   Problem Relation Age of Onset    Cancer Mother     Throat cancer Father     Breast cancer Sister        Physical Exam:     Vitals:   Blood Pressure: (!) 176/95 (07/02/22 1800)  Pulse: 56 (07/02/22 1800)  Temperature: 97 5 °F (36 4 °C) (07/02/22 1526)  Temp Source: Oral (07/02/22 1526)  Respirations: 18 (07/02/22 1800)  SpO2: 97 % (07/02/22 1800)    Physical Exam  Vitals and nursing note reviewed  Constitutional:       Appearance: Normal appearance  He is normal weight  HENT:      Head: Normocephalic and atraumatic  Eyes:      General: No scleral icterus  Conjunctiva/sclera: Conjunctivae normal    Cardiovascular:      Rate and Rhythm: Normal rate and regular rhythm  Heart sounds: Normal heart sounds  Pulmonary:      Effort: Pulmonary effort is normal  No respiratory distress  Breath sounds: No wheezing or rhonchi  Abdominal:      General: Bowel sounds are normal  There is no distension  Palpations: Abdomen is soft  Musculoskeletal:         General: No swelling  Right lower leg: No edema  Left lower leg: No edema  Skin:     General: Skin is warm and dry  Neurological:      General: No focal deficit present  Mental Status: He is alert  Mental status is at baseline  Cranial Nerves: No cranial nerve deficit  Sensory: No sensory deficit  Motor: No weakness        Coordination: Coordination abnormal       Comments: Unable to evaluate gait due to persistent dizziness  4/5 upper and lower extremity motor strength  Sensory intact bilaterally upper and lower extremity   Psychiatric:         Mood and Affect: Mood normal        Additional Data:     Lab Results: I have personally reviewed pertinent reports  Results from last 7 days   Lab Units 07/02/22  1556   WBC Thousand/uL 7 16   HEMOGLOBIN g/dL 13 7   HEMATOCRIT % 40 8   PLATELETS Thousands/uL 346   NEUTROS PCT % 73   LYMPHS PCT % 15   MONOS PCT % 8   EOS PCT % 2     Results from last 7 days   Lab Units 07/02/22  1556   SODIUM mmol/L 138   POTASSIUM mmol/L 3 7   CHLORIDE mmol/L 99*   CO2 mmol/L 26   BUN mg/dL 25   CREATININE mg/dL 0 91   ANION GAP mmol/L 13   CALCIUM mg/dL 9 1   ALBUMIN g/dL 3 7   TOTAL BILIRUBIN mg/dL 0 45   ALK PHOS U/L 69   ALT U/L 23   AST U/L 16   GLUCOSE RANDOM mg/dL 138     Results from last 7 days   Lab Units 07/02/22  1556   INR  1 09                   Imaging: I have personally reviewed pertinent reports  CTA head and neck with and without contrast   Final Result by Erick Coronado MD (07/02 1706)      No acute intracranial pathology  No significant stenosis of the cervical carotid or vertebral arteries  No intracranial stenosis, large vessel occlusion or aneurysm  Workstation performed: [id number]         MRI Inpatient Order    (Results Pending)       EKG, Pathology, and Other Studies Reviewed on Admission:   · EKG:  Sinus bradycardia    Allscripts / Epic Records Reviewed: Yes     ** Please Note: This note has been constructed using a voice recognition system   **

## 2022-07-02 NOTE — ED PROVIDER NOTES
History  Chief Complaint   Patient presents with    Dizziness     Patint with increased weakness  Nausea, and dizziness  Jaime Khan was noted to be hypertensive at home so took his wife's metoprolol to help with pressure     [de-identified] yo M with HTN, hyperlipidemia, PAF, on eliquis, c/o onset today of not feeling well, which he can't recall an exact onset, other than he recalls he ate breakfast, but began to feel "seasick" since then, with nausea, and his wife said he appeared to be off balance  By the time it was lunchtime, he was very dizzy, said he either had to keep his eyes closed or only look down  He denies headache, chest pain  Because he was having some difficulty walking, finally he and his wife opted to call ambulance  On arrival in the ED, he appeared uncomfortable, and during my exam became acutely nauseated and vomited several times in a row  He denies prior h/o vertigo  History provided by:  Patient  Dizziness  Associated symptoms: nausea, vomiting and weakness    Associated symptoms: no chest pain, no diarrhea, no headaches, no palpitations and no shortness of breath        Prior to Admission Medications   Prescriptions Last Dose Informant Patient Reported? Taking?    Eliquis 5 MG 7/2/2022 at Unknown time  No Yes   Sig: TAKE 1 TABLET TWICE A DAY   Multiple Vitamins-Minerals (CENTRUM SILVER 50+MEN PO) 7/2/2022 at Unknown time Self Yes Yes   Sig: Take by mouth   Omega-3 Fatty Acids (FISH OIL) 1,000 mg 7/2/2022 at Unknown time Self Yes Yes   Sig: Take by mouth daily   amLODIPine (NORVASC) 2 5 mg tablet 7/2/2022 at Unknown time  Yes Yes   Sig:     bimatoprost (LUMIGAN) 0 01 % ophthalmic drops Not Taking at Unknown time Self Yes No   Sig: Administer 1 drop into the left eye daily at bedtime   Patient not taking: Reported on 7/2/2022   fluticasone (FLONASE) 50 mcg/act nasal spray Not Taking at Unknown time Self Yes No   Patient not taking: No sig reported   levothyroxine 25 mcg tablet 7/2/2022 at Unknown time Self Yes Yes   Sig: Take 25 mcg by mouth daily   pramipexole (MIRAPEX) 0 125 mg tablet 7/2/2022 at Unknown time Self Yes Yes   Sig: Take by mouth Daily     rosuvastatin (CRESTOR) 10 MG tablet Past Week at Unknown time  Yes Yes   Sig: 10 mg Pt takes med every other day--conversation with PCP (Dr Carlie Sage)  November 2021 appt/Labs   tamsulosin (FLOMAX) 0 4 mg   No No   Sig: Take 1 capsule (0 4 mg total) by mouth daily with dinner   traZODone (DESYREL) 50 mg tablet 7/1/2022 at Unknown time  Yes Yes   Sig:        Facility-Administered Medications: None       Past Medical History:   Diagnosis Date    Back pain     Benign prostatic hypertrophy     Disease of thyroid gland     Elevated PSA 3/3/2021    Hyperlipidemia     Hypertension     Prostate cancer ([address] ) 7/21/2021    Restless leg        Past Surgical History:   Procedure Laterality Date    ACHILLES TENDON SURGERY      HERNIA REPAIR      KNEE ARTHROSCOPY Right     shoulder    CO BIOPSY OF PROSTATE,NEEDLE/PUNCH N/A 6/15/2021    Procedure: TRANSPERINEAL MRI FUSION PROSTATE BIOPSY;  Surgeon: Cisco Rodriguez MD;  Location[facility];  Service: Urology    CO PLACE RADIOTHER DEVICE/MARKER, PROSTATE N/A 9/21/2021    Procedure: INSERTION OF FIDUCIAL MARKER (TRANSRECTAL ULTRASOUND GUIDANCE), Zainab King;  Surgeon: Lien Emanuel MD;  Location: [facility];  Service: Urology    SKIN BIOPSY      TONSILLECTOMY         Family History   Problem Relation Age of Onset    Cancer Mother     Throat cancer Father     Breast cancer Sister      I have reviewed and agree with the history as documented  E-Cigarette/Vaping    E-Cigarette Use Never User      E-Cigarette/Vaping Substances     Social History     Tobacco Use    Smoking status: Never Smoker    Smokeless tobacco: Never Used   Vaping Use    Vaping Use: Never used   Substance Use Topics    Alcohol use:  Yes     Alcohol/week: 4 0 standard drinks     Types: 4 Cans of beer per week     Comment: CAFFEINE USE/3-4 beers/wk  Occasional wine or drink    Drug use: No       Review of Systems   Constitutional: Negative for appetite change, chills and fever  HENT: Negative for sore throat  Respiratory: Negative for cough, shortness of breath and wheezing  Cardiovascular: Negative for chest pain and palpitations  Gastrointestinal: Positive for nausea and vomiting  Negative for abdominal pain and diarrhea  Genitourinary: Negative for dysuria and hematuria  Musculoskeletal: Negative for neck pain  Skin: Negative for rash  Neurological: Positive for dizziness and weakness  Negative for headaches  Psychiatric/Behavioral: Negative for suicidal ideas  All other systems reviewed and are negative  Physical Exam  Physical Exam  Vitals and nursing note reviewed  Constitutional:       Appearance: Normal appearance  He is well-developed  He is not toxic-appearing or diaphoretic  HENT:      Head: Normocephalic  Right Ear: Tympanic membrane and external ear normal       Left Ear: External ear normal       Nose: Nose normal    Eyes:      General: Lids are normal       Conjunctiva/sclera: Conjunctivae normal       Pupils: Pupils are equal, round, and reactive to light  Neck:      Vascular: No JVD  Meningeal: Brudzinski's sign and Kernig's sign absent  Cardiovascular:      Rate and Rhythm: Normal rate and regular rhythm  Heart sounds: Normal heart sounds  No murmur heard  Pulmonary:      Effort: Pulmonary effort is normal  No tachypnea, accessory muscle usage or respiratory distress  Breath sounds: Normal breath sounds  No wheezing  Abdominal:      General: Bowel sounds are normal  There is no distension  Palpations: Abdomen is soft  Abdomen is not rigid  There is no mass  Tenderness: There is no abdominal tenderness  There is no guarding or rebound  Musculoskeletal:         General: Normal range of motion  Cervical back: Normal range of motion and neck supple  Lymphadenopathy:      Head:      Right side of head: No submental, submandibular, preauricular or posterior auricular adenopathy  Left side of head: No submental, submandibular, preauricular or posterior auricular adenopathy  Cervical: No cervical adenopathy  Skin:     General: Skin is warm and dry  Findings: No rash  Neurological:      Mental Status: He is alert and oriented to person, place, and time  GCS: GCS eye subscore is 4  GCS verbal subscore is 5  GCS motor subscore is 6  Cranial Nerves: No cranial nerve deficit  Sensory: No sensory deficit  Coordination: Coordination normal       Gait: Gait abnormal (ataxia)  Deep Tendon Reflexes: Reflexes are normal and symmetric  Psychiatric:         Speech: Speech normal          Behavior: Behavior normal          Thought Content:  Thought content normal          Vital Signs  ED Triage Vitals   Temperature Pulse Respirations Blood Pressure SpO2   07/02/22 1526 07/02/22 1526 07/02/22 1526 07/02/22 1526 07/02/22 1526   97 5 °F (36 4 °C) (!) 52 20 (!) 181/83 99 %      Temp Source Heart Rate Source Patient Position - Orthostatic VS BP Location FiO2 (%)   07/02/22 1526 07/02/22 1734 07/02/22 1734 07/02/22 1734 --   Oral Monitor Lying Right arm       Pain Score       07/02/22 1800       No Pain           Vitals:    07/02/22 1526 07/02/22 1734 07/02/22 1800   BP: (!) 181/83 (!) 185/90 (!) 176/95   Pulse: (!) 52 59 56   Patient Position - Orthostatic VS:  Lying Sitting         Visual Acuity  Visual Acuity    Flowsheet Row Most Recent Value   L Pupil Size (mm) 3   R Pupil Size (mm) 3          ED Medications  Medications   LORazepam (ATIVAN) injection 0 5 mg (has no administration in time range)   sodium chloride 0 9 % infusion (has no administration in time range)   acetaminophen (TYLENOL) tablet 650 mg (has no administration in time range)   ondansetron (ZOFRAN) injection 4 mg (has no administration in time range)   atorvastatin (LIPITOR) tablet 40 mg (has no administration in time range)   aspirin chewable tablet 81 mg (has no administration in time range)   meclizine (ANTIVERT) tablet 25 mg (has no administration in time range)   tamsulosin (FLOMAX) capsule 0 4 mg (has no administration in time range)   apixaban (ELIQUIS) tablet 5 mg (has no administration in time range)   levothyroxine tablet 25 mcg (has no administration in time range)   traZODone (DESYREL) tablet 50 mg (has no administration in time range)   pramipexole (MIRAPEX) tablet 0 125 mg (has no administration in time range)   methocarbamol (ROBAXIN) tablet 500 mg (has no administration in time range)   ondansetron (ZOFRAN) injection 4 mg (4 mg Intravenous Given 7/2/22 1623)   iohexol (OMNIPAQUE) 350 MG/ML injection (SINGLE-DOSE) 65 mL (65 mL Intravenous Given 7/2/22 1644)   meclizine (ANTIVERT) tablet 25 mg (25 mg Oral Given 7/2/22 1804)       Diagnostic Studies  Results Reviewed     Procedure Component Value Units Date/Time    Protime-INR [[id number]]  (Normal) Collected: 07/02/22 1556    Lab Status: Final result Specimen: Blood from Arm, Left Updated: 07/02/22 1628     Protime 13 9 seconds      INR 1 09    APTT [[id number]]  (Abnormal) Collected: 07/02/22 1556    Lab Status: Final result Specimen: Blood from Arm, Left Updated: 07/02/22 1628     PTT 38 seconds     HS Troponin 0hr (reflex protocol) [[id number]]  (Normal) Collected: 07/02/22 1556    Lab Status: Final result Specimen: Blood from Arm, Left Updated: 07/02/22 1626     hs TnI 0hr 6 ng/L     Comprehensive metabolic panel [[id number]]  (Abnormal) Collected: 07/02/22 1556    Lab Status: Final result Specimen: Blood from Arm, Left Updated: 07/02/22 1619     Sodium 138 mmol/L      Potassium 3 7 mmol/L      Chloride 99 mmol/L      CO2 26 mmol/L      ANION GAP 13 mmol/L      BUN 25 mg/dL      Creatinine 0 91 mg/dL      Glucose 138 mg/dL      Calcium 9 1 mg/dL      AST 16 U/L      ALT 23 U/L      Alkaline Phosphatase 69 U/L Total Protein 7 1 g/dL      Albumin 3 7 g/dL      Total Bilirubin 0 45 mg/dL      eGFR 79 ml/min/1 73sq m     Narrative:      National Kidney Disease Foundation guidelines for Chronic Kidney Disease (CKD):     Stage 1 with normal or high GFR (GFR > 90 mL/min/1 73 square meters)    Stage 2 Mild CKD (GFR = 60-89 mL/min/1 73 square meters)    Stage 3A Moderate CKD (GFR = 45-59 mL/min/1 73 square meters)    Stage 3B Moderate CKD (GFR = 30-44 mL/min/1 73 square meters)    Stage 4 Severe CKD (GFR = 15-29 mL/min/1 73 square meters)    Stage 5 End Stage CKD (GFR <15 mL/min/1 73 square meters)  Note: GFR calculation is accurate only with a steady state creatinine    CBC and differential [[id number]] Collected: 07/02/22 1581    Lab Status: Final result Specimen: Blood from Arm, Left Updated: 07/02/22 1605     WBC 7 16 Thousand/uL      RBC 4 47 Million/uL      Hemoglobin 13 7 g/dL      Hematocrit 40 8 %      MCV 91 fL      MCH 30 6 pg      MCHC 33 6 g/dL      RDW 13 0 %      MPV 9 5 fL      Platelets 457 Thousands/uL      nRBC 0 /100 WBCs      Neutrophils Relative 73 %      Immat GRANS % 1 %      Lymphocytes Relative 15 %      Monocytes Relative 8 %      Eosinophils Relative 2 %      Basophils Relative 1 %      Neutrophils Absolute 5 31 Thousands/µL      Immature Grans Absolute 0 04 Thousand/uL      Lymphocytes Absolute 1 06 Thousands/µL      Monocytes Absolute 0 54 Thousand/µL      Eosinophils Absolute 0 14 Thousand/µL      Basophils Absolute 0 07 Thousands/µL                  CTA head and neck with and without contrast   Final Result by Maddison Watt MD (07/02 1706)      No acute intracranial pathology  No significant stenosis of the cervical carotid or vertebral arteries  No intracranial stenosis, large vessel occlusion or aneurysm                 Workstation performed: [id number]         MRI Inpatient Order    (Results Pending)              Procedures  Procedures         ED Course  ED Course as of 07/02/22 0658 Sat Jul 02, 2022   1711 CTA head and neck with and without contrast  No acute findings      1735 On exam his NIH was 0, no cerebellar findings, but very vertiginous and started vomiting when I moved him around for exam  CTA has no acute findings, and he feels quite a bit better now, but still says the wall is swimming and he feels nausea again walking, so I am just going to admit him for vertigo, so he doesn't fall at home  Stroke Assessment     Row Name 07/02/22 1545             NIH Stroke Scale    Interval Baseline      Level of Consciousness (1a ) 0      LOC Questions (1b ) 0      LOC Commands (1c ) 0      Best Gaze (2 ) 0      Visual (3 ) 0      Facial Palsy (4 ) 0      Motor Arm, Left (5a ) 0      Motor Arm, Right (5b ) 0      Motor Leg, Left (6a ) 0      Motor Leg, Right (6b ) 0      Limb Ataxia (7 ) 0      Sensory (8 ) 0      Best Language (9 ) 0      Dysarthria (10 ) 0      Extinction and Inattention (11 ) (Formerly Neglect) 0      Total 0              Flowsheet Row Most Recent Value   TPA Decision Options    TPA Decision Patient not a TPA candidate  Patient is not a candidate options Unclear time of onset outside appropriate time window , Symptoms resolved/clearly non disabling                                    MDM    Disposition  Final diagnoses:   Vertigo   At risk for falling   Nausea and vomiting     Time reflects when diagnosis was documented in both MDM as applicable and the Disposition within this note     Time User Action Codes Description Comment    7/2/2022  5:38 PM Eileen Oar Add [R42] Vertigo     7/2/2022  5:38 PM Eileen Oar Add [Z91 81] At high risk for falls per Everlean Bessy fall risk assessment scale     7/2/2022  5:38 PM Maribell Leaver [Z91 81] At high risk for falls per Everlean Bessy fall risk assessment scale     7/2/2022  5:38 PM Davonna Simple [Z91 81] At risk for falling     7/2/2022  5:38 PM Amirah Dux L Add [R11 2] Nausea and vomiting ED Disposition     ED Disposition   Admit    Condition   Stable    Date/Time   Sat Jul 2, 2022  5:45 PM    Comment   Case was discussed with Dr Sanjuana Parkinson and the patient's admission status was agreed to be Admission Status: inpatient status to the service of Dr Sanjuana Parkinson   Follow-up Information    None         Patient's Medications   Discharge Prescriptions    No medications on file       No discharge procedures on file      PDMP Review       Value Time User    PDMP Reviewed  Yes 2/10/2021 11:08 AM Deyvi Gross MD          ED Provider  Electronically Signed by           Mishel Burgos MD  07/02/22 7749

## 2022-07-02 NOTE — ASSESSMENT & PLAN NOTE
81-year-old male with past history of atrial fibrillation, hypertension, hyperlipidemia and BPH presented with acute onset of dizziness  CTA head and neck were negative for any acute processes  ED discussed with neurology recommending stroke pathway  Permissive hypertension, monitor on telemetry  Brain MRI ordered, 2D echo  Neurochecks  Aspirin, statin  Check lipid panel, A1c  PT and OT to evaluate  P r n   Meclizine as needed for dizziness  Neuro consulted

## 2022-07-03 ENCOUNTER — APPOINTMENT (INPATIENT)
Dept: MRI IMAGING | Facility: HOSPITAL | Age: 82
DRG: 149 | End: 2022-07-03
Payer: MEDICARE

## 2022-07-03 VITALS
OXYGEN SATURATION: 99 % | RESPIRATION RATE: 18 BRPM | BODY MASS INDEX: 22.45 KG/M2 | DIASTOLIC BLOOD PRESSURE: 72 MMHG | WEIGHT: 134.92 LBS | SYSTOLIC BLOOD PRESSURE: 138 MMHG | HEART RATE: 80 BPM | TEMPERATURE: 98 F

## 2022-07-03 LAB
ANION GAP SERPL CALCULATED.3IONS-SCNC: 10 MMOL/L (ref 4–13)
BUN SERPL-MCNC: 21 MG/DL (ref 5–25)
CALCIUM SERPL-MCNC: 8.6 MG/DL (ref 8.3–10.1)
CHLORIDE SERPL-SCNC: 102 MMOL/L (ref 100–108)
CHOLEST SERPL-MCNC: 168 MG/DL
CO2 SERPL-SCNC: 26 MMOL/L (ref 21–32)
CREAT SERPL-MCNC: 1.04 MG/DL (ref 0.6–1.3)
ERYTHROCYTE [DISTWIDTH] IN BLOOD BY AUTOMATED COUNT: 13 % (ref 11.6–15.1)
EST. AVERAGE GLUCOSE BLD GHB EST-MCNC: 108 MG/DL
GFR SERPL CREATININE-BSD FRML MDRD: 67 ML/MIN/1.73SQ M
GLUCOSE SERPL-MCNC: 98 MG/DL (ref 65–140)
HBA1C MFR BLD: 5.4 %
HCT VFR BLD AUTO: 40.3 % (ref 36.5–49.3)
HDLC SERPL-MCNC: 64 MG/DL
HGB BLD-MCNC: 13.5 G/DL (ref 12–17)
LDLC SERPL CALC-MCNC: 90 MG/DL (ref 0–100)
MCH RBC QN AUTO: 30.7 PG (ref 26.8–34.3)
MCHC RBC AUTO-ENTMCNC: 33.5 G/DL (ref 31.4–37.4)
MCV RBC AUTO: 92 FL (ref 82–98)
PLATELET # BLD AUTO: 330 THOUSANDS/UL (ref 149–390)
PMV BLD AUTO: 9.4 FL (ref 8.9–12.7)
POTASSIUM SERPL-SCNC: 4.1 MMOL/L (ref 3.5–5.3)
RBC # BLD AUTO: 4.4 MILLION/UL (ref 3.88–5.62)
SODIUM SERPL-SCNC: 138 MMOL/L (ref 136–145)
TRIGL SERPL-MCNC: 72 MG/DL
WBC # BLD AUTO: 9.32 THOUSAND/UL (ref 4.31–10.16)

## 2022-07-03 PROCEDURE — 85027 COMPLETE CBC AUTOMATED: CPT | Performed by: STUDENT IN AN ORGANIZED HEALTH CARE EDUCATION/TRAINING PROGRAM

## 2022-07-03 PROCEDURE — 80061 LIPID PANEL: CPT | Performed by: STUDENT IN AN ORGANIZED HEALTH CARE EDUCATION/TRAINING PROGRAM

## 2022-07-03 PROCEDURE — 99239 HOSP IP/OBS DSCHRG MGMT >30: CPT | Performed by: STUDENT IN AN ORGANIZED HEALTH CARE EDUCATION/TRAINING PROGRAM

## 2022-07-03 PROCEDURE — 99222 1ST HOSP IP/OBS MODERATE 55: CPT | Performed by: PSYCHIATRY & NEUROLOGY

## 2022-07-03 PROCEDURE — G1004 CDSM NDSC: HCPCS

## 2022-07-03 PROCEDURE — 80048 BASIC METABOLIC PNL TOTAL CA: CPT | Performed by: STUDENT IN AN ORGANIZED HEALTH CARE EDUCATION/TRAINING PROGRAM

## 2022-07-03 PROCEDURE — 70551 MRI BRAIN STEM W/O DYE: CPT

## 2022-07-03 PROCEDURE — 83036 HEMOGLOBIN GLYCOSYLATED A1C: CPT | Performed by: STUDENT IN AN ORGANIZED HEALTH CARE EDUCATION/TRAINING PROGRAM

## 2022-07-03 RX ADMIN — ASPIRIN 81 MG CHEWABLE TABLET 81 MG: 81 TABLET CHEWABLE at 08:20

## 2022-07-03 RX ADMIN — APIXABAN 5 MG: 5 TABLET, FILM COATED ORAL at 08:20

## 2022-07-03 RX ADMIN — LEVOTHYROXINE SODIUM 25 MCG: 25 TABLET ORAL at 05:24

## 2022-07-03 RX ADMIN — TRAZODONE HYDROCHLORIDE 50 MG: 50 TABLET ORAL at 02:06

## 2022-07-03 RX ADMIN — LORAZEPAM 0.5 MG: 2 INJECTION INTRAMUSCULAR; INTRAVENOUS at 09:42

## 2022-07-03 NOTE — CONSULTS
Consultation - Neurology   Wing Man [de-identified] y o  male MRN: [id number]  Unit/Bed#: E4 -01 Encounter: [id number]      Physician Requesting Consult: Darling Stacy MD  Inpatient consult to Neurology  Consult performed by: Hernan Lemus MD  Consult ordered by: Darling Stacy MD        Reason for Consult / Principal Problem: eval for cva      Assessment:  Probable bppv followed by mild vasovagal symptoms  Resolved  Mri brain w/o contrast negative for acute cva  Plan:  Stable for discharge from neurological standpoint  Continue Eliquis for AFib  Given resolution of symptoms, patient should relax next couple days not do anything to strenuous  Discussed slow movements any changes position  At this point does not appear to be any clinical indication for vestibular outpatient therapy  HPI: Wing Man is a [de-identified] y o  male who states he was at home yesterday morning drinking his coffee and reduced his paper when he suddenly felt a rocking sensation  States he then got up walk and this home to lay down he felt a bit warm as his walking his symptoms worsened  He says he had keep his eyes closed  He checked his blood pressures that was in the 200s his pulse was 100  He said he felt nauseous sick to stomach he was sweating and cold alternating  Currently his symptoms have all resolved  He has never had these symptoms in the past   No new medications  No recent illness  Denies any headaches  No palpitations  CT head unremarkable CTA head and neck unremarkable          ROS:  A 12 point review additional HPI mentions he has trouble falling asleep staying asleep nocturia, cramps in his distal lower extremities and chronic hearing issues, rest negative    Historical Information   Past Medical History:   Diagnosis Date    Back pain     Benign prostatic hypertrophy     Disease of thyroid gland     Elevated PSA 3/3/2021    Hyperlipidemia     Hypertension     Prostate cancer ([facility] [address] ) 7/21/2021    Restless leg      Past Surgical History:   Procedure Laterality Date    ACHILLES TENDON SURGERY      HERNIA REPAIR      KNEE ARTHROSCOPY Right     shoulder    [place] BIOPSY OF PROSTATE,NEEDLE/PUNCH N/A 6/15/2021    Procedure: TRANSPERINEAL MRI FUSION PROSTATE BIOPSY;  Surgeon: Jami Farrell MD;  [facility];  Service: Urology    NC PLACE RADIOTHER DEVICE/MARKER, PROSTATE N/A 9/21/2021    Procedure: INSERTION OF FIDUCIAL MARKER (TRANSRECTAL ULTRASOUND GUIDANCE), Jennifer Borja;  Surgeon: Sukumar Sewell MD;  [facility];  Service: Urology    SKIN BIOPSY      TONSILLECTOMY       Social History   Social History     Tobacco Use   Smoking Status Never Smoker   Smokeless Tobacco Never Used     Social History     Substance and Sexual Activity   Alcohol Use Yes    Alcohol/week: 4 0 standard drinks    Types: 4 Cans of beer per week    Comment: CAFFEINE USE/3-4 beers/wk  Occasional wine or drink     Social History     Substance and Sexual Activity   Drug Use No       Family History: non-contributory      Meds/Allergies     No Known Allergies    all current active meds have been reviewed    Objective   Vitals:Blood pressure 138/72, pulse 80, temperature 98 °F (36 7 °C), temperature source Temporal, resp  rate 18, weight 61 2 kg (134 lb 14 7 oz), SpO2 99 %  ,Body mass index is 22 45 kg/m²  Physical Exam:   Physical Exam General appearance: alert, appears stated age and cooperative  Head: Normocephalic, without obvious abnormality, atraumatic  Extremities: extremities normal, atraumatic, no cyanosis or edema    Neurologic:  Cognitive:  Mental status: Alert, orientedX3, thought content appropriate  CN: CNII-XII normal  No nystagmus  Motor: normal tone and bulk  5 power ue/le bilat  Sensory: light touch, temperature, proprioception intact ue/le bilat  Cerebellar: finger to nose, heel to shin intact  No dysmetria or ataxia  DTR's: 2 ue/le bilat  Plantars: downgoing bilat  Gait: normal base   Able to walk in straight line and pivot to right and left without difficulty  Lab Results: I have personally reviewed pertinent reports        Imaging Studies: I have personally reviewed pertinent films in PACS    EKG, Pathology, and Other Studies: I have personally reviewed pertinent films in PACS

## 2022-07-03 NOTE — PLAN OF CARE
Problem: Potential for Falls  Goal: Patient will remain free of falls  Description: INTERVENTIONS:  - Educate patient/family on patient safety including physical limitations  - Instruct patient to call for assistance with activity   - Consult OT/PT to assist with strengthening/mobility   - Keep Call bell within reach  - Keep bed low and locked with side rails adjusted as appropriate  - Keep care items and personal belongings within reach  - Initiate and maintain comfort rounds  - Make Fall Risk Sign visible to staff  - Offer Toileting every 2 Hours, in advance of need  - Initiate/Maintain bed alarm  - Obtain necessary fall risk management equipment  - Apply yellow socks and bracelet for high fall risk patients  - Consider moving patient to room near nurses station  Outcome: Progressing     Problem: Activity Intolerance/Impaired Mobility  Goal: Mobility/activity is maintained at optimum level for patient  Description: Interventions:  - Assess and monitor patient  barriers to mobility and need for assistive/adaptive devices  - Assess patient's emotional response to limitations  - Collaborate with interdisciplinary team and initiate plans and interventions as ordered  - Encourage independent activity per ability   - Maintain proper body alignment  - Perform active/passive rom as tolerated/ordered    - Plan activities to conserve energy   - Turn patient as appropriate  Outcome: Progressing     Problem: SAFETY ADULT  Goal: Patient will remain free of falls  Description: INTERVENTIONS:  - Educate patient/family on patient safety including physical limitations  - Instruct patient to call for assistance with activity   - Consult OT/PT to assist with strengthening/mobility   - Keep Call bell within reach  - Keep bed low and locked with side rails adjusted as appropriate  - Keep care items and personal belongings within reach  - Initiate and maintain comfort rounds  - Make Fall Risk Sign visible to staff  - Offer Toileting every 2 Hours, in advance of need  - Initiate/Maintain bed alarm  - Obtain necessary fall risk management equipment  - Apply yellow socks and bracelet for high fall risk patients  - Consider moving patient to room near nurses station  Outcome: Progressing  Goal: Maintain or return to baseline ADL function  Description: INTERVENTIONS:  -  Assess patient's ability to carry out ADLs; assess patient's baseline for ADL function and identify physical deficits which impact ability to perform ADLs (bathing, care of mouth/teeth, toileting, grooming, dressing, etc )  - Assess/evaluate cause of self-care deficits   - Assess range of motion  - Assess patient's mobility; develop plan if impaired  - Assess patient's need for assistive devices and provide as appropriate  - Encourage maximum independence but intervene and supervise when necessary  - Involve family in performance of ADLs  - Assess for home care needs following discharge   - Consider OT consult to assist with ADL evaluation and planning for discharge  - Provide patient education as appropriate  Outcome: Progressing  Goal: Maintains/Returns to pre admission functional level  Description: INTERVENTIONS:  - Perform BMAT or MOVE assessment daily    - Set and communicate daily mobility goal to care team and patient/family/caregiver  - Collaborate with rehabilitation services on mobility goals if consulted  - Perform Range of Motion 3 times a day    - Dangle patient 3 times a day  - Stand patient 3 times a day  - Ambulate patient 3 times a day  - Out of bed to chair 3 times a day   - Out of bed for meals 3 times a day  - Out of bed for toileting  - Record patient progress and toleration of activity level   Outcome: Progressing     Problem: DISCHARGE PLANNING  Goal: Discharge to home or other facility with appropriate resources  Description: INTERVENTIONS:  - Identify barriers to discharge w/patient and caregiver  - Arrange for needed discharge resources and transportation as appropriate  - Identify discharge learning needs (meds)  - Arrange for interpretive services to assist at discharge as needed  - Refer to Case Management Department for coordinating discharge planning if the patient needs post-hospital services based on physician/advanced practitioner order or complex needs related to functional status, cognitive ability, or social support system  Outcome: Progressing     Problem: Knowledge Deficit  Goal: Patient/family/caregiver demonstrates understanding of disease process, treatment plan, medications, and discharge instructions  Description: Complete learning assessment and assess knowledge base    Interventions:  - Provide teaching at level of understanding  - Provide teaching via preferred learning methods  Outcome: Progressing

## 2022-07-03 NOTE — DISCHARGE SUMMARY
[address] [facility]  Discharge- Tiffanie Hannah 1940, [de-identified] y o  male MRN: [id number]  Unit/Bed#: E4 -01 Encounter: [id number]  Primary Care Provider: Bárbara Pacheco DO   Date and time admitted to hospital: 7/2/2022  3:21 PM    * Vertigo  Assessment & Plan  45-year-old male with past history of atrial fibrillation, hypertension, hyperlipidemia and BPH presented with acute onset of dizziness  CTA head and neck were negative for any acute processes  ED discussed with neurology recommending stroke pathway  MRI neg for acute stroke  Neuro evaluated, suspects likely BPPV  Caution patient regarding positional changes, getting up slowly and to follow with PCP    HTN (hypertension)  Assessment & Plan  Continue home blood pressure medications    A-fib ([address] )  Assessment & Plan  History of paroxysmal AFib, continue Eliquis  Not on AV saige blockers per Cardiology    Hypothyroidism  Assessment & Plan  Continue levothyroxine    Benign prostatic hypertrophy  Assessment & Plan  Continue Flomax bedtime        Discharging Physician / Practitioner: Ade Andrade MD  PCP: Bárbara Pacheco DO  Admission Date:   Admission Orders (From admission, onward)     Ordered        07/02/22 1746  Inpatient Admission  Once                      Discharge Date: 07/03/22    Medical Problems             Resolved Problems  Date Reviewed: 7/3/2022   None                 Consultations During Hospital Stay:  · Neurology    Procedures Performed:   · none    Significant Findings / Test Results:   CTA head and neck with and without contrast    Result Date: 7/2/2022  Impression: No acute intracranial pathology  No significant stenosis of the cervical carotid or vertebral arteries  No intracranial stenosis, large vessel occlusion or aneurysm  Workstation performed: [id number]     MRI brain wo contrast    Result Date: 7/3/2022  · Impression: No mass effect, acute intracranial hemorrhage or evidence of recent infarction    Mild scattered chronic microvascular ischemic change  Workstation performed: [id number]   ·     Incidental Findings:   · none     Test Results Pending at Discharge (will require follow up):   · none     Outpatient Tests Requested:  · none    Complications:  None    Reason for Admission:  Dizziness    Hospital Course:     Clary Lundborg is a [de-identified] y o  male patient who originally presented to the hospital on 7/2/2022 due to dizziness  Patient was evaluated the ED, concern for possible stroke and placed on stroke pathway  Initial CTA head and neck were negative for any acute processes  Brain MRI was negative  Neurology evaluated patient, suspected possible BPPV  Patient was already anticoagulated on Eliquis, did not recommend anti-platelet therapy in addition at this time given negative MRI  As patient is able to ambulate, reporting that he is at back to baseline patient is okay to be discharged home  Patient was caution on positional changes, follow-up with PCP outpatient  May benefit from outpatient PT if symptoms were to recur  Discharged earlier than anticipated due to resolution of symptoms  Please see above list of diagnoses and related plan for additional information  Condition at Discharge: fair     Discharge Day Visit / Exam:     Subjective:  Patient seen examined today at bedside  Reports doing well, symptoms has mainly resolved  Ambulated, showered without difficulty  Denies any chest pain, shortness breath, dizziness or lightheadedness  Vitals: Blood Pressure: 138/72 (07/03/22 1100)  Pulse: 80 (07/03/22 1100)  Temperature: 98 °F (36 7 °C) (07/03/22 1100)  Temp Source: Temporal (07/03/22 1100)  Respirations: 18 (07/03/22 1100)  Weight - Scale: 61 2 kg (134 lb 14 7 oz) (07/02/22 1945)  SpO2: 99 % (07/03/22 1100)  Exam:   Physical Exam  Vitals and nursing note reviewed  Constitutional:       Appearance: Normal appearance  He is normal weight  HENT:      Head: Normocephalic and atraumatic  Eyes:      Conjunctiva/sclera: Conjunctivae normal       Pupils: Pupils are equal, round, and reactive to light  Cardiovascular:      Rate and Rhythm: Normal rate and regular rhythm  Heart sounds: Normal heart sounds  Pulmonary:      Breath sounds: Normal breath sounds  No wheezing or rhonchi  Abdominal:      General: Bowel sounds are normal  There is no distension  Palpations: Abdomen is soft  Musculoskeletal:         General: No swelling  Normal range of motion  Skin:     General: Skin is warm and dry  Neurological:      General: No focal deficit present  Mental Status: He is alert  Mental status is at baseline  Discussion with Family: patient    Discharge instructions/Information to patient and family:   See after visit summary for information provided to patient and family  Provisions for Follow-Up Care:  See after visit summary for information related to follow-up care and any pertinent home health orders  Disposition:     Home    Planned Readmission: none     Discharge Statement:  I spent 35 minutes discharging the patient  This time was spent on the day of discharge  I had direct contact with the patient on the day of discharge  Greater than 50% of the total time was spent examining patient, answering all patient questions, arranging and discussing plan of care with patient as well as directly providing post-discharge instructions  Additional time then spent on discharge activities  Discharge Medications:  See after visit summary for reconciled discharge medications provided to patient and family        ** Please Note: This note has been constructed using a voice recognition system **

## 2022-07-03 NOTE — SPEECH THERAPY NOTE
Speech Language/Pathology  St note: Pt screened  tolerated breakfast this am  Stated he vomited yesterday but is feeling better today   "we'll see when I get up"  Speech clear  denied any speech or swallow changes  Nurse reported  No swallow concerns  Will d/c order  Screen only

## 2022-07-03 NOTE — CASE MANAGEMENT
Case Management Discharge Planning Note    Patient name Tiffanie Hannah  Location [facility] 4 /E4 -* MRN [id number]  : 1940 Date 7/3/2022       Current Admission Date: 2022  Current Admission Diagnosis:Vertigo   Patient Active Problem List    Diagnosis Date Noted    Vertigo 2022    Sleep apnea 2021    [facility] 2021    Elevated PSA 2021    A-fib ([facility] ) 2017    HTN (hypertension) 2017    Benign prostatic hypertrophy     Hypothyroidism       LOS (days): 1  Geometric Mean LOS (GMLOS) (days):   Days to GMLOS:     OBJECTIVE:  Risk of Unplanned Readmission Score: 10 62         Current admission status: Inpatient   Preferred Pharmacy:   [facility]  for [facility], [address] [place] [address]  Phone: [phone] Fax: [phone] [address] [address]  Phone: [phone] Fax: [phone]    Primary Care Provider: Bárbara Pacheco DO    Primary Insurance: MEDICARE  Secondary Insurance:  FOR LIFE    DISCHARGE DETAILS:    CM Consulted for ischemic stroke/ stroke pathway  No stroke detected  CM met w/ Pt and his spouse at bedside  Pt states he is independent- continues to drive and now finds ways to enjoy life with his spouse  Pt states he has relinquished several tasks to his grandson  Pt will not need services  Pt is to follow up in the op setting  Spouse will transport Pt home  CM Consult closed

## 2022-07-03 NOTE — ASSESSMENT & PLAN NOTE
52-year-old male with past history of atrial fibrillation, hypertension, hyperlipidemia and BPH presented with acute onset of dizziness  CTA head and neck were negative for any acute processes  ED discussed with neurology recommending stroke pathway  MRI neg for acute stroke  Neuro evaluated, suspects likely BPPV  Caution patient regarding positional changes, getting up slowly and to follow with PCP

## 2022-07-06 ENCOUNTER — APPOINTMENT (OUTPATIENT)
Dept: RADIATION ONCOLOGY | Facility: CLINIC | Age: 82
End: 2022-07-06
Payer: MEDICARE

## 2022-07-08 ENCOUNTER — RADIATION ONCOLOGY FOLLOW-UP (OUTPATIENT)
Dept: RADIATION ONCOLOGY | Facility: CLINIC | Age: 82
End: 2022-07-08
Attending: RADIOLOGY
Payer: MEDICARE

## 2022-07-08 ENCOUNTER — TELEPHONE (OUTPATIENT)
Dept: CARDIOLOGY CLINIC | Facility: CLINIC | Age: 82
End: 2022-07-08

## 2022-07-08 VITALS
OXYGEN SATURATION: 98 % | HEART RATE: 71 BPM | DIASTOLIC BLOOD PRESSURE: 84 MMHG | BODY MASS INDEX: 22.11 KG/M2 | RESPIRATION RATE: 16 BRPM | HEIGHT: 65 IN | WEIGHT: 132.72 LBS | SYSTOLIC BLOOD PRESSURE: 168 MMHG | TEMPERATURE: 98 F

## 2022-07-08 DIAGNOSIS — C61 PROSTATE CANCER (HCC): Primary | ICD-10-CM

## 2022-07-08 PROCEDURE — 99211 OFF/OP EST MAY X REQ PHY/QHP: CPT | Performed by: RADIOLOGY

## 2022-07-08 NOTE — PHYSICIAN ADVISOR
Current patient class: Inpatient  The patient is currently on Hospital Day: 2 at [address] [place] [address]      The patient was admitted to the hospital  on 7/2/22 at  5:46 PM for the following diagnosis:  Dizziness [R42]  Vertigo [R42]  Nausea and vomiting [R11 2]  At risk for falling [Z91 81]     After review of the relevant documentation, labs, vital signs and test results, this is a PROVIDER LIABLE case  In this particular case the patient was admitted to the hospital as an inpatient  The patient however failed to satisfy the 2 midnight benchmark and closer scrutiny of the case was warranted  After review of the patient presentation and relevant labs the patient was most appropriate for observation or outpatient class at the time of admission  Given that this patient has already been discharged prior to this review they become a provider liable case  Rationale is as follows: The patient is a [de-identified] yrs   Male who presented to the ED at 7/2/2022  3:21 PM with a chief complaint of Dizziness (Patint with increased weakness  Nausea, and dizziness  Jaziel Mariee was noted to be hypertensive at home so took his wife's metoprolol to help with pressure) Patient admitted for stroke evaluation  MRI negative for acute stroke  He was felt to have BPPV and stable for discharge hospital day 2      The patients vitals on arrival were   ED Triage Vitals   Temperature Pulse Respirations Blood Pressure SpO2   07/02/22 1526 07/02/22 1526 07/02/22 1526 07/02/22 1526 07/02/22 1526   97 5 °F (36 4 °C) (!) 52 20 (!) 181/83 99 %      Temp Source Heart Rate Source Patient Position - Orthostatic VS BP Location FiO2 (%)   07/02/22 1526 07/02/22 1734 07/02/22 1734 07/02/22 1734 --   Oral Monitor Lying Right arm       Pain Score       07/02/22 1800       No Pain           Past Medical History:   Diagnosis Date    Back pain     Benign prostatic hypertrophy     Disease of thyroid gland     Elevated PSA 3/3/2021    Hyperlipidemia     Hypertension     Pro[facility] ) 7/21/2021    Restless leg      Past Surgical History:   Procedure Laterality Date    ACHILLES TENDON SURGERY      HERNIA REPAIR      KNEE ARTHROSCOPY Right     shoulder    MN BIOPSY OF PROSTATE,NEEDLE/PUNCH N/A 6/15/2021    Procedure: TRANSPERINEAL MRI FUSION PROSTATE BIOPSY;  Surgeon: Robert Salvador MD;  [facility];  Service: Urology    MN PLACE [address], PROSTATE N/A 9/21/2021    Procedure: INSERTION OF FIDUCIAL MARKER (TRANSRECTAL ULTRASOUND GUIDANCE), SPACEOAR;  Surgeon: Syd Bonner MD;  [facility];  Service: Urology    SKIN BIOPSY      TONSILLECTOMY             Consults have been placed to:   IP CONSULT TO NEUROLOGY  IP CONSULT TO CASE MANAGEMENT    Vitals:    07/03/22 0245 07/03/22 0445 07/03/22 0645 07/03/22 1100   BP: 95/52 110/59 128/68 138/72   BP Location: Right arm Right arm Right arm Right arm   Pulse: 85 78 64 80   Resp: 16 16 18 18   Temp: (!) 97 3 °F (36 3 °C) 97 6 °F (36 4 °C) 97 7 °F (36 5 °C) 98 °F (36 7 °C)   TempSrc: Temporal Temporal Temporal Temporal   SpO2: 94% 96% 98% 99%   Weight:           Most recent labs:    No results for input(s): WBC, HGB, HCT, PLT, K, NA, CALCIUM, BUN, CREATININE, LIPASE, AMYLASE, INR, TROPONINI, CKTOTAL, AST, ALT, ALKPHOS, BILITOT in the last 72 hours  Scheduled Meds:  Continuous Infusions:No current facility-administered medications for this encounter  PRN Meds:      Surgical procedures (if appropriate):

## 2022-07-08 NOTE — PROGRESS NOTES
Tiffanie Hannah 1940 is a [de-identified] y o  male     Follow up visit     Patient is a [de-identified]  y o male with [place] 4+3=7 prostate cancer  He completed 6 months of ADT and a course of radiation to the prostate on 11/15/21  He returns today for follow up  He tolerated treatment well  He did have decreased urinary stream and was placed on Flomax with some improvement  He had no symptoms of proctitis a completion of treatment  Component PSA, Total   Latest Ref Rng & Units 0 0 - 4 0 ng/mL   2021 5 4 (H)   2021 2 1   2022 <0 1       22 Dr Efrain Cogan  Repeat PSA with repeat testosterone 6 months  Patient would like to stop tamsulosin- will reinstitute therapy if he has any urinary problems after being off of that medication  22- 7/3/22 Hospital admission for dizziness  Placed on stroke pathway  CTA head and neck were negative   Brain MRI was negative  Neuro evaluated, suspects likely BPPV  Discharged earlier than anticipated due to resolution of symptoms        Upcomin22 Urology with PSA and testosterone prior to visit        Oncology History   Prostate cancer ([facility] )   6/15/2021 Initial Diagnosis    Prostate cancer ([facility] )     6/15/2021 Biopsy    Final Diagnosis   A  Right JACKSON prostate, needle core biopsy:  - Benign prostate glands and stroma  B  Left anterior lateral prostate, needle core biopsy:  - Prostatic adenocarcinoma, [place] grade 4 + 3 = 7 (50% pattern 3 and 50% pattern 4, Grade group 3), involving one of one core and 5% of the tissue   - Perineural invasion is absent  C  Left posterior lateral prostate, needle core biopsy:  - Benign prostate glands and stroma  D  Left posterior medial prostate, needle core biopsy:  - Benign prostate glands and stroma  E  Left base prostate, needle core biopsy:  - Benign prostate glands and stroma  F  Right anterior medial prostate, needle core biopsy:  - Benign prostate glands and stroma       G  Right anterior lateral prostate, needle core biopsy:  - Prostatic adenocarcinoma, [place] grade 3 + 3 = 6 (Grade group 1), involving one of one core and less than 5% of the tissue   - Perineural invasion is absent  H  Right posterior lateral prostate, needle core biopsy:  - Atypical small acinar proliferation (ASAP), suspicious but not diagnostic for malignancy  I  Right posterior medial prostate, needle core biopsy:  - Benign prostate glands and stroma  J  Right base prostate, needle core biopsy:  - Benign prostate glands and stroma  10/6/2021 - 11/15/2021 Radiation    PROSTATE 10X 28 / 28 250 0 7,000 40      Treatment dates:  10/6/2021 - 11/15/2021          Review of Systems:  Review of Systems   Constitutional: Positive for activity change (not as active anymore) and fatigue (tires more quickly, especially with warmer weather)  HENT: Positive for hearing loss and postnasal drip (chronic)  Eyes:        Wears glasses  Respiratory: Positive for cough (secndary to post nasal drip)  Cardiovascular: Negative  Negative for chest pain  Gastrointestinal: Negative  Negative for blood in stool and constipation  Endocrine: Negative  Genitourinary: Positive for difficulty urinating (intermittent stream at times), frequency and urgency (seldom)  Negative for dysuria and hematuria  Good urine stream  Nocturia x 3  Dribbles sometimes  Musculoskeletal: Positive for arthralgias (Right shoulder) and back pain (Long hx of "back discomfort")  Skin: Negative  Allergic/Immunologic: Negative  Neurological: Positive for dizziness (went to ED 7/2-7/3 for dizziness, none since then)  Hematological: Does not bruise/bleed easily (Eliquis )  Psychiatric/Behavioral: Positive for sleep disturbance (Insomnia x years  )  The patient is nervous/anxious (worries)  Clinical Trial: no    IPSS Questionnaire (AUA-7):   Over the past month    1)  How often have you had a sensation of not emptying your bladder completely after you finish urinating? 2 - Less than half the time   2)  How often have you had to urinate again less than two hours after you finished urinating? 1 - Less than 1 time in 5   3)  How often have you found you stopped and started again several times when you urinated? 3 - About half the time   4) How difficult have you found it to postpone urination? 1 - Less than 1 time in 5   5) How often have you had a weak urinary stream?  2 - Less than half the time   6) How often have you had to push or strain to begin urination? 0 - Not at all   7) How many times did you most typically get up to urinate from the time you went to bed until the time you got up in the morning?   3 - 3 times   Total Score:  12       Teaching    Covid Vaccine Status: up to date    Health Maintenance   Topic Date Due    Medicare Annual Wellness Visit (AWV)  Never done    Fall Risk  Never done    Pneumococcal Vaccine: 65+ Years (2 - PPSV23 or PCV20) 06/17/2017    COVID-19 Vaccine (4 - Booster for Moderna series) 01/11/2022    Depression Screening  07/22/2022    Influenza Vaccine (1) 09/01/2022    BMI: Adult  07/02/2023    DTaP,Tdap,and Td Vaccines (2 - Td or Tdap) 01/25/2029    HIB Vaccine  Aged Out    Hepatitis B Vaccine  Aged Out    IPV Vaccine  Aged Out    Hepatitis A Vaccine  Aged Out    Meningococcal ACWY Vaccine  Aged Out    HPV Vaccine  Aged Out     Patient Active Problem List   Diagnosis    Benign prostatic hypertrophy    Hypothyroidism    A-fib ([address] )    HTN (hypertension)    Elevated PSA    Prostate cancer ([address] )    Sleep apnea    Vertigo     Past Medical History:   Diagnosis Date    Back pain     Benign prostatic hypertrophy     Disease of thyroid gland     Elevated PSA 3/3/2021    Hyperlipidemia     Hypertension     Prostate cancer ([address] ) 7/21/2021    Restless leg      Past Surgical History:   Procedure Laterality Date    ACHILLES TENDON SURGERY      HERNIA REPAIR      KNEE ARTHROSCOPY Right     shoulder    CT BIOPSY OF PROSTATE,NEEDLE/PUNCH N/A 6/15/2021    Procedure: TRANSPERINEAL MRI FUSION PROSTATE BIOPSY;  Surgeon: Gilberto Moon MD;  [facility];  Service: Urology    CT PLACE RADIOTHER DEVICE/MARKER, PROSTATE N/A 9/21/2021    Procedure: INSERTION OF FIDUCIAL MARKER (TRANSRECTAL ULTRASOUND GUIDANCE), Huong Diamond;  Surgeon: Maddison Joel MD;  [facility];  Service: Urology    SKIN BIOPSY      TONSILLECTOMY       Family History   Problem Relation Age of Onset    Cancer Mother     Throat cancer Father     Breast cancer Sister      Social History     Socioeconomic History    Marital status: /Civil Union     Spouse name: Not on file    Number of children: Not on file    Years of education: Not on file    Highest education level: Not on file   Occupational History    Not on file   Tobacco Use    Smoking status: Never Smoker    Smokeless tobacco: Never Used   Vaping Use    Vaping Use: Never used   Substance and Sexual Activity    Alcohol use: Yes     Alcohol/week: 4 0 standard drinks     Types: 4 Cans of beer per week     Comment: CAFFEINE USE/3-4 beers/wk    Occasional wine or drink    Drug use: No    Sexual activity: Not on file   Other Topics Concern    Not on file   Social History Narrative    Not on file     Social Determinants of Health     Financial Resource Strain: Not on file   Food Insecurity: Not on file   Transportation Needs: Not on file   Physical Activity: Not on file   Stress: Not on file   Social Connections: Not on file   Intimate Partner Violence: Not on file   Housing Stability: Not on file       Current Outpatient Medications:     amLODIPine (NORVASC) 2 5 mg tablet,  , Disp: , Rfl:     bimatoprost (LUMIGAN) 0 01 % ophthalmic drops, Administer 1 drop into the left eye daily at bedtime (Patient not taking: Reported on 7/2/2022), Disp: , Rfl:     Eliquis 5 MG, TAKE 1 TABLET TWICE A DAY, Disp: 180 tablet, Rfl: 3   fluticasone (FLONASE) 50 mcg/act nasal spray, , Disp: , Rfl:     levothyroxine 25 mcg tablet, Take 25 mcg by mouth daily, Disp: , Rfl:     Multiple Vitamins-Minerals (CENTRUM SILVER 50+MEN PO), Take by mouth, Disp: , Rfl:     Omega-3 Fatty Acids (FISH OIL) 1,000 mg, Take by mouth daily, Disp: , Rfl:     pramipexole (MIRAPEX) 0 125 mg tablet, Take by mouth Daily  , Disp: , Rfl:     rosuvastatin (CRESTOR) 10 MG tablet, 10 mg Pt takes med every other day--conversation with PCP (Dr Dev Davey) November 2021 appt/Labs, Disp: , Rfl:     tamsulosin (FLOMAX) 0 4 mg, Take 1 capsule (0 4 mg total) by mouth daily with dinner, Disp: 30 capsule, Rfl: 2    traZODone (DESYREL) 50 mg tablet,  , Disp: , Rfl:   No Known Allergies  Vitals:    07/08/22 1058   BP: 170/90   Pulse: 84   Resp: 16   Temp: 98 °F (36 7 °C)   TempSrc: Temporal   SpO2: 98%   Weight: 60 2 kg (132 lb 11 5 oz)   Height: 5' 5" (1 651 m)      Pain Score: 0-No pain

## 2022-07-08 NOTE — TELEPHONE ENCOUNTER
Pt called to make Dr Kirby Bravo aware he was in hospital for elevated BP event   he was discharged without medication changes and told to follow with PCP  Dr Yue Franco office  Pts wife thought maybe he needs more medication and told him to call here  The patient is feeling fine today BP [id number]  Told him would make Dr Kirby Bravo aware but he should call his PCP to see if adjustments needed since he is the one who prescribes his Norvasc    Pt in agreement to call PCP>

## 2022-08-02 ENCOUNTER — APPOINTMENT (OUTPATIENT)
Dept: LAB | Facility: CLINIC | Age: 82
End: 2022-08-02
Payer: MEDICARE

## 2022-08-02 DIAGNOSIS — C61 PROSTATE CANCER (HCC): ICD-10-CM

## 2022-08-02 LAB — PSA SERPL-MCNC: 0.3 NG/ML (ref 0–4)

## 2022-08-02 PROCEDURE — 84402 ASSAY OF FREE TESTOSTERONE: CPT

## 2022-08-02 PROCEDURE — 84153 ASSAY OF PSA TOTAL: CPT

## 2022-08-02 PROCEDURE — 36415 COLL VENOUS BLD VENIPUNCTURE: CPT

## 2022-08-02 PROCEDURE — 84403 ASSAY OF TOTAL TESTOSTERONE: CPT

## 2022-08-03 LAB
TESTOST FREE SERPL-MCNC: 3.2 PG/ML (ref 6.6–18.1)
TESTOST SERPL-MCNC: 214 NG/DL (ref 264–916)

## 2022-08-09 ENCOUNTER — OFFICE VISIT (OUTPATIENT)
Dept: UROLOGY | Facility: MEDICAL CENTER | Age: 82
End: 2022-08-09
Payer: MEDICARE

## 2022-08-09 VITALS
HEART RATE: 96 BPM | OXYGEN SATURATION: 98 % | DIASTOLIC BLOOD PRESSURE: 70 MMHG | BODY MASS INDEX: 22.33 KG/M2 | SYSTOLIC BLOOD PRESSURE: 132 MMHG | WEIGHT: 134 LBS | HEIGHT: 65 IN

## 2022-08-09 DIAGNOSIS — C61 PROSTATE CANCER (HCC): Primary | ICD-10-CM

## 2022-08-09 DIAGNOSIS — Z92.3 HISTORY OF RADIATION THERAPY: ICD-10-CM

## 2022-08-09 DIAGNOSIS — E29.1 HYPOGONADISM IN MALE: ICD-10-CM

## 2022-08-09 LAB
SL AMB  POCT GLUCOSE, UA: NORMAL
SL AMB LEUKOCYTE ESTERASE,UA: NORMAL
SL AMB POCT BILIRUBIN,UA: NORMAL
SL AMB POCT BLOOD,UA: NORMAL
SL AMB POCT CLARITY,UA: CLEAR
SL AMB POCT COLOR,UA: YELLOW
SL AMB POCT KETONES,UA: NORMAL
SL AMB POCT NITRITE,UA: NORMAL
SL AMB POCT PH,UA: 7
SL AMB POCT SPECIFIC GRAVITY,UA: 1.01
SL AMB POCT URINE PROTEIN: NORMAL
SL AMB POCT UROBILINOGEN: 0.2

## 2022-08-09 PROCEDURE — 81003 URINALYSIS AUTO W/O SCOPE: CPT | Performed by: UROLOGY

## 2022-08-09 PROCEDURE — 99214 OFFICE O/P EST MOD 30 MIN: CPT | Performed by: UROLOGY

## 2022-08-09 NOTE — PROGRESS NOTES
Assessment/Plan:  1  Adenocarcinoma the prostate [place] pattern score 6 and 7 status post 6 months of androgen deprivation therapy and IMRT  The patient's testosterone has risen from less than 8 to almost low normal over 200 now  With that rise PSA has gone from undetectable to 0 3  Will continue to watch serial Q 6 month PSAs    2  History of radiation therapy-as above    3  Hypogonadism-decreasing after 6 months of androgen deprivation  4  BPH with lower urinary tract symptoms now off of tamsulosin  Patient voiding adequately with AUA symptom score 13    No problem-specific Assessment & Plan notes found for this encounter  Diagnoses and all orders for this visit:    Prostate cancer ([facility] )  -     POCT urine dip auto non-scope    History of radiation therapy    Hypogonadism in male          Subjective:      Patient ID: Bari Littlejohn is a [de-identified] y o  male  HPI  81-year-old gentleman with [place] pattern score 6 and 7 status post 6 months androgen deprivation and IMRT presents with rising testosterone level and PSA now 0 3  The patient has an AUA symptom score of 13 noting the coming off of tamsulosin did not change his voiding pattern and that he is happy with his current voiding situation  He denies dysuria gross hematuria The following portions of the patient's history were reviewed and updated as appropriate: allergies, current medications, past family history, past medical history, past social history, past surgical history and problem list     Review of Systems   Genitourinary:        Aua ss 13   All other systems reviewed and are negative  Objective:      /70   Pulse 96   Ht 5' 5" (1 651 m)   Wt 60 8 kg (134 lb)   SpO2 98%   BMI 22 30 kg/m²          Physical Exam  Vitals reviewed  Constitutional:       General: He is not in acute distress  Appearance: Normal appearance  He is normal weight  He is not ill-appearing, toxic-appearing or diaphoretic     HENT:      Head: Normocephalic and atraumatic  Nose: Nose normal       Mouth/Throat:      Mouth: Mucous membranes are moist    Eyes:      Extraocular Movements: Extraocular movements intact  Pulmonary:      Effort: Pulmonary effort is normal  No respiratory distress  Abdominal:      Palpations: Abdomen is soft  Skin:     General: Skin is dry  Neurological:      Mental Status: He is alert and oriented to person, place, and time  Psychiatric:         Mood and Affect: Mood normal          Behavior: Behavior normal          Thought Content:  Thought content normal          Judgment: Judgment normal

## 2022-09-27 ENCOUNTER — APPOINTMENT (OUTPATIENT)
Dept: LAB | Facility: HOSPITAL | Age: 82
End: 2022-09-27
Payer: MEDICARE

## 2022-09-27 ENCOUNTER — HOSPITAL ENCOUNTER (OUTPATIENT)
Dept: RADIOLOGY | Facility: HOSPITAL | Age: 82
Discharge: HOME | End: 2022-09-27
Payer: MEDICARE

## 2022-09-27 DIAGNOSIS — M54.2 NECK PAIN: ICD-10-CM

## 2022-09-27 DIAGNOSIS — I10 ESSENTIAL (PRIMARY) HYPERTENSION: ICD-10-CM

## 2022-09-27 DIAGNOSIS — E78.2 MIXED HYPERLIPIDEMIA: ICD-10-CM

## 2022-09-27 DIAGNOSIS — R73.9 HYPERGLYCEMIA: ICD-10-CM

## 2022-09-27 LAB
ALBUMIN SERPL BCP-MCNC: 4.2 G/DL (ref 3.5–5)
ALP SERPL-CCNC: 72 U/L (ref 43–122)
ALT SERPL W P-5'-P-CCNC: 22 U/L
ANION GAP SERPL CALCULATED.3IONS-SCNC: 6 MMOL/L (ref 5–14)
AST SERPL W P-5'-P-CCNC: 103 U/L (ref 17–59)
BASOPHILS # BLD AUTO: 0.08 THOUSANDS/ΜL (ref 0–0.1)
BASOPHILS NFR BLD AUTO: 1 % (ref 0–1)
BILIRUB SERPL-MCNC: 0.76 MG/DL (ref 0.2–1)
BUN SERPL-MCNC: 19 MG/DL (ref 5–25)
CALCIUM SERPL-MCNC: 9.3 MG/DL (ref 8.4–10.2)
CHLORIDE SERPL-SCNC: 103 MMOL/L (ref 96–108)
CHOLEST SERPL-MCNC: 171 MG/DL
CK SERPL-CCNC: 93 U/L (ref 39–308)
CO2 SERPL-SCNC: 28 MMOL/L (ref 21–32)
CREAT SERPL-MCNC: 0.95 MG/DL (ref 0.7–1.5)
EOSINOPHIL # BLD AUTO: 0.35 THOUSAND/ΜL (ref 0–0.61)
EOSINOPHIL NFR BLD AUTO: 6 % (ref 0–6)
ERYTHROCYTE [DISTWIDTH] IN BLOOD BY AUTOMATED COUNT: 13.9 % (ref 11.6–15.1)
EST. AVERAGE GLUCOSE BLD GHB EST-MCNC: 114 MG/DL
GFR SERPL CREATININE-BSD FRML MDRD: 75 ML/MIN/1.73SQ M
GLUCOSE P FAST SERPL-MCNC: 97 MG/DL (ref 70–99)
HBA1C MFR BLD: 5.6 %
HCT VFR BLD AUTO: 43.4 % (ref 36.5–49.3)
HDLC SERPL-MCNC: 68 MG/DL
HGB BLD-MCNC: 14.2 G/DL (ref 12–17)
IMM GRANULOCYTES # BLD AUTO: 0.03 THOUSAND/UL (ref 0–0.2)
IMM GRANULOCYTES NFR BLD AUTO: 1 % (ref 0–2)
LDLC SERPL CALC-MCNC: 84 MG/DL (ref 0–100)
LYMPHOCYTES # BLD AUTO: 1.47 THOUSANDS/ΜL (ref 0.6–4.47)
LYMPHOCYTES NFR BLD AUTO: 26 % (ref 14–44)
MCH RBC QN AUTO: 30.9 PG (ref 26.8–34.3)
MCHC RBC AUTO-ENTMCNC: 32.7 G/DL (ref 31.4–37.4)
MCV RBC AUTO: 95 FL (ref 82–98)
MONOCYTES # BLD AUTO: 0.61 THOUSAND/ΜL (ref 0.17–1.22)
MONOCYTES NFR BLD AUTO: 11 % (ref 4–12)
NEUTROPHILS # BLD AUTO: 3.21 THOUSANDS/ΜL (ref 1.85–7.62)
NEUTS SEG NFR BLD AUTO: 55 % (ref 43–75)
NONHDLC SERPL-MCNC: 103 MG/DL
NRBC BLD AUTO-RTO: 0 /100 WBCS
PLATELET # BLD AUTO: 343 THOUSANDS/UL (ref 149–390)
PMV BLD AUTO: 9.7 FL (ref 8.9–12.7)
POTASSIUM SERPL-SCNC: 4.8 MMOL/L (ref 3.5–5.3)
PROT SERPL-MCNC: 7.1 G/DL (ref 6.4–8.4)
RBC # BLD AUTO: 4.59 MILLION/UL (ref 3.88–5.62)
SODIUM SERPL-SCNC: 137 MMOL/L (ref 135–147)
TRIGL SERPL-MCNC: 95 MG/DL
TSH SERPL DL<=0.05 MIU/L-ACNC: 2.9 UIU/ML (ref 0.45–4.5)
WBC # BLD AUTO: 5.75 THOUSAND/UL (ref 4.31–10.16)

## 2022-09-27 PROCEDURE — 80053 COMPREHEN METABOLIC PANEL: CPT

## 2022-09-27 PROCEDURE — 80061 LIPID PANEL: CPT

## 2022-09-27 PROCEDURE — 72050 X-RAY EXAM NECK SPINE 4/5VWS: CPT

## 2022-09-27 PROCEDURE — 82550 ASSAY OF CK (CPK): CPT

## 2022-09-27 PROCEDURE — 85025 COMPLETE CBC W/AUTO DIFF WBC: CPT

## 2022-09-27 PROCEDURE — 83036 HEMOGLOBIN GLYCOSYLATED A1C: CPT

## 2022-09-27 PROCEDURE — 84443 ASSAY THYROID STIM HORMONE: CPT

## 2022-09-27 PROCEDURE — 36415 COLL VENOUS BLD VENIPUNCTURE: CPT

## 2022-11-01 ENCOUNTER — APPOINTMENT (OUTPATIENT)
Dept: LAB | Facility: CLINIC | Age: 82
End: 2022-11-01

## 2022-11-01 DIAGNOSIS — R94.5 NONSPECIFIC ABNORMAL RESULTS OF LIVER FUNCTION STUDY: ICD-10-CM

## 2022-11-01 LAB
ALBUMIN SERPL BCP-MCNC: 3.5 G/DL (ref 3.5–5)
ALP SERPL-CCNC: 69 U/L (ref 46–116)
ALT SERPL W P-5'-P-CCNC: 19 U/L (ref 12–78)
ANION GAP SERPL CALCULATED.3IONS-SCNC: 1 MMOL/L (ref 4–13)
AST SERPL W P-5'-P-CCNC: 14 U/L (ref 5–45)
BILIRUB SERPL-MCNC: 0.77 MG/DL (ref 0.2–1)
BUN SERPL-MCNC: 17 MG/DL (ref 5–25)
CALCIUM SERPL-MCNC: 9.4 MG/DL (ref 8.3–10.1)
CHLORIDE SERPL-SCNC: 104 MMOL/L (ref 96–108)
CO2 SERPL-SCNC: 30 MMOL/L (ref 21–32)
CREAT SERPL-MCNC: 1.01 MG/DL (ref 0.6–1.3)
GFR SERPL CREATININE-BSD FRML MDRD: 69 ML/MIN/1.73SQ M
GLUCOSE SERPL-MCNC: 108 MG/DL (ref 65–140)
HAV IGM SER QL: NORMAL
HBV CORE IGM SER QL: NORMAL
HBV SURFACE AG SER QL: NORMAL
HCV AB SER QL: NORMAL
POTASSIUM SERPL-SCNC: 4.2 MMOL/L (ref 3.5–5.3)
PROT SERPL-MCNC: 7.3 G/DL (ref 6.4–8.4)
SODIUM SERPL-SCNC: 135 MMOL/L (ref 135–147)

## 2022-11-07 DIAGNOSIS — I48.91 ATRIAL FIBRILLATION, UNSPECIFIED TYPE (HCC): ICD-10-CM

## 2022-11-10 RX ORDER — APIXABAN 5 MG/1
TABLET, FILM COATED ORAL
Qty: 180 TABLET | Refills: 3 | Status: SHIPPED | OUTPATIENT
Start: 2022-11-10

## 2023-02-03 ENCOUNTER — OFFICE VISIT (OUTPATIENT)
Dept: OBGYN CLINIC | Facility: MEDICAL CENTER | Age: 83
End: 2023-02-03

## 2023-02-03 ENCOUNTER — APPOINTMENT (OUTPATIENT)
Dept: RADIOLOGY | Facility: MEDICAL CENTER | Age: 83
End: 2023-02-03

## 2023-02-03 VITALS
HEIGHT: 65 IN | HEART RATE: 86 BPM | WEIGHT: 137 LBS | SYSTOLIC BLOOD PRESSURE: 161 MMHG | BODY MASS INDEX: 22.82 KG/M2 | DIASTOLIC BLOOD PRESSURE: 79 MMHG

## 2023-02-03 DIAGNOSIS — Z01.89 ENCOUNTER FOR LOWER EXTREMITY COMPARISON IMAGING STUDY: ICD-10-CM

## 2023-02-03 DIAGNOSIS — M25.561 RIGHT KNEE PAIN, UNSPECIFIED CHRONICITY: ICD-10-CM

## 2023-02-03 DIAGNOSIS — M17.11 PRIMARY OSTEOARTHRITIS OF RIGHT KNEE: Primary | ICD-10-CM

## 2023-02-03 RX ORDER — BUPIVACAINE HYDROCHLORIDE 5 MG/ML
2 INJECTION, SOLUTION EPIDURAL; INTRACAUDAL
Status: COMPLETED | OUTPATIENT
Start: 2023-02-03 | End: 2023-02-03

## 2023-02-03 RX ORDER — TRIAMCINOLONE ACETONIDE 40 MG/ML
40 INJECTION, SUSPENSION INTRA-ARTICULAR; INTRAMUSCULAR
Status: COMPLETED | OUTPATIENT
Start: 2023-02-03 | End: 2023-02-03

## 2023-02-03 RX ADMIN — TRIAMCINOLONE ACETONIDE 40 MG: 40 INJECTION, SUSPENSION INTRA-ARTICULAR; INTRAMUSCULAR at 09:58

## 2023-02-03 RX ADMIN — BUPIVACAINE HYDROCHLORIDE 2 ML: 5 INJECTION, SOLUTION EPIDURAL; INTRACAUDAL at 09:58

## 2023-02-03 NOTE — PROGRESS NOTES
Assessment/Plan     1  Primary osteoarthritis of right knee    2  Right knee pain, unspecified chronicity    3  Encounter for lower extremity comparison imaging study      Orders Placed This Encounter   Procedures   • Large joint arthrocentesis: R knee   • XR knee 4+ vw right injury   • XR knee 1 or 2 vw left   • Ambulatory Referral to Physical Therapy     • Patient presents with severe right knee osteoarthritis  Informed him that the deformity in his knee may be a result of his advanced arthritis  • Discussed conservative treatment as well as risks and benefits of these treatment options  • After a discussion of risks and benefits the patient elected to proceed with a right knee steroid injection today  Patient should ice and avoid strenuous activity for 1-2 days if needed  Patient should avoid vaccines for 2 weeks if possible  If patient is diabetic should also monitor glucose over the next 7 to 10 days  • Continue Tylenol as needed for pain  1,000 mg up to 3 times a day  Do not exceed 3,000 mg per day  • Advised Kimo to call if he would like visco injections ordered for the right knee  Return in about 2 months (around 4/3/2023) for Recheck with Emma Nesbitt  I answered all of the patient's questions during the visit and provided education of the patient's condition during the visit  The patient verbalized understanding of the information given and agrees with the plan  This note was dictated using Scribd software  It may contain errors including improperly dictated words  Please contact physician directly for any questions  History of Present Illness   Chief complaint:   Chief Complaint   Patient presents with   • Right Knee - Pain       HPI: Joe Molina is a 81 y o  male that c/o right knee pain  Pain has been present for a few years, located lateral, and described as sharp   His biggest complaint is the lateral pain and he feels his fibular head is sticking out more compared to the right knee  Patient denies any previous injuries or surgeries  Pain is alleviated by Tylenol and Voltaren gel, and aggravated by walking, stairs, twisting  Symptoms include locking and instability  Patient has not initiated PT, but has had one CSI years ago with no relief, and 3-series visco injections once with good relief  Patient does report radiating pain ankle distal paresthesias, and he is aware he has cervical pathology possibly contributing to this  He reports using a compression wrap, but no formal brace for the knee  ROS:    See HPI for musculoskeletal review  All other systems reviewed are negative     Historical Information   Past Medical History:   Diagnosis Date   • Back pain    • Benign prostatic hypertrophy    • Disease of thyroid gland    • Elevated PSA 3/3/2021   • Hyperlipidemia    • Hypertension    • Prostate cancer ([facility] [address] ) 7/21/2021   • Restless leg      Past Surgical History:   Procedure Laterality Date   • ACHILLES TENDON SURGERY     • HERNIA REPAIR     • KNEE ARTHROSCOPY Right     shoulder   • [place] PLMT INTERSTITIAL DEV RADIAT TX PROSTATE 1/MULT N/A 9/21/2021    Procedure: INSERTION OF FIDUCIAL MARKER (TRANSRECTAL ULTRASOUND GUIDANCE), SPACEOAR;  Surgeon: Chani Langford MD;  Location: [facility];  Service: Urology   • [place] PROSTATE NEEDLE BIOPSY ANY APPROACH N/A 6/15/2021    Procedure: TRANSPERINEAL MRI FUSION PROSTATE BIOPSY;  Surgeon: Selvin Aguero MD;  Location: [facility];  Service: Urology   • SKIN BIOPSY     • TONSILLECTOMY       Social History   Social History     Substance and Sexual Activity   Alcohol Use Yes   • Alcohol/week: 4 0 standard drinks   • Types: 4 Cans of beer per week    Comment: CAFFEINE USE/3-4 beers/wk    Occasional wine or drink     Social History     Substance and Sexual Activity   Drug Use No     Social History     Tobacco Use   Smoking Status Never   Smokeless Tobacco Never     Family History:   Family History   Problem Relation Age of Onset   • Cancer Mother    • Throat cancer Father    • Breast cancer Sister        Current Outpatient Medications on File Prior to Visit   Medication Sig Dispense Refill   • amLODIPine (NORVASC) 2 5 mg tablet daily     • bimatoprost (LUMIGAN) 0 01 % ophthalmic drops Administer 1 drop into the left eye daily at bedtime     • Eliquis 5 MG TAKE 1 TABLET TWICE A  tablet 3   • fluticasone (FLONASE) 50 mcg/act nasal spray      • levothyroxine 25 mcg tablet Take 25 mcg by mouth daily     • Multiple Vitamins-Minerals (CENTRUM SILVER 50+MEN PO) Take by mouth in the morning     • Omega-3 Fatty Acids (FISH OIL) 1,000 mg Take by mouth daily     • pramipexole (MIRAPEX) 0 125 mg tablet Take by mouth Daily       • rosuvastatin (CRESTOR) 10 MG tablet 10 mg Pt takes med every other day--conversation with PCP (Dr Clemente Schmid)  November 2021 appt/Labs     • traZODone (DESYREL) 50 mg tablet daily at bedtime       No current facility-administered medications on file prior to visit  No Known Allergies    Current Outpatient Medications on File Prior to Visit   Medication Sig Dispense Refill   • amLODIPine (NORVASC) 2 5 mg tablet daily     • bimatoprost (LUMIGAN) 0 01 % ophthalmic drops Administer 1 drop into the left eye daily at bedtime     • Eliquis 5 MG TAKE 1 TABLET TWICE A  tablet 3   • fluticasone (FLONASE) 50 mcg/act nasal spray      • levothyroxine 25 mcg tablet Take 25 mcg by mouth daily     • Multiple Vitamins-Minerals (CENTRUM SILVER 50+MEN PO) Take by mouth in the morning     • Omega-3 Fatty Acids (FISH OIL) 1,000 mg Take by mouth daily     • pramipexole (MIRAPEX) 0 125 mg tablet Take by mouth Daily       • rosuvastatin (CRESTOR) 10 MG tablet 10 mg Pt takes med every other day--conversation with PCP (Dr Clemente Schmid)  November 2021 appt/Labs     • traZODone (DESYREL) 50 mg tablet daily at bedtime       No current facility-administered medications on file prior to visit         Objective   Vitals: Blood pressure 161/79, pulse 86, height 5' 5" (1 651 m), weight 62 1 kg (137 lb)  ,Body mass index is 22 8 kg/m²  PE:  AAOx 3  WDWN  Hearing intact, no drainage from eyes  Regular rate  no audible wheezing  no abdominal distension  LE compartments soft, skin intact    rightknee:    Appearance:  Slight redness near lateral joint line  no swelling   No ecchymosis  no obvious joint deformity   No effusion  Palpation/Tenderness:  No TTP over medial joint line  No TTP over lateral joint line   No TTP over patella  No TTP over patellar tendon  No TTP over pes anserine bursa  Active Range of Motion:  AROM: 0-120  PROM: 0-125  Special Tests:  Patellar grind:  Negative  Valgus Stress Test:  negative  Varus Stress Test:  negative     No ipsilateral hip pain with ROM    rightLE:    Sensation grossly intact  AT/GS/EHL intact    RLE:  EHL/AT/GS/quads/hamstrings/iliopsoas 5/5, sensation grossly intact L4, L5, S1, palpable pedal pulse  LLE:  EHL/AT/GS/quads/hamstrings/iliopsoas 5/5, sensation grossly intact L4, L5, S1, palpable pedal pulse      Imaging Studies: I have personally reviewed pertinent films in PACS  XR rightknee:    Severe arthritis with joint space narrowing and osteophyte formation, valgus deformity with fibular head protrusion      Large joint arthrocentesis: R knee  Universal Protocol:  Consent: Verbal consent obtained    Risks and benefits: risks, benefits and alternatives were discussed  Consent given by: patient  Patient understanding: patient states understanding of the procedure being performed  Patient consent: the patient's understanding of the procedure matches consent given    Supporting Documentation  Indications: pain   Procedure Details  Location: knee - R knee  Needle size: 22 G  Ultrasound guidance: no  Approach: superior  Medications administered: 40 mg triamcinolone acetonide 40 mg/mL; 2 mL bupivacaine (PF) 0 5 %    Patient tolerance: patient tolerated the procedure well with no immediate complications  Dressing:  Sterile dressing applied

## 2023-02-13 ENCOUNTER — EVALUATION (OUTPATIENT)
Dept: PHYSICAL THERAPY | Facility: REHABILITATION | Age: 83
End: 2023-02-13

## 2023-02-13 DIAGNOSIS — M17.11 PRIMARY OSTEOARTHRITIS OF RIGHT KNEE: Primary | ICD-10-CM

## 2023-02-13 NOTE — PROGRESS NOTES
PT Evaluation     Today's date: 2023  Patient name: Drew Murdock  : 1940  MRN: [id number]  Referring provider: Amy Castanon  Dx:   Encounter Diagnosis     ICD-10-CM    1  Primary osteoarthritis of right knee  M17 11 Ambulatory Referral to Physical Therapy                     Assessment  Assessment details: Drew Murdock is a 81 y o  male referred to outpt PT with dx of right knee OA  Pt presents with normal knee ROM and quad/HS strength, diminished lumbar joint play and ROM with positive reproducible distal LE pain, and ankle weakness on right  Pt funct is limited with decrease in tolerance to ADL's requiring prolonged standing and diff with walking greater than 6 blocks due to right leg pain  Pt would benefit from skilled PT to address these limitations and max funct  Impairments: abnormal or restricted ROM, impaired physical strength, lacks appropriate home exercise program and pain with function     Prognosis: good    Goals  Funct 1  Improve amb to >10 blocks without needing to sit for pain control x4 weeks  2  Improve standing tolerance to perform household tasks x4 weeks  Impairment 1  Increase ROM by 25% x4 weeks  2  Decrease pain by 25 % x4 weeks      Plan  Patient would benefit from: skilled physical therapy  Planned therapy interventions: manual therapy, stretching, strengthening, patient education, therapeutic exercise, neuromuscular re-education and home exercise program  Frequency: 2x week  Duration in weeks: 4        Subjective Evaluation    History of Present Illness  Mechanism of injury: Pt reports having chronic hx of right knee pain  Pt notes years prior he had synvisc injection s with good relief over the last few years  Pt more recent had a corizone injection at ortho visit and denies relief from CSI  Pt is an avid walker with his wife and notes a stedy decline in ability to walk   Pt is currently able to tolerate 6-10 blocks prior to needing to sit secondary to burning type pain along lateral knee  Pt is limited with negotiation of stairs in which at times he is crawling, but others he is using left LE with ascending  Pt does note having hx of back pain  Pt has positive hx of taking Gabapentin and tramadol for lumbar hx in which he had a rhizotomy and multiple epidural injections, denies any hx of surgery  Pain  Current pain ratin  At best pain ratin  At worst pain ratin    Patient Goals  Patient goals for therapy: decreased pain and increased strength          Objective     Neurological Testing     Sensation     Lumbar   Left   Intact: light touch    Right   Intact: light touch    Active Range of Motion     Lumbar   Flexion:  with pain Restriction level: moderate  Extension:  WFL  Left lateral flexion:  WFL  Right lateral flexion:  with pain Restriction level: moderate  Left rotation:  Restriction level: minimal  Right rotation:  Restriction level: moderate    Right Knee   Flexion: WFL  Extension: [facility]    Additional Active Range of Motion Details  Pt has positive restriction with lumbar flexion and right SB with slight reproducible right LE sx's  Pt has diminished lumbar lordosis in standing  Pt denies any reproducible pain with knee ROM or MMT  Pt has varus position of right knee and prominent fibular head, negative TTP  Joint Play     Hypomobile: L2, L3, L4, L5 and S1     Strength/Myotome Testing     Lumbar   Left   Normal strength    Right Hip   Planes of Motion   Flexion: 5  Abduction: 4+    Left Knee   Flexion: 5  Extension: 5    Right Knee   Flexion: 5  Extension: 5  Quadriceps contraction: good    Right Ankle/Foot   Dorsiflexion: 4  Plantar flexion: 4    Additional Strength Details  Pt has positive weakness right ankle compared with left    SLR 5/5    Tests     Right Knee   Negative Apley's compression, Apley's distraction, lateral Dipika, medial Dipika, patellar apprehension, patellar compression, valgus stress test at 0 degrees, valgus stress test at 30 degrees, varus stress test at 0 degrees and varus stress test at 30 degrees           Precautions: prostate CA hx, rhizotomy hx Lspine        Manual  2/13/23        grade II/III lumbar CPA/UPA mobs                                                                           Neuro Re-Ed             bridges          Mini squats           SLS           Side stepping                                                     Therex            rec bike w/u            seated pball flex              SKC              LTR                                                                                    Gait Training                                 Modalities

## 2023-02-16 ENCOUNTER — OFFICE VISIT (OUTPATIENT)
Dept: PHYSICAL THERAPY | Facility: REHABILITATION | Age: 83
End: 2023-02-16

## 2023-02-16 DIAGNOSIS — M17.11 PRIMARY OSTEOARTHRITIS OF RIGHT KNEE: Primary | ICD-10-CM

## 2023-02-16 NOTE — PROGRESS NOTES
Daily Note     Today's date: 2023  Patient name: Burl Nissen  : 1940  MRN: [id number]  Referring provider: Bandar Stanley  Dx:   Encounter Diagnosis     ICD-10-CM    1  Primary osteoarthritis of right knee  M17 11                      Subjective: Pt reports that he has been having more right lateral knee pain  Objective: See treatment diary below    Precautions: prostate CA hx, rhizotomy hx Lspine        Manual  23          grade II/III lumbar CPA/UPA mobs    10 mins           posterior tibial joint mobs into ext grade III   8 mins                                                          Neuro Re-Ed             bridges    5"x10          Mini squats             SLS             Side stepping                                                                     Therex             rec bike w/u    5 mins           seated pball flex              SKC              LTR    5"x10                                                                                 Gait Training                                 Modalities                                                                Assessment: Initiated lumbar mobs with good tolerance without pain, but does have restriction throughout lumbar spine  Retested ambulation and pt cont to note lateral knee pain, but denies further distal lower leg and ankle pain  Performed knee joint mobs following retest   Pt has fair mobility throughout without increase in pain  Pt does demo longer stride with ambulation to "test" leg and encouraged to take smaller strides to limit stress through joints  Pt issued HEP to assist with mobility at home  Plan: Continue per plan of care

## 2023-02-17 ENCOUNTER — APPOINTMENT (OUTPATIENT)
Dept: LAB | Facility: CLINIC | Age: 83
End: 2023-02-17

## 2023-02-17 DIAGNOSIS — E29.1 HYPOGONADISM IN MALE: ICD-10-CM

## 2023-02-17 DIAGNOSIS — C61 PROSTATE CANCER (HCC): ICD-10-CM

## 2023-02-17 LAB
PSA SERPL-MCNC: 0.2 NG/ML (ref 0–4)
TESTOST SERPL-MCNC: 237 NG/DL (ref 95–948)

## 2023-02-21 ENCOUNTER — OFFICE VISIT (OUTPATIENT)
Dept: UROLOGY | Facility: MEDICAL CENTER | Age: 83
End: 2023-02-21

## 2023-02-21 VITALS
WEIGHT: 134 LBS | DIASTOLIC BLOOD PRESSURE: 80 MMHG | HEIGHT: 65 IN | HEART RATE: 80 BPM | SYSTOLIC BLOOD PRESSURE: 140 MMHG | BODY MASS INDEX: 22.33 KG/M2

## 2023-02-21 DIAGNOSIS — Z92.3 HISTORY OF RADIATION THERAPY: ICD-10-CM

## 2023-02-21 DIAGNOSIS — E29.1 HYPOGONADISM IN MALE: ICD-10-CM

## 2023-02-21 DIAGNOSIS — N40.1 BPH ASSOCIATED WITH NOCTURIA: ICD-10-CM

## 2023-02-21 DIAGNOSIS — C61 PROSTATE CANCER (HCC): Primary | ICD-10-CM

## 2023-02-21 DIAGNOSIS — R35.1 BPH ASSOCIATED WITH NOCTURIA: ICD-10-CM

## 2023-02-21 NOTE — PROGRESS NOTES
Assessment/Plan:   1  Adenocarcinoma of the prostate [place] pattern score 6 and 7 status post 6-month course of androgen deprivation therapy and IMRT with IMRT finishing November 2021  Testosterone is normalized and PSA remains low at 0 2-repeat 6 months    #2  History of radiation therapy-as above    3  Male hypogonadism-resolved after cessation of androgen deprivation therapy    4  BPH with lower tract symptoms off of tamsulosin  Patient has an AUA symptom score of 15 with nocturia 3 times per night and 3/5 intermittency  The patient feels that he is voiding adequately  Karrie Nissen No problem-specific Assessment & Plan notes found for this encounter  Diagnoses and all orders for this visit:    Prostate cancer ([facility] [address] )  -     Cancel: PSA Total, Diagnostic; Future  -     PSA Total, Diagnostic; Future    History of radiation therapy    Hypogonadism in male    BPH associated with nocturia          Subjective:      Patient ID: Katelynn Hernandez is a 81 y o  male  HPI  28-year-old gentleman with [place] pattern score 6 and 7 status post 6 months androgen deprivation and IMRT finishing radiation in November 2021 presents with rising now normal testosterone level and PSA now 0 2  The patient has an AUA symptom score of 15 noting the coming off of tamsulosin did not change his voiding pattern and that he is happy with his current voiding situation  The following portions of the patient's history were reviewed and updated as appropriate: allergies, current medications, past family history, past medical history, past social history, past surgical history and problem list     Review of Systems   Genitourinary: Positive for frequency  Musculoskeletal: Positive for arthralgias, back pain and myalgias  All other systems reviewed and are negative  Objective:      /80   Pulse 80   Ht 5' 5" (1 651 m)   Wt 60 8 kg (134 lb)   BMI 22 30 kg/m²          Physical Exam  Vitals reviewed     Constitutional: General: He is not in acute distress  Appearance: He is normal weight  He is not ill-appearing, toxic-appearing or diaphoretic  HENT:      Head: Normocephalic and atraumatic  Nose: Nose normal    Eyes:      Extraocular Movements: Extraocular movements intact  Pulmonary:      Effort: Pulmonary effort is normal    Abdominal:      General: There is no distension  Palpations: Abdomen is soft  Tenderness: There is no abdominal tenderness  There is no guarding or rebound  Musculoskeletal:      Cervical back: Neck supple  Neurological:      Mental Status: He is alert and oriented to person, place, and time  Psychiatric:         Mood and Affect: Mood normal          Behavior: Behavior normal          Thought Content:  Thought content normal          Judgment: Judgment normal

## 2023-02-23 ENCOUNTER — OFFICE VISIT (OUTPATIENT)
Dept: PHYSICAL THERAPY | Facility: REHABILITATION | Age: 83
End: 2023-02-23

## 2023-02-23 DIAGNOSIS — M17.11 PRIMARY OSTEOARTHRITIS OF RIGHT KNEE: Primary | ICD-10-CM

## 2023-02-23 NOTE — PROGRESS NOTES
Daily Note     Today's date: 2023  Patient name: Bari Littlejohn  : 1940  MRN: [id number]  Referring provider: Christina Caban  Dx:   Encounter Diagnosis     ICD-10-CM    1  Primary osteoarthritis of right knee  M17 11                      Subjective: Pt rpeorts that he is having some med adductor discomfort  Pt does note some improvement in lateral knee pain, but cont to c/o anterior lower leg pain occurring at night which wakes him up  Objective: See treatment diary below    Precautions: prostate CA hx, rhizotomy hx Lspine        Manual  23        grade II/III lumbar CPA/UPA mobs    10 mins   10 mins         posterior tibial joint mobs into ext grade III   8 mins   5 mins                                                        Neuro Re-Ed             bridges    5"x10   5"x10        Mini squats             SLS             Side stepping                                                                     Therex             rec bike w/u    5 mins   7 mins         seated pball flex              SKC              LTR    5"x10   5"x10        SLR      3"x10         SAQ     5"2x15                                                   Gait Training                                 Modalities                                                                Assessment: Pt does well with rec bike without increase in pain and sx's  Pt does note having med thigh discomfort with SLR  Performed SAQ for focus on quad and motor control prior to SLR and sx's improved  Pt cont to have restriction throughout lumbar spine, improved post mobs  Pt was able to amb around facility with less med adductor pain  Plan: Continue per plan of care

## 2023-03-02 ENCOUNTER — OFFICE VISIT (OUTPATIENT)
Dept: PHYSICAL THERAPY | Facility: REHABILITATION | Age: 83
End: 2023-03-02

## 2023-03-02 DIAGNOSIS — M17.11 PRIMARY OSTEOARTHRITIS OF RIGHT KNEE: Primary | ICD-10-CM

## 2023-03-02 NOTE — PROGRESS NOTES
Daily Note     Today's date: 3/2/2023  Patient name: Guille Lara  : 1940  MRN: [id number]  Referring provider: Juan Francisco Garcia  Dx:   Encounter Diagnosis     ICD-10-CM    1  Primary osteoarthritis of right knee  M17 11                      Subjective: Pt reports slight improvement in pain and sx's, does note that he has been able to use bike for exercise at home and is awaiting part for TM so that he can walk at home as well  Pt denies all pain while on bike, primarily occurs in WB  Pt is having on and off sleep disturbances due to leg pain  Objective: See treatment diary below    Precautions: prostate CA hx, rhizotomy hx Lspine        Manual  2/13/23  2/16/23  2/23/23  3/2/23      grade II/III lumbar CPA/UPA mobs    10 mins   10 mins   10 mins      posterior tibial joint mobs into ext grade III   8 mins   5 mins                                                        Neuro Re-Ed             bridges    5"x10   5"x10   5"x20      Mini squats        3"x10      SLS        15"x2 ea     Side stepping              hip ext prone       3"x10 ea alt                                               Therex             rec bike w/u    5 mins   7 mins   8 mins       seated pball flex              SKC              LTR    5"x10   5"x10   hep     SLR      3"x10   3"x15       SAQ     5"2x15         hip abd SL                                          Gait Training                                 Modalities                                                                Assessment: Pt does well with introduction of CKC activities secondary to pain occurring primarily in WB  Pt denies pain with SLS into lateral right knee and is able to perform squat through appropriate ROM  Pt cont to have most restriction occurring L4-5 on right, improves post mobilizations  Pt denies pain through progression of exercise  Plan: Continue per plan of care        Access Code: [email or web address]  URL: [email or web address]  Date: 03/02/2023  Prepared by: Sarmad Reyna    Exercises  • Supine Bridge - 1 x daily - 1 sets - 20 reps - 3 hold  • Small Range Straight Leg Raise - 1 x daily - 1 sets - 20 reps - 3 hold  • Sidelying Hip Abduction - 1 x daily - 1 sets - 10 reps - 3 hold  • Mini Squat with Counter Support - 1 x daily - 1 sets - 10 reps - 3 hold

## 2023-03-06 ENCOUNTER — OFFICE VISIT (OUTPATIENT)
Dept: PHYSICAL THERAPY | Facility: REHABILITATION | Age: 83
End: 2023-03-06

## 2023-03-06 DIAGNOSIS — M17.11 PRIMARY OSTEOARTHRITIS OF RIGHT KNEE: Primary | ICD-10-CM

## 2023-03-06 NOTE — PROGRESS NOTES
Daily Note     Today's date: 3/6/2023  Patient name: Jamal Koehler  : 1940  MRN: [id number]  Referring provider: Nichole Crocker  Dx:   Encounter Diagnosis     ICD-10-CM    1  Primary osteoarthritis of right knee  M17 11                      Subjective: Pt reports having a "really bad night "  Pt walked a little further yesterday than usual, but denies increase in pain and sx's during his walk  Pt notes in the evening he had more pain which kept him awake last night  Pt attempted to change positions and surfaces frequently, but struggled to have a few hours of sleep together  Objective: See treatment diary below    Precautions: prostate CA hx, rhizotomy hx Lspine        Manual  2/13/23  2/16/23  2/23/23  3/2/23  3/6/23    grade II/III lumbar CPA/UPA mobs    10 mins   10 mins   10 mins  10 mins    posterior tibial joint mobs into ext grade III   8 mins   5 mins     5 mins                                                    Neuro Re-Ed             bridges    5"x10   5"x10   5"x20   5"x20    Mini squats        3"x10   3"x10    SLS        15"x2 ea  np   Side stepping              hip ext prone       3"x10 ea alt                                               Therex             rec bike w/u    5 mins   7 mins   8 mins       seated flex          5"x10     SKC              LTR    5"x10   5"x10   hep     SLR      3"x10   3"x15   3"2x10     SAQ     5"2x15         hip abd SL                                          Gait Training                                 Modalities                                                                Assessment: Pt denies pain with w/u on bike  Pt does note having pain with twisting or changing directions while WB on right LE  Pt tolerated amb with less pain post mobs and manual tx in lumbar spine, however cont to have sx's into right LE  Pt encouraged to perform lumbar flexion and LTR in bed if sx's are waking him up    Pt also encouraged to change position of lumbar spine secondary to cont to focus on knee  Pt feels he can use bike as well to assist with sx's at night if they persists past changing sx's with HEP  Plan: Continue per plan of care  Access Code: [email or web address]  URL: [email or web address]  Date: 03/06/2023  Prepared by: Yun Calles    Exercises  • Supine Bridge - 1 x daily - 1 sets - 20 reps - 3 hold  • Small Range Straight Leg Raise - 1 x daily - 1 sets - 20 reps - 3 hold  • Sidelying Hip Abduction - 1 x daily - 1 sets - 10 reps - 3 hold  • Mini Squat with Counter Support - 1 x daily - 1 sets - 10 reps - 3 hold  • Seated Lumbar Flexion Stretch - 2-3 x daily - 1 sets - 10 reps - 5 hold  • Supine Lower Trunk Rotation - 2-3 x daily - 1 sets - 10 reps - 5 hold

## 2023-03-13 ENCOUNTER — EVALUATION (OUTPATIENT)
Dept: PHYSICAL THERAPY | Facility: REHABILITATION | Age: 83
End: 2023-03-13

## 2023-03-13 DIAGNOSIS — M17.11 PRIMARY OSTEOARTHRITIS OF RIGHT KNEE: Primary | ICD-10-CM

## 2023-03-13 NOTE — PROGRESS NOTES
PT Re-Evaluation     Today's date: 3/13/2023  Patient name: Sally Eric  : 1940  MRN: [id number]  Referring provider: Edgard Larson  Dx:   Encounter Diagnosis     ICD-10-CM    1  Primary osteoarthritis of right knee  M17 11                      Assessment  Assessment details: Pt is progressing well with PT with increase in trunk ROM with less reproducible pain and sx's, improvement in right LE strength, and improvement with ambulation  Pt cont to be limited with diminished lumbar joint play, decrease in trunk flexion and right SB, and sx's at times at night into lateral right knee and anterior ankle  Pt would benefit from cont skilled PT to address these limitations and max funct  Impairments: abnormal or restricted ROM, impaired physical strength, lacks appropriate home exercise program and pain with function     Prognosis: good    Goals  Funct 1  Improve amb to >10 blocks without needing to sit for pain control x4 weeks  -met  2  Improve standing tolerance to perform household tasks x4 weeks-partially met  Impairment 1  Increase ROM by 25% x4 weeks-partially met  2  Decrease pain by 25 % x4 weeks-partially met      Plan  Patient would benefit from: skilled physical therapy  Planned therapy interventions: manual therapy, stretching, strengthening, patient education, therapeutic exercise, neuromuscular re-education and home exercise program  Frequency: 1x week  Duration in weeks: 2        Subjective Evaluation    History of Present Illness  Mechanism of injury: Pt note since beginning PT, he has been having an improvement in ability to ambulate for exercise, noting that at times he is walking up to 1 mile  Pt has mild discomfort after walking, but decreases with sitting and resting  Pt notes that he is also able to negotiate stairs reciprocally, no longer needing to crawl up the stairs  Pt is not taking medication to assist with pain regularly, tylenol as needed OTC    Pt does note having on and off pain at times, especially with "cramping" into right anterior ankle right LE  Pt did have some LBP yesterday, but denies restricted with ability to sleep or funct due to pain  Pain  Current pain ratin  At best pain ratin  At worst pain ratin    Patient Goals  Patient goals for therapy: decreased pain and increased strength  Patient goal: partially met        Objective     Neurological Testing     Sensation     Lumbar   Left   Intact: light touch    Right   Intact: light touch    Active Range of Motion     Lumbar   Flexion:  Restriction level: moderate  Extension:  WFL  Left lateral flexion:  WFL  Right lateral flexion:  Restriction level: moderate  Left rotation:  Restriction level: minimal  Right rotation:  Restriction level: minimal    Right Knee   Flexion: WFL  Extension: [facility]    Additional Active Range of Motion Details  Pt has restriction with lumbar flexion and right SB as above, no reproducible pain and sx's with testing today  Joint Play     Hypomobile: L2, L3, L4, L5 and S1     Strength/Myotome Testing     Lumbar   Left   Normal strength    Right Hip   Planes of Motion   Flexion: 5  Extension: 4  Abduction: 4+    Left Knee   Flexion: 5  Extension: 5    Right Knee   Flexion: 5  Extension: 5  Quadriceps contraction: good    Right Ankle/Foot   Dorsiflexion: 5  Plantar flexion: 5    Additional Strength Details  SLR 5/5    Tests     Lumbar     Left   Negative crossed SLR, passive SLR and slump test      Right   Negative crossed SLR and passive SLR  Left Pelvic Girdle/Sacrum   Negative: active SLR test      Right Pelvic Girdle/Sacrum   Negative: active SLR test      Left Hip   Negative CRISS and FADIR  Right Hip   Negative CRISS and FADIR       Right Knee   Negative Apley's compression, Apley's distraction, lateral Dipika, medial Dipika, patellar apprehension, patellar compression, valgus stress test at 0 degrees, valgus stress test at 30 degrees, varus stress test at 0 degrees and varus stress test at 30 degrees          Precautions: prostate CA hx, rhizotomy hx Lspine        Manual  3/13/23  2/16/23  2/23/23  3/2/23  3/6/23    grade II/III lumbar CPA/UPA mobs  10 mins  10 mins   10 mins   10 mins  10 mins    posterior tibial joint mobs into ext grade III   8 mins   5 mins     5 mins     RE 25 mins                                              Neuro Re-Ed             bridges    5"x10   5"x10   5"x20   5"x20    Mini squats        3"x10   3"x10    SLS        15"x2 ea  np   Side stepping              hip ext prone       3"x10 ea alt                                               Therex             rec bike w/u  8 mins   5 mins   7 mins   8 mins       seated flex          5"x10     SKC              LTR    5"x10   5"x10   hep     SLR      3"x10   3"x15   3"2x10     SAQ     5"2x15         hip abd SL                                          Gait Training                                 Modalities

## 2023-03-15 ENCOUNTER — OFFICE VISIT (OUTPATIENT)
Dept: CARDIOLOGY CLINIC | Facility: CLINIC | Age: 83
End: 2023-03-15

## 2023-03-15 VITALS
HEART RATE: 66 BPM | WEIGHT: 135.6 LBS | SYSTOLIC BLOOD PRESSURE: 136 MMHG | BODY MASS INDEX: 22.57 KG/M2 | DIASTOLIC BLOOD PRESSURE: 76 MMHG

## 2023-03-15 DIAGNOSIS — I48.0 PAROXYSMAL ATRIAL FIBRILLATION (HCC): Primary | ICD-10-CM

## 2023-03-15 DIAGNOSIS — E78.5 DYSLIPIDEMIA: ICD-10-CM

## 2023-03-15 DIAGNOSIS — I10 BENIGN ESSENTIAL HTN: ICD-10-CM

## 2023-03-15 NOTE — PROGRESS NOTES
Cardiology             Haley Jimenez  1940  [id number]                 Discussion/Summary:     Paroxysmal atrial fibrillation  Dyslipidemia  Hypertension        Maintaining sinus rhythm on ECG today at 66 bpm   PACs noted  Okay remain off AV node blockers  Continue Eliquis anticoagulation  Blood pressure controlled, continue amlodipine  Continue rosuvastatin every other day that he has been taking for dyslipidemia  Prior lipid panel reviewed, well controlled        Follow-up in 1 year        Interval History:      This is a 67year-old male with a history of atrial fibrillation diagnosed 08/2017, maintained on low-dose metoprolol up until May 2018  Gwen Dennison presented to his PCPs office with symptoms of intermittent lightheadedness with bradycardia in the 40s   At that time his metoprolol was discontinued  Gwen December has been maintained on Eliquis anticoagulation      He underwent PFTs 02/07/2020 which was within normal limits   CT chest 02/19/2020 revealed a few scattered small lung nodules      He was diagnosed with prostate cancer, and has undergone radiation therapy      He presents today for follow-up  He has complaints of chronic fatigue  He denies chest pain, shortness breath, dizziness, palpitations, lower extremity edema              Vitals:  Vitals:    03/15/23 0954   BP: 136/76   BP Location: Right arm   Patient Position: Sitting   Cuff Size: Standard   Pulse: 66   Weight: 61 5 kg (135 lb 9 6 oz)         Past Medical History:   Diagnosis Date   • Back pain    • Benign prostatic hypertrophy    • Disease of thyroid gland    • Elevated PSA 3/3/2021   • Hyperlipidemia    • Hypertension    • Prostate cancer ([facility] [address] ) 7/21/2021   • Restless leg      Social History     Socioeconomic History   • Marital status: /Civil Union     Spouse name: Not on file   • Number of children: Not on file   • Years of education: Not on file   • Highest education level: Not on file   Occupational History   • Not on file Tobacco Use   • Smoking status: Never   • Smokeless tobacco: Never   Vaping Use   • Vaping Use: Never used   Substance and Sexual Activity   • Alcohol use: Yes     Alcohol/week: 4 0 standard drinks     Types: 4 Cans of beer per week     Comment: CAFFEINE USE/3-4 beers/wk    Occasional wine or drink   • Drug use: No   • Sexual activity: Not on file   Other Topics Concern   • Not on file   Social History Narrative   • Not on file     Social Determinants of Health     Financial Resource Strain: Not on file   Food Insecurity: Not on file   Transportation Needs: Not on file   Physical Activity: Not on file   Stress: Not on file   Social Connections: Not on file   Intimate Partner Violence: Not on file   Housing Stability: Not on file      Family History   Problem Relation Age of Onset   • Cancer Mother    • Throat cancer Father    • Breast cancer Sister      Past Surgical History:   Procedure Laterality Date   • ACHILLES TENDON SURGERY     • HERNIA REPAIR     • KNEE ARTHROSCOPY Right     shoulder   • SD PLMT INTERSTITIAL DEV RADIAT [address] [place] [address],  [address] 1/MULT N/A 9/21/2021    Procedure: INSERTION OF FIDUCIAL MARKER (TRANSRECTAL ULTRASOUND GUIDANCE), SPACEOAR;  Surgeon: Cm Drake MD;  [facility];  Service: Urology   • SD PROSTATE NEEDLE BIOPSY ANY APPROACH N/A 6/15/2021    Procedure: TRANSPERINEAL MRI FUSION PROSTATE BIOPSY;  Surgeon: Mic Rosario MD;  Location[facility];  Service: Urology   • SKIN BIOPSY     • TONSILLECTOMY         Current Outpatient Medications:   •  amLODIPine (NORVASC) 2 5 mg tablet, daily, Disp: , Rfl:   •  bimatoprost (LUMIGAN) 0 01 % ophthalmic drops, Administer 1 drop into the left eye daily at bedtime, Disp: , Rfl:   •  Eliquis 5 MG, TAKE 1 TABLET TWICE A DAY, Disp: 180 tablet, Rfl: 3  •  fluticasone (FLONASE) 50 mcg/act nasal spray, , Disp: , Rfl:   •  levothyroxine 25 mcg tablet, Take 25 mcg by mouth daily, Disp: , Rfl:   •  Multiple Vitamins-Minerals (CENTRUM SILVER 50+MEN PO), Take by mouth in the morning, Disp: , Rfl:   •  Omega-3 Fatty Acids (FISH OIL) 1,000 mg, Take by mouth daily, Disp: , Rfl:   •  rosuvastatin (CRESTOR) 10 MG tablet, 10 mg Pt takes med every other day--conversation with PCP (Dr Akash Tierney) November 2021 appt/Labs, Disp: , Rfl:   •  pramipexole (MIRAPEX) 0 125 mg tablet, Take by mouth Daily   (Patient not taking: Reported on 3/15/2023), Disp: , Rfl:   •  traZODone (DESYREL) 50 mg tablet, daily at bedtime (Patient not taking: Reported on 3/15/2023), Disp: , Rfl:         Review of Systems:  Review of Systems   Constitutional: Positive for fatigue  Respiratory: Negative  Cardiovascular: Negative  All other systems reviewed and are negative  Physical Exam:  Physical Exam  Constitutional:       General: He is not in acute distress  Appearance: He is well-developed  He is not diaphoretic  HENT:      Head: Normocephalic and atraumatic  Eyes:      General: No scleral icterus  Right eye: No discharge  Pupils: Pupils are equal, round, and reactive to light  Neck:      Thyroid: No thyromegaly  Cardiovascular:      Rate and Rhythm: Normal rate and regular rhythm  Heart sounds: Normal heart sounds  No murmur heard  No friction rub  No gallop  Pulmonary:      Effort: Pulmonary effort is normal       Breath sounds: Normal breath sounds  Abdominal:      General: There is no distension  Tenderness: There is no abdominal tenderness  There is no guarding or rebound  Musculoskeletal:         General: Normal range of motion  Cervical back: Normal range of motion and neck supple  Skin:     General: Skin is warm and dry  Coloration: Skin is not pale  Findings: No erythema or rash  Neurological:      Mental Status: He is alert and oriented to person, place, and time  Coordination: Coordination normal    Psychiatric:         Behavior: Behavior normal          Thought Content:  Thought content normal          Judgment: Judgment normal          This note was completed in part utilizing M-Modal Fluency Direct Software  Grammatical errors, random word insertions, spelling mistakes, and incomplete sentences can be an occasional consequence of this system secondary to software limitations, ambient noise, and hardware issues  If you have any questions or concerns about the content, text, or information contained within the body of this dictation, please contact the provider for clarification

## 2023-03-20 ENCOUNTER — OFFICE VISIT (OUTPATIENT)
Dept: PHYSICAL THERAPY | Facility: REHABILITATION | Age: 83
End: 2023-03-20

## 2023-03-20 DIAGNOSIS — M17.11 PRIMARY OSTEOARTHRITIS OF RIGHT KNEE: Primary | ICD-10-CM

## 2023-03-20 NOTE — PROGRESS NOTES
Daily Note     Today's date: 3/20/2023  Patient name: Katelynn Hernandez  : 1940  MRN: [id number]  Referring provider: Lesly Kyle  Dx:   Encounter Diagnosis     ICD-10-CM    1  Primary osteoarthritis of right knee  M17 11                      Subjective: Pt reports working in the yard on Saturday and had more pain in knee and LB that evening, however notes resting yesterday and remaining active with mild walking and has less pain overall today  Objective: See treatment diary below     Precautions: prostate CA hx, rhizotomy hx Lspine        Manual  3/13/23 3/20/23  2/23/23  3/2/23  3/6/23    grade II/III lumbar CPA/UPA mobs  10 mins  10 mins   10 mins   10 mins  10 mins    posterior tibial joint mobs into ext grade III     5 mins     5 mins     RE 25 mins                                              Neuro Re-Ed             bridges   5"x20      Mini squats    3"x15      SLS    nv      Side stepping    20'x2 ea       hip ext prone    3"x10 ea alt        clamshells   3"x10 ea                            Therex          rec bike w/u  8 mins   5 mins                                    SLR    1# 2x10                   hip abd SL    3"x10 ea                                   Gait Training                                 Modalities                                                                   Assessment: Pt demo's good progression of exercises without increase in pain  Pt demo's improved tolerance to ambulation as well without lateral trunk lean due to pain  Pt would benefit from further progression of WB/CKC exercises and addition of tband during side stepping  Plan: Continue per plan of care  Access Code: [email or web address]  URL: [email or web address]  Date: 2023  Prepared by: Becca Ortiz    Exercises  • Supine Bridge - 1 x daily - 1 sets - 20 reps - 3 hold  • Small Range Straight Leg Raise - 1 x daily - 1 sets - 20 reps - 3 hold  • Sidelying Hip Abduction - 1 x daily - 1 sets - 10 reps - 3 hold  • Mini Squat with Counter Support - 1 x daily - 1 sets - 10 reps - 3 hold  • Seated Lumbar Flexion Stretch - 2-3 x daily - 1 sets - 10 reps - 5 hold  • Supine Lower Trunk Rotation - 2-3 x daily - 1 sets - 10 reps - 5 hold

## 2023-03-27 ENCOUNTER — OFFICE VISIT (OUTPATIENT)
Dept: PHYSICAL THERAPY | Facility: REHABILITATION | Age: 83
End: 2023-03-27

## 2023-03-27 DIAGNOSIS — M17.11 PRIMARY OSTEOARTHRITIS OF RIGHT KNEE: Primary | ICD-10-CM

## 2023-03-27 NOTE — PROGRESS NOTES
"Daily Note     Today's date: 3/27/2023  Patient name: Demetrio Benton  : 1940  MRN: [id number]  Referring provider: Ade Josue  Dx:   Encounter Diagnosis     ICD-10-CM    1  Primary osteoarthritis of right knee  M17 11                      Subjective: Pt reports that he did a lot of walking yesterday in which he had more back and hip soreness last night, which caused some sleep disturbances  Pt has been attempting to keep himself active with daily walks and notes improvement with performing  Objective: See treatment diary below     Precautions: prostate CA hx, rhizotomy hx Lspine        Manual  3/13/23 3/20/23 3/27/23  3/2/23  3/6/23    grade II/III lumbar CPA/UPA mobs  10 mins  10 mins  10 mins   10 mins  10 mins    posterior tibial joint mobs into ext grade III        5 mins     RE 25 mins                                              Neuro Re-Ed             bridges   5\"x20 5\"x20      Mini squats    3\"x15 3\"x10             Side stepping    20'x2 ea 20'x2 ea      hip ext prone    3\"x10 ea alt  3\"x15 ea      clamshells   3\"x10 ea 3\"x15                           Therex          rec bike w/u  8 mins   5 mins  8 mins                                   SLR    1# 2x10  3\"2x10                 hip abd SL    3\"x10 ea 3\"x10                                   Gait Training                                 Modalities                                                                   Assessment: Pt is doing well with independent HEP  Pt has improved lumbar mobility throughout without increase in pain or sx's  Pt issued complete HEP at today's visit to cont for focus on strengthening LE and ROM for lumbar spine  Plan: Pt has ortho visit next week, will place on hold at this time                   "

## 2023-04-04 ENCOUNTER — OFFICE VISIT (OUTPATIENT)
Dept: OBGYN CLINIC | Facility: MEDICAL CENTER | Age: 83
End: 2023-04-04

## 2023-04-04 VITALS
SYSTOLIC BLOOD PRESSURE: 136 MMHG | HEIGHT: 65 IN | WEIGHT: 134.8 LBS | HEART RATE: 78 BPM | DIASTOLIC BLOOD PRESSURE: 82 MMHG | BODY MASS INDEX: 22.46 KG/M2

## 2023-04-04 DIAGNOSIS — M17.11 PRIMARY OSTEOARTHRITIS OF RIGHT KNEE: Primary | ICD-10-CM

## 2023-04-04 NOTE — PROGRESS NOTES
Assessment/Plan:  1  Primary osteoarthritis of right knee      Orders Placed This Encounter   Procedures   • Injection Procedure Prior Authorization       · Patient has severe right knee osteoarthritis  · Order placed for right knee series of 3 viscosupplementation injections which can be administered as soon as approved  · May take Tylenol as needed for pain up to 3000 mg/day  · Continue home exercises as learned at physical therapy  Return in about 27 days (around 5/1/2023) for right knee euflexxa 1  I answered all of the patient's questions during the visit and provided education of the patient's condition during the visit  The patient verbalized understanding of the information given and agrees with the plan  This note was dictated using Eduora software  It may contain errors including improperly dictated words  Please contact physician directly for any questions  Subjective   Chief Complaint:   Chief Complaint   Patient presents with   • Right Knee - Follow-up       HPI  Chepe Smith is a 81 y o  male who presents for follow up for right knee pain  Patient received right knee steroid injection at his last visit on 2/3/2023 and reports minimal pain relief  He notes pain is located primarily over the lateral aspect of the right knee  Patient is not taking anything consistently for pain but will take Tylenol occasionally  Patient completed several weeks of physical therapy where they worked on his knee, hip, and back  He notes that physical therapy was very helpful for him  Patient would like to try viscosupplementation injections for his right knee  The patient has failed at least 3 months of conservative treatment including CSI and PT  The patient has failed to respond to steroid injections  The patient's symptoms include pain  This is limiting their ADLs and sleep at times  Their pain score is 8/10  Review of Systems  ROS:    See HPI for musculoskeletal review     All other systems reviewed are negative     History:  Past Medical History:   Diagnosis Date   • Back pain    • Benign prostatic hypertrophy    • Disease of thyroid gland    • Elevated PSA 3/3/2021   • Hyperlipidemia    • Hypertension    • Prostate cancer ([address] ) 7/21/2021   • Restless leg      Past Surgical History:   Procedure Laterality Date   • ACHILLES TENDON SURGERY     • HERNIA REPAIR     • KNEE ARTHROSCOPY Right     shoulder   • ME PLMT INTERSTITIAL DEV RADIAT [place] PROSTATE 1/MULT N/A 9/21/2021    Procedure: INSERTION OF FIDUCIAL MARKER (TRANSRECTAL ULTRASOUND GUIDANCE), SPACEOAR;  Surgeon: Nacho Sanabria MD;  [facility];  Service: Urology   • ME PROSTATE NEEDLE BIOPSY ANY APPROACH N/A 6/15/2021    Procedure: TRANSPERINEAL MRI FUSION PROSTATE BIOPSY;  Surgeon: Erlinda Lindsay MD;  [facility];  Service: Urology   • SKIN BIOPSY     • TONSILLECTOMY       Social History   Social History     Substance and Sexual Activity   Alcohol Use Yes   • Alcohol/week: 4 0 standard drinks   • Types: 4 Cans of beer per week    Comment: CAFFEINE USE/3-4 beers/wk    Occasional wine or drink     Social History     Substance and Sexual Activity   Drug Use No     Social History     Tobacco Use   Smoking Status Never   Smokeless Tobacco Never     Family History:   Family History   Problem Relation Age of Onset   • Cancer Mother    • Throat cancer Father    • Breast cancer Sister        Current Outpatient Medications on File Prior to Visit   Medication Sig Dispense Refill   • amLODIPine (NORVASC) 2 5 mg tablet daily     • bimatoprost (LUMIGAN) 0 01 % ophthalmic drops Administer 1 drop into the left eye daily at bedtime     • Eliquis 5 MG TAKE 1 TABLET TWICE A  tablet 3   • fluticasone (FLONASE) 50 mcg/act nasal spray      • levothyroxine 25 mcg tablet Take 25 mcg by mouth daily     • Multiple Vitamins-Minerals (CENTRUM SILVER 50+MEN PO) Take by mouth in the morning     • Omega-3 Fatty Acids (FISH OIL) 1,000 mg Take by "mouth daily     • pramipexole (MIRAPEX) 0 125 mg tablet Take by mouth Daily   (Patient not taking: Reported on 3/15/2023)     • rosuvastatin (CRESTOR) 10 MG tablet 10 mg Pt takes med every other day--conversation with PCP (Dr Ciaran Thomas)  November 2021 appt/Labs     • traZODone (DESYREL) 50 mg tablet daily at bedtime (Patient not taking: Reported on 3/15/2023)       No current facility-administered medications on file prior to visit       No Known Allergies     Objective     /82   Pulse 78   Ht 5' 5\" (1 651 m)   Wt 61 1 kg (134 lb 12 8 oz)   BMI 22 43 kg/m²      PE:  AAOx 3  WDWN  Hearing intact, no drainage from eyes  no audible wheezing  no abdominal distension  LE compartments soft, skin intact    Ortho Exam:  right Knee:   No erythema  no swelling  no effusion  no warmth  AROM: 0- 120  Stable to varus/valgus stress            "

## 2023-04-24 ENCOUNTER — OFFICE VISIT (OUTPATIENT)
Dept: SLEEP CENTER | Facility: CLINIC | Age: 83
End: 2023-04-24

## 2023-04-24 VITALS
SYSTOLIC BLOOD PRESSURE: 144 MMHG | BODY MASS INDEX: 22.53 KG/M2 | HEART RATE: 92 BPM | WEIGHT: 135.2 LBS | HEIGHT: 65 IN | DIASTOLIC BLOOD PRESSURE: 95 MMHG

## 2023-04-24 DIAGNOSIS — I48.91 ATRIAL FIBRILLATION, UNSPECIFIED TYPE (HCC): ICD-10-CM

## 2023-04-24 DIAGNOSIS — F41.9 ANXIETY: ICD-10-CM

## 2023-04-24 DIAGNOSIS — G47.00 INSOMNIA: ICD-10-CM

## 2023-04-24 DIAGNOSIS — G25.81 RESTLESS LEG: ICD-10-CM

## 2023-04-24 DIAGNOSIS — F51.04 CHRONIC INSOMNIA: Primary | ICD-10-CM

## 2023-04-24 NOTE — PATIENT INSTRUCTIONS
"Each night, a few hours before bed, try to write down your thoughts and worries  The goal is to clear your mind so you have less to think about in the middle of the night     It is common in insomnia to look at the time at night to see what time it is, how long you have been awake, etc   However, this can negatively affect sleep  I strongly recommend avoiding looking at the time at night  Here are some ways to do this  1) Turn the clock around so you cannot see the time at night  2) Avoid wearing a watch to bed  3) Leave your phone on the other side of the room so you cannot look at it at night  4) Set an alarm if you need to wake up in he morning  If you have not heard the alarm, assume it is still time to sleep  With great difficulty falling asleep or with difficulty falling back asleep, laying in bed for a prolonged period time is not ideal   This can increase worry and rumination (having racing thoughts, not able to \"turn off your brain\"  After what feels like 15 minutes, if you feel wide awake, worried, anxious, or can't clear your brain, it is better to leave the bedroom to do something relaxing , The typical recommendation is to read a boring book in dim light  In general, if you leave the room, you want to do something that is relaxing, not stimulating  Avoid bright screens, doing work, doing chores, or anything that requires a lot of mental effort  Return to bed when you feel more calm, sleepy, or mentally clear  If you practice this consistently, sleep quality and anxiety regarding sleep may improve  It is very important to avoid driving while drowsy, this can be very dangerous or even cause serious injury or death  If sleepy, it is not safe to get behind the wheel  If you are driving and feels sleepy, it is very important to pull over right away  Even losing control of the car for a split second can be deadly    If you feel you cannot control when sleepiness occurs and cannot " prevented, it is important to not drive at all until this improves  Please let me know if you experience this as it is very important  Nursing Support:  When: Monday through Friday 7A-5PM except holidays  Where: Our direct line is [phone]  If you are having a true emergency please call 911  In the event that the line is busy or it is after hours please leave a voice message and we will return your call  Please speak clearly, leaving your full name, birth date, best number to reach you and the reason for your call  Medication refills: We will need the name of the medication, the dosage, the ordering provider, whether you get a 30 or 90 day refill, and the pharmacy name and address  Medications will be ordered by the provider only  Nurses cannot call in prescriptions  Please allow 7 days for medication refills  Physician requested updates: If your provider requested that you call with an update after starting medication, please be ready to provide us the medication and dosage, what time you take your medication, the time you attempt to fall asleep, time you fall asleep, when you wake up, and what time you get out of bed  Sleep Study Results: We will contact you with sleep study results and/or next steps after the physician has reviewed your testing

## 2023-04-24 NOTE — PROGRESS NOTES
Assessment/Plan:    1  Chronic insomnia    2  Insomnia  -     Ambulatory Referral to Sleep Medicine    3  Restless leg  -     Ambulatory Referral to Sleep Medicine  -     Iron Panel (Includes Ferritin, Iron Sat%, Iron, and TIBC); Future    4  Atrial fibrillation, unspecified type (HCC)  -     Iron Panel (Includes Ferritin, Iron Sat%, Iron, and TIBC); Future    5  Anxiety  -     Ambulatory Referral to Psychiatry; Future; Expected date: 04/24/2023  Kimo and I reviewed his history in detail  He describes symptoms that fit restless leg syndrome, I would like to exclude iron deficiency as a cause and ordered an iron panel  If significant iron deficiency is identified, iron supplementation may result in improvement in restless leg symptoms  He has significant insomnia symptoms, it seems that anxiety is a significant contributing factor and should be addressed  We discussed this in detail and he is open to seeing a psychiatrist for their assessment  We reviewed many techniques that can improve insomnia symptoms which I encouraged him to follow on a regular basis  For example he looks at the time excessively, this seems to negatively affect his sleep and increased anxiety  We discussed this is 1 factor amongst many that may be contributing to his worsened sleep  I did not change medication for restless leg syndrome today, we will first assess iron levels  Consideration can be made in the future to change to pregabalin  I did not order a sleep study today as I do not suspect obstructive sleep apnea  It is conceivable he may have periodic limb movements during sleep contributing to sleep disruption but treatment for that would be the same as for restless leg syndrome  Subjective:      Patient ID: Benita Monroe is a 81 y o  male  HPI    This is an 50-year-old male who presents with a chief complaint of difficulties sleeping as well as restless legs    He is referred by his primary care provider,   "Alarcon  He is retired  He has poor sleep for years  He notes things are worsening, it is hard to fall asleep  Some nights he is awake all night, about once a month  Many nights he sleeps 3-5 hours  When he lays down, he feels he thrashes, his mind wants to stay awake  Watches TV outside the room, may doze outside the bedroom  Typically goes to bed around 10 PM,  every night it is hard to fall asleep, often not asleep until 2 AM   He ruminates often in bed  Looks at the time a lot  Has frustration and worry regarding sleep  When he cannot sleep, will walk around  Does not watch TV in bed  Usually awake 7-8 a m  He has 3-4 awakenings during the night  He feels he sleeps less than 5 hours nightly  Does not sleep better away from home  Does not spend time in the bedroom in the day    He is not refreshed upon awakening  He rarely is sleepy during the day  He does not intentionally take naps  He denies drowsy driving  He is prescribed trazodone and pramipexole although does not take these  He drinks caffeine, about 24 ounces of coffee a day  He also drinks caffeinated soda  He drinks alcohol 3-4 days a week  He sleeps alone (separate room from his wife), is not aware of snoring  Has never used CPAP in the past     He has a feeling he has to move his legs  Was prescribed gabapentin a few years ago, did not help his sleep,  \"may have controlled my legs a little'    Has pramipexole , \"does not help me sleep\"        [place] Sleepiness Scale  Sitting and reading: Would never doze  Watching TV: Slight chance of dozing  Sitting, inactive in a public place (e g  a theatre or a meeting): Slight chance of dozing  As a passenger in a car for an hour without a break: Slight chance of dozing  Lying down to rest in the afternoon when circumstances permit: Slight chance of dozing  Sitting and talking to someone: Would never doze  Sitting quietly after a lunch without alcohol: Would never doze  In " "a car, while stopped for a few minutes in traffic: Would never doze  Total score: 4      Review of Systems   HENT: Positive for congestion  Respiratory: Negative for shortness of breath  Cardiovascular: Negative for chest pain and leg swelling  Musculoskeletal: Positive for back pain and neck pain  Neurological: Negative for headaches  Psychiatric/Behavioral: Positive for decreased concentration  The patient is nervous/anxious  Objective:      /95 (BP Location: Right arm, Patient Position: Sitting, Cuff Size: Adult)   Pulse 92   Ht 5' 5\" (1 651 m)   Wt 61 3 kg (135 lb 3 2 oz)   BMI 22 50 kg/m²          Physical Exam  Constitutional:       Appearance: Normal appearance  HENT:      Head: Normocephalic and atraumatic  Mouth/Throat:      Mouth: Mucous membranes are moist    Eyes:      Extraocular Movements: Extraocular movements intact  Pupils: Pupils are equal, round, and reactive to light  Cardiovascular:      Rate and Rhythm: Normal rate  Pulses: Normal pulses  Heart sounds: Normal heart sounds  Pulmonary:      Effort: Pulmonary effort is normal       Breath sounds: Normal breath sounds  Musculoskeletal:      Right lower leg: No edema  Left lower leg: No edema  Neurological:      Mental Status: He is alert  Psychiatric:         Mood and Affect: Mood normal          Behavior: Behavior normal          Thought Content:  Thought content normal          Judgment: Judgment normal          14 in neck circumference   mallampati 3   Elongated soft palate  Overbite     Data reviewed- notes from primary care     I have spent a total time of 45 minutes on 04/29/23 in caring for this patient including Prognosis, Risks and benefits of tx options, Instructions for management, Patient and family education, Importance of tx compliance, Risk factor reductions, Impressions, Counseling / Coordination of care, Documenting in the medical record, Reviewing / ordering tests, " medicine, procedures   and Obtaining or reviewing history

## 2023-04-26 ENCOUNTER — TELEPHONE (OUTPATIENT)
Dept: PSYCHIATRY | Facility: CLINIC | Age: 83
End: 2023-04-26

## 2023-04-26 NOTE — TELEPHONE ENCOUNTER
Spoke to Patient in regards to routine referral, Patient stated he is not interested at this time do to being away for awhile  Inform patient to contact our office if something changes

## 2023-05-01 ENCOUNTER — PROCEDURE VISIT (OUTPATIENT)
Dept: OBGYN CLINIC | Facility: CLINIC | Age: 83
End: 2023-05-01

## 2023-05-01 VITALS
HEIGHT: 65 IN | WEIGHT: 134.6 LBS | HEART RATE: 78 BPM | DIASTOLIC BLOOD PRESSURE: 78 MMHG | SYSTOLIC BLOOD PRESSURE: 130 MMHG | BODY MASS INDEX: 22.42 KG/M2

## 2023-05-01 DIAGNOSIS — M17.11 PRIMARY OSTEOARTHRITIS OF RIGHT KNEE: Primary | ICD-10-CM

## 2023-05-01 DIAGNOSIS — G89.29 CHRONIC PAIN OF RIGHT KNEE: ICD-10-CM

## 2023-05-01 DIAGNOSIS — M25.561 CHRONIC PAIN OF RIGHT KNEE: ICD-10-CM

## 2023-05-01 RX ORDER — HYALURONATE SODIUM 10 MG/ML
20 SYRINGE (ML) INTRAARTICULAR
Status: COMPLETED | OUTPATIENT
Start: 2023-05-01 | End: 2023-05-01

## 2023-05-01 RX ADMIN — Medication 20 MG: at 12:05

## 2023-05-01 NOTE — PROGRESS NOTES
Patient has severe right knee osteoarthritis  The patient has failed at least 3 months of conservative treatment including CSI and tylenol  The patient has failed to respond to steroid injections  Patient only got minimal relief for a few weeks from CSI  The patient's symptoms include pain  This is limiting their ADLs and sleep at times  Their pain score is 8/10  Patient received right knee euflexxa injection 1/3  Tolerated the procedure well  Post injection instructions reviewed  Provided with right short hinge knee brace  Follow up 1 week  Large joint arthrocentesis: R knee  Universal Protocol:  Consent: Verbal consent obtained    Risks and benefits: risks, benefits and alternatives were discussed  Consent given by: patient  Site marked: the operative site was marked  Supporting Documentation  Indications: pain   Procedure Details  Location: knee - R knee  Preparation: Patient was prepped and draped in the usual sterile fashion  Needle size: 22 G  Ultrasound guidance: no  Approach: anterolateral  Medications administered: 20 mg Sodium Hyaluronate 20 MG/2ML    Patient tolerance: patient tolerated the procedure well with no immediate complications  Dressing:  Sterile dressing applied

## 2023-05-01 NOTE — TELEPHONE ENCOUNTER
Attempted to contact patient to verify the need of services  Left a voicemail for patient to contact intake department

## 2023-05-02 NOTE — TELEPHONE ENCOUNTER
Patient returned call in regards to scheduling, inform patient due to no availability we ar working off a wait list    Patient stated he going away for about a month  Inform patient I can place patient on wait list and when something becomes available we will contact patient  Patient agreed to be placed on wait list for med mgmt

## 2023-05-04 ENCOUNTER — HOSPITAL ENCOUNTER (OUTPATIENT)
Dept: RADIOLOGY | Facility: HOSPITAL | Age: 83
Discharge: HOME | End: 2023-05-04

## 2023-05-04 DIAGNOSIS — R52 PAIN: ICD-10-CM

## 2023-05-08 ENCOUNTER — PROCEDURE VISIT (OUTPATIENT)
Dept: OBGYN CLINIC | Facility: CLINIC | Age: 83
End: 2023-05-08

## 2023-05-08 VITALS
BODY MASS INDEX: 22.42 KG/M2 | WEIGHT: 134.6 LBS | HEIGHT: 65 IN | HEART RATE: 67 BPM | DIASTOLIC BLOOD PRESSURE: 76 MMHG | SYSTOLIC BLOOD PRESSURE: 138 MMHG

## 2023-05-08 DIAGNOSIS — M25.561 CHRONIC PAIN OF RIGHT KNEE: ICD-10-CM

## 2023-05-08 DIAGNOSIS — M17.11 PRIMARY OSTEOARTHRITIS OF RIGHT KNEE: Primary | ICD-10-CM

## 2023-05-08 DIAGNOSIS — G89.29 CHRONIC PAIN OF RIGHT KNEE: ICD-10-CM

## 2023-05-08 RX ORDER — HYALURONATE SODIUM 10 MG/ML
20 SYRINGE (ML) INTRAARTICULAR
Status: COMPLETED | OUTPATIENT
Start: 2023-05-08 | End: 2023-05-08

## 2023-05-08 RX ADMIN — Medication 20 MG: at 12:11

## 2023-05-08 NOTE — PROGRESS NOTES
Patient has severe right knee osteoarthritis  Patient received right knee euflexxa injection 2/3  Tolerated the procedure well  Post injection instructions reviewed  Follow up 1 week for final injection  Large joint arthrocentesis: R knee  Universal Protocol:  Consent: Verbal consent obtained    Risks and benefits: risks, benefits and alternatives were discussed  Consent given by: patient  Site marked: the operative site was marked  Supporting Documentation  Indications: pain   Procedure Details  Location: knee - R knee  Preparation: Patient was prepped and draped in the usual sterile fashion  Needle size: 22 G  Ultrasound guidance: no  Approach: anterolateral  Medications administered: 20 mg Sodium Hyaluronate 20 MG/2ML    Patient tolerance: patient tolerated the procedure well with no immediate complications  Dressing:  Sterile dressing applied

## 2023-05-12 ENCOUNTER — APPOINTMENT (OUTPATIENT)
Dept: LAB | Facility: CLINIC | Age: 83
End: 2023-05-12

## 2023-05-12 DIAGNOSIS — G25.81 RESTLESS LEG: ICD-10-CM

## 2023-05-12 DIAGNOSIS — I48.91 ATRIAL FIBRILLATION, UNSPECIFIED TYPE (HCC): ICD-10-CM

## 2023-05-12 LAB
FERRITIN SERPL-MCNC: 131 NG/ML (ref 8–388)
IRON SATN MFR SERPL: 34 % (ref 20–50)
IRON SERPL-MCNC: 120 UG/DL (ref 65–175)
TIBC SERPL-MCNC: 348 UG/DL (ref 250–450)

## 2023-05-15 ENCOUNTER — PROCEDURE VISIT (OUTPATIENT)
Dept: OBGYN CLINIC | Facility: CLINIC | Age: 83
End: 2023-05-15

## 2023-05-15 VITALS
DIASTOLIC BLOOD PRESSURE: 78 MMHG | HEIGHT: 65 IN | BODY MASS INDEX: 22.4 KG/M2 | SYSTOLIC BLOOD PRESSURE: 140 MMHG | HEART RATE: 74 BPM

## 2023-05-15 DIAGNOSIS — M25.561 CHRONIC PAIN OF RIGHT KNEE: ICD-10-CM

## 2023-05-15 DIAGNOSIS — M54.50 LUMBAR PAIN: ICD-10-CM

## 2023-05-15 DIAGNOSIS — M17.11 PRIMARY OSTEOARTHRITIS OF RIGHT KNEE: Primary | ICD-10-CM

## 2023-05-15 DIAGNOSIS — G89.29 CHRONIC PAIN OF RIGHT KNEE: ICD-10-CM

## 2023-05-15 RX ORDER — HYALURONATE SODIUM 10 MG/ML
20 SYRINGE (ML) INTRAARTICULAR
Status: COMPLETED | OUTPATIENT
Start: 2023-05-15 | End: 2023-05-15

## 2023-05-15 RX ADMIN — Medication 20 MG: at 11:30

## 2023-05-15 NOTE — PROGRESS NOTES
Patient has severe right knee osteoarthritis  Patient received right knee euflexxa injection 3/3 today  Tolerated the procedure well  Post injection instructions reviewed  Follow up 2 months for recheck right knee  We also discussed right sided radiculopathy  Pain management referral provided for eval    Also discussed issues with sleep  He plans to pursue psych referral provided by PCP  Large joint arthrocentesis: R knee  Universal Protocol:  Consent: Verbal consent obtained    Risks and benefits: risks, benefits and alternatives were discussed  Consent given by: patient  Site marked: the operative site was marked  Supporting Documentation  Indications: pain   Procedure Details  Location: knee - R knee  Preparation: Patient was prepped and draped in the usual sterile fashion  Needle size: 22 G  Ultrasound guidance: no  Approach: anterolateral  Medications administered: 20 mg Sodium Hyaluronate 20 MG/2ML    Patient tolerance: patient tolerated the procedure well with no immediate complications  Dressing:  Sterile dressing applied

## 2023-07-11 ENCOUNTER — APPOINTMENT (OUTPATIENT)
Dept: LAB | Facility: CLINIC | Age: 83
End: 2023-07-11
Payer: MEDICARE

## 2023-07-11 DIAGNOSIS — E03.9 MYXEDEMA HEART DISEASE: ICD-10-CM

## 2023-07-11 DIAGNOSIS — E78.49 OTHER HYPERLIPIDEMIA: ICD-10-CM

## 2023-07-11 DIAGNOSIS — I51.9 MYXEDEMA HEART DISEASE: ICD-10-CM

## 2023-07-11 DIAGNOSIS — I10 ESSENTIAL HYPERTENSION, MALIGNANT: ICD-10-CM

## 2023-07-11 LAB
ALBUMIN SERPL BCP-MCNC: 3.8 G/DL (ref 3.5–5)
ALP SERPL-CCNC: 56 U/L (ref 46–116)
ALT SERPL W P-5'-P-CCNC: 20 U/L (ref 12–78)
ANION GAP SERPL CALCULATED.3IONS-SCNC: 4 MMOL/L
AST SERPL W P-5'-P-CCNC: 17 U/L (ref 5–45)
BASOPHILS # BLD AUTO: 0.07 THOUSANDS/ÂΜL (ref 0–0.1)
BASOPHILS NFR BLD AUTO: 1 % (ref 0–1)
BILIRUB SERPL-MCNC: 0.53 MG/DL (ref 0.2–1)
BUN SERPL-MCNC: 21 MG/DL (ref 5–25)
CALCIUM SERPL-MCNC: 9.1 MG/DL (ref 8.3–10.1)
CHLORIDE SERPL-SCNC: 107 MMOL/L (ref 96–108)
CHOLEST SERPL-MCNC: 174 MG/DL
CK SERPL-CCNC: 102 U/L (ref 39–308)
CO2 SERPL-SCNC: 28 MMOL/L (ref 21–32)
CREAT SERPL-MCNC: 1.1 MG/DL (ref 0.6–1.3)
EOSINOPHIL # BLD AUTO: 0.28 THOUSAND/ÂΜL (ref 0–0.61)
EOSINOPHIL NFR BLD AUTO: 6 % (ref 0–6)
ERYTHROCYTE [DISTWIDTH] IN BLOOD BY AUTOMATED COUNT: 13.5 % (ref 11.6–15.1)
GFR SERPL CREATININE-BSD FRML MDRD: 62 ML/MIN/1.73SQ M
GLUCOSE P FAST SERPL-MCNC: 97 MG/DL (ref 65–99)
HCT VFR BLD AUTO: 41.1 % (ref 36.5–49.3)
HDLC SERPL-MCNC: 61 MG/DL
HGB BLD-MCNC: 13.5 G/DL (ref 12–17)
IMM GRANULOCYTES # BLD AUTO: 0.02 THOUSAND/UL (ref 0–0.2)
IMM GRANULOCYTES NFR BLD AUTO: 0 % (ref 0–2)
LDLC SERPL CALC-MCNC: 94 MG/DL (ref 0–100)
LYMPHOCYTES # BLD AUTO: 1.19 THOUSANDS/ÂΜL (ref 0.6–4.47)
LYMPHOCYTES NFR BLD AUTO: 24 % (ref 14–44)
MCH RBC QN AUTO: 30.9 PG (ref 26.8–34.3)
MCHC RBC AUTO-ENTMCNC: 32.8 G/DL (ref 31.4–37.4)
MCV RBC AUTO: 94 FL (ref 82–98)
MONOCYTES # BLD AUTO: 0.78 THOUSAND/ÂΜL (ref 0.17–1.22)
MONOCYTES NFR BLD AUTO: 16 % (ref 4–12)
NEUTROPHILS # BLD AUTO: 2.59 THOUSANDS/ÂΜL (ref 1.85–7.62)
NEUTS SEG NFR BLD AUTO: 53 % (ref 43–75)
NONHDLC SERPL-MCNC: 113 MG/DL
NRBC BLD AUTO-RTO: 0 /100 WBCS
PLATELET # BLD AUTO: 321 THOUSANDS/UL (ref 149–390)
PMV BLD AUTO: 9.7 FL (ref 8.9–12.7)
POTASSIUM SERPL-SCNC: 4.3 MMOL/L (ref 3.5–5.3)
PROT SERPL-MCNC: 6.8 G/DL (ref 6.4–8.4)
RBC # BLD AUTO: 4.37 MILLION/UL (ref 3.88–5.62)
SODIUM SERPL-SCNC: 139 MMOL/L (ref 135–147)
TRIGL SERPL-MCNC: 97 MG/DL
TSH SERPL DL<=0.05 MIU/L-ACNC: 2.96 UIU/ML (ref 0.45–4.5)
WBC # BLD AUTO: 4.93 THOUSAND/UL (ref 4.31–10.16)

## 2023-07-11 PROCEDURE — 84443 ASSAY THYROID STIM HORMONE: CPT

## 2023-07-11 PROCEDURE — 82550 ASSAY OF CK (CPK): CPT

## 2023-07-11 PROCEDURE — 80053 COMPREHEN METABOLIC PANEL: CPT

## 2023-07-11 PROCEDURE — 36415 COLL VENOUS BLD VENIPUNCTURE: CPT

## 2023-07-11 PROCEDURE — 85025 COMPLETE CBC W/AUTO DIFF WBC: CPT

## 2023-07-11 PROCEDURE — 80061 LIPID PANEL: CPT

## 2023-07-17 ENCOUNTER — CONSULT (OUTPATIENT)
Dept: PAIN MEDICINE | Facility: CLINIC | Age: 83
End: 2023-07-17
Payer: MEDICARE

## 2023-07-17 VITALS
WEIGHT: 134 LBS | HEIGHT: 65 IN | BODY MASS INDEX: 22.33 KG/M2 | DIASTOLIC BLOOD PRESSURE: 66 MMHG | SYSTOLIC BLOOD PRESSURE: 144 MMHG

## 2023-07-17 DIAGNOSIS — M47.817 LUMBOSACRAL SPONDYLOSIS WITHOUT MYELOPATHY: ICD-10-CM

## 2023-07-17 DIAGNOSIS — M54.16 LUMBAR RADICULOPATHY: Primary | ICD-10-CM

## 2023-07-17 PROCEDURE — 99204 OFFICE O/P NEW MOD 45 MIN: CPT | Performed by: ANESTHESIOLOGY

## 2023-07-17 RX ORDER — TRAZODONE HYDROCHLORIDE 50 MG/1
TABLET ORAL
COMMUNITY
Start: 2023-04-24

## 2023-07-17 RX ORDER — ALPRAZOLAM 0.5 MG/1
0.5 TABLET ORAL
Qty: 1 TABLET | Refills: 0 | Status: SHIPPED | OUTPATIENT
Start: 2023-07-17

## 2023-07-17 NOTE — PROGRESS NOTES
Assessment  1. Lumbar radiculopathy    2. Lumbosacral spondylosis without myelopathy        Plan  Patient presenting with chronic back pain for greater than 20 years, worsening over the past several months with right > left radiating leg pain. During today's evaluation patient is demonstrating pain that is multifactorial in nature, with the main pain generator likely stemming from lumbar spondylosis, lumbar radiculitis   Symptoms are accompanied by pain >7/10 on the pain scale with inability to participate in IADLs for >6 weeks. Patient has fully participated with physical therapy. Has been taking Tylenol, NSAIDs with modest benefit. Denies any gait instability, saddle anesthesia. In regards to the patient's lumbar pathology, we discussed the various treatment options including physical therapy, chiropractic treatment, medication management, activity modifications, interventional spine procedures. Given that patient has not had any benefit with conservative treatments, I think patient would benefit from targeted interventional treatment modalities. I reviewed and interpreted relevant imaging studies - specifically prior lumbar MRI from 2017 and discussed the results and clinical significance with the patient. This showed multilevel degenerative changes consistent with lumbar spondylosis. There is evidence of foraminal narrowing but no severe narrowing at any level. - Recommend obtaining an updated MRI at this point given worsening radicular symptoms in light of prior MRI not explaining these recent symptoms. Will prescribe oral Xanax for procedural anxiety. Risks, benefits, and alternatives to epidural steroid injections thoroughly discussed with patient. Patient previously had various interventions 10+ years ago including epidural steroid injections and radiofrequency ablations.     I reviewed external notes from the relevant aspects of the patient's medical record, specifically orthopedic surgery, physical therapy, primary care physician notes in regards to current and prior treatments tried (as mentioned in history of present illness). I reviewed pertinent laboratory studies, specifically renal function, hemoglobin A1c, CBC, coagulation studies, prior to recommending medication therapies/interventional treatment options. [place] Prescription Drug Monitoring Program report was reviewed and was appropriate     My impressions and treatment recommendations were discussed in detail with the patient who verbalized understanding and had no further questions. Discharge instructions were provided. I personally saw and examined the patient and I agree with the above discussed plan of care. Orders Placed This Encounter   Procedures   • MRI lumbar spine without contrast     Standing Status:   Future     Standing Expiration Date:   7/17/2027     Scheduling Instructions: There is no preparation for this test. Please leave your jewelry and valuables at home, wedding rings are the exception. All patients will be required to change into a hospital gown and pants. Street clothes are not permitted in the MRI. Magnetic nail polish must be removed prior to arrival for your test. Please bring your insurance cards, a form of photo ID and a list of your medications with you. Arrive 15 minutes prior to your appointment time in order to register. Please bring any prior CT or MRI studies of this area that were not performed at a [facility] facility. To schedule this appointment, please contact Central Scheduling at [phone]. Prior to your appointment, please make sure you complete the MRI Screening Form when you e-Check in for your appointment. This will be available starting 7 days before your appointment in [address] [place] [address]. You may receive an e-mail with an activation code if you do not have a [employer] account.  If you do not have access to a device, we will complete your screening at your appointment. Order Specific Question:   What is the patient's sedation requirement? If Medication for Claustrophobia is selected, order medication at this point. Answer:   No Sedation     Order Specific Question:   Does this procedure require the 3T MRI at [facility] or [place]?     Answer:   No     Order Specific Question:   Release to patient through [facility]     Answer:   Immediate     Order Specific Question:   Is order priority selected as STAT? Answer:   No     Order Specific Question:   Reason for Exam (FREE TEXT)     Answer:   right lumbar radiculopathy     New Medications Ordered This Visit   Medications   • traZODone (DESYREL) 50 mg tablet   • ALPRAZolam (XANAX) 0.5 mg tablet     Sig: Take 1 tablet (0.5 mg total) by mouth 30 min pre-procedure     Dispense:  1 tablet     Refill:  0       History of Present Illness    Karuna Barajas is a 81 y.o. male presenting for new patient visit at [facility] for exam and evaluation of chronic low back pain with right > left radiating leg pain for 20+ years, worsening over the past several months. Pain started without any precipitating injury or trauma. Over the past month, the intensity of pain has been moderate to severe. Pain is currently 5 out of 10 on the pain scale. Pain does interfere with age appropriate activities of daily living. Pain is intermittent, with no typical pattern throughout the day. Pain is described as burning, cramping with numbness. Patient denies weakness in the lower extremities. Assistance device used: None. Pain is increased with standing. Pain is decreased with lying down. Prior pertinent treatments tried: Physical therapy, injections  Pertinent medications tried/currently taking: Tramadol, Tylenol, ibuprofen, gabapentin, oral steroids.       I have personally reviewed and/or updated the patient's past medical history, past surgical history, family history, social history, current medications, allergies, and vital signs today. Review of Systems   Constitutional: Negative for chills and fever. HENT: Positive for hearing loss. Negative for ear pain and sore throat. Eyes: Negative for pain and visual disturbance. Respiratory: Positive for cough. Negative for shortness of breath. Cardiovascular: Negative for chest pain and palpitations. Gastrointestinal: Negative for abdominal pain and vomiting. Genitourinary: Negative for dysuria and hematuria. Musculoskeletal: Positive for arthralgias, back pain, gait problem and myalgias. Skin: Negative for color change and rash. Neurological: Positive for weakness and numbness. Negative for seizures and syncope. Psychiatric/Behavioral: The patient is nervous/anxious. All other systems reviewed and are negative.       Patient Active Problem List   Diagnosis   • Benign prostatic hypertrophy   • Hypothyroidism   • A-fib (HCC)   • HTN (hypertension)   • Elevated PSA   • Prostate cancer ([address])   • Sleep apnea   • Vertigo   • Primary osteoarthritis of right knee       Past Medical History:   Diagnosis Date   • Back pain    • Benign prostatic hypertrophy    • Disease of thyroid gland    • Elevated PSA 3/3/2021   • Hyperlipidemia    • Hypertension    • Prostate cancer ([address]) 7/21/2021   • Restless leg        Past Surgical History:   Procedure Laterality Date   • ACHILLES TENDON SURGERY     • HERNIA REPAIR     • KNEE ARTHROSCOPY Right     shoulder   • OR PLMT INTERSTITIAL DEV RADIAT TX PROSTATE 1/MULT N/A 9/21/2021    Procedure: INSERTION OF FIDUCIAL MARKER (TRANSRECTAL ULTRASOUND GUIDANCE), SPACEOAR;  Surgeon: Kim Rodriguez MD;  Location: [facility];  Service: Urology   • OR PROSTATE NEEDLE BIOPSY ANY APPROACH N/A 6/15/2021    Procedure: TRANSPERINEAL MRI FUSION PROSTATE BIOPSY;  Surgeon: Andrez Councilman, MD;  Location: [facility];  Service: Urology   • SKIN BIOPSY     • TONSILLECTOMY         Family History   Problem Relation Age of Onset   • Cancer Mother    • Throat cancer Father    • Breast cancer Sister        Social History     Occupational History   • Not on file   Tobacco Use   • Smoking status: Never   • Smokeless tobacco: Never   Vaping Use   • Vaping Use: Never used   Substance and Sexual Activity   • Alcohol use: Yes     Alcohol/week: 4.0 standard drinks of alcohol     Types: 4 Cans of beer per week     Comment: CAFFEINE USE/3-4 beers/wk. Occasional wine or drink   • Drug use: No   • Sexual activity: Not on file       Current Outpatient Medications on File Prior to Visit   Medication Sig   • amLODIPine (NORVASC) 2.5 mg tablet daily   • bimatoprost (LUMIGAN) 0.01 % ophthalmic drops Administer 1 drop into the left eye daily at bedtime   • Eliquis 5 MG TAKE 1 TABLET TWICE A DAY   • fluticasone (FLONASE) 50 mcg/act nasal spray    • levothyroxine 25 mcg tablet Take 25 mcg by mouth daily   • Multiple Vitamins-Minerals (CENTRUM SILVER 50+MEN PO) Take by mouth in the morning   • Omega-3 Fatty Acids (FISH OIL) 1,000 mg Take by mouth daily   • rosuvastatin (CRESTOR) 10 MG tablet 10 mg Pt takes med every other day--conversation with PCP (Dr. Earnestine Garcia)  November 2021 appt/Labs   • traZODone (DESYREL) 50 mg tablet      No current facility-administered medications on file prior to visit. No Known Allergies    Physical Exam    /66   Ht 5' 5" (1.651 m)   Wt 60.8 kg (134 lb)   BMI 22.30 kg/m²     Constitutional: normal, well developed, well nourished, alert, in no distress and non-toxic and no overt pain behavior. Eyes: anicteric  HEENT: grossly intact  Neck: supple, symmetric, trachea midline and no masses   Pulmonary:even and unlabored  Cardiovascular:No edema or pitting edema present  Skin:Normal without rashes or lesions and well hydrated  Psychiatric:Mood and affect appropriate  Neurologic: Motor function is grossly intact with no focal neurologic deficits. Musculoskeletal: Limited lumbar spine range of motion.     Imaging  X-ray lumbar spine 5/4/2023:  FINDINGS:     There are 5 non rib bearing lumbar vertebral bodies.     There is no evidence of acute fracture or destructive osseous lesion.     Mild scoliotic deformity is noted. Alignment is otherwise unremarkable.     Intervertebral disc base narrowing at all levels with endplate osteophytes and facet arthropathy.     The pedicles appear intact.     Soft tissues are unremarkable.     IMPRESSION:     Multilevel degenerative changes of lumbar spine with mild levocurvature.     MRI lumbar spine 7/12/2017: History: Lower back pain     Procedure: MRI of the lumbar spine 7/12/2017     Technique: MRI lumbar spine was obtained with the following sequences: Sagittal T1, sagittal T2, sagittal STIR and axial T2 weighted images. Comparison: 2/28/2011     Findings: For the purposes of this dictation, the lumbar vertebrae are labeled from a caudal to cranial direction, the first vertebra with lumbar morphology is labeled as L5. There is straightening of the normal lordosis. There is slight anterolisthesis L2 relation to L3, L3 in relation L4 and possibly a retrolisthesis L4 in relation L5. There are multiple areas of diminished signal on the short TR sequence within the bone marrow. There is vacuum phenomena within a Schmorl's node of L5. There is similar finding L2. Endplate changes are present multiple levels. Distal spinal cord and CSF are normal.     At the T11-12 level there is loss of disc height. There is mild disc bulge   there is mild foraminal narrowing. T12-L1: Mild disc bulge is mild foraminal narrowing bilaterally on the left   greater than right. A small central stenosis. L1-2: There is loss of the disc height. There is minimal disc bulge with   osteophytic spurring there is foraminal narrowing on the left. L2-3: There is mild disc bulge with d ossific spurring. There is foraminal   narrowing on the right.  There is mild central stenosis     L3-4: There is mild disc bulge and osteophyte. There is foraminal narrowing bilaterally. There  is mild central stenosis. L4-5: Osteophyte flattens the ventral thecal sac.  There is facet arthritis and mild foraminal narrowing bilaterally. L5-S1: There is disc bulge and osteophyte.  There is foraminal narrowing on left. Right foramen is stenotic. The paraspinal soft tissues are unremarkable. There is a presumed renal cysts in the kidney on the right. Impression:   1. Multilevel degenerative findings are present as described. 2. There is abnormal alignment most likely degenerative.

## 2023-07-20 ENCOUNTER — OFFICE VISIT (OUTPATIENT)
Dept: OBGYN CLINIC | Facility: CLINIC | Age: 83
End: 2023-07-20
Payer: MEDICARE

## 2023-07-20 VITALS
HEART RATE: 57 BPM | HEIGHT: 65 IN | BODY MASS INDEX: 22.09 KG/M2 | SYSTOLIC BLOOD PRESSURE: 148 MMHG | DIASTOLIC BLOOD PRESSURE: 75 MMHG | WEIGHT: 132.6 LBS

## 2023-07-20 DIAGNOSIS — M17.11 PRIMARY OSTEOARTHRITIS OF RIGHT KNEE: Primary | ICD-10-CM

## 2023-07-20 PROCEDURE — 99213 OFFICE O/P EST LOW 20 MIN: CPT | Performed by: ORTHOPAEDIC SURGERY

## 2023-07-20 NOTE — PROGRESS NOTES
Assessment/Plan:  1. Primary osteoarthritis of right knee      No orders of the defined types were placed in this encounter. • Patient presents with severe right knee osteoarthritis. He is currently happy with how he is feeling. • Continue lumbar spine treatment with Dr. Linda Vera.  • Continue Tylenol as needed for pain. 1,000 mg up to 3 times a day. Do not exceed 3,000 mg per day. Return if symptoms worsen or fail to improve, for Recheck. Can consider a steroid injection if needed. I answered all of the patient's questions during the visit and provided education of the patient's condition during the visit. The patient verbalized understanding of the information given and agrees with the plan. This note was dictated using Mutrac[employer] software. It may contain errors including improperly dictated words. Please contact physician directly for any questions. Subjective   Chief Complaint: No chief complaint on file. [facility]  Rekha Laurent is a 81 y.o. male who presents for follow up for right knee pain. He reports good relief from the Euflexxa injections last received on 5/15/23. He does not have any knee pain today and does not feel the need for a repeat steroid injection. He has seen Dr. Linda Vera to initiate treatment for lumbar spine pathology, and has an MRI coming up soon. He takes Tylenol as needed, mostly for his back. Review of Systems  ROS:    See [facility] for musculoskeletal review.    All other systems reviewed are negative     History:  Past Medical History:   Diagnosis Date   • Back pain    • Benign prostatic hypertrophy    • Disease of thyroid gland    • Elevated PSA 3/3/2021   • Hyperlipidemia    • Hypertension    • Prostate cancer ([address]) 7/21/2021   • Restless leg      Past Surgical History:   Procedure Laterality Date   • ACHILLES TENDON SURGERY     • HERNIA REPAIR     • KNEE ARTHROSCOPY Right     shoulder   • MT PLMT INTERSTITIAL DEV RADIAT TX PROSTATE 1/MULT N/A 9/21/2021    Procedure: INSERTION OF FIDUCIAL MARKER (TRANSRECTAL ULTRASOUND GUIDANCE), SPACEOAR;  Surgeon: Evans Camilo MD;  [facility];  Service: Urology   • MS PROSTATE NEEDLE BIOPSY ANY APPROACH N/A 6/15/2021    Procedure: TRANSPERINEAL MRI FUSION PROSTATE BIOPSY;  Surgeon: Taylor Rodriguez MD;  [facility];  Service: Urology   • SKIN BIOPSY     • TONSILLECTOMY       Social History   Social History     Substance and Sexual Activity   Alcohol Use Yes   • Alcohol/week: 4.0 standard drinks of alcohol   • Types: 4 Cans of beer per week    Comment: CAFFEINE USE/3-4 beers/wk. Occasional wine or drink     Social History     Substance and Sexual Activity   Drug Use No     Social History     Tobacco Use   Smoking Status Never   Smokeless Tobacco Never     Family History:   Family History   Problem Relation Age of Onset   • Cancer Mother    • Throat cancer Father    • Breast cancer Sister        Current Outpatient Medications on File Prior to Visit   Medication Sig Dispense Refill   • ALPRAZolam (XANAX) 0.5 mg tablet Take 1 tablet (0.5 mg total) by mouth 30 min pre-procedure 1 tablet 0   • amLODIPine (NORVASC) 2.5 mg tablet daily     • bimatoprost (LUMIGAN) 0.01 % ophthalmic drops Administer 1 drop into the left eye daily at bedtime     • Eliquis 5 MG TAKE 1 TABLET TWICE A  tablet 3   • fluticasone (FLONASE) 50 mcg/act nasal spray      • levothyroxine 25 mcg tablet Take 25 mcg by mouth daily     • Multiple Vitamins-Minerals (CENTRUM SILVER 50+MEN PO) Take by mouth in the morning     • Omega-3 Fatty Acids (FISH OIL) 1,000 mg Take by mouth daily     • rosuvastatin (CRESTOR) 10 MG tablet 10 mg Pt takes med every other day--conversation with PCP (Dr. Waldemar Barrientos)  November 2021 appt/Labs     • traZODone (DESYREL) 50 mg tablet        No current facility-administered medications on file prior to visit.      No Known Allergies     Objective     /75   Pulse 57   Ht 5' 5" (1.651 m)   Wt 60.1 kg (132 lb 9.6 oz)   BMI 22.07 kg/m² PE:  AAOx 3  WDWN  Hearing intact, no drainage from eyes  no audible wheezing  no abdominal distension  LE compartments soft, skin intact    Ortho Exam:  right Knee:   No erythema  no swelling  no effusion  no warmth  AROM: 3-120  No TTP  Stable to varus/valgus stress      Scribe Attestation    I,:  Mello Mcdonnell am acting as a scribe while in the presence of the attending physician.:       I,:  Ashley Pedraza, DO personally performed the services described in this documentation    as scribed in my presence.:

## 2023-07-24 ENCOUNTER — TELEPHONE (OUTPATIENT)
Dept: UROLOGY | Facility: AMBULATORY SURGERY CENTER | Age: 83
End: 2023-07-24

## 2023-07-24 NOTE — TELEPHONE ENCOUNTER
PT under care of: Roys Jean     Pt last seen: 02/21/23     PT calling today because/symptoms are: Pt needs to have  Mri done for something else and he wants to know if there is any reasons way he can't have a MRI due to his prostate cancer and the pellets that were placed.       PT can be reached at: [phone]

## 2023-07-25 NOTE — TELEPHONE ENCOUNTER
Spoke to pt and advised of Dr. Yeni Slater note regarding MRI compatibility of fiducial markers:    Chiquis Carlson MD  to Me        7/24/23  4:28 PM  I did not order an MRI for him another physician did. Adriana Goss is an MRI of the lumbar spine he wants to know whether the gold fiducial markers preclude him from having a safe MRI-do not think that they do and I think it is fine for him to have an MRI     Advised pt that the  of the markers ([address]) was contacted and they sent an email regarding this topic. The email was forwarded to [facility] [address] [place] [address] Radiology Dept and to Dr. Brian Wasserman for his information. Pt stated he is going to go ahead with the MRI after discussion with Radiology. He feels comfortable doing so.

## 2023-07-27 ENCOUNTER — HOSPITAL ENCOUNTER (OUTPATIENT)
Facility: MEDICAL CENTER | Age: 83
Discharge: HOME | End: 2023-07-27
Payer: MEDICARE

## 2023-07-27 DIAGNOSIS — M54.16 LUMBAR RADICULOPATHY: ICD-10-CM

## 2023-07-27 PROCEDURE — 72148 MRI LUMBAR SPINE W/O DYE: CPT

## 2023-07-27 PROCEDURE — G1004 CDSM NDSC: HCPCS

## 2023-08-01 ENCOUNTER — RADIATION ONCOLOGY FOLLOW-UP (OUTPATIENT)
Dept: RADIATION ONCOLOGY | Facility: CLINIC | Age: 83
End: 2023-08-01
Attending: RADIOLOGY
Payer: MEDICARE

## 2023-08-01 VITALS
TEMPERATURE: 97.1 F | WEIGHT: 132.94 LBS | OXYGEN SATURATION: 98 % | BODY MASS INDEX: 22.12 KG/M2 | DIASTOLIC BLOOD PRESSURE: 74 MMHG | RESPIRATION RATE: 18 BRPM | SYSTOLIC BLOOD PRESSURE: 124 MMHG | HEART RATE: 80 BPM

## 2023-08-01 DIAGNOSIS — C61 PROSTATE CANCER (HCC): Primary | ICD-10-CM

## 2023-08-01 PROCEDURE — 99211 OFF/OP EST MAY X REQ PHY/QHP: CPT | Performed by: RADIOLOGY

## 2023-08-01 PROCEDURE — 99214 OFFICE O/P EST MOD 30 MIN: CPT | Performed by: RADIOLOGY

## 2023-08-01 NOTE — PROGRESS NOTES
Esau Senior 1940 is a 81 y.o. male  with Lisset 4+3=7 prostate cancer. He completed 6 months of ADT and a course of radiation to the prostate on 11/15/21. He was last seen in radiation oncology on 22 and returns today for follow up. Lab Results   Component Value Date    PSA 0.2 2023    PSA 0.3 2022    PSA <0.1 2022 Dr. Mike Gilmore [facility]  Will continue to monitor PSA every 6 months d/t testosterone rising from less than 8 to over 200 and PSA rising. BPH with LUTS, now off tamsulosin. Pt voiding adequately. Hypogonadism decreasing after 6 months of ADT. POCT urine ordered. 23 Urology  Testosterone is normalized and PSA remains low, will repeat PSA in 6 months. [id number] Urology      Follow up visit     Oncology History   Prostate cancer [facility])   6/15/2021 Initial Diagnosis    Prostate cancer ([address])     6/15/2021 Biopsy    Final Diagnosis   A. Right JACKSON prostate, needle core biopsy:  - Benign prostate glands and stroma. B. Left anterior lateral prostate, needle core biopsy:  - Prostatic adenocarcinoma, [place] grade 4 + 3 = 7 (50% pattern 3 and 50% pattern 4, Grade group 3), involving one of one core and 5% of the tissue.  - Perineural invasion is absent. C. Left posterior lateral prostate, needle core biopsy:  - Benign prostate glands and stroma. D. Left posterior medial prostate, needle core biopsy:  - Benign prostate glands and stroma. E. Left base prostate, needle core biopsy:  - Benign prostate glands and stroma. F. Right anterior medial prostate, needle core biopsy:  - Benign prostate glands and stroma. G. Right anterior lateral prostate, needle core biopsy:  - Prostatic adenocarcinoma, Lisset grade 3 + 3 = 6 (Grade group 1), involving one of one core and less than 5% of the tissue.  - Perineural invasion is absent.      H. Right posterior lateral prostate, needle core biopsy:  - Atypical small acinar proliferation (ASAP), suspicious but not diagnostic for malignancy. I. Right posterior medial prostate, needle core biopsy:  - Benign prostate glands and stroma. J. Right base prostate, needle core biopsy:  - Benign prostate glands and stroma. 10/6/2021 - 11/15/2021 Radiation    PROSTATE 10X 28 / 28 250 0 7,000 40      Treatment dates:  10/6/2021 - 11/15/2021          Review of Systems:  Review of Systems   Constitutional: Positive for activity change (not as active anymore) and fatigue (tires more quickly, especially with warmer weather). HENT: Positive for hearing loss and postnasal drip (chronic). Eyes:        Wears glasses. Respiratory: Positive for cough (secndary to post nasal drip). Cardiovascular: Negative. Negative for chest pain. Gastrointestinal: Negative. Negative for blood in stool and constipation. Endocrine: Negative. Genitourinary: Positive for difficulty urinating (intermittent stream at times), frequency and urgency (seldom). Negative for dysuria and hematuria. Good urine stream. Nocturia x 3. Dribbles sometimes. Musculoskeletal: Positive for arthralgias (Right shoulder) and back pain (Long hx of "back discomfort"). Recent mri    Skin: Negative. Allergic/Immunologic: Negative. Neurological: Positive for dizziness (went to ED 7/2-7/3 for dizziness, none since then). Hematological: Does not bruise/bleed easily (Eliquis ). Psychiatric/Behavioral: Positive for sleep disturbance (Insomnia x years. ). The patient is nervous/anxious (worries). Clinical Trial: no    IPSS Questionnaire (AUA-7): Over the past month…    1)  How often have you had a sensation of not emptying your bladder completely after you finish urinating? 0 - Not at all   2)  How often have you had to urinate again less than two hours after you finished urinating? 0 - Not at all   3)  How often have you found you stopped and started again several times when you urinated?   1 - Less than 1 time in 5   4) How difficult have you found it to postpone urination? 0 - Not at all   5) How often have you had a weak urinary stream?  0 - Not at all   6) How often have you had to push or strain to begin urination? 1 - Less than 1 time in 5   7) How many times did you most typically get up to urinate from the time you went to bed until the time you got up in the morning?   3 - 3 times   Total Score:  5       Teaching completed     Health Maintenance   Topic Date Due   • Medicare Annual Wellness Visit (AWV)  Never done   • COVID-19 Vaccine (5 - Booster for Moderna series) 01/02/2023   • Depression Screening  07/08/2023   • Influenza Vaccine (1) 09/01/2023   • Fall Risk  03/06/2024   • BMI: Adult  07/20/2024   • Hepatitis C Screening  Completed   • Pneumococcal Vaccine: 65+ Years  Completed   • HIB Vaccine  Aged Out   • IPV Vaccine  Aged Out   • Hepatitis A Vaccine  Aged Out   • Meningococcal ACWY Vaccine  Aged Out   • HPV Vaccine  Aged Out     Patient Active Problem List   Diagnosis   • Benign prostatic hypertrophy   • Hypothyroidism   • A-fib ([address])   • HTN (hypertension)   • Elevated PSA   • Prostate cancer ([address])   • Sleep apnea   • Vertigo   • Primary osteoarthritis of right knee     Past Medical History:   Diagnosis Date   • Back pain    • Benign prostatic hypertrophy    • Disease of thyroid gland    • Elevated PSA 3/3/2021   • Hyperlipidemia    • Hypertension    • Prostate cancer ([address]) 7/21/2021   • Restless leg      Past Surgical History:   Procedure Laterality Date   • ACHILLES TENDON SURGERY     • HERNIA REPAIR     • KNEE ARTHROSCOPY Right     shoulder   • RI PLMT INTERSTITIAL DEV RADIAT TX PROSTATE 1/MULT N/A 9/21/2021    Procedure: INSERTION OF FIDUCIAL MARKER (TRANSRECTAL ULTRASOUND GUIDANCE), SPACEOAR;  Surgeon: Musa Sinclair MD;  [facility];  Service: Urology   • RI PROSTATE NEEDLE BIOPSY ANY APPROACH N/A 6/15/2021    Procedure: TRANSPERINEAL MRI FUSION PROSTATE BIOPSY;  Surgeon: Gui Jon Rosy Jean MD;  Location: [facility];  Service: Urology   • SKIN BIOPSY     • TONSILLECTOMY       Family History   Problem Relation Age of Onset   • Cancer Mother    • Throat cancer Father    • Breast cancer Sister      Social History     Socioeconomic History   • Marital status: /Civil Union     Spouse name: Not on file   • Number of children: Not on file   • Years of education: Not on file   • Highest education level: Not on file   Occupational History   • Not on file   Tobacco Use   • Smoking status: Never   • Smokeless tobacco: Never   Vaping Use   • Vaping Use: Never used   Substance and Sexual Activity   • Alcohol use: Yes     Alcohol/week: 4.0 standard drinks of alcohol     Types: 4 Cans of beer per week     Comment: CAFFEINE USE/3-4 beers/wk.   Occasional wine or drink   • Drug use: No   • Sexual activity: Not on file   Other Topics Concern   • Not on file   Social History Narrative   • Not on file     Social Determinants of Health     Financial Resource Strain: Not on file   Food Insecurity: Not on file   Transportation Needs: Not on file   Physical Activity: Not on file   Stress: Not on file   Social Connections: Not on file   Intimate Partner Violence: Not on file   Housing Stability: Not on file       Current Outpatient Medications:   •  ALPRAZolam (XANAX) 0.5 mg tablet, Take 1 tablet (0.5 mg total) by mouth 30 min pre-procedure, Disp: 1 tablet, Rfl: 0  •  amLODIPine (NORVASC) 2.5 mg tablet, daily, Disp: , Rfl:   •  bimatoprost (LUMIGAN) 0.01 % ophthalmic drops, Administer 1 drop into the left eye daily at bedtime, Disp: , Rfl:   •  Eliquis 5 MG, TAKE 1 TABLET TWICE A DAY, Disp: 180 tablet, Rfl: 3  •  fluticasone (FLONASE) 50 mcg/act nasal spray, , Disp: , Rfl:   •  levothyroxine 25 mcg tablet, Take 25 mcg by mouth daily, Disp: , Rfl:   •  Multiple Vitamins-Minerals (CENTRUM SILVER 50+MEN PO), Take by mouth in the morning, Disp: , Rfl:   •  Omega-3 Fatty Acids (FISH OIL) 1,000 mg, Take by mouth daily, Disp: , Rfl:   •  rosuvastatin (CRESTOR) 10 MG tablet, 10 mg Pt takes med every other day--conversation with PCP (Dr. Earnestine Garcia) November 2021 appt/Labs, Disp: , Rfl:   •  traZODone (DESYREL) 50 mg tablet, , Disp: , Rfl:   No Known Allergies  There were no vitals filed for this visit.

## 2023-08-01 NOTE — PROGRESS NOTES
Follow-up - Radiation Oncology   Celio Agosto 1940 81 y.o. male [id number]      History of Present Illness   Cancer Staging   No matching staging information was found for the patient. Interval History:    Celio Agosto 1940 is a 81 y.o. male  with [place] 4+3=7 prostate cancer. He completed 6 months of ADT and a course of radiation to the prostate on 11/15/21. He was last seen in radiation oncology on 22 and returns today for follow up.           Lab Results   Component Value Date     PSA 0.2 2023     PSA 0.3 2022     PSA <0.1 2022 Dr. Luis M Mckeon [facility]  Will continue to monitor PSA every 6 months d/t testosterone rising from less than 8 to over 200 and PSA rising. BPH with LUTS, now off tamsulosin. Pt voiding adequately. Hypogonadism decreasing after 6 months of ADT. POCT urine ordered.      23 Urology  Testosterone is normalized and PSA remains low, will repeat PSA in 6 months.      [id number] Urology         Historical Information   Oncology History   Prostate cancer [facility])   6/15/2021 Initial Diagnosis    Prostate cancer ([address])     6/15/2021 Biopsy    Final Diagnosis   A. Right JACKSON prostate, needle core biopsy:  - Benign prostate glands and stroma. B. Left anterior lateral prostate, needle core biopsy:  - Prostatic adenocarcinoma, Lisset grade 4 + 3 = 7 (50% pattern 3 and 50% pattern 4, Grade group 3), involving one of one core and 5% of the tissue.  - Perineural invasion is absent. C. Left posterior lateral prostate, needle core biopsy:  - Benign prostate glands and stroma. D. Left posterior medial prostate, needle core biopsy:  - Benign prostate glands and stroma. E. Left base prostate, needle core biopsy:  - Benign prostate glands and stroma. F. Right anterior medial prostate, needle core biopsy:  - Benign prostate glands and stroma.      G. Right anterior lateral prostate, needle core biopsy:  - Prostatic adenocarcinoma, Lisset grade 3 + 3 = 6 (Grade group 1), involving one of one core and less than 5% of the tissue.  - Perineural invasion is absent. H. Right posterior lateral prostate, needle core biopsy:  - Atypical small acinar proliferation (ASAP), suspicious but not diagnostic for malignancy. I. Right posterior medial prostate, needle core biopsy:  - Benign prostate glands and stroma. J. Right base prostate, needle core biopsy:  - Benign prostate glands and stroma. 10/6/2021 - 11/15/2021 Radiation    PROSTATE 10X 28 / 28 250 0 7,000 40      Treatment dates:  10/6/2021 - 11/15/2021          Past Medical History:   Diagnosis Date   • Back pain    • Benign prostatic hypertrophy    • Disease of thyroid gland    • Elevated PSA 3/3/2021   • Hyperlipidemia    • Hypertension    • Prostate cancer ([address]) 7/21/2021   • Restless leg      Past Surgical History:   Procedure Laterality Date   • ACHILLES TENDON SURGERY     • HERNIA REPAIR     • KNEE ARTHROSCOPY Right     shoulder   • IN PLMT INTERSTITIAL DEV RADIAT TX PROSTATE 1/MULT N/A 9/21/2021    Procedure: INSERTION OF FIDUCIAL MARKER (TRANSRECTAL ULTRASOUND GUIDANCE), SPACEOAR;  Surgeon: Eleuterio Santana MD;  [facility];  Service: Urology   • IN PROSTATE NEEDLE BIOPSY ANY APPROACH N/A 6/15/2021    Procedure: TRANSPERINEAL MRI FUSION PROSTATE BIOPSY;  Surgeon: Lazarus Main, MD;  [facility];  Service: Urology   • SKIN BIOPSY     • TONSILLECTOMY         Social History   Social History     Substance and Sexual Activity   Alcohol Use Yes   • Alcohol/week: 4.0 standard drinks of alcohol   • Types: 4 Cans of beer per week    Comment: CAFFEINE USE/3-4 beers/wk.   Occasional wine or drink     Social History     Substance and Sexual Activity   Drug Use No     Social History     Tobacco Use   Smoking Status Never   Smokeless Tobacco Never         Meds/Allergies     Current Outpatient Medications:   •  ALPRAZolam (XANAX) 0.5 mg tablet, Take 1 tablet (0.5 mg total) by mouth 30 min pre-procedure, Disp: 1 tablet, Rfl: 0  •  amLODIPine (NORVASC) 2.5 mg tablet, 5 mg daily, Disp: , Rfl:   •  Eliquis 5 MG, TAKE 1 TABLET TWICE A DAY, Disp: 180 tablet, Rfl: 3  •  fluticasone (FLONASE) 50 mcg/act nasal spray, , Disp: , Rfl:   •  levothyroxine 25 mcg tablet, Take 25 mcg by mouth daily, Disp: , Rfl:   •  Multiple Vitamins-Minerals (CENTRUM SILVER 50+MEN PO), Take by mouth in the morning, Disp: , Rfl:   •  Omega-3 Fatty Acids (FISH OIL) 1,000 mg, Take by mouth daily, Disp: , Rfl:   •  rosuvastatin (CRESTOR) 10 MG tablet, 10 mg Pt takes med every other day--conversation with PCP (Dr. Doreen Loera) November 2021 appt/Labs, Disp: , Rfl:   •  traZODone (DESYREL) 50 mg tablet, , Disp: , Rfl:   •  bimatoprost (LUMIGAN) 0.01 % ophthalmic drops, Administer 1 drop into the left eye daily at bedtime, Disp: , Rfl:   No Known Allergies      Review of Systems   Constitutional: Positive for activity change (not as active anymore) and fatigue (tires more quickly, especially with warmer weather). HENT: Positive for hearing loss and postnasal drip (chronic). Eyes:        Wears glasses. Respiratory: Positive for cough (secndary to post nasal drip). Cardiovascular: Negative. Negative for chest pain. Gastrointestinal: Negative. Negative for blood in stool and constipation. Endocrine: Negative. Genitourinary: Positive for difficulty urinating (intermittent stream at times), frequency and urgency (seldom). Negative for dysuria and hematuria. Good urine stream. Nocturia x 3. Dribbles sometimes. Musculoskeletal: Positive for arthralgias (Right shoulder) and back pain (Long hx of "back discomfort"). Recent mri    Skin: Negative. Allergic/Immunologic: Negative. Neurological: Positive for dizziness (went to ED 7/2-7/3 for dizziness, none since then). Hematological: Does not bruise/bleed easily (Eliquis ).    Psychiatric/Behavioral: Positive for sleep disturbance (Insomnia x years. ). The patient is nervous/anxious (worries).       Clinical Trial: no     IPSS Questionnaire (AUA-7): Over the past month…     1)  How often have you had a sensation of not emptying your bladder completely after you finish urinating? 0 - Not at all   2)  How often have you had to urinate again less than two hours after you finished urinating? 0 - Not at all   3)  How often have you found you stopped and started again several times when you urinated? 1 - Less than 1 time in 5   4) How difficult have you found it to postpone urination? 0 - Not at all   5) How often have you had a weak urinary stream?  0 - Not at all   6) How often have you had to push or strain to begin urination? 1 - Less than 1 time in 5   7) How many times did you most typically get up to urinate from the time you went to bed until the time you got up in the morning? 3 - 3 times   Total Score:  5            OBJECTIVE:   /74   Pulse 80   Temp (!) 97.1 °F (36.2 °C) (Temporal)   Resp 18   Wt 60.3 kg (132 lb 15 oz)   SpO2 98%   BMI 22.12 kg/m²   Pain Assessment:  0  ECOG/Zubrod/WHO: 0 - Asymptomatic    Physical Exam   He is conversing appropriately. Ambulating independently. His breathing is unlabored.         RESULTS    Lab Results:   Recent Results (from the past 672 hour(s))   Lipid panel    Collection Time: 07/11/23  8:42 AM   Result Value Ref Range    Cholesterol 174 See Comment mg/dL    Triglycerides 97 See Comment mg/dL    HDL, Direct 61 >=40 mg/dL    LDL Calculated 94 0 - 100 mg/dL    Non-HDL-Chol (CHOL-HDL) 113 mg/dl   CBC and differential    Collection Time: 07/11/23  8:42 AM   Result Value Ref Range    WBC 4.93 4.31 - 10.16 Thousand/uL    RBC 4.37 3.88 - 5.62 Million/uL    Hemoglobin 13.5 12.0 - 17.0 g/dL    Hematocrit 41.1 36.5 - 49.3 %    MCV 94 82 - 98 fL    MCH 30.9 26.8 - 34.3 pg    MCHC 32.8 31.4 - 37.4 g/dL    RDW 13.5 11.6 - 15.1 %    MPV 9.7 8.9 - 12.7 fL    Platelets 188 462 - 807 Thousands/uL    nRBC 0 /100 WBCs    Neutrophils Relative 53 43 - 75 %    Immat GRANS % 0 0 - 2 %    Lymphocytes Relative 24 14 - 44 %    Monocytes Relative 16 (H) 4 - 12 %    Eosinophils Relative 6 0 - 6 %    Basophils Relative 1 0 - 1 %    Neutrophils Absolute 2.59 1.85 - 7.62 Thousands/µL    Immature Grans Absolute 0.02 0.00 - 0.20 Thousand/uL    Lymphocytes Absolute 1.19 0.60 - 4.47 Thousands/µL    Monocytes Absolute 0.78 0.17 - 1.22 Thousand/µL    Eosinophils Absolute 0.28 0.00 - 0.61 Thousand/µL    Basophils Absolute 0.07 0.00 - 0.10 Thousands/µL   TSH, 3rd generation    Collection Time: 07/11/23  8:42 AM   Result Value Ref Range    TSH 3RD GENERATON 2.955 0.450 - 4.500 uIU/mL   Comprehensive metabolic panel    Collection Time: 07/11/23  8:42 AM   Result Value Ref Range    Sodium 139 135 - 147 mmol/L    Potassium 4.3 3.5 - 5.3 mmol/L    Chloride 107 96 - 108 mmol/L    CO2 28 21 - 32 mmol/L    ANION GAP 4 mmol/L    BUN 21 5 - 25 mg/dL    Creatinine 1.10 0.60 - 1.30 mg/dL    Glucose, Fasting 97 65 - 99 mg/dL    Calcium 9.1 8.3 - 10.1 mg/dL    AST 17 5 - 45 U/L    ALT 20 12 - 78 U/L    Alkaline Phosphatase 56 46 - 116 U/L    Total Protein 6.8 6.4 - 8.4 g/dL    Albumin 3.8 3.5 - 5.0 g/dL    Total Bilirubin 0.53 0.20 - 1.00 mg/dL    eGFR 62 ml/min/1.73sq m   CK    Collection Time: 07/11/23  8:42 AM   Result Value Ref Range    Total  39 - 308 U/L       Imaging Studies:No results found. Assessment/Plan:  No orders of the defined types were placed in this encounter. Leo Blackwood is a 81y.o. year old male with unfavorable intermediate risk prostate carcinoma. His last PSA from February returned 0.2. He has upcoming PSA in the next several weeks prior to urology appointment. Overall he is tolerating treatment well without any significant postradiation sequela. He will return for follow-up visit in 1 year as he will be seeing urology in the interim.       Ivan Reyes MD  1/3/9052,5:02 PM    Portions of the record may have been created with voice recognition software.  Occasional wrong word or "sound a like" substitutions may have occurred due to the inherent limitations of voice recognition software.  Read the chart carefully and recognize, using context, where substitutions have occurred.

## 2023-08-14 ENCOUNTER — TELEPHONE (OUTPATIENT)
Dept: PAIN MEDICINE | Facility: CLINIC | Age: 83
End: 2023-08-14

## 2023-08-14 ENCOUNTER — OFFICE VISIT (OUTPATIENT)
Dept: PAIN MEDICINE | Facility: CLINIC | Age: 83
End: 2023-08-14
Payer: MEDICARE

## 2023-08-14 VITALS
WEIGHT: 132 LBS | DIASTOLIC BLOOD PRESSURE: 52 MMHG | BODY MASS INDEX: 21.99 KG/M2 | HEIGHT: 65 IN | SYSTOLIC BLOOD PRESSURE: 120 MMHG

## 2023-08-14 DIAGNOSIS — M54.16 LUMBAR RADICULOPATHY: Primary | ICD-10-CM

## 2023-08-14 DIAGNOSIS — M48.061 LUMBAR FORAMINAL STENOSIS: ICD-10-CM

## 2023-08-14 DIAGNOSIS — M47.816 LUMBAR SPONDYLOSIS: ICD-10-CM

## 2023-08-14 PROCEDURE — 99214 OFFICE O/P EST MOD 30 MIN: CPT | Performed by: ANESTHESIOLOGY

## 2023-08-14 NOTE — TELEPHONE ENCOUNTER
Eliquis hold messaged to Dr. Maurice Howard today 8/14.   L3-4 LESI; 4 wk f/u with Dr. Lisa Villagomez

## 2023-08-14 NOTE — PROGRESS NOTES
Assessment:  1. Lumbar radiculopathy    2. Lumbar spondylosis    3. Lumbar foraminal stenosis        Plan:   Patient presenting for follow up regarding his chronic back pain for greater than 20 years, worsening over the past several months with right > left radicular leg pains.     Patient is demonstrating pain that is multifactorial in nature, with the main pain generator likely stemming from lumbar spondylosis, lumbar radiculitis. Symptoms are accompanied by pain >7/10 on the pain scale with inability to participate in IADLs for >6 weeks. Patient has fully participated with physical therapy.  Has been taking Tylenol, NSAIDs with modest benefit. Denies any gait instability, saddle anesthesia.     In regards to the patient's lumbar pathology, we discussed the various treatment options including physical therapy, chiropractic treatment, medication management, activity modifications, interventional spine procedures. Leo Mckoys that patient has not had any benefit with conservative treatments, I think patient would benefit from targeted interventional treatment modalities.     Reviewed and interpreted relevant imaging studies - specifically prior lumbar MRI from 7/27/23 and discussed the results and clinical significance with the patient. This showed multilevel severe degenerative changes consistent with lumbar spondylosis. There are varying degrees of foraminal stenosis throughout.     - at this time given predominating radicular symptoms I recommend proceeding with a Right L3-4 interlaminar DRAGAN  Risks, benefits, and alternatives to epidural steroid injections thoroughly discussed with patient. Patient previously had various interventions 10+ years ago including epidural steroid injections and radiofrequency ablations with good benefit.     Will request Eliquis hold for 3 days prior to epidural procedures.     Reviewed external notes from the relevant aspects of the patient's medical record, specifically orthopedic surgery, physical therapy, primary care physician notes in regards to current and prior treatments tried (as mentioned in history of present illness).     Reviewed pertinent laboratory studies, specifically renal function, hemoglobin A1c, CBC, coagulation studies, prior to recommending medication therapies/interventional treatment options.     [place] Prescription Drug Monitoring Program report was reviewed and was appropriate      My impressions and treatment recommendations were discussed in detail with the patient who verbalized understanding and had no further questions. Discharge instructions were provided. I personally saw and examined the patient and I agree with the above discussed plan of care. Orders Placed This Encounter   Procedures   • [place] spine and pain procedure     Standing Status:   Future     Standing Expiration Date:   8/14/2027     Order Specific Question:   Reason for Exam:     Answer:   L3-4 LESI     Order Specific Question:   Anticoagulant hold needed? Answer:   Yes, Eliquis     No orders of the defined types were placed in this encounter. History of Present Illness:  William Larson is a 81 y.o. male who presents for a follow up office visit in regards to Back Pain (MRI f/u). The patient’s current symptoms include unchanged low back pain radiating to the R>L legs with pain ranging from 2-8/10 on the pain scale. Pain is described as an intermittent burning, shooting, sharp, throbbing, cramping pain with numbness worse in the morning. I have personally reviewed and/or updated the patient's past medical history, past surgical history, family history, social history, current medications, allergies, and vital signs today. Review of Systems   Constitutional: Negative for chills and fever. HENT: Negative for ear pain and sore throat. Eyes: Negative for pain and visual disturbance. Respiratory: Negative for cough and shortness of breath.     Cardiovascular: Negative for chest pain and palpitations. Gastrointestinal: Negative for abdominal pain and vomiting. Genitourinary: Negative for dysuria and hematuria. Musculoskeletal: Positive for arthralgias, back pain, gait problem and myalgias. Skin: Negative for color change and rash. Neurological: Positive for weakness. Negative for seizures and syncope. All other systems reviewed and are negative. Patient Active Problem List   Diagnosis   • Benign prostatic hypertrophy   • Hypothyroidism   • A-fib (HCC)   • HTN (hypertension)   • Elevated PSA   • Prostate cancer ([address])   • Sleep apnea   • Vertigo   • Primary osteoarthritis of right knee       Past Medical History:   Diagnosis Date   • Back pain    • Benign prostatic hypertrophy    • Disease of thyroid gland    • Elevated PSA 3/3/2021   • Hyperlipidemia    • Hypertension    • Prostate cancer ([address]) 7/21/2021   • Restless leg        Past Surgical History:   Procedure Laterality Date   • ACHILLES TENDON SURGERY     • HERNIA REPAIR     • KNEE ARTHROSCOPY Right     shoulder   • IA PLMT INTERSTITIAL DEV RADIAT TX PROSTATE 1/MULT N/A 9/21/2021    Procedure: INSERTION OF FIDUCIAL MARKER (TRANSRECTAL ULTRASOUND GUIDANCE), SPACEOAR;  Surgeon: Brandon Mcgee MD;  Location: [facility];  Service: Urology   • IA PROSTATE NEEDLE BIOPSY ANY APPROACH N/A 6/15/2021    Procedure: TRANSPERINEAL MRI FUSION PROSTATE BIOPSY;  Surgeon: Buddy Fong MD;  Location: [facility];  Service: Urology   • SKIN BIOPSY     • TONSILLECTOMY         Family History   Problem Relation Age of Onset   • Cancer Mother    • Throat cancer Father    • Breast cancer Sister        Social History     Occupational History   • Not on file   Tobacco Use   • Smoking status: Never   • Smokeless tobacco: Never   Vaping Use   • Vaping Use: Never used   Substance and Sexual Activity   • Alcohol use: Yes     Alcohol/week: 4.0 standard drinks of alcohol     Types: 4 Cans of beer per week     Comment: CAFFEINE USE/3-4 beers/wk. Occasional wine or drink   • Drug use: No   • Sexual activity: Not on file       Current Outpatient Medications on File Prior to Visit   Medication Sig   • ALPRAZolam (XANAX) 0.5 mg tablet Take 1 tablet (0.5 mg total) by mouth 30 min pre-procedure   • amLODIPine (NORVASC) 2.5 mg tablet 5 mg daily   • bimatoprost (LUMIGAN) 0.01 % ophthalmic drops Administer 1 drop into the left eye daily at bedtime   • Eliquis 5 MG TAKE 1 TABLET TWICE A DAY   • fluticasone (FLONASE) 50 mcg/act nasal spray    • levothyroxine 25 mcg tablet Take 25 mcg by mouth daily   • Multiple Vitamins-Minerals (CENTRUM SILVER 50+MEN PO) Take by mouth in the morning   • Omega-3 Fatty Acids (FISH OIL) 1,000 mg Take by mouth daily   • rosuvastatin (CRESTOR) 10 MG tablet 10 mg Pt takes med every other day--conversation with PCP (Dr. Ana Velasquez)  November 2021 appt/Labs   • traZODone (DESYREL) 50 mg tablet      No current facility-administered medications on file prior to visit. No Known Allergies    Physical Exam:    /52   Ht 5' 5" (1.651 m)   Wt 59.9 kg (132 lb)   BMI 21.97 kg/m²     Constitutional:normal, well developed, well nourished, alert, in no distress and non-toxic and no overt pain behavior. Eyes:anicteric  HEENT:grossly intact  Neck:supple, symmetric, trachea midline and no masses   Pulmonary:even and unlabored  Cardiovascular:No edema or pitting edema present  Skin:Normal without rashes or lesions and well hydrated  Psychiatric:Mood and affect appropriate  Neurologic: No focal neurologic deficits. Musculoskeletal: Limited lumbar spine ROM    Imaging  MRI lumbar spine 7/27/23:  FINDINGS:     VERTEBRAL BODIES:  There are 5 lumbar type vertebral bodies. There is levoscoliosis with apex at L2-3. Stepwise grade 1 anterolisthesis at levels L2-3 and L3-4. Mild retrolisthesis at L4-5.     Multilevel endplate degenerative signal change; Modic type I at levels T12-L1, L2-3, and L5-S1; Modic type II at level L3-4.  No suspicious discrete lesion.     0.8 cm T1 hypointensity/T2 hyperintense lesion within L5 inferior endplate, stable from CT 7/31/2021, most likely representing atypical hemangioma.        SACRUM:  Normal signal within the sacrum. No evidence of insufficiency or stress fracture.     DISTAL CORD AND CONUS:  Normal size and signal within the distal cord and conus.     PARASPINAL SOFT TISSUES:  Paraspinal soft tissues are unremarkable.     LOWER THORACIC DISC SPACES: Mild noncompressive lower thoracic degenerative change.     LUMBAR DISC SPACES:     L1-L2: Disc bulge. Left foraminal disc protrusion. Marginal endplate osteophytes. Moderate bilateral facet arthropathy. Mild canal stenosis. Mild to moderate left and mild right foraminal narrowing.     L2-L3: Disc bulge. Severe bilateral facet arthropathy. Bilateral ligamentum flavum thickening. Mild canal stenosis. Mild to moderate right and mild left foraminal narrowing.     L3-L4: Disc bulge. Severe facet arthropathy. Mild canal stenosis. Mild to moderate right and mild left foraminal narrowing.     L4-L5: Mild retrolisthesis. No significant disc bulge. Left foraminal disc protrusion. Moderate bilateral facet arthropathy. Minimal canal narrowing. Moderate bilateral foraminal stenosis.     L5-S1: Mild disc bulge. Moderate bilateral facet arthropathy. No significant canal stenosis. Moderate left and mild to moderate right foraminal stenosis.     OTHER FINDINGS: Bilateral renal upper pole benign cysts and additional small T2 hyperintense lesions, statistically favored to represent benign cysts. Subcentimeter T2 hyperintense indeterminate hepatic lesions, statistically favored to represent benign   cysts.     IMPRESSION:     Degenerative change with multilevel mild canal and mild to moderate degree foraminal stenosis as described. .

## 2023-08-14 NOTE — TELEPHONE ENCOUNTER
Patient is being considered for a neuraxial injection, such as an epidural injection, nerve root block, etc. for the management of their pain. However, the patient is currently on an anticoagulant per your orders. The American Society of Regional Anest[employer] Guideline of neuraxial procedures and anti-coagulation indicates that medication should be held as follows:     ELIQUIS-HOLD 3 DAYS PRIOR TO PROCEDURE. Dr. Leeanna Candelaria,     If you could please advise whether hold is approved or not and we can call patient to schedule. Thank You!

## 2023-08-15 NOTE — TELEPHONE ENCOUNTER
Eliquis hold approved by Dr. Jose J Rodriguez. L3-4 LESI; 4 wk f/u Dr. Sakshi carpenter for dates and times.

## 2023-08-15 NOTE — TELEPHONE ENCOUNTER
RN S/W pt.        Scheduled pt for procedure on 8/24 at 1:20 and SOVS on 9/22 at 2:15. Pt instructed last dose of Eliquis is on 8/20 and to start holding it 8/21. Pt denies taking other anticoagulants, ASA and Excedrin. Pt verbalized understanding of preoperative instructions and pt will contact [facility] if he gets sick or starts taking antibiotics. [place] aware and will put in Epic.

## 2023-08-16 ENCOUNTER — APPOINTMENT (OUTPATIENT)
Dept: LAB | Facility: CLINIC | Age: 83
End: 2023-08-16
Payer: MEDICARE

## 2023-08-16 DIAGNOSIS — C61 PROSTATE CANCER (HCC): ICD-10-CM

## 2023-08-16 DIAGNOSIS — C61 MALIGNANT NEOPLASM OF PROSTATE (HCC): ICD-10-CM

## 2023-08-16 DIAGNOSIS — E29.1 3-OXO-5 ALPHA-STEROID DELTA 4-DEHYDROGENASE DEFICIENCY: ICD-10-CM

## 2023-08-16 LAB
PSA SERPL-MCNC: 0.13 NG/ML (ref 0–4)
TESTOST SERPL-MSCNC: 159 NG/DL

## 2023-08-16 PROCEDURE — 36415 COLL VENOUS BLD VENIPUNCTURE: CPT

## 2023-08-16 PROCEDURE — 84153 ASSAY OF PSA TOTAL: CPT

## 2023-08-16 PROCEDURE — 84403 ASSAY OF TOTAL TESTOSTERONE: CPT

## 2023-08-24 ENCOUNTER — HOSPITAL ENCOUNTER (OUTPATIENT)
Dept: RADIOLOGY | Facility: MEDICAL CENTER | Age: 83
Discharge: HOME | End: 2023-08-24
Payer: MEDICARE

## 2023-08-24 VITALS
RESPIRATION RATE: 18 BRPM | OXYGEN SATURATION: 98 % | SYSTOLIC BLOOD PRESSURE: 168 MMHG | TEMPERATURE: 97.9 F | HEART RATE: 80 BPM | DIASTOLIC BLOOD PRESSURE: 88 MMHG

## 2023-08-24 DIAGNOSIS — M54.16 LUMBAR RADICULOPATHY: ICD-10-CM

## 2023-08-24 PROCEDURE — 62323 NJX INTERLAMINAR LMBR/SAC: CPT | Performed by: ANESTHESIOLOGY

## 2023-08-24 RX ORDER — METHYLPREDNISOLONE ACETATE 80 MG/ML
80 INJECTION, SUSPENSION INTRA-ARTICULAR; INTRALESIONAL; INTRAMUSCULAR; PARENTERAL; SOFT TISSUE ONCE
Status: COMPLETED | OUTPATIENT
Start: 2023-08-24 | End: 2023-08-24

## 2023-08-24 RX ORDER — BUPIVACAINE HCL/PF 2.5 MG/ML
1 VIAL (ML) INJECTION ONCE
Status: COMPLETED | OUTPATIENT
Start: 2023-08-24 | End: 2023-08-24

## 2023-08-24 RX ADMIN — METHYLPREDNISOLONE ACETATE 80 MG: 80 INJECTION, SUSPENSION INTRA-ARTICULAR; INTRALESIONAL; INTRAMUSCULAR; PARENTERAL; SOFT TISSUE at 13:41

## 2023-08-24 RX ADMIN — Medication 1 ML: at 13:41

## 2023-08-24 RX ADMIN — IOHEXOL 1 ML: 300 INJECTION, SOLUTION INTRAVENOUS at 13:40

## 2023-08-24 NOTE — DISCHARGE INSTRUCTIONS
Epidural Steroid Injection   WHAT YOU NEED TO KNOW:   An epidural steroid injection (DRAGAN) is a procedure to inject steroid medicine into the epidural space. The epidural space is between your spinal cord and vertebrae. Steroids reduce inflammation and fluid buildup in your spine that may be causing pain. You may be given pain medicine along with the steroids. ACTIVITY  Do not drive or operate machinery today. No strenuous activity today - bending, lifting, etc.  You may resume normal activites starting tomorrow - start slowly and as tolerated. You may shower today, but no tub baths or hot tubs. You may have numbness for several hours from the local anesthetic. Please use caution and common sense, especially with weight-bearing activities. CARE OF THE INJECTION SITE  If you have soreness or pain, apply ice to the area today (20 minutes on/20 minutes off). Starting tomorrow, you may use warm, moist heat or ice if needed. You may have an increase or change in your discomfort for 36-48 hours after your treatment. Apply ice and continue with any pain medication you have been prescribed. Notify the [facility] if you have any of the following: redness, drainage, swelling, headache, stiff neck or fever above 100°F.    SPECIAL INSTRUCTIONS  Our office will contact you in approximately 7 days for a progress report. MEDICATIONS  Continue to take all routine medications. Our office may have instructed you to hold some medications. Resume Eliquis tomorrow after 2 pm.    As no general anesthesia was used in today's procedure, you should not experience any side effects related to anesthesia. If you are diabetic, the steroids used in today's injection may temporarily increase your blood sugar levels after the first few days after your injection.  Please keep a close eye on your sugars and alert the doctor who manages your diabetes if your sugars are significantly high from your baseline or you are symptomatic. If you have a problem specifically related to your procedure, please call our office at [phone]. Problems not related to your procedure should be directed to your primary care physician.

## 2023-08-24 NOTE — H&P
History of Present Illness:  The patient is a 81 y.o. male who presents with complaints of back and leg pain    Past Medical History:   Diagnosis Date   • Back pain    • Benign prostatic hypertrophy    • Disease of thyroid gland    • Elevated PSA 3/3/2021   • Hyperlipidemia    • Hypertension    • Prostate cancer ([address]) 7/21/2021   • Restless leg        Past Surgical History:   Procedure Laterality Date   • ACHILLES TENDON SURGERY     • HERNIA REPAIR     • KNEE ARTHROSCOPY Right     shoulder   • IN PLMT INTERSTITIAL DEV RADIAT TX PROSTATE 1/MULT N/A 9/21/2021    Procedure: INSERTION OF FIDUCIAL MARKER (TRANSRECTAL ULTRASOUND GUIDANCE), SPACEOAR;  Surgeon: Nikhil De La Torre MD;  Location: [facility];  Service: Urology   • IN PROSTATE NEEDLE BIOPSY ANY APPROACH N/A 6/15/2021    Procedure: TRANSPERINEAL MRI FUSION PROSTATE BIOPSY;  Surgeon: Julien Guallpa MD;  Location[facility];  Service: Urology   • SKIN BIOPSY     • TONSILLECTOMY           Current Outpatient Medications:   •  ALPRAZolam (XANAX) 0.5 mg tablet, Take 1 tablet (0.5 mg total) by mouth 30 min pre-procedure, Disp: 1 tablet, Rfl: 0  •  amLODIPine (NORVASC) 2.5 mg tablet, 5 mg daily, Disp: , Rfl:   •  bimatoprost (LUMIGAN) 0.01 % ophthalmic drops, Administer 1 drop into the left eye daily at bedtime, Disp: , Rfl:   •  Eliquis 5 MG, TAKE 1 TABLET TWICE A DAY, Disp: 180 tablet, Rfl: 3  •  fluticasone (FLONASE) 50 mcg/act nasal spray, , Disp: , Rfl:   •  levothyroxine 25 mcg tablet, Take 25 mcg by mouth daily, Disp: , Rfl:   •  Multiple Vitamins-Minerals (CENTRUM SILVER 50+MEN PO), Take by mouth in the morning, Disp: , Rfl:   •  Omega-3 Fatty Acids (FISH OIL) 1,000 mg, Take by mouth daily, Disp: , Rfl:   •  rosuvastatin (CRESTOR) 10 MG tablet, 10 mg Pt takes med every other day--conversation with PCP (Dr. Corinne Price) November 2021 appt/Labs, Disp: , Rfl:   •  traZODone (DESYREL) 50 mg tablet, , Disp: , Rfl:     No Known Allergies    Physical Exam:   Vitals:    08/24/23 1318   BP: 127/81   Pulse: 66   Resp: 18   Temp: 97.9 °F (36.6 °C)   SpO2: 98%     General: Awake, Alert, Oriented x 3, Mood and affect appropriate  Respiratory: Respirations even and unlabored  Cardiovascular: Peripheral pulses intact; no edema  Musculoskeletal Exam: lumbar spine ROM limited    ASA Score: 2    Patient/Chart Verification  Patient ID Verified: Verbal  Consents Confirmed: Procedural, To be obtained in the Pre-Procedure area  H&P( within 30 days) Verified: To be obtained in the Pre-Procedure area  Allergies Reviewed: Yes  Anticoag/NSAID held?: Yes (Eliquis last dose 8/20)  Currently on antibiotics?: No  Pregnancy denied?: NA    Assessment:   1.  Lumbar radiculopathy        Plan: L3-4 JASON

## 2023-08-29 ENCOUNTER — OFFICE VISIT (OUTPATIENT)
Dept: UROLOGY | Facility: MEDICAL CENTER | Age: 83
End: 2023-08-29
Payer: MEDICARE

## 2023-08-29 VITALS
OXYGEN SATURATION: 98 % | DIASTOLIC BLOOD PRESSURE: 60 MMHG | HEIGHT: 65 IN | WEIGHT: 135 LBS | HEART RATE: 84 BPM | BODY MASS INDEX: 22.49 KG/M2 | SYSTOLIC BLOOD PRESSURE: 130 MMHG

## 2023-08-29 DIAGNOSIS — E29.1 HYPOGONADISM IN MALE: ICD-10-CM

## 2023-08-29 DIAGNOSIS — C61 PROSTATE CANCER (HCC): Primary | ICD-10-CM

## 2023-08-29 DIAGNOSIS — N40.1 BPH ASSOCIATED WITH NOCTURIA: ICD-10-CM

## 2023-08-29 DIAGNOSIS — R35.1 BPH ASSOCIATED WITH NOCTURIA: ICD-10-CM

## 2023-08-29 DIAGNOSIS — Z92.3 HISTORY OF RADIATION THERAPY: ICD-10-CM

## 2023-08-29 PROCEDURE — 99214 OFFICE O/P EST MOD 30 MIN: CPT | Performed by: UROLOGY

## 2023-08-29 NOTE — PROGRESS NOTES
Assessment/Plan:  #1. Prostate cancer Lisset pattern score 6 and 7 status post 6-month course of androgen deprivation and IMRT finishing 2021. Testosterone is slightly subnormal with PSA 0.13. Repeat 6 months. 2.  Male hypogonadism-after cessation of androgen deprivation therapy hypogonadism have improved but as of late his total testosterone is still subnormal.  Do not suggest testosterone replacement therapy in this patient with prostate cancer. 3.  History of radiation therapy-as above    4. BPH with lower urinary tract symptoms. AUA symptom score is 12 with nocturia and intermittency as well as some weakening of the urinary stream frequency and urgency. Not bothersome the patient-no further treatment in that regard  No problem-specific Assessment & Plan notes found for this encounter. Diagnoses and all orders for this visit:    Prostate cancer ([address])  -     PSA Total, Diagnostic; Future    Hypogonadism in male  -     Testosterone, free, total; Future          Subjective:      Patient ID: Reginaldo Gold is a 81 y.o. male. HPI  78-year-old gentleman with prostate cancer Lisset pattern score 6 and 7 status post 6-month androgen deprivation and radiation therapy with radiation ending November 2021. The patient returns with an AUA symptom score of 12 noting 3/5 intermittency nocturia 2 times nightly. He denies gross hematuria dysuria. Overall he feels well  The following portions of the patient's history were reviewed and updated as appropriate: allergies, current medications, past family history, past medical history, past social history, past surgical history and problem list.    Review of Systems   Genitourinary: Positive for frequency and urgency. AUA symptom score 12   Musculoskeletal: Positive for arthralgias, back pain and myalgias. All other systems reviewed and are negative.         Objective:      /60 (BP Location: Left arm, Patient Position: Sitting, Cuff Size: Standard)   Pulse 84   Ht 5' 5" (1.651 m)   Wt 61.2 kg (135 lb)   SpO2 98%   BMI 22.47 kg/m²          Physical Exam  Vitals reviewed. Constitutional:       General: He is not in acute distress. Appearance: Normal appearance. He is normal weight. He is not ill-appearing, toxic-appearing or diaphoretic. HENT:      Head: Normocephalic and atraumatic. Nose: Nose normal.      Mouth/Throat:      Mouth: Mucous membranes are moist.   Eyes:      Extraocular Movements: Extraocular movements intact. Pulmonary:      Effort: Pulmonary effort is normal. No respiratory distress. Abdominal:      General: There is no distension. Palpations: Abdomen is soft. Tenderness: There is no abdominal tenderness. There is no guarding. Hernia: No hernia is present. Musculoskeletal:         General: Normal range of motion. Cervical back: Neck supple. Skin:     General: Skin is dry. Neurological:      Mental Status: He is alert and oriented to person, place, and time. Psychiatric:         Mood and Affect: Mood normal.         Behavior: Behavior normal.         Thought Content:  Thought content normal.         Judgment: Judgment normal.

## 2023-08-29 NOTE — LETTER
August 29, 2023     [facility], [address]  [place]    Patient: Garth Langford   YOB: 1940   Date of Visit: 8/29/2023       Dear Dr. Alisa Chou: Thank you for referring Garth Langford to me for evaluation. Below are my notes for this consultation. If you have questions, please do not hesitate to call me. I look forward to following your patient along with you. Sincerely,        Yandel Bueno MD        CC: No Recipients    Yandel Bueno MD  8/29/2023  1:26 PM  Sign when Signing Visit  Assessment/Plan:  #1. Prostate cancer Lisset pattern score 6 and 7 status post 6-month course of androgen deprivation and IMRT finishing 2021. Testosterone is slightly subnormal with PSA 0.13. Repeat 6 months. 2.  Male hypogonadism-after cessation of androgen deprivation therapy hypogonadism have improved but as of late his total testosterone is still subnormal.  Do not suggest testosterone replacement therapy in this patient with prostate cancer. 3.  History of radiation therapy-as above    4. BPH with lower urinary tract symptoms. AUA symptom score is 12 with nocturia and intermittency as well as some weakening of the urinary stream frequency and urgency. Not bothersome the patient-no further treatment in that regard  No problem-specific Assessment & Plan notes found for this encounter. Diagnoses and all orders for this visit:    Prostate cancer ([address])  -     PSA Total, Diagnostic; Future    Hypogonadism in male  -     Testosterone, free, total; Future         Subjective:     Patient ID: Garth Langford is a 81 y.o. male. HPI  81-year-old gentleman with prostate cancer Lisset pattern score 6 and 7 status post 6-month androgen deprivation and radiation therapy with radiation ending November 2021. The patient returns with an AUA symptom score of 12 noting 3/5 intermittency nocturia 2 times nightly. He denies gross hematuria dysuria.   Overall he feels well  The following portions of the patient's history were reviewed and updated as appropriate: allergies, current medications, past family history, past medical history, past social history, past surgical history and problem list.    Review of Systems   Genitourinary: Positive for frequency and urgency. AUA symptom score 12   Musculoskeletal: Positive for arthralgias, back pain and myalgias. All other systems reviewed and are negative. Objective:      /60 (BP Location: Left arm, Patient Position: Sitting, Cuff Size: Standard)   Pulse 84   Ht 5' 5" (1.651 m)   Wt 61.2 kg (135 lb)   SpO2 98%   BMI 22.47 kg/m²         Physical Exam  Vitals reviewed. Constitutional:       General: He is not in acute distress. Appearance: Normal appearance. He is normal weight. He is not ill-appearing, toxic-appearing or diaphoretic. HENT:      Head: Normocephalic and atraumatic. Nose: Nose normal.      Mouth/Throat:      Mouth: Mucous membranes are moist.   Eyes:      Extraocular Movements: Extraocular movements intact. Pulmonary:      Effort: Pulmonary effort is normal. No respiratory distress. Abdominal:      General: There is no distension. Palpations: Abdomen is soft. Tenderness: There is no abdominal tenderness. There is no guarding. Hernia: No hernia is present. Musculoskeletal:         General: Normal range of motion. Cervical back: Neck supple. Skin:     General: Skin is dry. Neurological:      Mental Status: He is alert and oriented to person, place, and time. Psychiatric:         Mood and Affect: Mood normal.         Behavior: Behavior normal.         Thought Content:  Thought content normal.         Judgment: Judgment normal.

## 2023-08-31 ENCOUNTER — TELEPHONE (OUTPATIENT)
Dept: PAIN MEDICINE | Facility: CLINIC | Age: 83
End: 2023-08-31

## 2023-08-31 NOTE — TELEPHONE ENCOUNTER
Caller: Juaquin Woods   Doctor/office: Dr Oz Farrell   CB#: [phone]    % of improvement: 100% few days now 80%  Pain Scale (1-10):  3-4/10

## 2023-09-06 ENCOUNTER — TELEPHONE (OUTPATIENT)
Age: 83
End: 2023-09-06

## 2023-09-06 NOTE — TELEPHONE ENCOUNTER
Rec'd call from patient requesting NEW for FULL BODY. No history of skin cancer. Explained wait/cancellation list and MyChart notifications. Understanding was verbalized. Created wait list for patient.

## 2023-09-25 ENCOUNTER — OFFICE VISIT (OUTPATIENT)
Dept: PAIN MEDICINE | Facility: CLINIC | Age: 83
End: 2023-09-25
Payer: MEDICARE

## 2023-09-25 ENCOUNTER — TELEPHONE (OUTPATIENT)
Age: 83
End: 2023-09-25

## 2023-09-25 VITALS
DIASTOLIC BLOOD PRESSURE: 66 MMHG | SYSTOLIC BLOOD PRESSURE: 146 MMHG | BODY MASS INDEX: 22.49 KG/M2 | WEIGHT: 135 LBS | HEIGHT: 65 IN

## 2023-09-25 DIAGNOSIS — M54.16 LUMBAR RADICULITIS: ICD-10-CM

## 2023-09-25 DIAGNOSIS — M47.817 LUMBOSACRAL SPONDYLOSIS WITHOUT MYELOPATHY: Primary | ICD-10-CM

## 2023-09-25 PROCEDURE — 99214 OFFICE O/P EST MOD 30 MIN: CPT | Performed by: ANESTHESIOLOGY

## 2023-09-25 NOTE — TELEPHONE ENCOUNTER
Patient Called to see if he had an apt today with derm.  I advised he did not but that he is on our wait list.

## 2023-09-25 NOTE — PROGRESS NOTES
Assessment:  1. Lumbosacral spondylosis without myelopathy    2. Lumbar radiculitis        Plan:    Patient presenting for follow up regarding his chronic back pain for greater than 20 years, worsening over the past several months. Patient previously had various interventions 10+ years ago including epidural steroid injections and radiofrequency ablations with good benefit. Most recently, he underwent an L3-4 interlaminar DRAGAN for right > left radicular leg pains. He reports today that he did obtain about 50% improvement for about 2 weeks. On further questioning he notes that his radicular leg pains have improved but he does continue to have significant axial back symptoms bilaterally.     Patient is demonstrating pain that is multifactorial in nature, with the main pain generator likely stemming from lumbar spondylosis, lumbar radiculitis. Symptoms are accompanied by pain >7/10 on the pain scale with inability to participate in IADLs for >6 weeks. Patient has fully participated with physical therapy.  Has been taking Tylenol, NSAIDs with modest benefit. Denies any gait instability, saddle anesthesia.      Reviewed and interpreted relevant imaging studies - specifically prior lumbar MRI from 7/27/23 and discussed the results and clinical significance with the patient. This showed multilevel severe degenerative changes consistent with lumbar spondylosis. There are varying degrees of foraminal stenosis throughout.     - at this time given predominant axial back symptoms I offered and recommended to proceed with bilateral diagnostic lumbar L3-5 medial branch nerve blocks    We will schedule the patient for bilateral L3-5 medial branch nerve blocks with intention of moving forward towards radiofrequency ablation if there is an appropriate diagnostic response.  The initial blocks will be performed with 2% lidocaine and if an appropriate response is obtained upon review of the patient's pain diary, a confirmatory block will be scheduled with 0.5% bupivacaine. In the office today, we reviewed the nature of facet joint pathology in depth using a spine model. We discussed the approach we would use for the injections and provided literature for home review. The patient understands the risks associated with the procedure including bleeding, infection, tissue injury, and allergic reaction and provided verbal informed consent in the office today.     Reviewed external notes from the relevant aspects of the patient's medical record, specifically orthopedic surgery, physical therapy, primary care physician notes in regards to current and prior treatments tried (as mentioned in history of present illness).     Reviewed pertinent laboratory studies, specifically renal function, hemoglobin A1c, CBC, coagulation studies, prior to recommending medication therapies/interventional treatment options.     [place] Prescription Drug Monitoring Program report was reviewed and was appropriate      My impressions and treatment recommendations were discussed in detail with the patient who verbalized understanding and had no further questions. Discharge instructions were provided. I personally saw and examined the patient and I agree with the above discussed plan of care. Orders Placed This Encounter   Procedures   • FL spine and pain procedure     Standing Status:   Future     Standing Expiration Date:   9/25/2027     Order Specific Question:   Reason for Exam:     Answer:   bilateral L3-5 MBB #1     Order Specific Question:   Anticoagulant hold needed? Answer:   no     No orders of the defined types were placed in this encounter. History of Present Illness:  Carmenza Heath is a 81 y.o. male who presents for a follow up office visit in regards to Back Pain. The patient’s current symptoms include continued symptoms of bilateral axial low back pain greater than radicular leg pain.   Pain is rated 5 out of 10 and described as intermittent dull/aching pain worse in the morning. I have personally reviewed and/or updated the patient's past medical history, past surgical history, family history, social history, current medications, allergies, and vital signs today. Review of Systems   Constitutional: Negative for chills and fever. HENT: Negative for ear pain and sore throat. Eyes: Negative for pain and visual disturbance. Respiratory: Negative for cough and shortness of breath. Cardiovascular: Negative for chest pain and palpitations. Gastrointestinal: Negative for abdominal pain and vomiting. Genitourinary: Negative for dysuria and hematuria. Musculoskeletal: Positive for arthralgias, back pain and myalgias. Skin: Negative for color change and rash. Neurological: Positive for weakness. Negative for seizures and syncope. All other systems reviewed and are negative.       Patient Active Problem List   Diagnosis   • Benign prostatic hypertrophy   • Hypothyroidism   • A-fib (HCC)   • HTN (hypertension)   • Elevated PSA   • Prostate cancer [facility])   • Sleep apnea   • Vertigo   • Primary osteoarthritis of right knee   • Lumbar radiculopathy       Past Medical History:   Diagnosis Date   • Back pain    • Benign prostatic hypertrophy    • Disease of thyroid gland    • Elevated PSA 3/3/2021   • Hyperlipidemia    • Hypertension    • Prostate cancer ([address]) 7/21/2021   • Restless leg        Past Surgical History:   Procedure Laterality Date   • ACHILLES TENDON SURGERY     • HERNIA REPAIR     • KNEE ARTHROSCOPY Right     shoulder   • CT PLMT INTERSTITIAL DEV RADIAT TX PROSTATE 1/MULT N/A 9/21/2021    Procedure: INSERTION OF FIDUCIAL MARKER (TRANSRECTAL ULTRASOUND GUIDANCE), Lila Bloch;  Surgeon: Lorenzo Pierre MD;  [facility];  Service: Urology   • CT PROSTATE NEEDLE BIOPSY ANY APPROACH N/A 6/15/2021    Procedure: TRANSPERINEAL MRI FUSION PROSTATE BIOPSY;  Surgeon: Kenton Harris MD;  [facility];  Service: Urology   • SKIN BIOPSY     • TONSILLECTOMY         Family History   Problem Relation Age of Onset   • Cancer Mother    • Throat cancer Father    • Breast cancer Sister        Social History     Occupational History   • Not on file   Tobacco Use   • Smoking status: Never   • Smokeless tobacco: Never   Vaping Use   • Vaping Use: Never used   Substance and Sexual Activity   • Alcohol use: Yes     Alcohol/week: 4.0 standard drinks of alcohol     Types: 4 Cans of beer per week     Comment: CAFFEINE USE/3-4 beers/wk. Occasional wine or drink   • Drug use: No   • Sexual activity: Not on file       Current Outpatient Medications on File Prior to Visit   Medication Sig   • amLODIPine (NORVASC) 2.5 mg tablet 5 mg daily   • bimatoprost (LUMIGAN) 0.01 % ophthalmic drops Administer 1 drop into the left eye daily at bedtime   • Eliquis 5 MG TAKE 1 TABLET TWICE A DAY   • fluticasone (FLONASE) 50 mcg/act nasal spray    • levothyroxine 25 mcg tablet Take 25 mcg by mouth daily   • Multiple Vitamins-Minerals (CENTRUM SILVER 50+MEN PO) Take by mouth in the morning   • Omega-3 Fatty Acids (FISH OIL) 1,000 mg Take by mouth daily KRILL OIL   • rosuvastatin (CRESTOR) 10 MG tablet 10 mg Pt takes med every other day--conversation with PCP (Dr. Ilene Aguillon)  November 2021 appt/Labs   • traZODone (DESYREL) 50 mg tablet    • ALPRAZolam (XANAX) 0.5 mg tablet Take 1 tablet (0.5 mg total) by mouth 30 min pre-procedure (Patient not taking: Reported on 8/29/2023)     No current facility-administered medications on file prior to visit. No Known Allergies    Physical Exam:    /66   Ht 5' 5" (1.651 m)   Wt 61.2 kg (135 lb)   BMI 22.47 kg/m²     Constitutional:normal, well developed, well nourished, alert, in no distress and non-toxic and no overt pain behavior.   Eyes:anicteric  HEENT:grossly intact  Neck:supple, symmetric, trachea midline and no masses   Pulmonary:even and unlabored  Cardiovascular:No edema or pitting edema present  Skin:Normal without rashes or lesions and well hydrated  Psychiatric:Mood and affect appropriate  Neurologic: No focal neurologic deficits. Musculoskeletal: Gait is nonantalgic. Pain with lumbar extension and lateral flexion. Imaging  MRI LUMBAR SPINE WITHOUT CONTRAST     INDICATION: M54.16: Radiculopathy, lumbar region.     COMPARISON: MRI lumbar spine 4/21/2008     TECHNIQUE:  Multiplanar, multisequence imaging of the lumbar spine was performed. .        IMAGE QUALITY:  Diagnostic     FINDINGS:     VERTEBRAL BODIES:  There are 5 lumbar type vertebral bodies. There is levoscoliosis with apex at L2-3. Stepwise grade 1 anterolisthesis at levels L2-3 and L3-4. Mild retrolisthesis at L4-5.     Multilevel endplate degenerative signal change; Modic type I at levels T12-L1, L2-3, and L5-S1; Modic type II at level L3-4. No suspicious discrete lesion.     0.8 cm T1 hypointensity/T2 hyperintense lesion within L5 inferior endplate, stable from CT 7/31/2021, most likely representing atypical hemangioma.        SACRUM:  Normal signal within the sacrum. No evidence of insufficiency or stress fracture.     DISTAL CORD AND CONUS:  Normal size and signal within the distal cord and conus.     PARASPINAL SOFT TISSUES:  Paraspinal soft tissues are unremarkable.     LOWER THORACIC DISC SPACES: Mild noncompressive lower thoracic degenerative change.     LUMBAR DISC SPACES:     L1-L2: Disc bulge. Left foraminal disc protrusion. Marginal endplate osteophytes. Moderate bilateral facet arthropathy. Mild canal stenosis. Mild to moderate left and mild right foraminal narrowing.     L2-L3: Disc bulge. Severe bilateral facet arthropathy. Bilateral ligamentum flavum thickening. Mild canal stenosis. Mild to moderate right and mild left foraminal narrowing.     L3-L4: Disc bulge. Severe facet arthropathy. Mild canal stenosis. Mild to moderate right and mild left foraminal narrowing.     L4-L5: Mild retrolisthesis.  No significant disc bulge. Left foraminal disc protrusion. Moderate bilateral facet arthropathy. Minimal canal narrowing. Moderate bilateral foraminal stenosis.     L5-S1: Mild disc bulge. Moderate bilateral facet arthropathy. No significant canal stenosis. Moderate left and mild to moderate right foraminal stenosis.     OTHER FINDINGS: Bilateral renal upper pole benign cysts and additional small T2 hyperintense lesions, statistically favored to represent benign cysts. Subcentimeter T2 hyperintense indeterminate hepatic lesions, statistically favored to represent benign   cysts.     IMPRESSION:     Degenerative change with multilevel mild canal and mild to moderate degree foraminal stenosis as described. .

## 2023-10-26 ENCOUNTER — HOSPITAL ENCOUNTER (OUTPATIENT)
Dept: RADIOLOGY | Facility: MEDICAL CENTER | Age: 83
Discharge: HOME | End: 2023-10-26
Payer: MEDICARE

## 2023-10-26 VITALS
OXYGEN SATURATION: 97 % | TEMPERATURE: 97.8 F | HEART RATE: 64 BPM | SYSTOLIC BLOOD PRESSURE: 147 MMHG | DIASTOLIC BLOOD PRESSURE: 75 MMHG | RESPIRATION RATE: 16 BRPM

## 2023-10-26 DIAGNOSIS — M47.817 LUMBOSACRAL SPONDYLOSIS WITHOUT MYELOPATHY: ICD-10-CM

## 2023-10-26 PROCEDURE — 64494 INJ PARAVERT F JNT L/S 2 LEV: CPT | Performed by: ANESTHESIOLOGY

## 2023-10-26 PROCEDURE — 64493 INJ PARAVERT F JNT L/S 1 LEV: CPT | Performed by: ANESTHESIOLOGY

## 2023-10-26 RX ADMIN — Medication 3 ML: at 15:24

## 2023-10-26 NOTE — DISCHARGE INSTRUCTIONS

## 2023-10-26 NOTE — H&P
History of Present Illness:  The patient is a 81 y.o. male who presents with complaints of back pain    Past Medical History:   Diagnosis Date    Back pain     Benign prostatic hypertrophy     Disease of thyroid gland     Elevated PSA 3/3/2021    Hyperlipidemia     Hypertension     Prostate cancer ([address]) 7/21/2021    Restless leg        Past Surgical History:   Procedure Laterality Date    ACHILLES TENDON SURGERY      HERNIA REPAIR      KNEE ARTHROSCOPY Right     shoulder    IA PLMT INTERSTITIAL DEV RADIAT TX PROSTATE 1/MULT N/A 9/21/2021    Procedure: INSERTION OF FIDUCIAL MARKER (TRANSRECTAL ULTRASOUND GUIDANCE), SPACEOAR;  Surgeon: Basilia Curling, MD;  [facility];  Service: Urology    IA PROSTATE NEEDLE BIOPSY ANY APPROACH N/A 6/15/2021    Procedure: TRANSPERINEAL MRI FUSION PROSTATE BIOPSY;  Surgeon: Diamond Benton MD;  [facility];  Service: Urology    SKIN BIOPSY      TONSILLECTOMY           Current Outpatient Medications:     ALPRAZolam (XANAX) 0.5 mg tablet, Take 1 tablet (0.5 mg total) by mouth 30 min pre-procedure (Patient not taking: Reported on 8/29/2023), Disp: 1 tablet, Rfl: 0    amLODIPine (NORVASC) 2.5 mg tablet, 5 mg daily, Disp: , Rfl:     bimatoprost (LUMIGAN) 0.01 % ophthalmic drops, Administer 1 drop into the left eye daily at bedtime, Disp: , Rfl:     Eliquis 5 MG, TAKE 1 TABLET TWICE A DAY, Disp: 180 tablet, Rfl: 3    fluticasone (FLONASE) 50 mcg/act nasal spray, , Disp: , Rfl:     levothyroxine 25 mcg tablet, Take 25 mcg by mouth daily, Disp: , Rfl:     Multiple Vitamins-Minerals (CENTRUM SILVER 50+MEN PO), Take by mouth in the morning, Disp: , Rfl:     Omega-3 Fatty Acids (FISH OIL) 1,000 mg, Take by mouth daily KRILL OIL, Disp: , Rfl:     rosuvastatin (CRESTOR) 10 MG tablet, 10 mg Pt takes med every other day--conversation with PCP (Dr. Phil Martinez) November 2021 appt/Labs, Disp: , Rfl:     traZODone (DESYREL) 50 mg tablet, , Disp: , Rfl:     Current Facility-Administered Medications:     lidocaine (PF) (XYLOCAINE-MPF) 2 % injection 3 mL, 3 mL, Perineural, Once, Will Wandy Sullivan MD    No Known Allergies    Physical Exam: There were no vitals filed for this visit. General: Awake, Alert, Oriented x 3, Mood and affect appropriate  Respiratory: Respirations even and unlabored  Cardiovascular: Peripheral pulses intact; no edema  Musculoskeletal Exam: pain with lumbar extension, lateral bending    ASA Score: 2    Patient/Chart Verification  Patient ID Verified: Verbal  Consents Confirmed: Procedural, To be obtained in the Pre-Procedure area  H&P( within 30 days) Verified: To be obtained in the Pre-Procedure area  Allergies Reviewed: Yes  Anticoag/NSAID held?: NA  Currently on antibiotics?: No  Pregnancy denied?: NA    Assessment:   1.  Lumbosacral spondylosis without myelopathy        Plan: bilateral L3-5 MBB #1

## 2023-10-31 ENCOUNTER — TELEPHONE (OUTPATIENT)
Dept: PAIN MEDICINE | Facility: CLINIC | Age: 83
End: 2023-10-31

## 2023-10-31 NOTE — TELEPHONE ENCOUNTER
Patient has been experiencing moderate to severe axial spine pain that is causing functional deficit. The pain has been present for at least 3 months and is not improving with conservative care. Currently the patient is not experiencing any radicular features or neurogenic claudication. Non-Facet pathology has been ruled out on clinical evaluation.

## 2023-11-02 DIAGNOSIS — I48.91 ATRIAL FIBRILLATION, UNSPECIFIED TYPE (HCC): ICD-10-CM

## 2023-11-02 RX ORDER — APIXABAN 5 MG/1
TABLET, FILM COATED ORAL
Qty: 180 TABLET | Refills: 3 | Status: SHIPPED | OUTPATIENT
Start: 2023-11-02

## 2023-11-21 ENCOUNTER — TELEPHONE (OUTPATIENT)
Dept: PAIN MEDICINE | Facility: CLINIC | Age: 83
End: 2023-11-21

## 2023-11-21 NOTE — TELEPHONE ENCOUNTER
Caller: Kimo    Doctor: Michael Aguilar    Reason for call: last Thursday he got a shot called RSV can he still have his procedure on 11/22 please advise    Call back#:[phone]

## 2023-11-22 ENCOUNTER — HOSPITAL ENCOUNTER (OUTPATIENT)
Dept: RADIOLOGY | Facility: MEDICAL CENTER | Age: 83
Discharge: HOME | End: 2023-11-22
Payer: MEDICARE

## 2023-11-22 VITALS
TEMPERATURE: 97.1 F | RESPIRATION RATE: 20 BRPM | OXYGEN SATURATION: 100 % | SYSTOLIC BLOOD PRESSURE: 160 MMHG | HEART RATE: 68 BPM | DIASTOLIC BLOOD PRESSURE: 79 MMHG

## 2023-11-22 DIAGNOSIS — M47.816 LUMBAR SPONDYLOSIS: ICD-10-CM

## 2023-11-22 PROCEDURE — 64493 INJ PARAVERT F JNT L/S 1 LEV: CPT | Performed by: ANESTHESIOLOGY

## 2023-11-22 PROCEDURE — 64494 INJ PARAVERT F JNT L/S 2 LEV: CPT | Performed by: ANESTHESIOLOGY

## 2023-11-22 RX ORDER — BUPIVACAINE HYDROCHLORIDE 5 MG/ML
3 INJECTION, SOLUTION EPIDURAL; INTRACAUDAL ONCE
Status: COMPLETED | OUTPATIENT
Start: 2023-11-22 | End: 2023-11-22

## 2023-11-22 RX ADMIN — BUPIVACAINE HYDROCHLORIDE 3 ML: 5 INJECTION, SOLUTION EPIDURAL; INTRACAUDAL at 11:01

## 2023-11-22 NOTE — H&P
History of Present Illness:  The patient is a 81 y.o. male who presents with complaints of back pain    Past Medical History:   Diagnosis Date    Back pain     Benign prostatic hypertrophy     Disease of thyroid gland     Elevated PSA 3/3/2021    Hyperlipidemia     Hypertension     Prostate cancer ([address]) 7/21/2021    Restless leg        Past Surgical History:   Procedure Laterality Date    ACHILLES TENDON SURGERY      HERNIA REPAIR      KNEE ARTHROSCOPY Right     shoulder    CO PLMT INTERSTITIAL DEV RADIAT TX PROSTATE 1/MULT N/A 9/21/2021    Procedure: INSERTION OF FIDUCIAL MARKER (TRANSRECTAL ULTRASOUND GUIDANCE), SPACEOAR;  Surgeon: Miguel Yeung MD;  [facility];  Service: Urology    CO PROSTATE NEEDLE BIOPSY ANY APPROACH N/A 6/15/2021    Procedure: TRANSPERINEAL MRI FUSION PROSTATE BIOPSY;  Surgeon: Leighann Mcgraw MD;  [facility];  Service: Urology    SKIN BIOPSY      TONSILLECTOMY           Current Outpatient Medications:     amLODIPine (NORVASC) 2.5 mg tablet, 5 mg daily, Disp: , Rfl:     bimatoprost (LUMIGAN) 0.01 % ophthalmic drops, Administer 1 drop into the left eye daily at bedtime, Disp: , Rfl:     Eliquis 5 MG, TAKE 1 TABLET TWICE A DAY, Disp: 180 tablet, Rfl: 3    fluticasone (FLONASE) 50 mcg/act nasal spray, , Disp: , Rfl:     levothyroxine 25 mcg tablet, Take 25 mcg by mouth daily, Disp: , Rfl:     Multiple Vitamins-Minerals (CENTRUM SILVER 50+MEN PO), Take by mouth in the morning, Disp: , Rfl:     Omega-3 Fatty Acids (FISH OIL) 1,000 mg, Take by mouth daily KRILL OIL, Disp: , Rfl:     rosuvastatin (CRESTOR) 10 MG tablet, 10 mg Pt takes med every other day--conversation with PCP (Dr. Uriel Masterson) November 2021 appt/Labs, Disp: , Rfl:     traZODone (DESYREL) 50 mg tablet, , Disp: , Rfl:     Current Facility-Administered Medications:     bupivacaine (PF) (MARCAINE) 0.5 % injection 3 mL, 3 mL, Injection, Once, Will Lyle Sauceda MD    No Known Allergies    Physical Exam:   Vitals: 11/22/23 1048   BP: 163/76   Pulse: 67   Resp: 20   Temp: (!) 97.1 °F (36.2 °C)   SpO2: 99%     General: Awake, Alert, Oriented x 3, Mood and affect appropriate  Respiratory: Respirations even and unlabored  Cardiovascular: Peripheral pulses intact; no edema  Musculoskeletal Exam: pain with lumbar extension, lateral bending    ASA Score: 2    Patient/Chart Verification  Patient ID Verified: Verbal  ID Band Applied: No  Consents Confirmed: Procedural, To be obtained in the Pre-Procedure area  H&P( within 30 days) Verified: To be obtained in the Pre-Procedure area  Allergies Reviewed: Yes  Anticoag/NSAID held?: NA (Pt on eliquis no need for holding medicatin for this procedure)  Currently on antibiotics?: No    Assessment:   1.  Lumbar spondylosis        Plan: ADRIEL L3-5 MBB #2

## 2023-11-22 NOTE — DISCHARGE INSTRUCTIONS

## 2023-11-27 ENCOUNTER — TELEPHONE (OUTPATIENT)
Dept: OBGYN CLINIC | Facility: MEDICAL CENTER | Age: 83
End: 2023-11-27

## 2023-11-28 NOTE — TELEPHONE ENCOUNTER
Pain Diary reviewed by Dr. Betty Dickens and ok to proceed with RFA's. Order placed. Called patient and scheduled:    LT L3-5 RFA on 12/28    Rt L3-5 RFA on 1/11    6 wk f/u on 2/5    Reviewed instructions: , NPO 1 hour prior, loose-fitting/comfortable clothing, if ill/fever/infx/abx to call and reschedule. No need to hold any meds prior. Patient stated verbal understanding.

## 2023-12-28 ENCOUNTER — TELEPHONE (OUTPATIENT)
Dept: PAIN MEDICINE | Facility: CLINIC | Age: 83
End: 2023-12-28

## 2023-12-28 ENCOUNTER — HOSPITAL ENCOUNTER (OUTPATIENT)
Dept: RADIOLOGY | Facility: MEDICAL CENTER | Age: 83
Discharge: HOME | End: 2023-12-28
Payer: MEDICARE

## 2023-12-28 VITALS
HEART RATE: 82 BPM | DIASTOLIC BLOOD PRESSURE: 82 MMHG | RESPIRATION RATE: 18 BRPM | OXYGEN SATURATION: 95 % | SYSTOLIC BLOOD PRESSURE: 176 MMHG | TEMPERATURE: 97.4 F

## 2023-12-28 DIAGNOSIS — M47.816 LUMBAR SPONDYLOSIS: ICD-10-CM

## 2023-12-28 PROCEDURE — 64635 DESTROY LUMB/SAC FACET JNT: CPT | Performed by: ANESTHESIOLOGY

## 2023-12-28 PROCEDURE — 64636 DESTROY L/S FACET JNT ADDL: CPT | Performed by: ANESTHESIOLOGY

## 2023-12-28 RX ORDER — BUPIVACAINE HCL/PF 2.5 MG/ML
2 VIAL (ML) INJECTION ONCE
Status: COMPLETED | OUTPATIENT
Start: 2023-12-28 | End: 2023-12-28

## 2023-12-28 RX ORDER — LIDOCAINE HYDROCHLORIDE 10 MG/ML
10 INJECTION, SOLUTION EPIDURAL; INFILTRATION; INTRACAUDAL; PERINEURAL ONCE
Status: COMPLETED | OUTPATIENT
Start: 2023-12-28 | End: 2023-12-28

## 2023-12-28 RX ORDER — METHYLPREDNISOLONE ACETATE 40 MG/ML
40 INJECTION, SUSPENSION INTRA-ARTICULAR; INTRALESIONAL; INTRAMUSCULAR; PARENTERAL; SOFT TISSUE ONCE
Status: COMPLETED | OUTPATIENT
Start: 2023-12-28 | End: 2023-12-28

## 2023-12-28 RX ADMIN — Medication 1.5 ML: at 11:34

## 2023-12-28 RX ADMIN — METHYLPREDNISOLONE ACETATE 40 MG: 40 INJECTION, SUSPENSION INTRA-ARTICULAR; INTRALESIONAL; INTRAMUSCULAR; PARENTERAL; SOFT TISSUE at 11:37

## 2023-12-28 RX ADMIN — Medication 2 ML: at 11:37

## 2023-12-28 RX ADMIN — LIDOCAINE HYDROCHLORIDE 10 ML: 10 INJECTION, SOLUTION EPIDURAL; INFILTRATION; INTRACAUDAL at 11:28

## 2023-12-28 NOTE — DISCHARGE INSTRUCTIONS

## 2023-12-28 NOTE — H&P
History of Present Illness: The patient is a 83 y.o. male who presents with complaints of back pain    Past Medical History:   Diagnosis Date    Back pain     Benign prostatic hypertrophy     Disease of thyroid gland     Elevated PSA 3/3/2021    Hyperlipidemia     Hypertension     Prostate cancer (HCC) 7/21/2021    Restless leg        Past Surgical History:   Procedure Laterality Date    ACHILLES TENDON SURGERY      HERNIA REPAIR      KNEE ARTHROSCOPY Right     shoulder    PA PLMT INTERSTITIAL DEV RADIAT TX PROSTATE 1/MULT N/A 9/21/2021    Procedure: INSERTION OF FIDUCIAL MARKER (TRANSRECTAL ULTRASOUND GUIDANCE), SPACEOAR;  Surgeon: Daniel Bryant MD;  [facility];  Service: Urology    PA PROSTATE NEEDLE BIOPSY ANY APPROACH N/A 6/15/2021    Procedure: TRANSPERINEAL MRI FUSION PROSTATE BIOPSY;  Surgeon: Cruzito Yee MD;  [facility];  Service: Urology    SKIN BIOPSY      TONSILLECTOMY           Current Outpatient Medications:     amLODIPine (NORVASC) 2.5 mg tablet, 5 mg daily, Disp: , Rfl:     bimatoprost (LUMIGAN) 0.01 % ophthalmic drops, Administer 1 drop into the left eye daily at bedtime, Disp: , Rfl:     Eliquis 5 MG, TAKE 1 TABLET TWICE A DAY, Disp: 180 tablet, Rfl: 3    fluticasone (FLONASE) 50 mcg/act nasal spray, , Disp: , Rfl:     levothyroxine 25 mcg tablet, Take 25 mcg by mouth daily, Disp: , Rfl:     Multiple Vitamins-Minerals (CENTRUM SILVER 50+MEN PO), Take by mouth in the morning, Disp: , Rfl:     Omega-3 Fatty Acids (FISH OIL) 1,000 mg, Take by mouth daily KRILL OIL, Disp: , Rfl:     rosuvastatin (CRESTOR) 10 MG tablet, 10 mg Pt takes med every other day--conversation with PCP (Dr. Alarcon) November 2021 appt/Labs, Disp: , Rfl:     traZODone (DESYREL) 50 mg tablet, , Disp: , Rfl:     Current Facility-Administered Medications:     bupivacaine (PF) (MARCAINE) 0.25 % injection 2 mL, 2 mL, Perineural, Once, Will Jarek Bryson MD    lidocaine (PF) (XYLOCAINE-MPF) 1 % injection 10 mL, 10  mL, Infiltration, Once, Will Jarek Bryson MD    lidocaine (PF) (XYLOCAINE-MPF) 2 % injection 1.5 mL, 1.5 mL, Perineural, Once, Will Jarek Bryson MD    methylPREDNISolone acetate (DEPO-MEDROL) injection 40 mg, 40 mg, Perineural, Once, Will Jarek Bryson MD    No Known Allergies    Physical Exam:   Vitals:    12/28/23 1115   BP: 170/75   Pulse: 65   Resp: 20   Temp: (!) 97.4 °F (36.3 °C)   SpO2: 99%     General: Awake, Alert, Oriented x 3, Mood and affect appropriate  Respiratory: Respirations even and unlabored  Cardiovascular: Peripheral pulses intact; no edema  Musculoskeletal Exam: pain with lumbar extension, lateral bending    ASA Score: 2    Patient/Chart Verification  Patient ID Verified: Verbal  ID Band Applied: No  Consents Confirmed: Procedural, To be obtained in the Pre-Procedure area  H&P( within 30 days) Verified: To be obtained in the Pre-Procedure area  Interval H&P(within 24 hr) Complete (required for Outpatients and Surgery Admit only): To be obtained in the Pre-Procedure area  Allergies Reviewed: Yes  Anticoag/NSAID held?: NA (Pt takes eliquis no need to hold)  Currently on antibiotics?: No    Assessment:   1. Lumbar spondylosis        Plan: LT L3-5 RFA

## 2023-12-29 NOTE — TELEPHONE ENCOUNTER
FYI-    Pt did well over night no s/s of infection or sunburn sensation.  Aware it takes 2 weeks to notice pain relief and 4-6 weeks for full pain effect to be achieved.  Pain rating as of today-0/10    Aware to medicate as previous for discomfort and may use ice or heat.  Confirmed next appt.  Call if any questions or concerns prior to next appt.

## 2024-01-11 ENCOUNTER — TELEPHONE (OUTPATIENT)
Dept: PAIN MEDICINE | Facility: MEDICAL CENTER | Age: 84
End: 2024-01-11

## 2024-01-11 ENCOUNTER — HOSPITAL ENCOUNTER (OUTPATIENT)
Dept: RADIOLOGY | Facility: MEDICAL CENTER | Age: 84
End: 2024-01-11
Payer: MEDICARE

## 2024-01-11 VITALS
RESPIRATION RATE: 16 BRPM | HEART RATE: 75 BPM | TEMPERATURE: 97.3 F | DIASTOLIC BLOOD PRESSURE: 95 MMHG | SYSTOLIC BLOOD PRESSURE: 166 MMHG | OXYGEN SATURATION: 99 %

## 2024-01-11 DIAGNOSIS — M47.816 LUMBAR SPONDYLOSIS: ICD-10-CM

## 2024-01-11 PROCEDURE — 64635 DESTROY LUMB/SAC FACET JNT: CPT | Performed by: ANESTHESIOLOGY

## 2024-01-11 PROCEDURE — 64636 DESTROY L/S FACET JNT ADDL: CPT | Performed by: ANESTHESIOLOGY

## 2024-01-11 RX ORDER — METHYLPREDNISOLONE ACETATE 40 MG/ML
40 INJECTION, SUSPENSION INTRA-ARTICULAR; INTRALESIONAL; INTRAMUSCULAR; PARENTERAL; SOFT TISSUE ONCE
Status: COMPLETED | OUTPATIENT
Start: 2024-01-11 | End: 2024-01-11

## 2024-01-11 RX ORDER — BUPIVACAINE HCL/PF 2.5 MG/ML
2 VIAL (ML) INJECTION ONCE
Status: COMPLETED | OUTPATIENT
Start: 2024-01-11 | End: 2024-01-11

## 2024-01-11 RX ORDER — LIDOCAINE HYDROCHLORIDE 10 MG/ML
10 INJECTION, SOLUTION EPIDURAL; INFILTRATION; INTRACAUDAL; PERINEURAL ONCE
Status: COMPLETED | OUTPATIENT
Start: 2024-01-11 | End: 2024-01-11

## 2024-01-11 RX ADMIN — METHYLPREDNISOLONE ACETATE 40 MG: 40 INJECTION, SUSPENSION INTRA-ARTICULAR; INTRALESIONAL; INTRAMUSCULAR; PARENTERAL; SOFT TISSUE at 11:32

## 2024-01-11 RX ADMIN — Medication 1.5 ML: at 11:28

## 2024-01-11 RX ADMIN — Medication 2 ML: at 11:32

## 2024-01-11 RX ADMIN — LIDOCAINE HYDROCHLORIDE 10 ML: 10 INJECTION, SOLUTION EPIDURAL; INFILTRATION; INTRACAUDAL at 11:20

## 2024-01-11 NOTE — H&P
History of Present Illness: The patient is a 83 y.o. male who presents with complaints of back pain    Past Medical History:   Diagnosis Date    Back pain     Benign prostatic hypertrophy     Disease of thyroid gland     Elevated PSA 3/3/2021    Hyperlipidemia     Hypertension     Prostate cancer (HCC) 7/21/2021    Restless leg        Past Surgical History:   Procedure Laterality Date    ACHILLES TENDON SURGERY      HERNIA REPAIR      KNEE ARTHROSCOPY Right     shoulder    SC PLMT INTERSTITIAL DEV RADIAT TX PROSTATE 1/MULT N/A 9/21/2021    Procedure: INSERTION OF FIDUCIAL MARKER (TRANSRECTAL ULTRASOUND GUIDANCE), SPACEOAR;  Surgeon: Daniel Bryant MD;  [facility];  Service: Urology    SC PROSTATE NEEDLE BIOPSY ANY APPROACH N/A 6/15/2021    Procedure: TRANSPERINEAL MRI FUSION PROSTATE BIOPSY;  Surgeon: Cruzito Yee MD;  [facility];  Service: Urology    SKIN BIOPSY      TONSILLECTOMY           Current Outpatient Medications:     amLODIPine (NORVASC) 2.5 mg tablet, 5 mg daily, Disp: , Rfl:     bimatoprost (LUMIGAN) 0.01 % ophthalmic drops, Administer 1 drop into the left eye daily at bedtime, Disp: , Rfl:     Eliquis 5 MG, TAKE 1 TABLET TWICE A DAY, Disp: 180 tablet, Rfl: 3    fluticasone (FLONASE) 50 mcg/act nasal spray, , Disp: , Rfl:     levothyroxine 25 mcg tablet, Take 25 mcg by mouth daily, Disp: , Rfl:     Multiple Vitamins-Minerals (CENTRUM SILVER 50+MEN PO), Take by mouth in the morning, Disp: , Rfl:     Omega-3 Fatty Acids (FISH OIL) 1,000 mg, Take by mouth daily KRILL OIL, Disp: , Rfl:     rosuvastatin (CRESTOR) 10 MG tablet, 10 mg Pt takes med every other day--conversation with PCP (Dr. Alarcon) November 2021 appt/Labs, Disp: , Rfl:     traZODone (DESYREL) 50 mg tablet, , Disp: , Rfl:     Current Facility-Administered Medications:     bupivacaine (PF) (MARCAINE) 0.25 % injection 2 mL, 2 mL, Perineural, Once, Will Jarek Bryson MD    lidocaine (PF) (XYLOCAINE-MPF) 1 % injection 10 mL, 10  mL, Infiltration, Once, Will Jarek Bryson MD    lidocaine (PF) (XYLOCAINE-MPF) 2 % injection 1.5 mL, 1.5 mL, Perineural, Once, Will Jarek Bryson MD    methylPREDNISolone acetate (DEPO-MEDROL) injection 40 mg, 40 mg, Perineural, Once, Will Jarek Bryson MD    No Known Allergies    Physical Exam:   Vitals:    01/11/24 1109   BP: 166/72   Pulse: 60   Resp: 20   Temp: (!) 97.3 °F (36.3 °C)   SpO2: 98%       General: Awake, Alert, Oriented x 3, Mood and affect appropriate  Respiratory: Respirations even and unlabored  Cardiovascular: Peripheral pulses intact; no edema  Musculoskeletal Exam: pain with lumbar extension, lateral bending    ASA Score: 2    Patient/Chart Verification  Patient ID Verified: Verbal  ID Band Applied: No  Consents Confirmed: Procedural, To be obtained in the Pre-Procedure area  H&P( within 30 days) Verified: To be obtained in the Pre-Procedure area  Interval H&P(within 24 hr) Complete (required for Outpatients and Surgery Admit only): To be obtained in the Pre-Procedure area  Allergies Reviewed: Yes  Anticoag/NSAID held?: NA (takes BID ELIQUIS)  Currently on antibiotics?: No    Assessment:   1. Lumbar spondylosis        Plan: RT L3-5 RFA

## 2024-01-11 NOTE — DISCHARGE INSTRUCTIONS

## 2024-01-12 NOTE — TELEPHONE ENCOUNTER
S/W pt. Pt stated current pain level is 2/10.  Pt stated needle sites look good, denies S&S of infection, denies fevers, denies soreness and denies sun burn like sensation.  Advised pt if he does get pain to take his prescribed or OTC pain medications and/or use ice/heat and that it takes 4 to 6 weeks to see the full effect.  Scheduled next appt w/ pt for 2/14 at 10:15 w/ WS.  Cancelled appt for 2/5 since last RFA was 1/11.  Pt verbalized understanding.

## 2024-01-26 ENCOUNTER — APPOINTMENT (OUTPATIENT)
Dept: LAB | Facility: HOSPITAL | Age: 84
End: 2024-01-26
Payer: MEDICARE

## 2024-01-26 ENCOUNTER — HOSPITAL ENCOUNTER (OUTPATIENT)
Dept: ULTRASOUND IMAGING | Facility: HOSPITAL | Age: 84
Discharge: HOME | End: 2024-01-26
Payer: MEDICARE

## 2024-01-26 DIAGNOSIS — R10.9 STOMACH ACHE: ICD-10-CM

## 2024-01-26 DIAGNOSIS — E78.2 MIXED HYPERLIPIDEMIA: ICD-10-CM

## 2024-01-26 DIAGNOSIS — E29.1 HYPOGONADISM IN MALE: ICD-10-CM

## 2024-01-26 DIAGNOSIS — E03.9 HYPOTHYROIDISM, ADULT: ICD-10-CM

## 2024-01-26 DIAGNOSIS — C61 MALIGNANT NEOPLASM OF PROSTATE (HCC): ICD-10-CM

## 2024-01-26 DIAGNOSIS — C61 PROSTATE CANCER (HCC): ICD-10-CM

## 2024-01-26 DIAGNOSIS — I10 ESSENTIAL HYPERTENSION, MALIGNANT: ICD-10-CM

## 2024-01-26 LAB
ALBUMIN SERPL BCP-MCNC: 4.4 G/DL (ref 3.5–5)
ALP SERPL-CCNC: 49 U/L (ref 34–104)
ALT SERPL W P-5'-P-CCNC: 13 U/L (ref 7–52)
ANION GAP SERPL CALCULATED.3IONS-SCNC: 6 MMOL/L
AST SERPL W P-5'-P-CCNC: 17 U/L (ref 13–39)
BASOPHILS # BLD AUTO: 0.06 THOUSANDS/ÂΜL (ref 0–0.1)
BASOPHILS NFR BLD AUTO: 1 % (ref 0–1)
BILIRUB SERPL-MCNC: 0.8 MG/DL (ref 0.2–1)
BUN SERPL-MCNC: 22 MG/DL (ref 5–25)
CALCIUM SERPL-MCNC: 9.4 MG/DL (ref 8.4–10.2)
CHLORIDE SERPL-SCNC: 102 MMOL/L (ref 96–108)
CHOLEST SERPL-MCNC: 185 MG/DL
CK SERPL-CCNC: 87 U/L (ref 39–308)
CO2 SERPL-SCNC: 30 MMOL/L (ref 21–32)
CREAT SERPL-MCNC: 1.06 MG/DL (ref 0.6–1.3)
EOSINOPHIL # BLD AUTO: 0.31 THOUSAND/ÂΜL (ref 0–0.61)
EOSINOPHIL NFR BLD AUTO: 5 % (ref 0–6)
ERYTHROCYTE [DISTWIDTH] IN BLOOD BY AUTOMATED COUNT: 13.3 % (ref 11.6–15.1)
GFR SERPL CREATININE-BSD FRML MDRD: 65 ML/MIN/1.73SQ M
GLUCOSE P FAST SERPL-MCNC: 105 MG/DL (ref 65–99)
HCT VFR BLD AUTO: 42.4 % (ref 36.5–49.3)
HDLC SERPL-MCNC: 73 MG/DL
HGB BLD-MCNC: 14 G/DL (ref 12–17)
IMM GRANULOCYTES # BLD AUTO: 0.03 THOUSAND/UL (ref 0–0.2)
IMM GRANULOCYTES NFR BLD AUTO: 1 % (ref 0–2)
LDLC SERPL CALC-MCNC: 97 MG/DL (ref 0–100)
LYMPHOCYTES # BLD AUTO: 1.43 THOUSANDS/ÂΜL (ref 0.6–4.47)
LYMPHOCYTES NFR BLD AUTO: 25 % (ref 14–44)
MCH RBC QN AUTO: 30.6 PG (ref 26.8–34.3)
MCHC RBC AUTO-ENTMCNC: 33 G/DL (ref 31.4–37.4)
MCV RBC AUTO: 93 FL (ref 82–98)
MONOCYTES # BLD AUTO: 0.67 THOUSAND/ÂΜL (ref 0.17–1.22)
MONOCYTES NFR BLD AUTO: 12 % (ref 4–12)
NEUTROPHILS # BLD AUTO: 3.32 THOUSANDS/ÂΜL (ref 1.85–7.62)
NEUTS SEG NFR BLD AUTO: 56 % (ref 43–75)
NONHDLC SERPL-MCNC: 112 MG/DL
NRBC BLD AUTO-RTO: 0 /100 WBCS
PLATELET # BLD AUTO: 351 THOUSANDS/UL (ref 149–390)
PMV BLD AUTO: 9.1 FL (ref 8.9–12.7)
POTASSIUM SERPL-SCNC: 4.4 MMOL/L (ref 3.5–5.3)
PROT SERPL-MCNC: 7 G/DL (ref 6.4–8.4)
RBC # BLD AUTO: 4.58 MILLION/UL (ref 3.88–5.62)
SODIUM SERPL-SCNC: 138 MMOL/L (ref 135–147)
TRIGL SERPL-MCNC: 74 MG/DL
TSH SERPL DL<=0.05 MIU/L-ACNC: 4.1 UIU/ML (ref 0.45–4.5)
WBC # BLD AUTO: 5.82 THOUSAND/UL (ref 4.31–10.16)

## 2024-01-26 PROCEDURE — 76705 ECHO EXAM OF ABDOMEN: CPT

## 2024-01-26 PROCEDURE — 82550 ASSAY OF CK (CPK): CPT

## 2024-01-26 PROCEDURE — 80053 COMPREHEN METABOLIC PANEL: CPT

## 2024-01-26 PROCEDURE — 85025 COMPLETE CBC W/AUTO DIFF WBC: CPT

## 2024-01-26 PROCEDURE — 36415 COLL VENOUS BLD VENIPUNCTURE: CPT

## 2024-01-26 PROCEDURE — 80061 LIPID PANEL: CPT

## 2024-01-26 PROCEDURE — 84443 ASSAY THYROID STIM HORMONE: CPT

## 2024-01-31 NOTE — TELEPHONE ENCOUNTER
1st attempt  Lm to cb with % improvement and pain level. What Type Of Note Output Would You Prefer (Optional)?: Standard Output How Severe Is Your Acne?: mild Is This A New Presentation, Or A Follow-Up?: Acne

## 2024-02-06 ENCOUNTER — OFFICE VISIT (OUTPATIENT)
Dept: SURGERY | Facility: CLINIC | Age: 84
End: 2024-02-06
Payer: MEDICARE

## 2024-02-06 VITALS
WEIGHT: 134.8 LBS | BODY MASS INDEX: 23.88 KG/M2 | HEIGHT: 63 IN | DIASTOLIC BLOOD PRESSURE: 73 MMHG | SYSTOLIC BLOOD PRESSURE: 151 MMHG | HEART RATE: 71 BPM | OXYGEN SATURATION: 98 % | RESPIRATION RATE: 18 BRPM | TEMPERATURE: 97.4 F

## 2024-02-06 DIAGNOSIS — C61 PROSTATE CANCER (HCC): ICD-10-CM

## 2024-02-06 DIAGNOSIS — K82.8 SLUDGE IN GALLBLADDER: Primary | ICD-10-CM

## 2024-02-06 DIAGNOSIS — I48.0 PAROXYSMAL ATRIAL FIBRILLATION (HCC): ICD-10-CM

## 2024-02-06 PROBLEM — R97.20 ELEVATED PSA: Status: RESOLVED | Noted: 2021-03-03 | Resolved: 2024-02-06

## 2024-02-06 PROBLEM — I10 HTN (HYPERTENSION): Status: RESOLVED | Noted: 2017-08-28 | Resolved: 2024-02-06

## 2024-02-06 PROCEDURE — 99203 OFFICE O/P NEW LOW 30 MIN: CPT | Performed by: PHYSICIAN ASSISTANT

## 2024-02-06 NOTE — PROGRESS NOTES
Assessment/Plan:   Az Kern is a 83 y.o.male who is here for Gallbladder disease         Upon evaluation today patient has: gallbladder sludge on US    .     Plan:   Patient today comes in with minimal RUQ pain the last 2-3 weeks very mild. He thinks it may be associated with food but not sure or to what types of food. On RUQ US it shows some sludge. At this point the patient is stable with no abdominal pain. We will wait 3 months and see him for a follow up and see if abdominal pain continues and will keep a log of what foods give him pain. Patient agrees to plan and if pain becomes worse and more frequent prior to three month appointment he will return sooner.    Ultrasound findings: 1/26/24:  BILIARY:  No gallstones or wall thickening. Small amount of free floating debris in the gallbladder.  No intrahepatic biliary dilatation.  CBD measures 5.0 mm.  No choledocholithiasis.       In preparation for this visit all relevant and known prior office notes, prior consultations, emergency room visits, blood work results, and imaging studies were personally reviewed.  A total of  30 minutes was spent reviewing all of this information,caring for this patient, providing differential diagnosis, instructions for management, counseling and coordination of care.  This also includes planning surgical intervention where indicated.    Patient understands hernia occurrence or re-occurrence risk is higher in a diabetic, tobacco user, with elevated BMIs.     ____________________________________________________    HPI:  Az Kern is a 83 y.o.male who was referred for evaluation of The encounter diagnosis was Sludge in gallbladder.    PMH: a-fib, prostate cancer, on eliquis    Abdominal pain Location: in the RUQ very mild without radiation, which is Intermittent the last 2-3 weeks     no nausea and no vomiting,     regular bowel movement.     Duration of pain or symptoms: 2-3 weeks a handful of times      Prior Colonoscopy  :  1 Years Ago.  No polyps were seen this was completed at [facility] 1/13/23    Prior Abdominal Surgery: bilateral inguinal hernias    Anticoagulation: Eliquis 5 mg    Smoking Status: Non-smoker     Imaging:   No orders to display           LABS:    Lab Results   Component Value Date    K 4.4 01/26/2024     01/26/2024    CO2 30 01/26/2024    BUN 22 01/26/2024    CREATININE 1.06 01/26/2024    GLUF 105 (H) 01/26/2024    CALCIUM 9.4 01/26/2024    AST 17 01/26/2024    ALT 13 01/26/2024    ALKPHOS 49 01/26/2024    EGFR 65 01/26/2024       Lab Results   Component Value Date    WBC 5.82 01/26/2024    HGB 14.0 01/26/2024    HCT 42.4 01/26/2024    MCV 93 01/26/2024     01/26/2024     Lab Results   Component Value Date    INR 1.09 07/02/2022    PROTIME 13.9 07/02/2022     Lab Results   Component Value Date    HGBA1C 5.6 09/27/2022           ROS:  General ROS: negative for - chills, fatigue, fever or night sweats, weight loss  Respiratory ROS: no cough, shortness of breath, or wheezing  Cardiovascular ROS: no chest pain or dyspnea on exertion  Genito-Urinary ROS: no dysuria, trouble voiding, or hematuria  Musculoskeletal ROS: negative for - gait disturbance, joint pain or muscle pain  Neurological ROS: no TIA or stroke symptoms  Gastrointestinal ROS: See HPI   GI ROS: see HPI  Skin ROS: no new rashes or lesions   Lymphatic ROS: no new adenopathy noted by pt.   GYN ROS: see HPI, no new GYN history or bleeding noted  Psy ROS: no new mental or behavioral disturbances       Patient Active Problem List   Diagnosis    Benign prostatic hypertrophy    Hypothyroidism    A-fib (HCC)    HTN (hypertension)    Elevated PSA    Prostate cancer (HCC)    Sleep apnea    Vertigo    Primary osteoarthritis of right knee    Lumbar radiculopathy    Lumbosacral spondylosis without myelopathy    Lumbar spondylosis         Allergies:  Patient has no known allergies.      Current Outpatient Medications:     amLODIPine (NORVASC) 2.5 mg tablet, 5  mg daily, Disp: , Rfl:     bimatoprost (LUMIGAN) 0.01 % ophthalmic drops, Administer 1 drop into the left eye daily at bedtime, Disp: , Rfl:     Eliquis 5 MG, TAKE 1 TABLET TWICE A DAY, Disp: 180 tablet, Rfl: 3    fluticasone (FLONASE) 50 mcg/act nasal spray, , Disp: , Rfl:     levothyroxine 25 mcg tablet, Take 25 mcg by mouth daily, Disp: , Rfl:     Multiple Vitamins-Minerals (CENTRUM SILVER 50+MEN PO), Take by mouth in the morning, Disp: , Rfl:     Omega-3 Fatty Acids (FISH OIL) 1,000 mg, Take by mouth daily KRILL OIL, Disp: , Rfl:     rosuvastatin (CRESTOR) 10 MG tablet, 10 mg Pt takes med every other day--conversation with PCP (Dr. Alarcon) November 2021 appt/Labs, Disp: , Rfl:     traZODone (DESYREL) 50 mg tablet, , Disp: , Rfl:     Past Medical History:   Diagnosis Date    Back pain     Benign prostatic hypertrophy     Disease of thyroid gland     Elevated PSA 3/3/2021    Hyperlipidemia     Hypertension     Prostate cancer (HCC) 7/21/2021    Restless leg        Past Surgical History:   Procedure Laterality Date    ACHILLES TENDON SURGERY      HERNIA REPAIR      KNEE ARTHROSCOPY Right     shoulder    MS PLMT INTERSTITIAL DEV RADIAT TX PROSTATE 1/MULT N/A 9/21/2021    Procedure: INSERTION OF FIDUCIAL MARKER (TRANSRECTAL ULTRASOUND GUIDANCE), SPACEOAR;  Surgeon: Daniel Bryant MD;  Location: [facility];  Service: Urology    MS PROSTATE NEEDLE BIOPSY ANY APPROACH N/A 6/15/2021    Procedure: TRANSPERINEAL MRI FUSION PROSTATE BIOPSY;  Surgeon: Cruzito Yee MD;  Location: [facility];  Service: Urology    SKIN BIOPSY      TONSILLECTOMY         Family History   Problem Relation Age of Onset    Cancer Mother     Throat cancer Father     Breast cancer Sister         reports that he has never smoked. He has never used smokeless tobacco. He reports current alcohol use of about 4.0 standard drinks of alcohol per week. He reports that he does not use drugs.      Exam:   Vitals:    02/06/24 1333   BP: 151/73   Pulse: 71  "  Resp: 18   Temp: (!) 97.4 °F (36.3 °C)   SpO2: 98%       PHYSICAL EXAM  General Appearance:    Alert, cooperative, no distress,    Head:    Normocephalic without obvious abnormality   Eyes:    PERRL, conjunctiva/corneas clear,       Neck:   Supple, no adenopathy, no JVD   Back:     Symmetric, no spinal or CVA tenderness   Lungs:     Clear to auscultation bilaterally, no wheezing or rhonchi   Heart:    Regular rate and rhythm, S1 and S2 normal, no murmur   Abdomen:     abdomen is soft without significant tenderness, masses, organomegaly or guarding      Extremities:   Extremities normal. No clubbing, cyanosis or edema   Psych:   Normal Affect, AOx3.    Neurologic:  Skin:   CNII-XII intact. Strength symmetric, speech intact    Warm, dry, intact, no visible rashes or lesions          Signature:   Lorena Mejia PA-C    Date: 2/6/2024 Time: 2:08 PM                          Some portions of this record may have been generated with voice recognition software. There may be translation, syntax,  or grammatical errors. Occasional wrong word or \"sound-a-like\" substitutions may have occurred due to the inherent limitations of the voice recognition software. Read the chart carefully and recognize, using context, where substitutions may have occurred. If you have any questions, please contact the dictating provider for clarification or correction, as needed. This encounter has been coded by a non-certified coder.   "

## 2024-02-14 ENCOUNTER — OFFICE VISIT (OUTPATIENT)
Dept: PAIN MEDICINE | Facility: CLINIC | Age: 84
End: 2024-02-14
Payer: MEDICARE

## 2024-02-14 VITALS
SYSTOLIC BLOOD PRESSURE: 151 MMHG | WEIGHT: 134 LBS | HEIGHT: 63 IN | DIASTOLIC BLOOD PRESSURE: 73 MMHG | BODY MASS INDEX: 23.74 KG/M2

## 2024-02-14 DIAGNOSIS — M47.816 LUMBAR SPONDYLOSIS: Primary | ICD-10-CM

## 2024-02-14 DIAGNOSIS — M99.03 LUMBAR REGION SOMATIC DYSFUNCTION: ICD-10-CM

## 2024-02-14 PROCEDURE — 99214 OFFICE O/P EST MOD 30 MIN: CPT | Performed by: ANESTHESIOLOGY

## 2024-02-14 NOTE — PROGRESS NOTES
Assessment:  1. Lumbar spondylosis    2. Lumbar region somatic dysfunction        Plan:    Patient presenting for follow up regarding his chronic back pain for greater than 20 years, with frequency exacerbations over the past year.    Patient previously had various interventions 10+ years ago including epidural steroid injections and radiofrequency ablations with varying degrees of benefit.    Patient is demonstrating pain that is multifactorial in nature, with the main pain generator likely stemming from lumbar spondylosis, lumbar radiculitis. Symptoms are accompanied by pain >7/10 on the pain scale with inability to participate in IADLs for >6 weeks. Patient has fully participated with physical therapy.  Has been taking Tylenol, NSAIDs with modest benefit.  Denies any gait instability, saddle anesthesia.       Recently, he underwent an L3-4 interlaminar DRAGAN for right > left radicular leg pains.  He reports on follow up that he did obtain about 50% improvement for about 2 weeks.      On further questioning he notes that his radicular leg pains have improved but he does continue to have significant axial back symptoms bilaterally.    Reviewed and interpreted prior lumbar MRI from 7/27/23 and discussed the results and clinical significance with the patient.  This showed multilevel severe degenerative changes consistent with lumbar spondylosis.  There are varying degrees of foraminal stenosis throughout.     The patient recently underwent bilateral L3-5 lumbar RFA with 50% improvement in certain aspects of his axial back pains. He continues to have pain in the lower back that indicates lumbar deconditioning at this point.    I strongly recommended targeted PT for the deconditioned state of his lumbar spine. He would like to hold off on PT at this time as he has multiple issues going on in regards to his health as well as his wife's.    I recommended Tylenol and topical lidocaine to address the residual components of  his back pains.  Patient will follow up in 10 weeks for reevaluation.     Reviewed external notes from orthopedic surgery, physical therapy, primary care physician notes in regards to current and prior treatments tried.      [place] Prescription Drug Monitoring Program report was reviewed and was appropriate      My impressions and treatment recommendations were discussed in detail with the patient who verbalized understanding and had no further questions.  Discharge instructions were provided. I personally saw and examined the patient and I agree with the above discussed plan of care.    I have spent a total time of 30 minutes on 02/14/24 in caring for this patient including Diagnostic results, Prognosis, Risks and benefits of tx options, Instructions for management, Patient and family education, Importance of tx compliance, Risk factor reductions, Impressions, Counseling / Coordination of care, Documenting in the medical record, Reviewing / ordering tests, medicine, procedures  , and Obtaining or reviewing history      History of Present Illness:  Az Kern is a 83 y.o. male who presents for a follow up office visit in regards to No chief complaint on file..   The patient’s current symptoms include slightly improved but persistent low back pain symptoms rated 5-6/10. Pain is described as an intermittent aching pain worse in the morning.    I have personally reviewed and/or updated the patient's past medical history, past surgical history, family history, social history, current medications, allergies, and vital signs today.     Review of Systems   Constitutional:  Negative for chills and fever.   HENT:  Negative for ear pain and sore throat.    Eyes:  Negative for pain and visual disturbance.   Respiratory:  Negative for cough and shortness of breath.    Cardiovascular:  Negative for chest pain and palpitations.   Gastrointestinal:  Negative for abdominal pain and vomiting.   Genitourinary:  Negative for  dysuria and hematuria.   Musculoskeletal:  Positive for back pain, gait problem and myalgias. Negative for arthralgias.   Skin:  Negative for color change and rash.   Neurological:  Positive for weakness and numbness. Negative for seizures and syncope.   All other systems reviewed and are negative.      Patient Active Problem List   Diagnosis    Benign prostatic hypertrophy    A-fib (HCC)    Prostate cancer (HCC)    Sleep apnea    Vertigo    Primary osteoarthritis of right knee    Lumbar radiculopathy    Lumbosacral spondylosis without myelopathy    Lumbar spondylosis       Past Medical History:   Diagnosis Date    Back pain     Benign prostatic hypertrophy     Disease of thyroid gland     Elevated PSA 3/3/2021    Hyperlipidemia     Hypertension     Prostate cancer (HCC) 7/21/2021    Restless leg        Past Surgical History:   Procedure Laterality Date    ACHILLES TENDON SURGERY      HERNIA REPAIR      KNEE ARTHROSCOPY Right     shoulder    SC PLMT INTERSTITIAL DEV RADIAT TX PROSTATE 1/MULT N/A 9/21/2021    Procedure: INSERTION OF FIDUCIAL MARKER (TRANSRECTAL ULTRASOUND GUIDANCE), SPACEOAR;  Surgeon: Daniel Bryant MD;  Location: [facility];  Service: Urology    SC PROSTATE NEEDLE BIOPSY ANY APPROACH N/A 6/15/2021    Procedure: TRANSPERINEAL MRI FUSION PROSTATE BIOPSY;  Surgeon: Cruzito Yee MD;  Location[facility];  Service: Urology    SKIN BIOPSY      TONSILLECTOMY         Family History   Problem Relation Age of Onset    Cancer Mother     Throat cancer Father     Breast cancer Sister        Social History     Occupational History    Not on file   Tobacco Use    Smoking status: Never    Smokeless tobacco: Never   Vaping Use    Vaping status: Never Used   Substance and Sexual Activity    Alcohol use: Yes     Alcohol/week: 4.0 standard drinks of alcohol     Types: 4 Cans of beer per week     Comment: CAFFEINE USE/3-4 beers/wk.  Occasional wine or drink    Drug use: No    Sexual activity: Not on file  "      Current Outpatient Medications on File Prior to Visit   Medication Sig    amLODIPine (NORVASC) 2.5 mg tablet 5 mg daily    bimatoprost (LUMIGAN) 0.01 % ophthalmic drops Administer 1 drop into the left eye daily at bedtime    Eliquis 5 MG TAKE 1 TABLET TWICE A DAY    fluticasone (FLONASE) 50 mcg/act nasal spray     levothyroxine 25 mcg tablet Take 25 mcg by mouth daily    Multiple Vitamins-Minerals (CENTRUM SILVER 50+MEN PO) Take by mouth in the morning    Omega-3 Fatty Acids (FISH OIL) 1,000 mg Take by mouth daily KRILL OIL    rosuvastatin (CRESTOR) 10 MG tablet 10 mg Pt takes med every other day--conversation with PCP (Dr. Alarcon)  November 2021 appt/Labs    traZODone (DESYREL) 50 mg tablet      No current facility-administered medications on file prior to visit.       No Known Allergies    Physical Exam:    /73   Ht 5' 3\" (1.6 m)   Wt 60.8 kg (134 lb)   BMI 23.74 kg/m²     Constitutional:normal, well developed, well nourished, alert, in no distress and non-toxic and no overt pain behavior.  Eyes:anicteric  HEENT:grossly intact  Neck:supple, symmetric, trachea midline and no masses   Pulmonary:even and unlabored  Cardiovascular:No edema or pitting edema present  Skin:Normal without rashes or lesions and well hydrated  Psychiatric:Mood and affect appropriate  Neurologic: Motor function is grossly intact with no focal neurologic deficits   Musculoskeletal: Gait is nonantalgic. Limited lumbar spine ROM.    Imaging  MRI LUMBAR SPINE WITHOUT CONTRAST     INDICATION: M54.16: Radiculopathy, lumbar region.     COMPARISON: MRI lumbar spine 4/21/2008     TECHNIQUE:  Multiplanar, multisequence imaging of the lumbar spine was performed. .        IMAGE QUALITY:  Diagnostic     FINDINGS:     VERTEBRAL BODIES:  There are 5 lumbar type vertebral bodies. There is levoscoliosis with apex at L2-3. Stepwise grade 1 anterolisthesis at levels L2-3 and L3-4. Mild retrolisthesis at L4-5.     Multilevel endplate degenerative " signal change; Modic type I at levels T12-L1, L2-3, and L5-S1; Modic type II at level L3-4. No suspicious discrete lesion.     0.8 cm T1 hypointensity/T2 hyperintense lesion within L5 inferior endplate, stable from CT 7/31/2021, most likely representing atypical hemangioma.        SACRUM:  Normal signal within the sacrum. No evidence of insufficiency or stress fracture.     DISTAL CORD AND CONUS:  Normal size and signal within the distal cord and conus.     PARASPINAL SOFT TISSUES:  Paraspinal soft tissues are unremarkable.     LOWER THORACIC DISC SPACES: Mild noncompressive lower thoracic degenerative change.     LUMBAR DISC SPACES:     L1-L2: Disc bulge. Left foraminal disc protrusion. Marginal endplate osteophytes. Moderate bilateral facet arthropathy. Mild canal stenosis. Mild to moderate left and mild right foraminal narrowing.     L2-L3: Disc bulge. Severe bilateral facet arthropathy. Bilateral ligamentum flavum thickening. Mild canal stenosis. Mild to moderate right and mild left foraminal narrowing.     L3-L4: Disc bulge. Severe facet arthropathy. Mild canal stenosis. Mild to moderate right and mild left foraminal narrowing.     L4-L5: Mild retrolisthesis. No significant disc bulge. Left foraminal disc protrusion. Moderate bilateral facet arthropathy. Minimal canal narrowing. Moderate bilateral foraminal stenosis.     L5-S1: Mild disc bulge. Moderate bilateral facet arthropathy. No significant canal stenosis. Moderate left and mild to moderate right foraminal stenosis.     OTHER FINDINGS: Bilateral renal upper pole benign cysts and additional small T2 hyperintense lesions, statistically favored to represent benign cysts. Subcentimeter T2 hyperintense indeterminate hepatic lesions, statistically favored to represent benign   cysts.     IMPRESSION:     Degenerative change with multilevel mild canal and mild to moderate degree foraminal stenosis as described..

## 2024-02-19 ENCOUNTER — APPOINTMENT (OUTPATIENT)
Dept: LAB | Facility: CLINIC | Age: 84
End: 2024-02-19
Payer: MEDICARE

## 2024-02-19 LAB — PSA SERPL-MCNC: 0.19 NG/ML (ref 0–4)

## 2024-02-19 PROCEDURE — 84403 ASSAY OF TOTAL TESTOSTERONE: CPT

## 2024-02-19 PROCEDURE — 84402 ASSAY OF FREE TESTOSTERONE: CPT

## 2024-02-19 PROCEDURE — 84153 ASSAY OF PSA TOTAL: CPT

## 2024-02-20 LAB
TESTOST FREE SERPL-MCNC: 1.8 PG/ML (ref 6.6–18.1)
TESTOST SERPL-MCNC: 214 NG/DL (ref 264–916)

## 2024-03-06 ENCOUNTER — OFFICE VISIT (OUTPATIENT)
Dept: UROLOGY | Facility: MEDICAL CENTER | Age: 84
End: 2024-03-06
Payer: MEDICARE

## 2024-03-06 VITALS
HEART RATE: 76 BPM | SYSTOLIC BLOOD PRESSURE: 130 MMHG | BODY MASS INDEX: 23.21 KG/M2 | OXYGEN SATURATION: 97 % | HEIGHT: 63 IN | WEIGHT: 131 LBS | DIASTOLIC BLOOD PRESSURE: 80 MMHG

## 2024-03-06 DIAGNOSIS — E29.1 HYPOGONADISM IN MALE: ICD-10-CM

## 2024-03-06 DIAGNOSIS — C61 PROSTATE CANCER (HCC): Primary | ICD-10-CM

## 2024-03-06 DIAGNOSIS — Z92.3 HISTORY OF RADIATION THERAPY: ICD-10-CM

## 2024-03-06 DIAGNOSIS — R35.1 BPH ASSOCIATED WITH NOCTURIA: ICD-10-CM

## 2024-03-06 DIAGNOSIS — N40.1 BPH ASSOCIATED WITH NOCTURIA: ICD-10-CM

## 2024-03-06 PROCEDURE — 99214 OFFICE O/P EST MOD 30 MIN: CPT | Performed by: UROLOGY

## 2024-03-06 PROCEDURE — 81003 URINALYSIS AUTO W/O SCOPE: CPT | Performed by: UROLOGY

## 2024-03-06 NOTE — LETTER
March 6, 2024     Compa Alarcon DO  [address] [address]    Patient: Az Kern   YOB: 1940   Date of Visit: 3/6/2024       Dear Dr. Alarcon:    Thank you for referring Az Kern to me for evaluation. Below are my notes for this consultation.    If you have questions, please do not hesitate to call me. I look forward to following your patient along with you.         Sincerely,        Cruzito Yee MD        CC: No Recipients    Cruzito Yee MD  3/6/2024 12:46 PM  Sign when Signing Visit  Assessment/Plan:  Assessment/Plan:  #1.  Prostate cancer Lisset pattern score 6 and 7 status post 6-month course of androgen deprivation and IMRT finishing 2021.  Testosterone is slightly subnormal with PSA 0.19.  Repeat 6 months.     2.  Male hypogonadism-after cessation of androgen deprivation therapy hypogonadism have improved but as of late his total testosterone is still subnormal.  Do not suggest testosterone replacement therapy in this patient with prostate cancer.     3.  History of radiation therapy-as above     4.  BPH with lower urinary tract symptoms.  AUA symptom score is 12 with nocturia and intermittency as well as some weakening of the urinary stream frequency and urgency.  Not bothersome the patient-no further treatment in that regard        No problem-specific Assessment & Plan notes found for this encounter.       Diagnoses and all orders for this visit:    Prostate cancer (HCC)  -     PSA Total, Diagnostic; Future  -     Testosterone; Future    Hypogonadism in male  -     Testosterone; Future    History of radiation therapy    BPH associated with nocturia          Subjective:      Patient ID: Az Kern is a 83 y.o. male.    HPI    83 year-old gentleman with prostate cancer Lisset pattern score 6 and 7 status post 6-month androgen deprivation and radiation therapy with radiation ending November 2021. PSA has risen slowly with hypogonadism  stable. Only mild urinary complaints       The following portions of the patient's history were reviewed and updated as appropriate: allergies, current medications, past family history, past medical history, past social history, past surgical history, and problem list.    Review of Systems   Genitourinary:  Positive for frequency and urgency.        See aua ss   All other systems reviewed and are negative.        Objective:      There were no vitals taken for this visit.         Physical Exam  Vitals reviewed.   Constitutional:       General: He is not in acute distress.     Appearance: Normal appearance. He is not ill-appearing, toxic-appearing or diaphoretic.   HENT:      Head: Normocephalic and atraumatic.      Nose: Nose normal.      Mouth/Throat:      Mouth: Mucous membranes are moist.   Eyes:      Extraocular Movements: Extraocular movements intact.   Pulmonary:      Effort: Pulmonary effort is normal. No respiratory distress.   Skin:     General: Skin is dry.   Neurological:      Mental Status: He is alert and oriented to person, place, and time.   Psychiatric:         Mood and Affect: Mood normal.         Behavior: Behavior normal.         Thought Content: Thought content normal.         Judgment: Judgment normal.

## 2024-03-06 NOTE — PROGRESS NOTES
Assessment/Plan:  Assessment/Plan:  #1.  Prostate cancer Kennard pattern score 6 and 7 status post 6-month course of androgen deprivation and IMRT finishing 2021.  Testosterone is slightly subnormal with PSA 0.19.  Repeat 6 months.     2.  Male hypogonadism-after cessation of androgen deprivation therapy hypogonadism have improved but as of late his total testosterone is still subnormal.  Do not suggest testosterone replacement therapy in this patient with prostate cancer.     3.  History of radiation therapy-as above     4.  BPH with lower urinary tract symptoms.  AUA symptom score is 12 with nocturia and intermittency as well as some weakening of the urinary stream frequency and urgency.  Not bothersome the patient-no further treatment in that regard        No problem-specific Assessment & Plan notes found for this encounter.       Diagnoses and all orders for this visit:    Prostate cancer (HCC)  -     PSA Total, Diagnostic; Future  -     Testosterone; Future    Hypogonadism in male  -     Testosterone; Future    History of radiation therapy    BPH associated with nocturia          Subjective:      Patient ID: Az Kern is a 83 y.o. male.    HPI    83 year-old gentleman with prostate cancer Kennard pattern score 6 and 7 status post 6-month androgen deprivation and radiation therapy with radiation ending November 2021. PSA has risen slowly with hypogonadism stable. Only mild urinary complaints       The following portions of the patient's history were reviewed and updated as appropriate: allergies, current medications, past family history, past medical history, past social history, past surgical history, and problem list.    Review of Systems   Genitourinary:  Positive for frequency and urgency.        See aua ss   All other systems reviewed and are negative.        Objective:      There were no vitals taken for this visit.         Physical Exam  Vitals reviewed.   Constitutional:       General: He is not in  acute distress.     Appearance: Normal appearance. He is not ill-appearing, toxic-appearing or diaphoretic.   HENT:      Head: Normocephalic and atraumatic.      Nose: Nose normal.      Mouth/Throat:      Mouth: Mucous membranes are moist.   Eyes:      Extraocular Movements: Extraocular movements intact.   Pulmonary:      Effort: Pulmonary effort is normal. No respiratory distress.   Skin:     General: Skin is dry.   Neurological:      Mental Status: He is alert and oriented to person, place, and time.   Psychiatric:         Mood and Affect: Mood normal.         Behavior: Behavior normal.         Thought Content: Thought content normal.         Judgment: Judgment normal.

## 2024-03-09 ENCOUNTER — HOSPITAL ENCOUNTER (OUTPATIENT)
Dept: CT IMAGING | Facility: HOSPITAL | Age: 84
Discharge: HOME | End: 2024-03-09
Payer: MEDICARE

## 2024-03-09 DIAGNOSIS — R10.9 STOMACH ACHE: ICD-10-CM

## 2024-03-09 DIAGNOSIS — R10.12 LUQ ABDOMINAL PAIN: ICD-10-CM

## 2024-03-09 DIAGNOSIS — R10.11 RUQ ABDOMINAL PAIN: ICD-10-CM

## 2024-03-09 PROCEDURE — 74177 CT ABD & PELVIS W/CONTRAST: CPT

## 2024-03-09 RX ADMIN — IOHEXOL 100 ML: 350 INJECTION, SOLUTION INTRAVENOUS at 14:20

## 2024-03-14 ENCOUNTER — TELEPHONE (OUTPATIENT)
Age: 84
End: 2024-03-14

## 2024-03-14 NOTE — TELEPHONE ENCOUNTER
Patient of Dr Yee at [facility]    Patient called stating he had a ct scan done on 03/09/24 that was ordered by his family doctor and the results are in epic.  His family doctor told him to have Dr. Yee review and see if there is anything to be done about it.  He would like a call back if he needs to see Dr. Yee again. Review results and advise     Patient can be reached at [phone]

## 2024-03-14 NOTE — TELEPHONE ENCOUNTER
CT shows that the bladder wall could be slightly think and it may be correlated with infection. As long as patient has no symptoms of infection and is not having difficulty urinating, I do not think further intervention is warranted at this time. If he is having symptoms of infection urine studies can be ordered. If he does not feel he is emptying properly, nursing visit for PVR can be scheduled. Thank you!

## 2024-03-15 ENCOUNTER — TELEPHONE (OUTPATIENT)
Dept: UROLOGY | Facility: MEDICAL CENTER | Age: 84
End: 2024-03-15

## 2024-03-15 NOTE — TELEPHONE ENCOUNTER
Call placed to patient and spoke with him. Informed him of the AP recommendations and CT scan results. Pt is aware and thankful for the call.

## 2024-03-15 NOTE — TELEPHONE ENCOUNTER
Pt returning call.  Pt requesting a call back to discuss CT results.    Pt call back: [phone]

## 2024-03-15 NOTE — TELEPHONE ENCOUNTER
Left voice message for pt to give the office a call for his ct scan results. Call back number provided.

## 2024-03-25 ENCOUNTER — EVALUATION (OUTPATIENT)
Dept: PHYSICAL THERAPY | Facility: REHABILITATION | Age: 84
End: 2024-03-25
Payer: MEDICARE

## 2024-03-25 DIAGNOSIS — M54.16 LUMBAR RADICULOPATHY: Primary | ICD-10-CM

## 2024-03-25 PROCEDURE — 97161 PT EVAL LOW COMPLEX 20 MIN: CPT | Performed by: PHYSICAL THERAPIST

## 2024-03-25 PROCEDURE — 97110 THERAPEUTIC EXERCISES: CPT | Performed by: PHYSICAL THERAPIST

## 2024-03-25 NOTE — PROGRESS NOTES
"PT Evaluation     Today's date: 3/25/2024  Patient name: Az Kern  : 1940  MRN: [id number]  Referring provider: Compa Alarcon DO  Dx:   Encounter Diagnosis     ICD-10-CM    1. Lumbar radiculopathy  M54.16                      Assessment  Assessment details: Az Kern is a 83 y.o. male referred to outpt PT with dx of LBP.  Pt presents with decrease in trunk ROM, positive joint play restriction throughout lumbar spine, and pain central LB.  Pt funct is limited with decrease in tolerance to ambulation and standing, pain when getting OOB initially in AM, and pain progressive through the evening.  Pt would benefit from skilled PT to address these limitations and max funct.     Impairments: abnormal or restricted ROM, activity intolerance and impaired physical strength    Goals  Funct 1. Improve tolerance to ambulation 1 mile with pain <2/10 x4 weeks.  2. Less pain in AM when getting OOB <5/10 x4 weeks.  Impairment 1. Increase ROM by 25% x4 weeks  2. Decrease pain by 25 % x4 weeks      Plan  Planned therapy interventions: home exercise program, therapeutic exercise, stretching, strengthening, postural training, patient education, neuromuscular re-education, manual therapy and joint mobilization  Frequency: 2x week  Duration in weeks: 4        Subjective Evaluation    History of Present Illness  Mechanism of injury: Pt reports having chronic hx of LBP in which he had a bout of PT including radiofrequency ablation treatment with some relief, but cont to have LE pain and sx's.  Pt notes currently having anterior bilat shin pain when laying in bed and notes an increase in LBP in AM upon getting OOB in AM.  Pt notes having min pain during the day in his legs, but cont with LB sx's until \"getting moving.\"  Pt does reports increase in sx's in the evening after a busy, active day and reports an increase in pain.  Pt does note standing prolonged causes his pain.  Pt does walk 1 mile every day with pain " towards the end of his distance, but tolerable and able.  Pt is scheduled to f/u with pain management in the next 4 weeks.     No hx of recent trauma, negative N/T and saddle parasthesias, no HA, and no changes in bowel or bladder.   Patient Goals  Patient goals for therapy: independence with ADLs/IADLs and decreased pain    Pain  Current pain ratin  At worst pain ratin          Objective     Neurological Testing     Sensation     Lumbar   Left   Intact: light touch    Right   Intact: light touch    Reflexes   Left   Patellar (L4): normal (2+)  Achilles (S1): normal (2+)  Clonus sign: negative    Right   Patellar (L4): normal (2+)  Achilles (S1): normal (2+)  Clonus sign: negative    Active Range of Motion     Lumbar   Flexion:  Restriction level: moderate  Extension:  with pain Restriction level: maximal  Left lateral flexion:  Restriction level: moderate  Right lateral flexion:  with pain Restriction level: moderate    Additional Active Range of Motion Details  Pt has positive pain with lumbar extension with compensatory knee flexion to perform and bilat SB in central lumbar spine.      Joint Play     Hypomobile: L1, L2, L3, L4, L5 and S1     Strength/Myotome Testing     Left Hip   Planes of Motion   Flexion: 5    Right Hip   Planes of Motion   Flexion: 5    Left Knee   Flexion: 5  Extension: 5    Right Knee   Flexion: 5  Extension: 5    Left Ankle/Foot   Dorsiflexion: 5  Plantar flexion: 5  Great toe extension: 5    Right Ankle/Foot   Dorsiflexion: 5  Plantar flexion: 5  Great toe extension: 5    Tests     Lumbar     Left   Negative crossed SLR, passive SLR and slump test.     Right   Negative crossed SLR, passive SLR and slump test.     Left Hip   Negative CRISS and FADIR.     Right Hip   Negative CRISS and FADIR.            Precautions: hx of prostate CA        Manual  3/25/24        Grade III/IV CPA/UPA lumbar spine                                                                           Neuro Re-Ed              Bridge                                                                                      Therex            Rec bike            JUSTUS              JUSTUS at wall              LTR              SL open book stretch               pball flexion stretch                                                        Gait Training                                 Modalities

## 2024-03-25 NOTE — LETTER
2024    Compa Alarcon DO  [address] [place] [address]  [facility] [address]    Patient: Az Kern   YOB: 1940   Date of Visit: 3/25/2024     Encounter Diagnosis     ICD-10-CM    1. Lumbar radiculopathy  M54.16           Dear Dr. Alarcon:    Thank you for your recent referral of Az Kern. Please review the attached evaluation summary from Az's recent visit.     Please verify that you agree with the plan of care by signing the attached order.     If you have any questions or concerns, please do not hesitate to call.     I sincerely appreciate the opportunity to share in the care of one of your patients and hope to have another opportunity to work with you in the near future.       Sincerely,    Atiya Almaraz, PT      Referring Provider:      I certify that I have read the below Plan of Care and certify the need for these services furnished under this plan of treatment while under my care.                    Compa Alarcon DO  [address] [place] [address]  [facility] [address]  Via Fax: [phone]          PT Evaluation     Today's date: 3/25/2024  Patient name: Az Kern  : 1940  MRN: [id number]  Referring provider: Compa Alarcon DO  Dx:   Encounter Diagnosis     ICD-10-CM    1. Lumbar radiculopathy  M54.16                      Assessment  Assessment details: Az Kern is a 83 y.o. male referred to outpt PT with dx of LBP.  Pt presents with decrease in trunk ROM, positive joint play restriction throughout lumbar spine, and pain central LB.  Pt funct is limited with decrease in tolerance to ambulation and standing, pain when getting OOB initially in AM, and pain progressive through the evening.  Pt would benefit from skilled PT to address these limitations and max funct.     Impairments: abnormal or restricted ROM, activity intolerance and impaired physical strength    Goals  Funct 1. Improve tolerance to ambulation 1 mile with pain <2/10 x4 weeks.  2.  "Less pain in AM when getting OOB <5/10 x4 weeks.  Impairment 1. Increase ROM by 25% x4 weeks  2. Decrease pain by 25 % x4 weeks      Plan  Planned therapy interventions: home exercise program, therapeutic exercise, stretching, strengthening, postural training, patient education, neuromuscular re-education, manual therapy and joint mobilization  Frequency: 2x week  Duration in weeks: 4        Subjective Evaluation    History of Present Illness  Mechanism of injury: Pt reports having chronic hx of LBP in which he had a bout of PT including radiofrequency ablation treatment with some relief, but cont to have LE pain and sx's.  Pt notes currently having anterior bilat shin pain when laying in bed and notes an increase in LBP in AM upon getting OOB in AM.  Pt notes having min pain during the day in his legs, but cont with LB sx's until \"getting moving.\"  Pt does reports increase in sx's in the evening after a busy, active day and reports an increase in pain.  Pt does note standing prolonged causes his pain.  Pt does walk 1 mile every day with pain towards the end of his distance, but tolerable and able.  Pt is scheduled to f/u with pain management in the next 4 weeks.     No hx of recent trauma, negative N/T and saddle parasthesias, no HA, and no changes in bowel or bladder.   Patient Goals  Patient goals for therapy: independence with ADLs/IADLs and decreased pain    Pain  Current pain ratin  At worst pain ratin          Objective     Neurological Testing     Sensation     Lumbar   Left   Intact: light touch    Right   Intact: light touch    Reflexes   Left   Patellar (L4): normal (2+)  Achilles (S1): normal (2+)  Clonus sign: negative    Right   Patellar (L4): normal (2+)  Achilles (S1): normal (2+)  Clonus sign: negative    Active Range of Motion     Lumbar   Flexion:  Restriction level: moderate  Extension:  with pain Restriction level: maximal  Left lateral flexion:  Restriction level: moderate  Right " lateral flexion:  with pain Restriction level: moderate    Additional Active Range of Motion Details  Pt has positive pain with lumbar extension with compensatory knee flexion to perform and bilat SB in central lumbar spine.      Joint Play     Hypomobile: L1, L2, L3, L4, L5 and S1     Strength/Myotome Testing     Left Hip   Planes of Motion   Flexion: 5    Right Hip   Planes of Motion   Flexion: 5    Left Knee   Flexion: 5  Extension: 5    Right Knee   Flexion: 5  Extension: 5    Left Ankle/Foot   Dorsiflexion: 5  Plantar flexion: 5  Great toe extension: 5    Right Ankle/Foot   Dorsiflexion: 5  Plantar flexion: 5  Great toe extension: 5    Tests     Lumbar     Left   Negative crossed SLR, passive SLR and slump test.     Right   Negative crossed SLR, passive SLR and slump test.     Left Hip   Negative CRISS and FADIR.     Right Hip   Negative CRISS and FADIR.            Precautions: hx of prostate CA        Manual  3/25/24        Grade III/IV CPA/UPA lumbar spine                                                                           Neuro Re-Ed             Bridge                                                                                      Therex            Rec bike            JUSTUS              JUSTUS at wall              LTR              SL open book stretch               pball flexion stretch                                                        Gait Training                                 Modalities

## 2024-04-02 ENCOUNTER — OFFICE VISIT (OUTPATIENT)
Dept: PHYSICAL THERAPY | Facility: REHABILITATION | Age: 84
End: 2024-04-02
Payer: MEDICARE

## 2024-04-02 DIAGNOSIS — M54.16 LUMBAR RADICULOPATHY: Primary | ICD-10-CM

## 2024-04-02 PROCEDURE — 97110 THERAPEUTIC EXERCISES: CPT | Performed by: PHYSICAL THERAPIST

## 2024-04-02 PROCEDURE — 97140 MANUAL THERAPY 1/> REGIONS: CPT | Performed by: PHYSICAL THERAPIST

## 2024-04-02 NOTE — PROGRESS NOTES
"Daily Note     Today's date: 2024  Patient name: Az Kern  : 1940  MRN: [id number]  Referring provider: Compa Alarcon DO  Dx:   Encounter Diagnosis     ICD-10-CM    1. Lumbar radiculopathy  M54.16                      Subjective: pt notes similar pain and sx's occurring left LB and into left LE.        Objective: See treatment diary below    Precautions: hx of prostate CA        Manual  3/25/24  4/2/24          Grade III/IV CPA/UPA lumbar spine    10 mins                                                                        Neuro Re-Ed             Bridge                                                                                                                Therex             Rec bike    5 mins           JUSTUS    5\"x10           JUSTUS at wall              LTR              SL open book stretch     5\"x10 ea          pball flexion stretch    5\"x10                                                     Gait Training                                 Modalities                                                                 Assessment: Pt does well with initiation of manuals into treatment to assist with lumbar mobility and less radicular sx's.  Pt denies pain throughout manual tx and initiation of stretching ex, primarily restricted left UPA and with left rotation in SL position.  Pt issued updated HEP to maintain and improve flexibility to assist with sx's.       Plan: Continue per plan of care.      "

## 2024-04-03 ENCOUNTER — OFFICE VISIT (OUTPATIENT)
Dept: CARDIOLOGY CLINIC | Facility: CLINIC | Age: 84
End: 2024-04-03
Payer: MEDICARE

## 2024-04-03 VITALS
HEART RATE: 56 BPM | SYSTOLIC BLOOD PRESSURE: 130 MMHG | WEIGHT: 134 LBS | DIASTOLIC BLOOD PRESSURE: 70 MMHG | BODY MASS INDEX: 23.74 KG/M2

## 2024-04-03 DIAGNOSIS — E78.5 DYSLIPIDEMIA: ICD-10-CM

## 2024-04-03 DIAGNOSIS — I48.0 PAROXYSMAL ATRIAL FIBRILLATION (HCC): ICD-10-CM

## 2024-04-03 DIAGNOSIS — I10 BENIGN ESSENTIAL HTN: ICD-10-CM

## 2024-04-03 DIAGNOSIS — I48.91 ATRIAL FIBRILLATION, UNSPECIFIED TYPE (HCC): Primary | ICD-10-CM

## 2024-04-03 PROCEDURE — 93000 ELECTROCARDIOGRAM COMPLETE: CPT | Performed by: INTERNAL MEDICINE

## 2024-04-03 PROCEDURE — 99214 OFFICE O/P EST MOD 30 MIN: CPT | Performed by: INTERNAL MEDICINE

## 2024-04-03 NOTE — PROGRESS NOTES
Cardiology             Az Kern  1940  [id number]                 Discussion/Summary:     Paroxysmal atrial fibrillation  Dyslipidemia  Hypertension        Regular rhythm on examination, sinus bradycardia at 56 bpm on ECG today.  Continue Eliquis anticoagulation  Blood pressure controlled, continue amlodipine  Continue rosuvastatin for dyslipidemia          Follow-up in 1 year        Interval History:      This is a 83-year-old male with a history of atrial fibrillation diagnosed 08/2017, maintained on low-dose metoprolol up until May 2018.  He presented to his PCPs office with symptoms of intermittent lightheadedness with bradycardia in the 40s.  At that time his metoprolol was discontinued.  He has been maintained on Eliquis anticoagulation.     He underwent PFTs 02/07/2020 which was within normal limits.  CT chest 02/19/2020 revealed a few scattered small lung nodules.     He was diagnosed with prostate cancer, and has undergone radiation therapy.     He presents today for follow-up with no complaints.  He denies exertional chest pain, shortness of breath, dizziness, palpitations, lower extremity edema.            Vitals:  Vitals:    04/03/24 1138   BP: 130/70   BP Location: Left arm   Patient Position: Sitting   Cuff Size: Adult   Pulse: 56   Weight: 60.8 kg (134 lb)         Past Medical History:   Diagnosis Date   • Back pain    • Benign prostatic hypertrophy    • Disease of thyroid gland    • Elevated PSA 3/3/2021   • Hyperlipidemia    • Hypertension    • Prostate cancer (HCC) 7/21/2021   • Restless leg      Social History     Socioeconomic History   • Marital status: /Civil Union     Spouse name: Not on file   • Number of children: Not on file   • Years of education: Not on file   • Highest education level: Not on file   Occupational History   • Not on file   Tobacco Use   • Smoking status: Never   • Smokeless tobacco: Never   Vaping Use   • Vaping status: Never Used   Substance and  Sexual Activity   • Alcohol use: Yes     Alcohol/week: 4.0 standard drinks of alcohol     Types: 4 Cans of beer per week     Comment: CAFFEINE USE/3-4 beers/wk.  Occasional wine or drink   • Drug use: No   • Sexual activity: Not on file   Other Topics Concern   • Not on file   Social History Narrative   • Not on file     Social Determinants of Health     Financial Resource Strain: Not on file   Food Insecurity: Not on file   Transportation Needs: Not on file   Physical Activity: Not on file   Stress: Not on file   Social Connections: Not on file   Intimate Partner Violence: Not on file   Housing Stability: Not on file      Family History   Problem Relation Age of Onset   • Cancer Mother    • Throat cancer Father    • Breast cancer Sister      Past Surgical History:   Procedure Laterality Date   • ACHILLES TENDON SURGERY     • HERNIA REPAIR     • KNEE ARTHROSCOPY Right     shoulder   • MS PLMT INTERSTITIAL DEV RADIAT TX PROSTATE 1/MULT N/A 9/21/2021    Procedure: INSERTION OF FIDUCIAL MARKER (TRANSRECTAL ULTRASOUND GUIDANCE), SPACEOAR;  Surgeon: Daniel Bryant MD;  Location: [facility];  Service: Urology   • MS PROSTATE NEEDLE BIOPSY ANY APPROACH N/A 6/15/2021    Procedure: TRANSPERINEAL MRI FUSION PROSTATE BIOPSY;  Surgeon: Cruzito Yee MD;  Location: [facility];  Service: Urology   • SKIN BIOPSY     • TONSILLECTOMY         Current Outpatient Medications:   •  amLODIPine (NORVASC) 2.5 mg tablet, 5 mg daily, Disp: , Rfl:   •  bimatoprost (LUMIGAN) 0.01 % ophthalmic drops, Administer 1 drop into the left eye daily at bedtime, Disp: , Rfl:   •  Eliquis 5 MG, TAKE 1 TABLET TWICE A DAY, Disp: 180 tablet, Rfl: 3  •  fluticasone (FLONASE) 50 mcg/act nasal spray, , Disp: , Rfl:   •  levothyroxine 25 mcg tablet, Take 25 mcg by mouth daily, Disp: , Rfl:   •  Multiple Vitamins-Minerals (CENTRUM SILVER 50+MEN PO), Take by mouth in the morning, Disp: , Rfl:   •  Omega-3 Fatty Acids (FISH OIL) 1,000 mg, Take by mouth daily  KRILL OIL, Disp: , Rfl:   •  rosuvastatin (CRESTOR) 10 MG tablet, 10 mg Pt takes med every other day--conversation with PCP (Dr. Alarcon) November 2021 appt/Labs, Disp: , Rfl:   •  traZODone (DESYREL) 50 mg tablet, , Disp: , Rfl:         Review of Systems:  Review of Systems   Constitutional:  Negative for fatigue.   Respiratory: Negative.     Cardiovascular: Negative.    Musculoskeletal:  Positive for back pain.   All other systems reviewed and are negative.        Physical Exam:  Physical Exam  Constitutional:       General: He is not in acute distress.     Appearance: He is well-developed. He is not diaphoretic.   HENT:      Head: Normocephalic and atraumatic.   Eyes:      General: No scleral icterus.        Right eye: No discharge.      Pupils: Pupils are equal, round, and reactive to light.   Neck:      Thyroid: No thyromegaly.      Vascular: No carotid bruit.   Cardiovascular:      Rate and Rhythm: Normal rate and regular rhythm.      Heart sounds: Normal heart sounds. No murmur heard.     No friction rub. No gallop.   Pulmonary:      Effort: Pulmonary effort is normal.      Breath sounds: Normal breath sounds.   Abdominal:      General: There is no distension.      Tenderness: There is no abdominal tenderness. There is no guarding or rebound.   Musculoskeletal:         General: Normal range of motion.      Cervical back: Normal range of motion and neck supple.      Right lower leg: No edema.      Left lower leg: No edema.   Skin:     General: Skin is warm and dry.      Coloration: Skin is not pale.      Findings: No erythema or rash.   Neurological:      Mental Status: He is alert and oriented to person, place, and time.      Coordination: Coordination normal.   Psychiatric:         Behavior: Behavior normal.         Thought Content: Thought content normal.         Judgment: Judgment normal.         This note was completed in part utilizing [employer] Fluency Direct Software.  Grammatical errors, random word  insertions, spelling mistakes, and incomplete sentences can be an occasional consequence of this system secondary to software limitations, ambient noise, and hardware issues.  If you have any questions or concerns about the content, text, or information contained within the body of this dictation, please contact the provider for clarification.

## 2024-04-05 ENCOUNTER — OFFICE VISIT (OUTPATIENT)
Dept: PHYSICAL THERAPY | Facility: REHABILITATION | Age: 84
End: 2024-04-05
Payer: MEDICARE

## 2024-04-05 DIAGNOSIS — M54.16 LUMBAR RADICULOPATHY: Primary | ICD-10-CM

## 2024-04-05 PROCEDURE — 97140 MANUAL THERAPY 1/> REGIONS: CPT | Performed by: PHYSICAL THERAPIST

## 2024-04-05 PROCEDURE — 97110 THERAPEUTIC EXERCISES: CPT | Performed by: PHYSICAL THERAPIST

## 2024-04-05 PROCEDURE — 97112 NEUROMUSCULAR REEDUCATION: CPT | Performed by: PHYSICAL THERAPIST

## 2024-04-05 NOTE — PROGRESS NOTES
"Daily Note     Today's date: 2024  Patient name: Az Kern  : 1940  MRN: [id number]  Referring provider: Compa Alarcon DO  Dx:   Encounter Diagnosis     ICD-10-CM    1. Lumbar radiculopathy  M54.16                      Subjective: Pt reports improvement of sx's the day and evening after last visit including the ability to sleep well without increase in pian.  Pt did note poor sleep last night due to taking care of his wife after hernia surgery and feels more fatigued today.      Objective: See treatment diary below    Precautions: hx of prostate CA        Manual  3/25/24  4/2/24  4/5/34        Grade III/IV CPA/UPA lumbar spine    10 mins   10 mins                                                                      Neuro Re-Ed             Bridge       5\"x10                                                                                                          Therex             Rec bike    5 mins   7 mins         JUSTUS    5\"x10   5\"x10         JUSTUS at wall      5\"x10         LTR              SL open book stretch     5\"x10 ea  5\"x10         pball flexion stretch    5\"x10   5\"x15                                                   Gait Training                                 Modalities                                                                 Assessment: Pt does well with progression of exercises to assist with extension and rotation mobility post manual tx.  Pt has improved lumbar joint play compared with last visit but cont to be most restricted lumbar L3-5. Pt denies pain post PT treatment and issued updated HEP.     Plan: Continue per plan of care.      "

## 2024-04-09 ENCOUNTER — OFFICE VISIT (OUTPATIENT)
Dept: PHYSICAL THERAPY | Facility: REHABILITATION | Age: 84
End: 2024-04-09
Payer: MEDICARE

## 2024-04-09 DIAGNOSIS — M54.16 LUMBAR RADICULOPATHY: Primary | ICD-10-CM

## 2024-04-09 PROCEDURE — 97112 NEUROMUSCULAR REEDUCATION: CPT | Performed by: PHYSICAL THERAPIST

## 2024-04-09 PROCEDURE — 97140 MANUAL THERAPY 1/> REGIONS: CPT | Performed by: PHYSICAL THERAPIST

## 2024-04-09 PROCEDURE — 97110 THERAPEUTIC EXERCISES: CPT | Performed by: PHYSICAL THERAPIST

## 2024-04-09 NOTE — PROGRESS NOTES
"Daily Note     Today's date: 2024  Patient name: Az Kern  : 1940  MRN: [id number]  Referring provider: Compa Alarcon DO  Dx:   Encounter Diagnosis     ICD-10-CM    1. Lumbar radiculopathy  M54.16                      Subjective: Pt cont to have improvement in pain and sx's, but does report right shoulder discomfort with extension based movements.  Pt does note fatigued again today taking care of his wife p.o.       Objective: See treatment diary below    Precautions: hx of prostate CA        Manual  3/25/24  4/2/24  4/5/34  4/9/24      Grade III/IV CPA/UPA lumbar spine    10 mins   10 mins   10 mins                                                                    Neuro Re-Ed             Bridge       5\"x10   5\"x15                                                                                                        Therex             Rec bike    5 mins   7 mins   7 mins      JUSTUS    5\"x10   5\"x10   5\"x10       JUSTUS at wall      5\"x10         LTR              SL open book stretch     5\"x10 ea  5\"x10   5\"x10 ea      pball flexion stretch    5\"x10   5\"x15   5\"x15                                                 Gait Training                                 Modalities                                                                 Assessment: Pt cont to improve with pain and mobility post manuals and ROM/stretching program.  Able to modify JUSTUS against wall secondary to increase in right shoulder pressure through exercise.      Plan: Continue per plan of care.      "

## 2024-04-12 ENCOUNTER — OFFICE VISIT (OUTPATIENT)
Dept: PHYSICAL THERAPY | Facility: REHABILITATION | Age: 84
End: 2024-04-12
Payer: MEDICARE

## 2024-04-12 DIAGNOSIS — M54.16 LUMBAR RADICULOPATHY: Primary | ICD-10-CM

## 2024-04-12 PROCEDURE — 97140 MANUAL THERAPY 1/> REGIONS: CPT | Performed by: PHYSICAL THERAPIST

## 2024-04-12 PROCEDURE — 97110 THERAPEUTIC EXERCISES: CPT | Performed by: PHYSICAL THERAPIST

## 2024-04-12 PROCEDURE — 97112 NEUROMUSCULAR REEDUCATION: CPT | Performed by: PHYSICAL THERAPIST

## 2024-04-12 NOTE — PROGRESS NOTES
"Daily Note     Today's date: 2024  Patient name: Az Kern  : 1940  MRN: [id number]  Referring provider: Compa Alarcon DO  Dx:   Encounter Diagnosis     ICD-10-CM    1. Lumbar radiculopathy  M54.16                      Subjective: Pt notes that he is having min pain occurring in lumbar spine, denies LE sx's.        Objective: See treatment diary below    Precautions: hx of prostate CA        Manual  3/25/24  4/2/24  4/5/34  4/9/24  4/12/24    Grade III/IV CPA/UPA lumbar spine    10 mins   10 mins   10 mins   10 mins                                                                  Neuro Re-Ed             Bridge       5\"x10   5\"x15   5\"x20     DLS SLR         3\"x10 ea    dead lift              funct squat                                                                     Therex             Rec bike    5 mins   7 mins   7 mins  8 mins     JUSTUS    5\"x10   5\"x10   5\"x10   5\"x10     JUSTUS at wall      5\"x10   hep              SL open book stretch     5\"x10 ea  5\"x10   5\"x10 ea  5\"x10     pball flexion stretch    5\"x10   5\"x15   5\"x15   5\"x15                                               Gait Training                                 Modalities                                                                Assessment: Pt demo's improvement in lumbar mobility throughout without reproducible pain and no radicular sx's present.  Pt does well with addition of SLR DLS with improved pelvic and lumbar control, does struggle with quad activation on right when compared with left.  Pt encouraged to cont with HEP for ROM and flexibility.      Plan: Continue per plan of care.      "

## 2024-04-15 ENCOUNTER — OFFICE VISIT (OUTPATIENT)
Dept: PHYSICAL THERAPY | Facility: REHABILITATION | Age: 84
End: 2024-04-15
Payer: MEDICARE

## 2024-04-15 DIAGNOSIS — M54.16 LUMBAR RADICULOPATHY: Primary | ICD-10-CM

## 2024-04-15 PROCEDURE — 97110 THERAPEUTIC EXERCISES: CPT | Performed by: PHYSICAL THERAPIST

## 2024-04-15 PROCEDURE — 97112 NEUROMUSCULAR REEDUCATION: CPT | Performed by: PHYSICAL THERAPIST

## 2024-04-15 PROCEDURE — 97140 MANUAL THERAPY 1/> REGIONS: CPT | Performed by: PHYSICAL THERAPIST

## 2024-04-15 NOTE — PROGRESS NOTES
"Daily Note     Today's date: 4/15/2024  Patient name: Az Kern  : 1940  MRN: [id number]  Referring provider: Compa Alarcon DO  Dx:   Encounter Diagnosis     ICD-10-CM    1. Lumbar radiculopathy  M54.16                      Subjective: Pt notes cont to do well with PT but cont to have pain in AM and upon getting out of chair after sitting prolonged especially as the day progresses with an increase in general activity.  Pt is scheduled to f/u with pain management next week.       Objective: See treatment diary below    Precautions: hx of prostate CA        Manual  4/15/24  4/2/24  4/5/34  4/9/24  4/12/24    Grade III/IV CPA/UPA lumbar spine  10 mins   10 mins   10 mins   10 mins   10 mins                                                                  Neuro Re-Ed             Bridge   5\"x20    5\"x10   5\"x15   5\"x20     DLS SLR  3\"x10 ea       3\"x10 ea    dead lift  5# (2) x20             funct squat              prone hip ext   3\"x10                                                      Therex             Rec bike  8 mins   5 mins   7 mins   7 mins  8 mins     JUSTUS  5\"2x10   5\"x10   5\"x10   5\"x10   5\"x10     JUSTUS at wall  hep    5\"x10   hep              SL open book stretch   hep  5\"x10 ea  5\"x10   5\"x10 ea  5\"x10     pball flexion stretch  5\"x15  5\"x10   5\"x15   5\"x15   5\"x15                                               Gait Training                                 Modalities                                                                Assessment: Pt cont to improve throughout his lumbar mobility and ROM without increase in pain.  Added progressive strengthening and lumbar support to assist with funct tasks.  Pt encouraged to cont with HEP for ROM and flexibility initially in AM and prior to getting up from chair to assist with pain and stiffness.         Plan: Continue per plan of care.      "

## 2024-04-18 ENCOUNTER — OFFICE VISIT (OUTPATIENT)
Dept: PHYSICAL THERAPY | Facility: REHABILITATION | Age: 84
End: 2024-04-18
Payer: MEDICARE

## 2024-04-18 DIAGNOSIS — M54.16 LUMBAR RADICULOPATHY: Primary | ICD-10-CM

## 2024-04-18 PROCEDURE — 97140 MANUAL THERAPY 1/> REGIONS: CPT | Performed by: PHYSICAL THERAPIST

## 2024-04-18 PROCEDURE — 97110 THERAPEUTIC EXERCISES: CPT | Performed by: PHYSICAL THERAPIST

## 2024-04-18 PROCEDURE — 97112 NEUROMUSCULAR REEDUCATION: CPT | Performed by: PHYSICAL THERAPIST

## 2024-04-18 NOTE — PROGRESS NOTES
"Daily Note     Today's date: 2024  Patient name: Az Kern  : 1940  MRN: [id number]  Referring provider: Compa Alarcon DO  Dx:   Encounter Diagnosis     ICD-10-CM    1. Lumbar radiculopathy  M54.16                      Subjective: Pt reports similar pain in AM and at times waking in the middle of the night, left greater than right.        Objective: See treatment diary below    Precautions: hx of prostate CA        Manual  4/15/24 4/18/24  4/5/34  4/9/24  4/12/24    Grade III/IV CPA/UPA lumbar spine  10 mins   10 mins   10 mins   10 mins   10 mins                                                                  Neuro Re-Ed             Bridge   5\"x20  5\"x20   5\"x10   5\"x15   5\"x20     DLS SLR  3\"x10 ea  3\"x10 ea     3\"x10 ea    dead lift  5# (2) x20   5# (2) x20           funct squat              prone hip ext   3\"x10   3\"x10 ea                                                   Therex            Rec bike  8 mins  8 mins   7 mins   7 mins  8 mins     JUSTUS  5\"2x10  5\"2x10   5\"x10   5\"x10   5\"x10     JUSTUS at wall  hep   5\"x10   hep              SL open book stretch   hep   5\"x10   5\"x10 ea  5\"x10     pball flexion stretch  5\"x15 5\"x20   5\"x15   5\"x15   5\"x15                                              Gait Training                                 Modalities                                                                Assessment: Pt cont to report discomfort in AM, but reports improvement with movement and activity.  Pt has improved lumbar mobility throughout with manual tx and cont to maintain well with HEP.        Plan: Continue per plan of care. Will RE next visit prior to f/u with pain management.      "

## 2024-04-22 ENCOUNTER — EVALUATION (OUTPATIENT)
Dept: PHYSICAL THERAPY | Facility: REHABILITATION | Age: 84
End: 2024-04-22
Payer: MEDICARE

## 2024-04-22 DIAGNOSIS — M54.16 LUMBAR RADICULOPATHY: Primary | ICD-10-CM

## 2024-04-22 PROCEDURE — 97140 MANUAL THERAPY 1/> REGIONS: CPT | Performed by: PHYSICAL THERAPIST

## 2024-04-22 PROCEDURE — 97110 THERAPEUTIC EXERCISES: CPT | Performed by: PHYSICAL THERAPIST

## 2024-04-22 NOTE — PROGRESS NOTES
PT Discharge    Today's date: 2024  Patient name: Az Kern  : 1940  MRN: [id number]  Referring provider: Compa Alarcon DO  Dx:   Encounter Diagnosis     ICD-10-CM    1. Lumbar radiculopathy  M54.16                      Assessment  Assessment details: Pt has progressed with PT with increase in trunk ROM, improvement in lumbar mobility, increase in tolerance to ADL's and household tasks throughout his day, and less pain during day.  Pt does cont to have positive sleep disturbances due to LB and radicular sx's, positive stiffness and restriction initially in AM, and progressive pain through evening with rest.  Pt is Dc'd from PT at this time with meeting ROM and flexibility goals and to f/u with pain management for better assistance of current pain in night.       Goals  Funct 1. Improve tolerance to ambulation 1 mile with pain <2/10 x4 weeks.-partially met  2. Less pain in AM when getting OOB <5/10 x4 weeks.0-partially met  Impairment 1. Increase ROM by 25% x4 weeks-partially met  2. Decrease pain by 25 % x4 weeks-partially met          Subjective Evaluation    History of Present Illness  Mechanism of injury: Pt notes throughout his day he has minimal back pain, but reports cont to have increase in sx's in LB upon waking initially in AM and in the evening prior to bed.  Pt does report having some pain into right LE when attempting to sleep and waking frequently due to pain.  Pt reports daily funct tasks are able to be accomplish, again noting more sx's in the later portion of his evening/day.  Pt reports improvement in flexibility overall and cont with HEP to focus on stretching.  Pt has been able to ambulate for exercise up to 10 blocks without restrictions.    Patient Goals  Patient goals for therapy: independence with ADLs/IADLs and decreased pain  Patient goal: -partially met  Pain  Current pain ratin  At worst pain ratin          Objective     Neurological Testing     Sensation  "    Lumbar   Left   Intact: light touch    Right   Intact: light touch    Reflexes   Left   Patellar (L4): normal (2+)  Achilles (S1): normal (2+)  Clonus sign: negative    Right   Patellar (L4): normal (2+)  Achilles (S1): normal (2+)  Clonus sign: negative    Active Range of Motion     Lumbar   Flexion:  Restriction level: minimal  Extension:  Restriction level: minimal  Left lateral flexion:  Restriction level: minimal  Right lateral flexion:  Restriction level: minimal  Left rotation:  Restriction level: minimal  Right rotation:  Restriction level: minimal    Additional Active Range of Motion Details  Pt has no reproducible lumbar pain with trunk ROM and testing as above.     Joint Play   Joints within functional limits: T12, L1, L2 and L3     Hypomobile: L4, L5 and S1     Strength/Myotome Testing     Left Hip   Planes of Motion   Flexion: 5    Right Hip   Planes of Motion   Flexion: 5    Left Knee   Flexion: 5  Extension: 5    Right Knee   Flexion: 5  Extension: 5    Left Ankle/Foot   Dorsiflexion: 5  Plantar flexion: 5  Great toe extension: 5    Right Ankle/Foot   Dorsiflexion: 5  Plantar flexion: 5  Great toe extension: 5    Tests     Lumbar     Left   Negative crossed SLR, passive SLR and slump test.     Right   Negative crossed SLR, passive SLR and slump test.     Left Hip   Negative CRISS and FADIR.     Right Hip   Negative CRISS and FADIR.            Precautions: hx of prostate CA        Manual  4/15/24 4/18/24 4/22/24  4/9/24  4/12/24    Grade III/IV CPA/UPA lumbar spine  10 mins   10 mins    10 mins   10 mins     RE     30 mins                                                        Neuro Re-Ed             Bridge   5\"x20  5\"x20  reviewed  5\"x15   5\"x20     DLS SLR  3\"x10 ea  3\"x10 ea reviewed   3\"x10 ea    dead lift  5# (2) x20   5# (2) x20  reviewed        funct squat             prone hip ext   3\"x10   3\"x10 ea reviewed                                              Therex            Rec bike  8 mins  8 " "mins  8 mins   7 mins  8 mins     JUSTUS  5\"2x10  5\"2x10  reviewed  5\"x10   5\"x10     JUSTUS at wall  hep     hep                   SL open book stretch   hep     5\"x10 ea  5\"x10     pball flexion stretch  5\"x15 5\"x20  reviewed  5\"x15   5\"x15                                              Gait Training                                 Modalities                                                             "

## 2024-04-24 ENCOUNTER — OFFICE VISIT (OUTPATIENT)
Dept: PAIN MEDICINE | Facility: CLINIC | Age: 84
End: 2024-04-24
Payer: MEDICARE

## 2024-04-24 VITALS
DIASTOLIC BLOOD PRESSURE: 70 MMHG | BODY MASS INDEX: 23.74 KG/M2 | WEIGHT: 134 LBS | HEIGHT: 63 IN | SYSTOLIC BLOOD PRESSURE: 130 MMHG

## 2024-04-24 DIAGNOSIS — M47.817 LUMBOSACRAL SPONDYLOSIS WITHOUT MYELOPATHY: ICD-10-CM

## 2024-04-24 DIAGNOSIS — M54.16 LUMBAR RADICULITIS: Primary | ICD-10-CM

## 2024-04-24 PROCEDURE — 99214 OFFICE O/P EST MOD 30 MIN: CPT | Performed by: ANESTHESIOLOGY

## 2024-04-24 PROCEDURE — G2211 COMPLEX E/M VISIT ADD ON: HCPCS | Performed by: ANESTHESIOLOGY

## 2024-04-24 NOTE — PROGRESS NOTES
Assessment:  1. Lumbar radiculitis    2. Lumbosacral spondylosis without myelopathy        Patient presenting for follow up regarding his chronic back pain for greater than 20 years, with frequent exacerbations over the past year.     Patient previously had various interventions 10+ years ago including epidural steroid injections and radiofrequency ablations with varying degrees of benefit.     Patient is demonstrating pain that is multifactorial in nature, with the main pain generator likely stemming from lumbar spondylosis, lumbar radiculitis. Symptoms are accompanied by pain at times 7/10 on the pain scale with inability to participate in IADLs for >6 weeks. Patient has fully participated with physical therapy.  Has been taking Tylenol, NSAIDs with modest benefit.  Denies any gait instability, saddle anesthesia.       Patient underwent an L3-4 interlaminar DRAGAN for right > left radicular leg pains.  He reports on follow up that he did obtain about 50% improvement for about 2 weeks.       On further questioning he noted that his radicular leg pains have improved but he does continue to have significant axial back symptoms bilaterally.     Reviewed and interpreted prior lumbar MRI from 7/27/23.  This showed multilevel severe degenerative changes consistent with lumbar spondylosis.  There are varying degrees of foraminal stenosis throughout.     After bilateral L3-5 lumbar RFA with 50% improvement in certain aspects of his axial back pains.     He continues to have pain in the left lower back that radiates to the lower extremity at times.    Plan:    -Rx topical Voltaren for axial back symptoms secondary to lumbar spondylosis    - Schedule for left L4 and L5 TF ESIs.   Risks, benefits, and alternatives to epidural steroid injections thoroughly discussed with patient.   Complete risks and benefits including bleeding, infection, tissue reaction, nerve injury and allergic reaction were discussed. The approach was  demonstrated using models and literature was provided. Verbal consent was obtained.    Will request Eliquis hold x3 days for neuraxial procedure.    Reviewed external notes from physical therapy (4/22/24) in regards to recent and prior relevant medical histories, treatment recommendations, medication and/or interventional treatment responses.    Reviewed renal function, CBC prior to recommending, initiating, continuing and/or adjusting medications.    Reviewed hemoglobin A1c, renal function, CBC and/or PT/INR prior to discussing/offering interventional modalities.    [place] Prescription Drug Monitoring Program report was reviewed and was appropriate     My impressions and treatment recommendations were discussed in detail with the patient who verbalized understanding and had no further questions.  Discharge instructions were provided. I personally saw and examined the patient and I agree with the above discussed plan of care.     My impressions and treatment recommendations were discussed in detail with the patient who verbalized understanding and had no further questions.  Discharge instructions were provided. I personally saw and examined the patient and I agree with the above discussed plan of care.    Orders Placed This Encounter   Procedures    FL spine and pain procedure     Standing Status:   Future     Standing Expiration Date:   4/24/2028     Order Specific Question:   Reason for Exam:     Answer:   left L4 and L5 TF DRAGAN     Order Specific Question:   Anticoagulant hold needed?     Answer:   yes, Eliquis x3 days     New Medications Ordered This Visit   Medications    Diclofenac Sodium (VOLTAREN) 1 %     Sig: Apply 2 g topically 2 (two) times a day as needed (low back pain)     Dispense:  100 g     Refill:  2       History of Present Illness:  Az Kern is a 83 y.o. male who presents for a follow up office visit in regards to Back Pain.   The patient’s current symptoms include continued left lower  back pain symptoms radiating to the left left at times. Pain is rated 5/10 and described as an intermittent cramping, pressure-like, shooting pain worse in the morning and at night.    I have personally reviewed and/or updated the patient's past medical history, past surgical history, family history, social history, current medications, allergies, and vital signs today.     Review of Systems   Constitutional:  Negative for chills and fever.   HENT:  Negative for ear pain and sore throat.    Eyes:  Negative for pain and visual disturbance.   Respiratory:  Negative for cough and shortness of breath.    Cardiovascular:  Negative for chest pain and palpitations.   Gastrointestinal:  Negative for abdominal pain and vomiting.   Genitourinary:  Negative for dysuria and hematuria.   Musculoskeletal:  Positive for back pain and myalgias. Negative for arthralgias.   Skin:  Negative for color change and rash.   Neurological:  Positive for weakness. Negative for seizures and syncope.   All other systems reviewed and are negative.      Patient Active Problem List   Diagnosis    Benign prostatic hypertrophy    A-fib (HCC)    Prostate cancer (HCC)    Sleep apnea    Vertigo    Primary osteoarthritis of right knee    Lumbar radiculopathy    Lumbosacral spondylosis without myelopathy    Lumbar spondylosis    History of radiation therapy    Hypogonadism in male    BPH associated with nocturia       Past Medical History:   Diagnosis Date    Back pain     Benign prostatic hypertrophy     Disease of thyroid gland     Elevated PSA 3/3/2021    Hyperlipidemia     Hypertension     Prostate cancer (HCC) 7/21/2021    Restless leg        Past Surgical History:   Procedure Laterality Date    ACHILLES TENDON SURGERY      HERNIA REPAIR      KNEE ARTHROSCOPY Right     shoulder    WI PLMT INTERSTITIAL DEV RADIAT TX PROSTATE 1/MULT N/A 9/21/2021    Procedure: INSERTION OF FIDUCIAL MARKER (TRANSRECTAL ULTRASOUND GUIDANCE), SPACEOAR;  Surgeon: Daniel  "MD Manny;  Location: [facility];  Service: Urology    WA PROSTATE NEEDLE BIOPSY ANY APPROACH N/A 6/15/2021    Procedure: TRANSPERINEAL MRI FUSION PROSTATE BIOPSY;  Surgeon: Cruzito Yee MD;  Location: [facility];  Service: Urology    SKIN BIOPSY      TONSILLECTOMY         Family History   Problem Relation Age of Onset    Cancer Mother     Throat cancer Father     Breast cancer Sister        Social History     Occupational History    Not on file   Tobacco Use    Smoking status: Never    Smokeless tobacco: Never   Vaping Use    Vaping status: Never Used   Substance and Sexual Activity    Alcohol use: Yes     Alcohol/week: 4.0 standard drinks of alcohol     Types: 4 Cans of beer per week     Comment: CAFFEINE USE/3-4 beers/wk.  Occasional wine or drink    Drug use: No    Sexual activity: Not on file       Current Outpatient Medications on File Prior to Visit   Medication Sig    amLODIPine (NORVASC) 2.5 mg tablet 5 mg daily    bimatoprost (LUMIGAN) 0.01 % ophthalmic drops Administer 1 drop into the left eye daily at bedtime    Eliquis 5 MG TAKE 1 TABLET TWICE A DAY    fluticasone (FLONASE) 50 mcg/act nasal spray     levothyroxine 25 mcg tablet Take 25 mcg by mouth daily    Multiple Vitamins-Minerals (CENTRUM SILVER 50+MEN PO) Take by mouth in the morning    Omega-3 Fatty Acids (FISH OIL) 1,000 mg Take by mouth daily KRILL OIL    rosuvastatin (CRESTOR) 10 MG tablet 10 mg Pt takes med every other day--conversation with PCP (Dr. Alarcon)  November 2021 appt/Labs    traZODone (DESYREL) 50 mg tablet      No current facility-administered medications on file prior to visit.       No Known Allergies    Physical Exam:    /70   Ht 5' 3\" (1.6 m)   Wt 60.8 kg (134 lb)   BMI 23.74 kg/m²     Constitutional:normal, well developed, well nourished, alert, in no distress and non-toxic and no overt pain behavior.  Eyes:anicteric  HEENT:grossly intact  Neck:supple, symmetric, trachea midline and no masses   Pulmonary:even and " unlabored  Cardiovascular:No edema or pitting edema present  Skin:Normal without rashes or lesions and well hydrated  Psychiatric:Mood and affect appropriate  Neurologic: Motor function is grossly intact with no focal neurologic deficits   Musculoskeletal: limited lumbar spine ROM. Gait is nonantalgic    Imaging  MRI LUMBAR SPINE WITHOUT CONTRAST     INDICATION: M54.16: Radiculopathy, lumbar region.     COMPARISON: MRI lumbar spine 4/21/2008     TECHNIQUE:  Multiplanar, multisequence imaging of the lumbar spine was performed. .        IMAGE QUALITY:  Diagnostic     FINDINGS:     VERTEBRAL BODIES:  There are 5 lumbar type vertebral bodies. There is levoscoliosis with apex at L2-3. Stepwise grade 1 anterolisthesis at levels L2-3 and L3-4. Mild retrolisthesis at L4-5.     Multilevel endplate degenerative signal change; Modic type I at levels T12-L1, L2-3, and L5-S1; Modic type II at level L3-4. No suspicious discrete lesion.     0.8 cm T1 hypointensity/T2 hyperintense lesion within L5 inferior endplate, stable from CT 7/31/2021, most likely representing atypical hemangioma.        SACRUM:  Normal signal within the sacrum. No evidence of insufficiency or stress fracture.     DISTAL CORD AND CONUS:  Normal size and signal within the distal cord and conus.     PARASPINAL SOFT TISSUES:  Paraspinal soft tissues are unremarkable.     LOWER THORACIC DISC SPACES: Mild noncompressive lower thoracic degenerative change.     LUMBAR DISC SPACES:     L1-L2: Disc bulge. Left foraminal disc protrusion. Marginal endplate osteophytes. Moderate bilateral facet arthropathy. Mild canal stenosis. Mild to moderate left and mild right foraminal narrowing.     L2-L3: Disc bulge. Severe bilateral facet arthropathy. Bilateral ligamentum flavum thickening. Mild canal stenosis. Mild to moderate right and mild left foraminal narrowing.     L3-L4: Disc bulge. Severe facet arthropathy. Mild canal stenosis. Mild to moderate right and mild left  foraminal narrowing.     L4-L5: Mild retrolisthesis. No significant disc bulge. Left foraminal disc protrusion. Moderate bilateral facet arthropathy. Minimal canal narrowing. Moderate bilateral foraminal stenosis.     L5-S1: Mild disc bulge. Moderate bilateral facet arthropathy. No significant canal stenosis. Moderate left and mild to moderate right foraminal stenosis.     OTHER FINDINGS: Bilateral renal upper pole benign cysts and additional small T2 hyperintense lesions, statistically favored to represent benign cysts. Subcentimeter T2 hyperintense indeterminate hepatic lesions, statistically favored to represent benign   cysts.     IMPRESSION:     Degenerative change with multilevel mild canal and mild to moderate degree foraminal stenosis as described..

## 2024-04-30 ENCOUNTER — TELEPHONE (OUTPATIENT)
Dept: RADIOLOGY | Facility: MEDICAL CENTER | Age: 84
End: 2024-04-30

## 2024-04-30 NOTE — TELEPHONE ENCOUNTER
Caller: Patient     Doctor:     Reason for call: Returning call from      Call back#: [phone] or [phone]

## 2024-04-30 NOTE — TELEPHONE ENCOUNTER
Left message for patient to call me back DIRECTLY to schedule.  Left my DIRECT phone # [phone] on message.

## 2024-05-01 NOTE — TELEPHONE ENCOUNTER
Caller: Kimo    Doctor: Adelaide    Reason for call: returning schedulers phone call     Call back#: [phone]    Transferred to

## 2024-05-01 NOTE — TELEPHONE ENCOUNTER
S/w pt and reviewed hold instructions prior to his TFESI on 5/23 for Eliquis. Pt advised he will need to start holding his Eliquis on 5/20 and will be advised when to resume after his procedure. Pt verbalized understanding.

## 2024-05-01 NOTE — TELEPHONE ENCOUNTER
[facility] CARDIOLOGY [address] [address]  Phone#  [phone]  Fax#  [phone]  Cardiology Pre Operative Clearance        PRE OPERATIVE CARDIAC RISK ASSESSMENT     04/25/24     Az Kern  1940  [id number]     Date of Surgery: TBD     Type of Surgery: Left L4 and L5 TF ESIs      Surgeon: Dr. Bryson     No Cardiac Contraindication for Planned Surgical Procedures     Anticoagulation: Can patient hold Eliquis for 3 days prior to procedure? Yes     Physician Comment: low cardiac risk     Electronically Signed:      Revision History    Revised by Sarahy Mathur DO on 4/26/2024 at 11:40 AM (current version)   Created by Lorena Angel on 4/25/2024 at 10:18 AM

## 2024-05-01 NOTE — TELEPHONE ENCOUNTER
Patient is scheduled for 5/23, follow up is scheduled for 6/19.     Reviewed instructions: , NPO 1 hour prior, loose-fitting/comfortable pants, if ill/fever/infx/abx to call and reschedule.  No immunizations or vaccinations 2w prior/after steroid injections. Medication held is Eliquis      Patient awaiting hold instructions     Patient stated verbal understanding.

## 2024-05-02 NOTE — PROGRESS NOTES
Assessment/Plan:   Az Kern is a 83 y.o.male who is here for Gallbladder disease         Upon evaluation today patient has: gallbladder debris on US    .     Plan:   Patient today has minimal epigastric/RUQ pressure with spicy foods. Patient has very minimal discomfort. With no gallstones on US we can go ahead with a HIDA to determine if patient's discomfort is indeed from his gallbladder. We will also get a GI referral due to his history of peptic ulcer disease. No complaints of GERD at this time.    Ultrasound findings: 1/26/24:  BILIARY:  No gallstones or wall thickening. Small amount of free floating debris in the gallbladder.  No intrahepatic biliary dilatation.  CBD measures 5.0 mm.  No choledocholithiasis.       In preparation for this visit all relevant and known prior office notes, prior consultations, emergency room visits, blood work results, and imaging studies were personally reviewed.  A total of  30 minutes was spent reviewing all of this information,caring for this patient, providing differential diagnosis, instructions for management, counseling and coordination of care.  This also includes planning surgical intervention where indicated.    Patient understands hernia occurrence or re-occurrence risk is higher in a diabetic, tobacco user, with elevated BMIs.     ____________________________________________________    HPI:  Az Kern is a 83 y.o.male who was referred for evaluation of The primary encounter diagnosis was RUQ pain. A diagnosis of Diarrhea, unspecified type was also pertinent to this visit.    PMH: a-fib, prostate cancer, on eliquis    Abdominal pain Location: in the RUQ very mild more pressure than pain without radiation, which is Intermittent the last 2-3 months mostly with spicy foods     no nausea and no vomiting,     regular bowel movement.     Duration of pain or symptoms: 2-3 weeks a handful of times      Prior Colonoscopy :  1 Years Ago.  No polyps were seen this was  completed at [facility] 1/13/23    Prior Abdominal Surgery: bilateral inguinal hernias    Anticoagulation: Eliquis 5 mg    Smoking Status: Non-smoker     Imaging:   NM hepatobiliary    (Results Pending)           LABS:    Lab Results   Component Value Date    K 4.4 01/26/2024     01/26/2024    CO2 30 01/26/2024    BUN 22 01/26/2024    CREATININE 1.06 01/26/2024    GLUF 105 (H) 01/26/2024    CALCIUM 9.4 01/26/2024    AST 17 01/26/2024    ALT 13 01/26/2024    ALKPHOS 49 01/26/2024    EGFR 65 01/26/2024       Lab Results   Component Value Date    WBC 5.82 01/26/2024    HGB 14.0 01/26/2024    HCT 42.4 01/26/2024    MCV 93 01/26/2024     01/26/2024     Lab Results   Component Value Date    INR 1.09 07/02/2022    PROTIME 13.9 07/02/2022     Lab Results   Component Value Date    HGBA1C 5.6 09/27/2022           ROS:  General ROS: negative for - chills, fatigue, fever or night sweats, weight loss  Respiratory ROS: no cough, shortness of breath, or wheezing  Cardiovascular ROS: no chest pain or dyspnea on exertion  Genito-Urinary ROS: no dysuria, trouble voiding, or hematuria  Musculoskeletal ROS: negative for - gait disturbance, joint pain or muscle pain  Neurological ROS: no TIA or stroke symptoms  Gastrointestinal ROS: See HPI   GI ROS: see HPI  Skin ROS: no new rashes or lesions   Lymphatic ROS: no new adenopathy noted by pt.   GYN ROS: see HPI, no new GYN history or bleeding noted  Psy ROS: no new mental or behavioral disturbances       Patient Active Problem List   Diagnosis    Benign prostatic hypertrophy    A-fib (HCC)    Prostate cancer (HCC)    Sleep apnea    Vertigo    Primary osteoarthritis of right knee    Lumbar radiculopathy    Lumbosacral spondylosis without myelopathy    Lumbar spondylosis    History of radiation therapy    Hypogonadism in male    BPH associated with nocturia         Allergies:  Patient has no known allergies.      Current Outpatient Medications:     amLODIPine (NORVASC) 2.5 mg tablet,  5 mg daily, Disp: , Rfl:     bimatoprost (LUMIGAN) 0.01 % ophthalmic drops, Administer 1 drop into the left eye daily at bedtime, Disp: , Rfl:     Diclofenac Sodium (VOLTAREN) 1 %, Apply 2 g topically 2 (two) times a day as needed (low back pain), Disp: 100 g, Rfl: 2    Eliquis 5 MG, TAKE 1 TABLET TWICE A DAY, Disp: 180 tablet, Rfl: 3    fluticasone (FLONASE) 50 mcg/act nasal spray, , Disp: , Rfl:     levothyroxine 25 mcg tablet, Take 25 mcg by mouth daily, Disp: , Rfl:     Multiple Vitamins-Minerals (CENTRUM SILVER 50+MEN PO), Take by mouth in the morning, Disp: , Rfl:     Omega-3 Fatty Acids (FISH OIL) 1,000 mg, Take by mouth daily KRILL OIL, Disp: , Rfl:     rosuvastatin (CRESTOR) 10 MG tablet, 10 mg Pt takes med every other day--conversation with PCP (Dr. Alarcon) November 2021 appt/Labs, Disp: , Rfl:     traZODone (DESYREL) 50 mg tablet, , Disp: , Rfl:     Past Medical History:   Diagnosis Date    Back pain     Benign prostatic hypertrophy     Disease of thyroid gland     Elevated PSA 3/3/2021    Hyperlipidemia     Hypertension     Prostate cancer (HCC) 7/21/2021    Restless leg        Past Surgical History:   Procedure Laterality Date    ACHILLES TENDON SURGERY      HERNIA REPAIR      KNEE ARTHROSCOPY Right     shoulder    UT PLMT INTERSTITIAL DEV RADIAT TX PROSTATE 1/MULT N/A 9/21/2021    Procedure: INSERTION OF FIDUCIAL MARKER (TRANSRECTAL ULTRASOUND GUIDANCE), SPACEOAR;  Surgeon: Daniel Bryant MD;  [facility];  Service: Urology    UT PROSTATE NEEDLE BIOPSY ANY APPROACH N/A 6/15/2021    Procedure: TRANSPERINEAL MRI FUSION PROSTATE BIOPSY;  Surgeon: Cruzito Yee MD;  [facility];  Service: Urology    SKIN BIOPSY      TONSILLECTOMY         Family History   Problem Relation Age of Onset    Cancer Mother     Throat cancer Father     Breast cancer Sister         reports that he has never smoked. He has never used smokeless tobacco. He reports current alcohol use of about 4.0 standard drinks  "of alcohol per week. He reports that he does not use drugs.      Exam:   Vitals:    05/07/24 1015   BP: 146/77   Pulse: 60   Temp: 97.6 °F (36.4 °C)         PHYSICAL EXAM  General Appearance:    Alert, cooperative, no distress,    Head:    Normocephalic without obvious abnormality   Eyes:    PERRL, conjunctiva/corneas clear,       Neck:   Supple, no adenopathy, no JVD   Back:     Symmetric, no spinal or CVA tenderness   Lungs:     Clear to auscultation bilaterally, no wheezing or rhonchi   Heart:    Regular rate and rhythm, S1 and S2 normal, no murmur   Abdomen:     abdomen is soft without significant tenderness, masses, organomegaly or guarding      Extremities:   Extremities normal. No clubbing, cyanosis or edema   Psych:   Normal Affect, AOx3.    Neurologic:  Skin:   CNII-XII intact. Strength symmetric, speech intact    Warm, dry, intact, no visible rashes or lesions          Signature:   Lorena Mejia PA-C    Date: 5/7/2024 Time: 10:42 AM                          Some portions of this record may have been generated with voice recognition software. There may be translation, syntax,  or grammatical errors. Occasional wrong word or \"sound-a-like\" substitutions may have occurred due to the inherent limitations of the voice recognition software. Read the chart carefully and recognize, using context, where substitutions may have occurred. If you have any questions, please contact the dictating provider for clarification or correction, as needed. This encounter has been coded by a non-certified coder.   "

## 2024-05-07 ENCOUNTER — OFFICE VISIT (OUTPATIENT)
Dept: SURGERY | Facility: CLINIC | Age: 84
End: 2024-05-07
Payer: MEDICARE

## 2024-05-07 VITALS
TEMPERATURE: 97.6 F | DIASTOLIC BLOOD PRESSURE: 77 MMHG | WEIGHT: 133 LBS | BODY MASS INDEX: 23.57 KG/M2 | HEART RATE: 60 BPM | HEIGHT: 63 IN | SYSTOLIC BLOOD PRESSURE: 146 MMHG

## 2024-05-07 DIAGNOSIS — R19.7 DIARRHEA, UNSPECIFIED TYPE: ICD-10-CM

## 2024-05-07 DIAGNOSIS — R10.11 RUQ PAIN: Primary | ICD-10-CM

## 2024-05-07 PROCEDURE — 99212 OFFICE O/P EST SF 10 MIN: CPT | Performed by: PHYSICIAN ASSISTANT

## 2024-05-13 ENCOUNTER — HOSPITAL ENCOUNTER (OUTPATIENT)
Dept: NUCLEAR MEDICINE | Facility: HOSPITAL | Age: 84
Discharge: HOME | End: 2024-05-13
Payer: MEDICARE

## 2024-05-13 DIAGNOSIS — R10.11 RUQ PAIN: ICD-10-CM

## 2024-05-13 PROCEDURE — A9537 TC99M MEBROFENIN: HCPCS

## 2024-05-13 PROCEDURE — 78227 HEPATOBIL SYST IMAGE W/DRUG: CPT

## 2024-05-13 RX ORDER — SINCALIDE 5 UG/5ML
0.02 INJECTION, POWDER, LYOPHILIZED, FOR SOLUTION INTRAVENOUS
Status: COMPLETED | OUTPATIENT
Start: 2024-05-13 | End: 2024-05-13

## 2024-05-13 RX ADMIN — SINCALIDE 1.2 MCG: 5 INJECTION, POWDER, LYOPHILIZED, FOR SOLUTION INTRAVENOUS at 13:28

## 2024-05-16 PROBLEM — R10.13 EPIGASTRIC PAIN: Status: ACTIVE | Noted: 2024-05-16

## 2024-05-23 ENCOUNTER — HOSPITAL ENCOUNTER (OUTPATIENT)
Dept: RADIOLOGY | Facility: MEDICAL CENTER | Age: 84
End: 2024-05-23
Payer: MEDICARE

## 2024-05-23 VITALS
SYSTOLIC BLOOD PRESSURE: 178 MMHG | HEART RATE: 72 BPM | DIASTOLIC BLOOD PRESSURE: 85 MMHG | RESPIRATION RATE: 20 BRPM | OXYGEN SATURATION: 98 % | TEMPERATURE: 97.5 F

## 2024-05-23 DIAGNOSIS — M54.16 LUMBAR RADICULITIS: ICD-10-CM

## 2024-05-23 PROCEDURE — 64484 NJX AA&/STRD TFRM EPI L/S EA: CPT | Performed by: ANESTHESIOLOGY

## 2024-05-23 PROCEDURE — 64483 NJX AA&/STRD TFRM EPI L/S 1: CPT | Performed by: ANESTHESIOLOGY

## 2024-05-23 RX ORDER — PAPAVERINE HCL 150 MG
15 CAPSULE, EXTENDED RELEASE ORAL ONCE
Status: COMPLETED | OUTPATIENT
Start: 2024-05-23 | End: 2024-05-23

## 2024-05-23 RX ORDER — BUPIVACAINE HCL/PF 2.5 MG/ML
3.5 VIAL (ML) INJECTION ONCE
Status: COMPLETED | OUTPATIENT
Start: 2024-05-23 | End: 2024-05-23

## 2024-05-23 RX ADMIN — DEXAMETHASONE SODIUM PHOSPHATE 15 MG: 10 INJECTION INTRAMUSCULAR; INTRAVENOUS at 13:37

## 2024-05-23 RX ADMIN — BUPIVACAINE HYDROCHLORIDE 3.5 ML: 2.5 INJECTION, SOLUTION EPIDURAL; INFILTRATION; INTRACAUDAL at 13:37

## 2024-05-23 RX ADMIN — IOHEXOL 2 ML: 300 INJECTION, SOLUTION INTRAVENOUS at 13:36

## 2024-05-23 NOTE — H&P
History of Present Illness: The patient is a 83 y.o. male who presents with complaints of back pain    Past Medical History:   Diagnosis Date    Back pain     Benign prostatic hypertrophy     Disease of thyroid gland     Elevated PSA 3/3/2021    Hyperlipidemia     Hypertension     Prostate cancer (HCC) 7/21/2021    Restless leg        Past Surgical History:   Procedure Laterality Date    ACHILLES TENDON SURGERY      HERNIA REPAIR      KNEE ARTHROSCOPY Right     shoulder    VT PLMT INTERSTITIAL DEV RADIAT TX PROSTATE 1/MULT N/A 9/21/2021    Procedure: INSERTION OF FIDUCIAL MARKER (TRANSRECTAL ULTRASOUND GUIDANCE), SPACEOAR;  Surgeon: Daniel Bryant MD;  [facility];  Service: Urology    VT PROSTATE NEEDLE BIOPSY ANY APPROACH N/A 6/15/2021    Procedure: TRANSPERINEAL MRI FUSION PROSTATE BIOPSY;  Surgeon: Cruzito Yee MD;  [facility];  Service: Urology    SKIN BIOPSY      TONSILLECTOMY           Current Outpatient Medications:     Acetaminophen (TYLENOL PO), Take by mouth, Disp: , Rfl:     amLODIPine (NORVASC) 2.5 mg tablet, 5 mg daily, Disp: , Rfl:     bimatoprost (LUMIGAN) 0.01 % ophthalmic drops, Administer 1 drop into the left eye daily at bedtime, Disp: , Rfl:     Diclofenac Sodium (VOLTAREN) 1 %, Apply 2 g topically 2 (two) times a day as needed (low back pain), Disp: 100 g, Rfl: 2    Eliquis 5 MG, TAKE 1 TABLET TWICE A DAY, Disp: 180 tablet, Rfl: 3    fluticasone (FLONASE) 50 mcg/act nasal spray, , Disp: , Rfl:     Krill Oil 500 MG CAPS, Take by mouth, Disp: , Rfl:     levothyroxine 25 mcg tablet, Take 25 mcg by mouth daily, Disp: , Rfl:     Multiple Vitamins-Minerals (CENTRUM SILVER 50+MEN PO), Take by mouth in the morning, Disp: , Rfl:     Omega-3 Fatty Acids (FISH OIL) 1,000 mg, Take by mouth daily KRILL OIL (Patient not taking: Reported on 5/16/2024), Disp: , Rfl:     pantoprazole (PROTONIX) 40 mg tablet, Take 1 tablet (40 mg total) by mouth daily, Disp: 30 tablet, Rfl: 2    rosuvastatin  (CRESTOR) 10 MG tablet, 10 mg Pt takes med every other day--conversation with PCP (Dr. Alarcon) November 2021 appt/Labs, Disp: , Rfl:     traZODone (DESYREL) 50 mg tablet, , Disp: , Rfl:     Current Facility-Administered Medications:     bupivacaine (PF) (MARCAINE) 0.25 % injection 3.5 mL, 3.5 mL, Epidural, Once, Will Jarek Bryson MD    dexamethasone (PF) (DECADRON) injection 15 mg, 15 mg, Epidural, Once, Will Jarek Bryson MD    iohexol (OMNIPAQUE) 300 mg/mL injection 2 mL, 2 mL, Epidural, Once, Will Jarek Bryson MD    No Known Allergies    Physical Exam:   Vitals:    05/23/24 1321   BP: 161/77   Pulse: 62   Resp: 20   Temp: 97.5 °F (36.4 °C)   SpO2: 98%         General: Awake, Alert, Oriented x 3, Mood and affect appropriate  Respiratory: Respirations even and unlabored  Cardiovascular: Peripheral pulses intact; no edema  Musculoskeletal Exam: pain with lumbar extension, lateral bending    ASA Score: 2    Patient/Chart Verification  Patient ID Verified: Verbal  ID Band Applied: No  Consents Confirmed: Procedural, To be obtained in the Pre-Procedure area  H&P( within 30 days) Verified: To be obtained in the Pre-Procedure area  Interval H&P(within 24 hr) Complete (required for Outpatients and Surgery Admit only): To be obtained in the Pre-Procedure area  Allergies Reviewed: Yes  Anticoag/NSAID held?: Yes (last dose of eliquis Sunday 5/19)  Currently on antibiotics?: No    Assessment:   1. Lumbar radiculitis        Plan: left L4 and L5 TF DRAGAN

## 2024-05-23 NOTE — DISCHARGE INSTRUCTIONS
Epidural Steroid Injection   WHAT YOU NEED TO KNOW:   An epidural steroid injection (DRAGAN) is a procedure to inject steroid medicine into the epidural space. The epidural space is between your spinal cord and vertebrae. Steroids reduce inflammation and fluid buildup in your spine that may be causing pain. You may be given pain medicine along with the steroids.          ACTIVITY  Do not drive or operate machinery today.  No strenuous activity today - bending, lifting, etc.  You may resume normal activites starting tomorrow - start slowly and as tolerated.  You may shower today, but no tub baths or hot tubs.  You may have numbness for several hours from the local anesthetic. Please use caution and common sense, especially with weight-bearing activities.    CARE OF THE INJECTION SITE  If you have soreness or pain, apply ice to the area today (20 minutes on/20 minutes off).  Starting tomorrow, you may use warm, moist heat or ice if needed.  You may have an increase or change in your discomfort for 36-48 hours after your treatment.  Apply ice and continue with any pain medication you have been prescribed.  Notify the [facility] if you have any of the following: redness, drainage, swelling, headache, stiff neck or fever above 100°F.    SPECIAL INSTRUCTIONS  Our office will contact you in approximately 7 days for a progress report.    MEDICATIONS  Continue to take all routine medications.You may restart your eliquis tomorrow 24 hours after your procedure on 5/24 after 1:30  Our office may have instructed you to hold some medications.    As no general anesthesia was used in today's procedure, you should not experience any side effects related to anesthesia.     If you are diabetic, the steroids used in today's injection may temporarily increase your blood sugar levels after the first few days after your injection. Please keep a close eye on your sugars and alert the doctor who manages your diabetes if your sugars  are significantly high from your baseline or you are symptomatic.     If you have a problem specifically related to your procedure, please call our office at [phone].  Problems not related to your procedure should be directed to your primary care physician.

## 2024-05-30 ENCOUNTER — TELEPHONE (OUTPATIENT)
Dept: PAIN MEDICINE | Facility: CLINIC | Age: 84
End: 2024-05-30

## 2024-06-19 ENCOUNTER — OFFICE VISIT (OUTPATIENT)
Dept: PAIN MEDICINE | Facility: CLINIC | Age: 84
End: 2024-06-19
Payer: MEDICARE

## 2024-06-19 VITALS
WEIGHT: 134 LBS | BODY MASS INDEX: 23.74 KG/M2 | DIASTOLIC BLOOD PRESSURE: 70 MMHG | SYSTOLIC BLOOD PRESSURE: 140 MMHG | HEIGHT: 63 IN

## 2024-06-19 DIAGNOSIS — M47.816 LUMBAR SPONDYLOSIS: Primary | ICD-10-CM

## 2024-06-19 DIAGNOSIS — M54.16 LUMBAR RADICULITIS: ICD-10-CM

## 2024-06-19 PROCEDURE — G2211 COMPLEX E/M VISIT ADD ON: HCPCS | Performed by: ANESTHESIOLOGY

## 2024-06-19 PROCEDURE — 99213 OFFICE O/P EST LOW 20 MIN: CPT | Performed by: ANESTHESIOLOGY

## 2024-06-19 NOTE — PROGRESS NOTES
Assessment:  1. Lumbar spondylosis    2. Lumbar radiculitis      Patient presenting for follow up regarding his chronic back pain for greater than 20 years, with intermittent exacerbations over the past year.     Patient previously had various interventions 10+ years ago including epidural steroid injections and radiofrequency ablations with varying degrees of benefit.     Patient is demonstrating pain that is multifactorial in nature, with the main pain generator likely stemming from lumbar spondylosis, lumbar radiculitis. Symptoms are accompanied by pain at times 7/10 on the pain scale with inability to participate in IADLs for >6 weeks. Patient has fully participated with physical therapy.  Has been taking Tylenol, NSAIDs with modest benefit.  Denies any gait instability, saddle anesthesia.    Reviewed and interpreted prior lumbar MRI from 7/27/23.  This showed multilevel severe degenerative changes consistent with lumbar spondylosis.  There are varying degrees of foraminal stenosis throughout.       Patient underwent an L3-4 interlaminar DRAGAN for right > left radicular leg pains.  He reports on follow up that he did obtain about 50% improvement for about 2 weeks.       On further questioning he noted that his radicular leg pains have improved but he does continue to have significant axial back symptoms bilaterally.      After bilateral L3-5 lumbar RFA with 50% improvement in certain aspects of his axial back pains.      He continues to have pain in the left lower back that radiates to the lower extremity at times.     After left L4 and L5 TF DRAGAN, he denies any ongoing radiating leg symptoms but continues with left sided back pains.    Plan:     Unable to reproduce residual left sided axial back pain with palpation, extension, flexion, etc.    At this time we will give it more time and follow up in 1 month to reassess, as his residual pain is manageable currently.      [place] Prescription Drug Monitoring  Program report was reviewed and was appropriate      My impressions and treatment recommendations were discussed in detail with the patient who verbalized understanding and had no further questions.  Discharge instructions were provided. I personally saw and examined the patient and I agree with the above discussed plan of care.    No orders of the defined types were placed in this encounter.    No orders of the defined types were placed in this encounter.      History of Present Illness:  Az Kern is a 83 y.o. male who presents for a follow up office visit in regards to Back Pain.   The patient’s current symptoms include continued left sided back pain symptoms rated 5/10 on the pain scale. Pain is described as an intermittent dull/aching pain worse in the morning.    I have personally reviewed and/or updated the patient's past medical history, past surgical history, family history, social history, current medications, allergies, and vital signs today.     Review of Systems   Constitutional:  Negative for chills and fever.   HENT:  Negative for ear pain and sore throat.    Eyes:  Negative for pain and visual disturbance.   Respiratory:  Negative for cough and shortness of breath.    Cardiovascular:  Negative for chest pain and palpitations.   Gastrointestinal:  Negative for abdominal pain and vomiting.   Genitourinary:  Negative for dysuria and hematuria.   Musculoskeletal:  Positive for arthralgias, back pain, gait problem and myalgias.   Skin:  Negative for color change and rash.   Neurological:  Positive for weakness. Negative for seizures and syncope.   All other systems reviewed and are negative.      Patient Active Problem List   Diagnosis    Benign prostatic hypertrophy    A-fib (HCC)    Prostate cancer (HCC)    Sleep apnea    Vertigo    Primary osteoarthritis of right knee    Lumbar radiculopathy    Lumbosacral spondylosis without myelopathy    Lumbar spondylosis    History of radiation therapy     Hypogonadism in male    BPH associated with nocturia    Epigastric pain       Past Medical History:   Diagnosis Date    Back pain     Benign prostatic hypertrophy     Disease of thyroid gland     Elevated PSA 3/3/2021    Hyperlipidemia     Hypertension     Prostate cancer (HCC) 7/21/2021    Restless leg        Past Surgical History:   Procedure Laterality Date    ACHILLES TENDON SURGERY      HERNIA REPAIR      KNEE ARTHROSCOPY Right     shoulder    MA PLMT INTERSTITIAL DEV RADIAT TX PROSTATE 1/MULT N/A 9/21/2021    Procedure: INSERTION OF FIDUCIAL MARKER (TRANSRECTAL ULTRASOUND GUIDANCE), SPACEOAR;  Surgeon: Daniel Bryant MD;  [facility];  Service: Urology    MA PROSTATE NEEDLE BIOPSY ANY APPROACH N/A 6/15/2021    Procedure: TRANSPERINEAL MRI FUSION PROSTATE BIOPSY;  Surgeon: Cruzito Yee MD;  [facility];  Service: Urology    SKIN BIOPSY      TONSILLECTOMY         Family History   Problem Relation Age of Onset    Colon cancer Mother     Throat cancer Father     Breast cancer Sister        Social History     Occupational History    Occupation: Retired   Tobacco Use    Smoking status: Never    Smokeless tobacco: Never   Vaping Use    Vaping status: Never Used   Substance and Sexual Activity    Alcohol use: Yes     Alcohol/week: 7.0 standard drinks of alcohol     Types: 7 Standard drinks or equivalent per week    Drug use: No    Sexual activity: Not on file       Current Outpatient Medications on File Prior to Visit   Medication Sig    Acetaminophen (TYLENOL PO) Take by mouth    amLODIPine (NORVASC) 2.5 mg tablet 5 mg daily    bimatoprost (LUMIGAN) 0.01 % ophthalmic drops Administer 1 drop into the left eye daily at bedtime    Diclofenac Sodium (VOLTAREN) 1 % Apply 2 g topically 2 (two) times a day as needed (low back pain)    Eliquis 5 MG TAKE 1 TABLET TWICE A DAY    fluticasone (FLONASE) 50 mcg/act nasal spray     Krill Oil 500 MG CAPS Take by mouth    levothyroxine 25 mcg tablet Take 25 mcg by  "mouth daily    Multiple Vitamins-Minerals (CENTRUM SILVER 50+MEN PO) Take by mouth in the morning    pantoprazole (PROTONIX) 40 mg tablet Take 1 tablet (40 mg total) by mouth daily    rosuvastatin (CRESTOR) 10 MG tablet 10 mg Pt takes med every other day--conversation with PCP (Dr. Alarcon)  November 2021 appt/Labs    traZODone (DESYREL) 50 mg tablet     Omega-3 Fatty Acids (FISH OIL) 1,000 mg Take by mouth daily KRILL OIL (Patient not taking: Reported on 5/16/2024)     No current facility-administered medications on file prior to visit.       No Known Allergies    Physical Exam:    /70   Ht 5' 3\" (1.6 m)   Wt 60.8 kg (134 lb)   BMI 23.74 kg/m²     Constitutional:normal, well developed, well nourished, alert, in no distress and non-toxic and no overt pain behavior.  Eyes:anicteric  HEENT:grossly intact  Neck:supple, symmetric, trachea midline and no masses   Pulmonary:even and unlabored  Cardiovascular:No edema or pitting edema present  Skin:Normal without rashes or lesions and well hydrated  Psychiatric:Mood and affect appropriate  Neurologic: Motor function is grossly intact with no focal neurologic deficits   Musculoskeletal: normal    Imaging    "

## 2024-06-26 ENCOUNTER — HOSPITAL ENCOUNTER (OUTPATIENT)
Dept: CT IMAGING | Facility: HOSPITAL | Age: 84
Discharge: HOME | End: 2024-06-26
Payer: MEDICARE

## 2024-06-26 DIAGNOSIS — L72.3 SEBACEOUS CYST: ICD-10-CM

## 2024-06-26 PROCEDURE — 71250 CT THORAX DX C-: CPT

## 2024-07-03 ENCOUNTER — HOSPITAL ENCOUNTER (EMERGENCY)
Facility: HOSPITAL | Age: 84
Discharge: HOME | End: 2024-07-03
Attending: EMERGENCY MEDICINE
Payer: MEDICARE

## 2024-07-03 ENCOUNTER — APPOINTMENT (EMERGENCY)
Dept: RADIOLOGY | Facility: HOSPITAL | Age: 84
End: 2024-07-03
Payer: MEDICARE

## 2024-07-03 VITALS
OXYGEN SATURATION: 99 % | BODY MASS INDEX: 22.73 KG/M2 | SYSTOLIC BLOOD PRESSURE: 157 MMHG | DIASTOLIC BLOOD PRESSURE: 81 MMHG | HEART RATE: 60 BPM | TEMPERATURE: 97.6 F | RESPIRATION RATE: 22 BRPM | WEIGHT: 128.31 LBS

## 2024-07-03 DIAGNOSIS — R53.1 GENERALIZED WEAKNESS: Primary | ICD-10-CM

## 2024-07-03 DIAGNOSIS — R10.13 DYSPEPSIA: ICD-10-CM

## 2024-07-03 LAB
2HR DELTA HS TROPONIN: 3 NG/L
ALBUMIN SERPL BCG-MCNC: 4.1 G/DL (ref 3.5–5)
ALP SERPL-CCNC: 52 U/L (ref 34–104)
ALT SERPL W P-5'-P-CCNC: 11 U/L (ref 7–52)
ANION GAP SERPL CALCULATED.3IONS-SCNC: 5 MMOL/L (ref 4–13)
AST SERPL W P-5'-P-CCNC: 17 U/L (ref 13–39)
ATRIAL RATE: 60 BPM
ATRIAL RATE: 81 BPM
BASOPHILS # BLD AUTO: 0.09 THOUSANDS/ÂΜL (ref 0–0.1)
BASOPHILS NFR BLD AUTO: 1 % (ref 0–1)
BILIRUB SERPL-MCNC: 0.29 MG/DL (ref 0.2–1)
BUN SERPL-MCNC: 23 MG/DL (ref 5–25)
CALCIUM SERPL-MCNC: 9.4 MG/DL (ref 8.4–10.2)
CARDIAC TROPONIN I PNL SERPL HS: 12 NG/L
CARDIAC TROPONIN I PNL SERPL HS: 15 NG/L
CHLORIDE SERPL-SCNC: 106 MMOL/L (ref 96–108)
CO2 SERPL-SCNC: 28 MMOL/L (ref 21–32)
CREAT SERPL-MCNC: 1.19 MG/DL (ref 0.6–1.3)
EOSINOPHIL # BLD AUTO: 0.87 THOUSAND/ÂΜL (ref 0–0.61)
EOSINOPHIL NFR BLD AUTO: 12 % (ref 0–6)
ERYTHROCYTE [DISTWIDTH] IN BLOOD BY AUTOMATED COUNT: 13.3 % (ref 11.6–15.1)
GFR SERPL CREATININE-BSD FRML MDRD: 56 ML/MIN/1.73SQ M
GLUCOSE SERPL-MCNC: 107 MG/DL (ref 65–140)
HCT VFR BLD AUTO: 39.8 % (ref 36.5–49.3)
HGB BLD-MCNC: 13.2 G/DL (ref 12–17)
IMM GRANULOCYTES # BLD AUTO: 0.03 THOUSAND/UL (ref 0–0.2)
IMM GRANULOCYTES NFR BLD AUTO: 0 % (ref 0–2)
LIPASE SERPL-CCNC: 28 U/L (ref 11–82)
LYMPHOCYTES # BLD AUTO: 1.68 THOUSANDS/ÂΜL (ref 0.6–4.47)
LYMPHOCYTES NFR BLD AUTO: 24 % (ref 14–44)
MAGNESIUM SERPL-MCNC: 2.2 MG/DL (ref 1.9–2.7)
MCH RBC QN AUTO: 29.9 PG (ref 26.8–34.3)
MCHC RBC AUTO-ENTMCNC: 33.2 G/DL (ref 31.4–37.4)
MCV RBC AUTO: 90 FL (ref 82–98)
MONOCYTES # BLD AUTO: 0.78 THOUSAND/ÂΜL (ref 0.17–1.22)
MONOCYTES NFR BLD AUTO: 11 % (ref 4–12)
NEUTROPHILS # BLD AUTO: 3.64 THOUSANDS/ÂΜL (ref 1.85–7.62)
NEUTS SEG NFR BLD AUTO: 52 % (ref 43–75)
NRBC BLD AUTO-RTO: 0 /100 WBCS
P AXIS: 66 DEGREES
P AXIS: 70 DEGREES
PLATELET # BLD AUTO: 346 THOUSANDS/UL (ref 149–390)
PMV BLD AUTO: 9.2 FL (ref 8.9–12.7)
POTASSIUM SERPL-SCNC: 4.4 MMOL/L (ref 3.5–5.3)
PR INTERVAL: 126 MS
PR INTERVAL: 126 MS
PROT SERPL-MCNC: 6.6 G/DL (ref 6.4–8.4)
QRS AXIS: 22 DEGREES
QRS AXIS: 27 DEGREES
QRSD INTERVAL: 72 MS
QRSD INTERVAL: 74 MS
QT INTERVAL: 370 MS
QT INTERVAL: 414 MS
QTC INTERVAL: 414 MS
QTC INTERVAL: 429 MS
RBC # BLD AUTO: 4.41 MILLION/UL (ref 3.88–5.62)
SODIUM SERPL-SCNC: 139 MMOL/L (ref 135–147)
T WAVE AXIS: 51 DEGREES
T WAVE AXIS: 54 DEGREES
VENTRICULAR RATE: 60 BPM
VENTRICULAR RATE: 81 BPM
WBC # BLD AUTO: 7.09 THOUSAND/UL (ref 4.31–10.16)

## 2024-07-03 PROCEDURE — 84484 ASSAY OF TROPONIN QUANT: CPT | Performed by: EMERGENCY MEDICINE

## 2024-07-03 PROCEDURE — 85025 COMPLETE CBC W/AUTO DIFF WBC: CPT | Performed by: EMERGENCY MEDICINE

## 2024-07-03 PROCEDURE — 83690 ASSAY OF LIPASE: CPT | Performed by: EMERGENCY MEDICINE

## 2024-07-03 PROCEDURE — 93005 ELECTROCARDIOGRAM TRACING: CPT

## 2024-07-03 PROCEDURE — 99285 EMERGENCY DEPT VISIT HI MDM: CPT | Performed by: EMERGENCY MEDICINE

## 2024-07-03 PROCEDURE — 36415 COLL VENOUS BLD VENIPUNCTURE: CPT | Performed by: EMERGENCY MEDICINE

## 2024-07-03 PROCEDURE — 99285 EMERGENCY DEPT VISIT HI MDM: CPT

## 2024-07-03 PROCEDURE — 93010 ELECTROCARDIOGRAM REPORT: CPT | Performed by: INTERNAL MEDICINE

## 2024-07-03 PROCEDURE — 80053 COMPREHEN METABOLIC PANEL: CPT | Performed by: EMERGENCY MEDICINE

## 2024-07-03 PROCEDURE — 83735 ASSAY OF MAGNESIUM: CPT | Performed by: EMERGENCY MEDICINE

## 2024-07-03 PROCEDURE — 71045 X-RAY EXAM CHEST 1 VIEW: CPT

## 2024-07-03 RX ORDER — MAGNESIUM HYDROXIDE/ALUMINUM HYDROXICE/SIMETHICONE 120; 1200; 1200 MG/30ML; MG/30ML; MG/30ML
30 SUSPENSION ORAL ONCE
Status: COMPLETED | OUTPATIENT
Start: 2024-07-03 | End: 2024-07-03

## 2024-07-03 RX ORDER — SUCRALFATE 1 G/1
1 TABLET ORAL ONCE
Status: COMPLETED | OUTPATIENT
Start: 2024-07-03 | End: 2024-07-03

## 2024-07-03 RX ORDER — LIDOCAINE HYDROCHLORIDE 20 MG/ML
15 SOLUTION OROPHARYNGEAL ONCE
Status: COMPLETED | OUTPATIENT
Start: 2024-07-03 | End: 2024-07-03

## 2024-07-03 RX ADMIN — SUCRALFATE 1 G: 1 TABLET ORAL at 01:01

## 2024-07-03 RX ADMIN — ALUMINUM HYDROXIDE, MAGNESIUM HYDROXIDE, DIMETHICONE 30 ML: 400; 400; 40 SUSPENSION ORAL at 01:02

## 2024-07-03 RX ADMIN — LIDOCAINE HYDROCHLORIDE 15 ML: 20 SOLUTION ORAL at 01:02

## 2024-07-09 NOTE — ED PROVIDER NOTES
History  Chief Complaint   Patient presents with    Weakness - Generalized     States generalized weakness starting today. Complaining of nausea. States Hx of Afib and felt similar. Denies chest pain or SOB. Also states BP has been up and down all day.     83-year-old male with a history of hypertension, hyperlipidemia, prostate cancer, GERD and thyroid disorder presents for evaluation due to generalized feeling of unwellness and midepigastric discomfort.  Patient reports that he was feeling unwell prior to going to bed but did not have any acute chest pain, shortness of breath, nausea or vomiting.  Patient was concerned because he checked his blood pressure and heart rate multiple times on his home machine and these numbers fluctuated.  Patient reports that he had a prior episode similar to this 1 where he was diagnosed with his A-fib.  Patient reports compliance with his medications.  Patient denies any changes in his diet, increased alcohol or drug use.          Prior to Admission Medications   Prescriptions Last Dose Informant Patient Reported? Taking?   Acetaminophen (TYLENOL PO)   Yes No   Sig: Take by mouth   Diclofenac Sodium (VOLTAREN) 1 %   No No   Sig: Apply 2 g topically 2 (two) times a day as needed (low back pain)   Eliquis 5 MG  Self No No   Sig: TAKE 1 TABLET TWICE A DAY   Krill Oil 500 MG CAPS   Yes No   Sig: Take by mouth   Multiple Vitamins-Minerals (CENTRUM SILVER 50+MEN PO)  Self Yes No   Sig: Take by mouth in the morning   Omega-3 Fatty Acids (FISH OIL) 1,000 mg  Self Yes No   Sig: Take by mouth daily KRILL OIL   Patient not taking: Reported on 2024   amLODIPine (NORVASC) 2.5 mg tablet  Self Yes No   Si mg daily   bimatoprost (LUMIGAN) 0.01 % ophthalmic drops  Self Yes No   Sig: Administer 1 drop into the left eye daily at bedtime   fluticasone (FLONASE) 50 mcg/act nasal spray  Self Yes No   levothyroxine 25 mcg tablet  Self Yes No   Sig: Take 25 mcg by mouth daily   pantoprazole  (PROTONIX) 40 mg tablet   No No   Sig: Take 1 tablet (40 mg total) by mouth daily   rosuvastatin (CRESTOR) 10 MG tablet  Self Yes No   Sig: 10 mg Pt takes med every other day--conversation with PCP (Dr. Alarcon)  November 2021 appt/Labs   traZODone (DESYREL) 50 mg tablet  Self Yes No      Facility-Administered Medications: None       Past Medical History:   Diagnosis Date    Back pain     Benign prostatic hypertrophy     Disease of thyroid gland     Elevated PSA 3/3/2021    Hyperlipidemia     Hypertension     Prostate cancer (HCC) 7/21/2021    Restless leg        Past Surgical History:   Procedure Laterality Date    ACHILLES TENDON SURGERY      HERNIA REPAIR      KNEE ARTHROSCOPY Right     shoulder    MD PLMT INTERSTITIAL DEV RADIAT TX PROSTATE 1/MULT N/A 9/21/2021    Procedure: INSERTION OF FIDUCIAL MARKER (TRANSRECTAL ULTRASOUND GUIDANCE), SPACEOAR;  Surgeon: Daniel Bryant MD;  Location: [facility];  Service: Urology    MD PROSTATE NEEDLE BIOPSY ANY APPROACH N/A 6/15/2021    Procedure: TRANSPERINEAL MRI FUSION PROSTATE BIOPSY;  Surgeon: Cruzito Yee MD;  Location: [facility];  Service: Urology    SKIN BIOPSY      TONSILLECTOMY         Family History   Problem Relation Age of Onset    Colon cancer Mother     Throat cancer Father     Breast cancer Sister      I have reviewed and agree with the history as documented.    E-Cigarette/Vaping    E-Cigarette Use Never User      E-Cigarette/Vaping Substances    Nicotine No     THC No     CBD No     Flavoring No     Other No     Unknown No      Social History     Tobacco Use    Smoking status: Never    Smokeless tobacco: Never   Vaping Use    Vaping status: Never Used   Substance Use Topics    Alcohol use: Yes     Alcohol/week: 7.0 standard drinks of alcohol     Types: 7 Standard drinks or equivalent per week    Drug use: No       Review of Systems   Constitutional:  Negative for chills and fever.   HENT:  Negative for ear pain and sore throat.    Eyes:  Negative for pain  and visual disturbance.   Respiratory:  Negative for cough and shortness of breath.    Cardiovascular:  Negative for chest pain and palpitations.   Gastrointestinal:  Negative for abdominal pain and vomiting.   Genitourinary:  Negative for dysuria and hematuria.   Musculoskeletal:  Negative for arthralgias and back pain.   Skin:  Negative for color change and rash.   Neurological:  Positive for weakness. Negative for seizures and syncope.   All other systems reviewed and are negative.      Physical Exam  Physical Exam  Vitals and nursing note reviewed.   Constitutional:       General: He is not in acute distress.     Appearance: He is well-developed.   HENT:      Head: Normocephalic and atraumatic.   Eyes:      Conjunctiva/sclera: Conjunctivae normal.   Cardiovascular:      Rate and Rhythm: Normal rate and regular rhythm.   Pulmonary:      Effort: Pulmonary effort is normal. No respiratory distress.      Breath sounds: Normal breath sounds.   Abdominal:      General: There is no distension.      Palpations: Abdomen is soft. There is no mass.      Tenderness: There is no abdominal tenderness.   Musculoskeletal:         General: No swelling.      Cervical back: Neck supple.   Skin:     General: Skin is warm and dry.      Capillary Refill: Capillary refill takes less than 2 seconds.   Neurological:      General: No focal deficit present.      Mental Status: He is alert and oriented to person, place, and time. Mental status is at baseline.   Psychiatric:         Mood and Affect: Mood normal.         Vital Signs  ED Triage Vitals [07/03/24 0018]   Temperature Pulse Respirations Blood Pressure SpO2   97.6 °F (36.4 °C) 80 18 (!) 174/114 100 %      Temp Source Heart Rate Source Patient Position - Orthostatic VS BP Location FiO2 (%)   Oral Monitor Lying Right arm --      Pain Score       No Pain           Vitals:    07/03/24 0018 07/03/24 0200 07/03/24 0300   BP: (!) 174/114 158/87 157/81   Pulse: 80 64 60   Patient Position  - Orthostatic VS: Lying Lying Lying         Visual Acuity      ED Medications  Medications   aluminum-magnesium hydroxide-simethicone (MAALOX) oral suspension 30 mL (30 mL Oral Given 7/3/24 0102)   sucralfate (CARAFATE) tablet 1 g (1 g Oral Given 7/3/24 0101)   Lidocaine Viscous HCl (XYLOCAINE) 2 % mucosal solution 15 mL (15 mL Swish & Spit Given 7/3/24 0102)       Diagnostic Studies  Results Reviewed       Procedure Component Value Units Date/Time    HS Troponin I 2hr [[id number]]  (Normal) Collected: 07/03/24 0326    Lab Status: Final result Specimen: Blood from Arm, Left Updated: 07/03/24 0355     hs TnI 2hr 15 ng/L      Delta 2hr hsTnI 3 ng/L     HS Troponin 0hr (reflex protocol) [[id number]]  (Normal) Collected: 07/03/24 0107    Lab Status: Final result Specimen: Blood from Arm, Left Updated: 07/03/24 0133     hs TnI 0hr 12 ng/L     Comprehensive metabolic panel [[id number]] Collected: 07/03/24 0107    Lab Status: Final result Specimen: Blood from Arm, Left Updated: 07/03/24 0126     Sodium 139 mmol/L      Potassium 4.4 mmol/L      Chloride 106 mmol/L      CO2 28 mmol/L      ANION GAP 5 mmol/L      BUN 23 mg/dL      Creatinine 1.19 mg/dL      Glucose 107 mg/dL      Calcium 9.4 mg/dL      AST 17 U/L      ALT 11 U/L      Alkaline Phosphatase 52 U/L      Total Protein 6.6 g/dL      Albumin 4.1 g/dL      Total Bilirubin 0.29 mg/dL      eGFR 56 ml/min/1.73sq m     Narrative:      National Kidney Disease Foundation guidelines for Chronic Kidney Disease (CKD):     Stage 1 with normal or high GFR (GFR > 90 mL/min/1.73 square meters)    Stage 2 Mild CKD (GFR = 60-89 mL/min/1.73 square meters)    Stage 3A Moderate CKD (GFR = 45-59 mL/min/1.73 square meters)    Stage 3B Moderate CKD (GFR = 30-44 mL/min/1.73 square meters)    Stage 4 Severe CKD (GFR = 15-29 mL/min/1.73 square meters)    Stage 5 End Stage CKD (GFR <15 mL/min/1.73 square meters)  Note: GFR calculation is accurate only with a steady state creatinine    Magnesium  [[id number]]  (Normal) Collected: 07/03/24 0107    Lab Status: Final result Specimen: Blood from Arm, Left Updated: 07/03/24 0126     Magnesium 2.2 mg/dL     Lipase [[id number]]  (Normal) Collected: 07/03/24 0107    Lab Status: Final result Specimen: Blood from Arm, Left Updated: 07/03/24 0126     Lipase 28 u/L     CBC and differential [[id number]]  (Abnormal) Collected: 07/03/24 0107    Lab Status: Final result Specimen: Blood from Arm, Left Updated: 07/03/24 0112     WBC 7.09 Thousand/uL      RBC 4.41 Million/uL      Hemoglobin 13.2 g/dL      Hematocrit 39.8 %      MCV 90 fL      MCH 29.9 pg      MCHC 33.2 g/dL      RDW 13.3 %      MPV 9.2 fL      Platelets 346 Thousands/uL      nRBC 0 /100 WBCs      Segmented % 52 %      Immature Grans % 0 %      Lymphocytes % 24 %      Monocytes % 11 %      Eosinophils Relative 12 %      Basophils Relative 1 %      Absolute Neutrophils 3.64 Thousands/µL      Absolute Immature Grans 0.03 Thousand/uL      Absolute Lymphocytes 1.68 Thousands/µL      Absolute Monocytes 0.78 Thousand/µL      Eosinophils Absolute 0.87 Thousand/µL      Basophils Absolute 0.09 Thousands/µL                    XR chest 1 view portable   Final Result by Ernie Paulino MD (07/03 0913)      Persistent focal opacity in the lingular region similar to the prior CT. Recommend ongoing imaging surveillance to ensure that this remains stable or resolves. Consider repeat x-ray in several weeks.      No acute new findings appreciated            Workstation performed: [id number]                    Procedures  ECG 12 Lead Documentation Only    Date/Time: 7/3/2024 12:18 AM    Performed by: Mona Jordan MD  Authorized by: Mona Jordan MD    ECG reviewed by me, the ED Provider: yes    Patient location:  ED  Previous ECG:     Previous ECG:  Compared to current    Similarity:  Changes noted  Interpretation:     Interpretation: normal    Rate:     ECG rate:  81    ECG rate assessment: normal     Rhythm:     Rhythm: sinus rhythm    Ectopy:     Ectopy: none    QRS:     QRS axis:  Normal    QRS intervals:  Normal  Conduction:     Conduction: normal    ST segments:     ST segments:  Normal  T waves:     T waves: normal             ED Course                                               Medical Decision Making  83-year-old male presents for evaluation due to generalized weakness. Presentation consistent with acute epigastric abdominal pain likely secondary to GERD. Abdominal exam without peritoneal signs. No evidence of acute abdomen at this time. Well appearing. Given work up have low suspicion for acute hepatobiliary disease (including acute cholecystitis or cholangitis), upper GI bleed, acute pancreatitis, gastric perforation, acute infectious processes (pneumonia, hepatitis, pyelonephritis), atypical appendicitis, vascular catastrophe, bowel obstruction or viscus perforation, or acute coronary syndrome.  presentation not consistent with other acute, emergent causes of abdominal pain at this time. Patient treated symptomatically with GI cocktail with significant improvement.  Recommend the patient continue taking his Protonix as prescribed and follow-up with gastroenterologist for EGD.  Patient reports understanding and is in agreement with plan of care.  Will discharge patient home with strict instructions when to return to the emergency department.      Amount and/or Complexity of Data Reviewed  Labs: ordered.  Radiology: ordered.    Risk  OTC drugs.  Prescription drug management.                 Disposition  Final diagnoses:   Generalized weakness   Dyspepsia     Time reflects when diagnosis was documented in both MDM as applicable and the Disposition within this note       Time User Action Codes Description Comment    7/3/2024  4:38 AM Mona Jordan Add [R53.1] Generalized weakness     7/3/2024  4:38 AM Mona Jordan Add [R10.13] Dyspepsia           ED Disposition       ED Disposition   Discharge     Condition   Stable    Date/Time   Wed Jul 3, 2024  4:38 AM    Comment    Az Kern discharge to home/self care.                   Follow-up Information       Follow up With Specialties Details Why Contact Info    Compa Alarcon,  Family Medicine Schedule an appointment as soon as possible for a visit in 1 week  [address] [place] [address]  [place] [address]  [phone]              Discharge Medication List as of 7/3/2024  4:39 AM        CONTINUE these medications which have NOT CHANGED    Details   Acetaminophen (TYLENOL PO) Take by mouth, Historical Med      amLODIPine (NORVASC) 2.5 mg tablet 5 mg daily, Starting Wed 10/27/2021, Historical Med      bimatoprost (LUMIGAN) 0.01 % ophthalmic drops Administer 1 drop into the left eye daily at bedtime, Historical Med      Diclofenac Sodium (VOLTAREN) 1 % Apply 2 g topically 2 (two) times a day as needed (low back pain), Starting Wed 4/24/2024, Normal      Eliquis 5 MG TAKE 1 TABLET TWICE A DAY, Normal      fluticasone (FLONASE) 50 mcg/act nasal spray Starting Sun 12/6/2020, Historical Med      Krill Oil 500 MG CAPS Take by mouth, Historical Med      levothyroxine 25 mcg tablet Take 25 mcg by mouth daily, Historical Med      Multiple Vitamins-Minerals (CENTRUM SILVER 50+MEN PO) Take by mouth in the morning, Historical Med      Omega-3 Fatty Acids (FISH OIL) 1,000 mg Take by mouth daily KRILL OIL, Starting Mon 10/2/2017, Historical Med      pantoprazole (PROTONIX) 40 mg tablet Take 1 tablet (40 mg total) by mouth daily, Starting Thu 5/16/2024, Normal      rosuvastatin (CRESTOR) 10 MG tablet 10 mg Pt takes med every other day--conversation with PCP (Dr. Alarcon)  November 2021 appt/Labs, Starting Fri 5/7/2021, Historical Med      traZODone (DESYREL) 50 mg tablet Starting Mon 4/24/2023, Historical Med             No discharge procedures on file.    PDMP Review         Value Time User    PDMP Reviewed  Yes 4/24/2024  9:56 AM Will Jarek Bryson MD            ED  Provider  Electronically Signed by             Mona Jordan MD  07/09/24 0842

## 2024-07-22 ENCOUNTER — TELEPHONE (OUTPATIENT)
Age: 84
End: 2024-07-22

## 2024-07-22 NOTE — TELEPHONE ENCOUNTER
Patient Kimo [phone] contacting office tentatively scheduled to have his upper wisdom teeth extracted this Wednesday 7/24.    Patient stopped taking his Eliquis on Sunday 7/21/2024 and is inquiring how long he needs to hold the blood thinner.

## 2024-07-24 ENCOUNTER — TELEPHONE (OUTPATIENT)
Age: 84
End: 2024-07-24

## 2024-07-24 NOTE — TELEPHONE ENCOUNTER
Mayda called from [facility] about a cardiac clearance had been faxed to office on 6/20/2024 pt currently in the office.    Having a extraction on teeth 1, 14 today with anesthesia.    C/b [id number]    See cardiac clearance form 6/24/2024 not that clerance then need a different one for today.     Tried to transfer clay, got disconnected  C/b [id number]

## 2024-07-24 NOTE — TELEPHONE ENCOUNTER
Mariana from [facility] calls requesting pt's clearance letter.  Faxed completed clearance letter to [phone]

## 2024-07-24 NOTE — TELEPHONE ENCOUNTER
Received another call from Antoinette SLADE regarding cardiac clearance letter; has been faxed via computer multiple times and has not received it.  Verified fax number as [phone].    Can you please print the clearance letter out from June 24th and fax it to her?

## 2024-07-29 PROBLEM — R10.11 RUQ PAIN: Status: ACTIVE | Noted: 2024-07-29

## 2024-07-31 ENCOUNTER — TELEPHONE (OUTPATIENT)
Age: 84
End: 2024-07-31

## 2024-07-31 NOTE — TELEPHONE ENCOUNTER
Spoke to pt in regards to medication management wait list. Pt is no longer interested in services. Removed from wait list.

## 2024-08-01 ENCOUNTER — HOSPITAL ENCOUNTER (OUTPATIENT)
Dept: RADIOLOGY | Facility: HOSPITAL | Age: 84
Discharge: HOME | End: 2024-08-01
Payer: MEDICARE

## 2024-08-01 DIAGNOSIS — J11.1 FLU: ICD-10-CM

## 2024-08-01 PROCEDURE — 71046 X-RAY EXAM CHEST 2 VIEWS: CPT

## 2024-08-08 ENCOUNTER — OFFICE VISIT (OUTPATIENT)
Dept: RADIATION ONCOLOGY | Facility: CLINIC | Age: 84
End: 2024-08-08
Attending: INTERNAL MEDICINE
Payer: MEDICARE

## 2024-08-08 VITALS
HEART RATE: 69 BPM | HEIGHT: 64 IN | TEMPERATURE: 97.1 F | WEIGHT: 129.2 LBS | OXYGEN SATURATION: 98 % | BODY MASS INDEX: 22.06 KG/M2 | DIASTOLIC BLOOD PRESSURE: 86 MMHG | SYSTOLIC BLOOD PRESSURE: 155 MMHG | RESPIRATION RATE: 16 BRPM

## 2024-08-08 DIAGNOSIS — C61 PROSTATE CANCER (HCC): Primary | ICD-10-CM

## 2024-08-08 PROCEDURE — 99213 OFFICE O/P EST LOW 20 MIN: CPT | Performed by: INTERNAL MEDICINE

## 2024-08-08 PROCEDURE — 99211 OFF/OP EST MAY X REQ PHY/QHP: CPT | Performed by: INTERNAL MEDICINE

## 2024-08-08 NOTE — PROGRESS NOTES
Az Kern 1940 is a 83 y.o. male  with Lisset 4+3=7 prostate cancer. He completed 6 months of ADT and a course of radiation to the prostate on 11/15/21. He was last seen in radiation oncology on 23 with Dr. Le     PSA   Latest Ref Rng 0.00 - 4.00 ng/mL   3/3/2020 4.1 (H)    2021 5.4 (H)    2021 2.1    2022 <0.1    2022 0.3    2023 0.2    2023 0.13    2024 0.19        3/6/24  Dr. Yee   PSA slowly rising.  Mild urinary complaints.  Repeat PSA and testosterone in 6 months    Upcomin24  Dr. Yee    Follow up visit     Oncology History   Prostate cancer (HCC)   6/15/2021 Initial Diagnosis    Prostate cancer (HCC)     6/15/2021 Biopsy    Final Diagnosis   A. Right JACKSON prostate, needle core biopsy:  - Benign prostate glands and stroma.     B. Left anterior lateral prostate, needle core biopsy:  - Prostatic adenocarcinoma, Lisset grade 4 + 3 = 7 (50% pattern 3 and 50% pattern 4, Grade group 3), involving one of one core and 5% of the tissue.  - Perineural invasion is absent.     C. Left posterior lateral prostate, needle core biopsy:  - Benign prostate glands and stroma.     D. Left posterior medial prostate, needle core biopsy:  - Benign prostate glands and stroma.     E. Left base prostate, needle core biopsy:  - Benign prostate glands and stroma.     F. Right anterior medial prostate, needle core biopsy:  - Benign prostate glands and stroma.     G. Right anterior lateral prostate, needle core biopsy:  - Prostatic adenocarcinoma, Lisset grade 3 + 3 = 6 (Grade group 1), involving one of one core and less than 5% of the tissue.  - Perineural invasion is absent.     H. Right posterior lateral prostate, needle core biopsy:  - Atypical small acinar proliferation (ASAP), suspicious but not diagnostic for malignancy.     I. Right posterior medial prostate, needle core biopsy:  - Benign prostate glands and stroma.     J. Right base prostate, needle core  biopsy:  - Benign prostate glands and stroma.        10/6/2021 - 11/15/2021 Radiation    PROSTATE 10X 28 / 28 250 0 7,000 40      Treatment dates:  10/6/2021 - 11/15/2021          Review of Systems:  Review of Systems   Constitutional:  Negative for activity change, appetite change and fatigue.   HENT: Negative.     Eyes: Negative.         Patient wears glasses    Respiratory: Negative.     Cardiovascular: Negative.    Gastrointestinal: Negative.  Negative for blood in stool, constipation, diarrhea, nausea and vomiting.   Endocrine: Negative.    Genitourinary:  Positive for frequency. Negative for difficulty urinating, dysuria, hematuria and urgency.   Skin: Negative.    Allergic/Immunologic: Negative.    Neurological: Negative.    Hematological:  Bruises/bleeds easily.   Psychiatric/Behavioral:  Positive for sleep disturbance.        Clinical Trial: no    IPSS Questionnaire (AUA-7):  Over the past month…    1)  How often have you had a sensation of not emptying your bladder completely after you finish urinating?  0 - Not at all   2)  How often have you had to urinate again less than two hours after you finished urinating? 3 - About half the time   3)  How often have you found you stopped and started again several times when you urinated?  3 - About half the time   4) How difficult have you found it to postpone urination?  0 - Not at all   5) How often have you had a weak urinary stream?  1 - Less than 1 time in 5   6) How often have you had to push or strain to begin urination?  1 - Less than 1 time in 5   7) How many times did you most typically get up to urinate from the time you went to bed until the time you got up in the morning?  4 - 4 times   Total Score:  12       Teaching completed     Health Maintenance   Topic Date Due    Medicare Annual Wellness Visit (AWV)  Never done    Depression Screening  07/08/2023    COVID-19 Vaccine (7 - 2023-24 season) 12/06/2023    Influenza Vaccine (1) 09/01/2024    Fall Risk   03/25/2025    BMI: Adult  07/29/2025    Hepatitis C Screening  Completed    RSV Vaccine Age 60+ Years  Completed    Zoster Vaccine  Completed    Pneumococcal Vaccine: 65+ Years  Completed    RSV Vaccine age 0-20 Months  Aged Out    HIB Vaccine  Aged Out    IPV Vaccine  Aged Out    Hepatitis A Vaccine  Aged Out    Meningococcal ACWY Vaccine  Aged Out    HPV Vaccine  Aged Out     Patient Active Problem List   Diagnosis    Benign prostatic hypertrophy    A-fib (HCC)    Prostate cancer (HCC)    Sleep apnea    Vertigo    Primary osteoarthritis of right knee    Lumbar radiculopathy    Lumbosacral spondylosis without myelopathy    Lumbar spondylosis    History of radiation therapy    Hypogonadism in male    BPH associated with nocturia    Epigastric pain    RUQ pain     Past Medical History:   Diagnosis Date    Back pain     Benign prostatic hypertrophy     Disease of thyroid gland     Elevated PSA 3/3/2021    Hyperlipidemia     Hypertension     Prostate cancer (HCC) 7/21/2021    Restless leg      Past Surgical History:   Procedure Laterality Date    ACHILLES TENDON SURGERY      HERNIA REPAIR      KNEE ARTHROSCOPY Right     shoulder    DE PLMT INTERSTITIAL DEV RADIAT TX PROSTATE 1/MULT N/A 9/21/2021    Procedure: INSERTION OF FIDUCIAL MARKER (TRANSRECTAL ULTRASOUND GUIDANCE), SPACEOAR;  Surgeon: Daniel Bryant MD;  Location: [facility];  Service: Urology    DE PROSTATE NEEDLE BIOPSY ANY APPROACH N/A 6/15/2021    Procedure: TRANSPERINEAL MRI FUSION PROSTATE BIOPSY;  Surgeon: Cruzito Yee MD;  Location[facility];  Service: Urology    SKIN BIOPSY      TONSILLECTOMY       Family History   Problem Relation Age of Onset    Colon cancer Mother     Throat cancer Father     Breast cancer Sister      Social History     Socioeconomic History    Marital status: /Civil Union     Spouse name: Not on file    Number of children: Not on file    Years of education: Not on file    Highest education level: Not on file    Occupational History    Occupation: Retired - [employer]   Tobacco Use    Smoking status: Never    Smokeless tobacco: Never   Vaping Use    Vaping status: Never Used   Substance and Sexual Activity    Alcohol use: Yes     Alcohol/week: 7.0 standard drinks of alcohol     Types: 7 Standard drinks or equivalent per week    Drug use: No    Sexual activity: Not on file   Other Topics Concern    Not on file   Social History Narrative    Not on file     Social Determinants of Health     Financial Resource Strain: Not on file   Food Insecurity: Not on file   Transportation Needs: Not on file   Physical Activity: Not on file   Stress: Not on file   Social Connections: Not on file   Intimate Partner Violence: Not on file   Housing Stability: Not on file       Current Outpatient Medications:     Acetaminophen (TYLENOL PO), Take by mouth, Disp: , Rfl:     amLODIPine (NORVASC) 2.5 mg tablet, 5 mg daily, Disp: , Rfl:     amoxicillin (AMOXIL) 500 MG tablet, Take 500 mg by mouth, Disp: , Rfl:     bimatoprost (LUMIGAN) 0.01 % ophthalmic drops, Administer 1 drop into the left eye daily at bedtime, Disp: , Rfl:     Diclofenac Sodium (VOLTAREN) 1 %, Apply 2 g topically 2 (two) times a day as needed (low back pain), Disp: 100 g, Rfl: 2    Eliquis 5 MG, TAKE 1 TABLET TWICE A DAY, Disp: 180 tablet, Rfl: 3    fluticasone (FLONASE) 50 mcg/act nasal spray, , Disp: , Rfl:     Krill Oil 500 MG CAPS, Take by mouth, Disp: , Rfl:     levothyroxine 25 mcg tablet, Take 25 mcg by mouth daily, Disp: , Rfl:     Multiple Vitamins-Minerals (CENTRUM SILVER 50+MEN PO), Take by mouth in the morning, Disp: , Rfl:     Omega-3 Fatty Acids (FISH OIL) 1,000 mg, Take by mouth daily KRILL OIL (Patient not taking: Reported on 5/16/2024), Disp: , Rfl:     pantoprazole (PROTONIX) 40 mg tablet, Take 1 tablet (40 mg total) by mouth daily, Disp: 30 tablet, Rfl: 2    rosuvastatin (CRESTOR) 10 MG tablet, 10 mg Pt takes med every other  day--conversation with PCP (Dr. Alarcon) November 2021 appt/Labs, Disp: , Rfl:     traZODone (DESYREL) 50 mg tablet, , Disp: , Rfl:   No Known Allergies  There were no vitals filed for this visit.

## 2024-08-08 NOTE — PROGRESS NOTES
Follow-up - Radiation Oncology   Az Kern 1940 83 y.o. male [id number]    Assessment/Plan:  Az Kern is a 83 y.o. male former patient of Dr. Gregory who completed radiation therapy for prostate cancer on 11/15/2021.    He presents for routine follow-up now nearly 3 years after completion of treatment.  He has no late side effects related to treatment.  His PSA regan was in 2022 and has trended downwards to 0.13 in August of last year, with slow rise to 0.19 earlier this year.  Continued surveillance is indicated.  24  Dr. Yee  RTC in 1 year.    Sofia Parekh MD  Department of Radiation Oncology  [facility]    Total Time Spent  20 minutes spent reviewing EMR in preparation for visit, with the patient, coordination with other providers, and documentation.    No orders of the defined types were placed in this encounter.       History of Present Illness   Interval History:  Az Kern 1940 is a 83 y.o. male  with [place] 4+3=7 prostate cancer. He completed 6 months of ADT and a course of radiation to the prostate on 11/15/21. He was last seen in radiation oncology on 23 with Dr. Le       PSA   Latest Ref Rng 0.00 - 4.00 ng/mL   3/3/2020 4.1 (H)    2021 5.4 (H)    2021 2.1    2022 <0.1    2022 0.3    2023 0.2    2023 0.13    2024 0.19          3/6/24  Dr. Yee   PSA slowly rising.  Mild urinary complaints.  Repeat PSA and testosterone in 6 months     Upcomin24  Dr. Yee    Feels well overall. He has some frequency and intermittency. His IPSS score is a 12.    Historical Information   Oncology History   Prostate cancer (HCC)   6/15/2021 Initial Diagnosis    Prostate cancer (HCC)     6/15/2021 Biopsy    Final Diagnosis   A. Right [place] prostate, needle core biopsy:  - Benign prostate glands and stroma.     B. Left anterior lateral prostate, needle core biopsy:  - Prostatic adenocarcinoma, Lisset  grade 4 + 3 = 7 (50% pattern 3 and 50% pattern 4, Grade group 3), involving one of one core and 5% of the tissue.  - Perineural invasion is absent.     C. Left posterior lateral prostate, needle core biopsy:  - Benign prostate glands and stroma.     D. Left posterior medial prostate, needle core biopsy:  - Benign prostate glands and stroma.     E. Left base prostate, needle core biopsy:  - Benign prostate glands and stroma.     F. Right anterior medial prostate, needle core biopsy:  - Benign prostate glands and stroma.     G. Right anterior lateral prostate, needle core biopsy:  - Prostatic adenocarcinoma, Lisset grade 3 + 3 = 6 (Grade group 1), involving one of one core and less than 5% of the tissue.  - Perineural invasion is absent.     H. Right posterior lateral prostate, needle core biopsy:  - Atypical small acinar proliferation (ASAP), suspicious but not diagnostic for malignancy.     I. Right posterior medial prostate, needle core biopsy:  - Benign prostate glands and stroma.     J. Right base prostate, needle core biopsy:  - Benign prostate glands and stroma.        10/6/2021 - 11/15/2021 Radiation    PROSTATE 10X 28 / 28 250 0 7,000 40      Treatment dates:  10/6/2021 - 11/15/2021        Past Medical History:   Diagnosis Date    Back pain     Benign prostatic hypertrophy     Disease of thyroid gland     Elevated PSA 3/3/2021    Hyperlipidemia     Hypertension     Prostate cancer (HCC) 7/21/2021    Restless leg      Past Surgical History:   Procedure Laterality Date    ACHILLES TENDON SURGERY      COLONOSCOPY      HERNIA REPAIR      KNEE ARTHROSCOPY Right     shoulder    MT PLMT INTERSTITIAL DEV RADIAT TX PROSTATE 1/MULT N/A 09/21/2021    Procedure: INSERTION OF FIDUCIAL MARKER (TRANSRECTAL ULTRASOUND GUIDANCE), SPACEOAR;  Surgeon: Daniel Bryant MD;  [facility];  Service: Urology    [place] PROSTATE NEEDLE BIOPSY ANY APPROACH N/A 06/15/2021    Procedure: TRANSPERINEAL MRI FUSION PROSTATE BIOPSY;   "Surgeon: Cruzito Yee MD;  Location: [facility];  Service: Urology    SKIN BIOPSY      TONSILLECTOMY       Social History   Social History     Substance and Sexual Activity   Alcohol Use Yes    Alcohol/week: 7.0 standard drinks of alcohol    Types: 7 Standard drinks or equivalent per week     Social History     Substance and Sexual Activity   Drug Use No     Social History     Tobacco Use   Smoking Status Never   Smokeless Tobacco Never       Meds/Allergies     Current Outpatient Medications:     amLODIPine (NORVASC) 2.5 mg tablet, 5 mg daily, Disp: , Rfl:     bimatoprost (LUMIGAN) 0.01 % ophthalmic drops, Administer 1 drop into the left eye daily at bedtime, Disp: , Rfl:     Diclofenac Sodium (VOLTAREN) 1 %, Apply 2 g topically 2 (two) times a day as needed (low back pain), Disp: 100 g, Rfl: 2    Eliquis 5 MG, TAKE 1 TABLET TWICE A DAY, Disp: 180 tablet, Rfl: 3    fluticasone (FLONASE) 50 mcg/act nasal spray, , Disp: , Rfl:     Krill Oil 500 MG CAPS, Take by mouth, Disp: , Rfl:     levothyroxine 25 mcg tablet, Take 25 mcg by mouth daily, Disp: , Rfl:     Multiple Vitamins-Minerals (CENTRUM SILVER 50+MEN PO), Take by mouth in the morning, Disp: , Rfl:     pantoprazole (PROTONIX) 40 mg tablet, Take 1 tablet (40 mg total) by mouth daily, Disp: 30 tablet, Rfl: 2    rosuvastatin (CRESTOR) 10 MG tablet, 10 mg Pt takes med every other day--conversation with PCP (Dr. Alarcon) November 2021 appt/Labs, Disp: , Rfl:     traZODone (DESYREL) 50 mg tablet, , Disp: , Rfl:     Acetaminophen (TYLENOL PO), Take by mouth, Disp: , Rfl:     amoxicillin (AMOXIL) 500 MG tablet, Take 500 mg by mouth, Disp: , Rfl:     Omega-3 Fatty Acids (FISH OIL) 1,000 mg, Take by mouth daily KRILL OIL (Patient not taking: Reported on 5/16/2024), Disp: , Rfl:   No Known Allergies    Review of Systems:  Refer to nursing note    OBJECTIVE:   /86 (BP Location: Left arm)   Pulse 69   Temp (!) 97.1 °F (36.2 °C) (Temporal)   Resp 16   Ht 5' 4\" " "(1.626 m)   Wt 58.6 kg (129 lb 3.2 oz)   SpO2 98%   BMI 22.18 kg/m²     Physical Exam:   General Appearance:  Alert, cooperative, no distress, appears stated age  Lungs: Respirations unlabored, no cyanosis, able to speak in complete sentences without dyspnea.  Extremities: No cyanosis or edema  Skin: No generalized rash or dermatitis  Neurologic: ANOx3, speech and cognition intact.    Portions of the record may have been created with voice recognition software.  Occasional wrong word or \"sound a like\" substitutions may have occurred due to the inherent limitations of voice recognition software.  Read the chart carefully and recognize, using context, where substitutions have occurred.  "

## 2024-08-15 ENCOUNTER — OFFICE VISIT (OUTPATIENT)
Dept: PULMONOLOGY | Facility: CLINIC | Age: 84
End: 2024-08-15
Payer: MEDICARE

## 2024-08-15 VITALS
WEIGHT: 129 LBS | OXYGEN SATURATION: 97 % | HEART RATE: 85 BPM | BODY MASS INDEX: 22.02 KG/M2 | SYSTOLIC BLOOD PRESSURE: 126 MMHG | HEIGHT: 64 IN | TEMPERATURE: 98.7 F | DIASTOLIC BLOOD PRESSURE: 62 MMHG

## 2024-08-15 DIAGNOSIS — J47.9 BRONCHIECTASIS WITHOUT COMPLICATION (HCC): Primary | ICD-10-CM

## 2024-08-15 DIAGNOSIS — R06.02 SHORTNESS OF BREATH: ICD-10-CM

## 2024-08-15 DIAGNOSIS — R93.89 ABNORMAL CT OF THE CHEST: ICD-10-CM

## 2024-08-15 PROCEDURE — 99204 OFFICE O/P NEW MOD 45 MIN: CPT | Performed by: INTERNAL MEDICINE

## 2024-08-15 RX ORDER — FLUTICASONE PROPIONATE AND SALMETEROL 250; 50 UG/1; UG/1
1 POWDER RESPIRATORY (INHALATION) 2 TIMES DAILY
Qty: 60 BLISTER | Refills: 6 | Status: SHIPPED | OUTPATIENT
Start: 2024-08-15

## 2024-08-16 PROBLEM — R93.89 ABNORMAL CT OF THE CHEST: Status: ACTIVE | Noted: 2024-08-16

## 2024-08-16 PROBLEM — R06.02 SHORTNESS OF BREATH: Status: ACTIVE | Noted: 2024-08-16

## 2024-08-16 PROBLEM — J47.9 BRONCHIECTASIS WITHOUT COMPLICATION (HCC): Status: ACTIVE | Noted: 2024-08-16

## 2024-08-16 NOTE — PROGRESS NOTES
Consultation - Pulmonary Medicine   Az Kern 83 y.o. male MRN: [id number]    Physician Requesting Consult: Dr. Alarcon  Reason for Consult: Shortness of breath    Shortness of breath  Reports shortness of breath and fatigue and also cough which is productive  This has been going on for some time but has been more bothersome to him of late  Prior PFTs in 2020 were largely normal.  I recommended we repeat his PFTs  I will give him a trial of Advair 250/50.  I did call his ophthalmologist who recently performed laser surgery for glaucoma.  He did indicate that it would be okay to initiate the Advair and closely monitor    Bronchiectasis without complication (HCC)  Ridging does show bronchiectasis with some mucous plugging  This is likely the cause of his chronic cough and phlegm production  Again I do think that the addition of Advair will help with the mucus production and expectoration    Abnormal CT of the chest  Peripheral infiltrate was noted in the lingula that was consistent with pneumonia.  This was back in June.  He does not recall having had infection/pneumonia symptoms at that time  This will require follow-up and would consider 6-month follow-up CAT scan to reevaluate.  He will be seen back in follow-up prior to this however and we will order a scan at the next visit  Tiny pulmonary nodules can be considered benign and do not require further follow-up.  These will be assessed at repeat imaging anyway  He does have heavy coronary calcification noted.  Does not report  anginal equivalent symptoms but shortness of breath could potentially be cardiac in etiology and if no explanation for shortness of breath on pulmonary function, could consider cardiology evaluation    Return in about 2 months (around 10/15/2024).  Will assess response to Advair and review PFTs.  Also will need CT chest follow-up ordered for December  ______________________________________________________________________    HPI:     Az Kern is a 83 y.o. male who presents for evaluation of shortness of breath and cough.  Patient reports that he has had cough for quite some time which is typically productive of some phlegm.  It has not been associated with any hemoptysis.  The mucus is throughout the day.  He does have some shortness of breath associated with activities and he feels like he fatigues more easily.  He finds that he has to rest due to shortness of breath at times.  He does not report any chest pain or cardiac complaints.  He does have a history of atrial fibrillation.  He is not on any beta-blockers.  He is on chronic anticoagulation.    He has had evaluation for shortness of breath in the past with PFTs.  He did not have any significant abnormality on pulmonary function in 2020.  He feels like his symptoms have progressed however slightly since that time.  He recently had a CAT scan of his chest.  This did not show any emphysema or other findings but he did have bronchiectasis with some mucous plugging noted.        Review of Systems:    Aside from what is mentioned in the HPI, the review of systems otherwise negative.    Current Medications:    Current Outpatient Medications:     amLODIPine (NORVASC) 2.5 mg tablet, 5 mg daily, Disp: , Rfl:     bimatoprost (LUMIGAN) 0.01 % ophthalmic drops, Administer 1 drop into the left eye daily at bedtime, Disp: , Rfl:     Diclofenac Sodium (VOLTAREN) 1 %, Apply 2 g topically 2 (two) times a day as needed (low back pain), Disp: 100 g, Rfl: 2    Eliquis 5 MG, TAKE 1 TABLET TWICE A DAY, Disp: 180 tablet, Rfl: 3    fluticasone (FLONASE) 50 mcg/act nasal spray, , Disp: , Rfl:     Fluticasone-Salmeterol (Advair Diskus) 250-50 mcg/dose inhaler, Inhale 1 puff 2 (two) times a day Rinse mouth after use., Disp: 60 blister, Rfl: 6    Krill Oil 500 MG CAPS, Take by mouth, Disp: , Rfl:     levothyroxine 25 mcg tablet, Take 25 mcg by mouth daily, Disp: , Rfl:     Multiple Vitamins-Minerals (CENTRUM  "SILVER 50+MEN PO), Take by mouth in the morning, Disp: , Rfl:     pantoprazole (PROTONIX) 40 mg tablet, Take 1 tablet (40 mg total) by mouth daily, Disp: 30 tablet, Rfl: 2    rosuvastatin (CRESTOR) 10 MG tablet, 10 mg Pt takes med every other day--conversation with PCP (Dr. Alarcon) November 2021 appt/Labs, Disp: , Rfl:     traZODone (DESYREL) 50 mg tablet, , Disp: , Rfl:     Historical Information   Past Medical History:   Diagnosis Date    Back pain     Benign prostatic hypertrophy     Disease of thyroid gland     Elevated PSA 3/3/2021    Hyperlipidemia     Hypertension     Prostate cancer (HCC) 7/21/2021    Restless leg      Past Surgical History:   Procedure Laterality Date    ACHILLES TENDON SURGERY      COLONOSCOPY      HERNIA REPAIR      KNEE ARTHROSCOPY Right     shoulder    [place] PLMT INTERSTITIAL DEV RADIAT TX PROSTATE 1/MULT N/A 09/21/2021    Procedure: INSERTION OF FIDUCIAL MARKER (TRANSRECTAL ULTRASOUND GUIDANCE), SPACEOAR;  Surgeon: Daniel Bryant MD;  Location[facility];  Service: Urology    [place] PROSTATE NEEDLE BIOPSY ANY APPROACH N/A 06/15/2021    Procedure: TRANSPERINEAL MRI FUSION PROSTATE BIOPSY;  Surgeon: Cruzito Yee MD;  Location[facility];  Service: Urology    SKIN BIOPSY      TONSILLECTOMY       Social History   Social History     Tobacco Use   Smoking Status Never   Smokeless Tobacco Never       Occupational history:  He is retired.  He did have worked at [employer] in the [employer] works with exposure to asbestos and silica    Family History:   Family History   Problem Relation Age of Onset    Colon cancer Mother     Cancer Mother     Throat cancer Father     Cancer Father         Esophagus    Breast cancer Sister     Breast cancer additional onset Sister     Bone cancer Sister          PhysicalExamination:  Vitals:   /62 (BP Location: Left arm, Patient Position: Sitting, Cuff Size: Large)   Pulse 85   Temp 98.7 °F (37.1 °C) (Tympanic)   Ht 5' 4\" (1.626 m)   Wt 58.5 kg (129 lb)  "  SpO2 97%   BMI 22.14 kg/m²     Gen: Appears healthy.  Comfortable on room air.  No conversational dyspnea  HEENT:  Conjugate gaze.  sclerae anicteric.  Oropharynx moist  Neck: Trachea is midline. No JVD. No adenopathy  Chest: Symmetric chest wall excursion with clear breath sounds.  Cardiac: Irregular. no murmur  Abdomen:  benign  Extremities: No edema  Neuro:  Normal speech and mentation      Diagnostic Data:  Labs:  I personally reviewed the most recent laboratory data pertinent to today's visit    Lab Results   Component Value Date    WBC 7.09 07/03/2024    HGB 13.2 07/03/2024    HCT 39.8 07/03/2024    MCV 90 07/03/2024     07/03/2024     Lab Results   Component Value Date    CALCIUM 9.4 07/03/2024    K 4.4 07/03/2024    CO2 28 07/03/2024     07/03/2024    BUN 23 07/03/2024    CREATININE 1.19 07/03/2024     PFT results:  The most recent pulmonary function tests were reviewed.  2020 PFT showed normal spirometry, normal lung volumes, and normal diffusion    Imaging:  I personally reviewed the images on the PAC system pertinent to today's visit  Chest x-ray from August 1 was clear.  At the time he had influenza    CAT scan of the chest from June however did show bronchiectasis, mucous plugging and very tiny pulmonary nodules that were 4 mm or less in size and stable in comparison to study from 2020.  There was extensive coronary calcification        Ernie Grande MD

## 2024-08-16 NOTE — ASSESSMENT & PLAN NOTE
Peripheral infiltrate was noted in the lingula that was consistent with pneumonia.  This was back in June.  He does not recall having had infection/pneumonia symptoms at that time  This will require follow-up and would consider 6-month follow-up CAT scan to reevaluate.  He will be seen back in follow-up prior to this however and we will order a scan at the next visit  Tiny pulmonary nodules can be considered benign and do not require further follow-up.  These will be assessed at repeat imaging anyway  He does have heavy coronary calcification noted.  Does not report  anginal equivalent symptoms but shortness of breath could potentially be cardiac in etiology and if no explanation for shortness of breath on pulmonary function, could consider cardiology evaluation

## 2024-08-16 NOTE — ASSESSMENT & PLAN NOTE
Reports shortness of breath and fatigue and also cough which is productive  This has been going on for some time but has been more bothersome to him of late  Prior PFTs in 2020 were largely normal.  I recommended we repeat his PFTs  I will give him a trial of Advair 250/50.  I did call his ophthalmologist who recently performed laser surgery for glaucoma.  He did indicate that it would be okay to initiate the Advair and closely monitor

## 2024-08-16 NOTE — ASSESSMENT & PLAN NOTE
Ridging does show bronchiectasis with some mucous plugging  This is likely the cause of his chronic cough and phlegm production  Again I do think that the addition of Advair will help with the mucus production and expectoration

## 2024-08-27 ENCOUNTER — HOSPITAL ENCOUNTER (OUTPATIENT)
Dept: PULMONOLOGY | Facility: HOSPITAL | Age: 84
Discharge: HOME | End: 2024-08-27
Attending: INTERNAL MEDICINE
Payer: MEDICARE

## 2024-08-27 DIAGNOSIS — J47.9 BRONCHIECTASIS WITHOUT COMPLICATION (HCC): ICD-10-CM

## 2024-08-27 PROCEDURE — 94760 N-INVAS EAR/PLS OXIMETRY 1: CPT

## 2024-08-27 PROCEDURE — 94060 EVALUATION OF WHEEZING: CPT | Performed by: INTERNAL MEDICINE

## 2024-08-27 PROCEDURE — 94726 PLETHYSMOGRAPHY LUNG VOLUMES: CPT

## 2024-08-27 PROCEDURE — 94729 DIFFUSING CAPACITY: CPT | Performed by: INTERNAL MEDICINE

## 2024-08-27 PROCEDURE — 94726 PLETHYSMOGRAPHY LUNG VOLUMES: CPT | Performed by: INTERNAL MEDICINE

## 2024-08-27 PROCEDURE — 94060 EVALUATION OF WHEEZING: CPT

## 2024-08-27 PROCEDURE — 94729 DIFFUSING CAPACITY: CPT

## 2024-08-27 RX ORDER — ALBUTEROL SULFATE 0.83 MG/ML
2.5 SOLUTION RESPIRATORY (INHALATION) ONCE AS NEEDED
Status: COMPLETED | OUTPATIENT
Start: 2024-08-27 | End: 2024-08-27

## 2024-08-27 RX ADMIN — ALBUTEROL SULFATE 2.5 MG: 2.5 SOLUTION RESPIRATORY (INHALATION) at 16:41

## 2024-09-06 ENCOUNTER — TELEPHONE (OUTPATIENT)
Age: 84
End: 2024-09-06

## 2024-09-06 NOTE — TELEPHONE ENCOUNTER
Pt called asking if needed psa order in hand to go to [facility] I informed him no copy in chart,no further action needed.

## 2024-09-18 ENCOUNTER — TELEPHONE (OUTPATIENT)
Dept: UROLOGY | Facility: HOSPITAL | Age: 84
End: 2024-09-18

## 2024-09-23 ENCOUNTER — APPOINTMENT (OUTPATIENT)
Dept: LAB | Facility: CLINIC | Age: 84
End: 2024-09-23
Payer: MEDICARE

## 2024-09-23 DIAGNOSIS — C61 PROSTATE CANCER (HCC): ICD-10-CM

## 2024-09-23 DIAGNOSIS — E29.1 HYPOGONADISM IN MALE: ICD-10-CM

## 2024-09-23 LAB
PSA SERPL-MCNC: 0.21 NG/ML (ref 0–4)
TESTOST SERPL-MSCNC: 182 NG/DL

## 2024-09-23 PROCEDURE — 36415 COLL VENOUS BLD VENIPUNCTURE: CPT

## 2024-09-23 PROCEDURE — 84153 ASSAY OF PSA TOTAL: CPT

## 2024-09-23 PROCEDURE — 84403 ASSAY OF TOTAL TESTOSTERONE: CPT

## 2024-09-27 ENCOUNTER — OFFICE VISIT (OUTPATIENT)
Dept: UROLOGY | Facility: MEDICAL CENTER | Age: 84
End: 2024-09-27
Payer: MEDICARE

## 2024-09-27 VITALS
OXYGEN SATURATION: 98 % | DIASTOLIC BLOOD PRESSURE: 60 MMHG | HEIGHT: 64 IN | SYSTOLIC BLOOD PRESSURE: 120 MMHG | HEART RATE: 79 BPM | BODY MASS INDEX: 22.2 KG/M2 | WEIGHT: 130 LBS

## 2024-09-27 DIAGNOSIS — N40.1 BPH ASSOCIATED WITH NOCTURIA: ICD-10-CM

## 2024-09-27 DIAGNOSIS — E29.1 HYPOGONADISM IN MALE: ICD-10-CM

## 2024-09-27 DIAGNOSIS — R35.1 BPH ASSOCIATED WITH NOCTURIA: ICD-10-CM

## 2024-09-27 DIAGNOSIS — C61 PROSTATE CANCER (HCC): Primary | ICD-10-CM

## 2024-09-27 DIAGNOSIS — Z92.3 HISTORY OF RADIATION THERAPY: ICD-10-CM

## 2024-09-27 PROCEDURE — 99214 OFFICE O/P EST MOD 30 MIN: CPT | Performed by: UROLOGY

## 2024-09-27 RX ORDER — KETOROLAC TROMETHAMINE 5 MG/ML
SOLUTION OPHTHALMIC
COMMUNITY
Start: 2024-07-24

## 2024-09-27 NOTE — LETTER
2024     Compa Alarcon DO  [address] [address]    Patient: Az Kern   YOB: 1940   Date of Visit: 2024       Dear Dr. Alarcon:    Thank you for referring Az Kern to me for evaluation. Below are my notes for this consultation.    If you have questions, please do not hesitate to call me. I look forward to following your patient along with you.         Sincerely,        Cruzito Yee MD        CC: No Recipients    Cruzito Yee MD  2024  1:21 PM  Sign when Signing Visit  Ambulatory Visit  Name: Az Kern      : 1940      MRN: [id number]  Encounter Provider: Cruzito Yee MD  Encounter Date: 2024   Encounter department: [facility] UROLOGY [place]    Assessment & Plan  Prostate cancer (HCC)  Prostate cancer Lisset pattern score 6 and 7 status post 6-month course of androgen deprivation and IMRT finishing .  Testosterone is low normal with PSA 0..21.  Repeat 6 months.        Hypogonadism in male  Testosterone level slightly higher now within normal limits but low normal.  No further androgen deprivation at this time       History of radiation therapy  No side effects from radiation other than mild urinary frequency.       BPH associated with nocturia  AUA symptom score 16 with patient noting nocturia some weakening of the urinary stream and intermittency but notes that overall he feels that he is voiding well         History of Present Illness    Az Kern is a 83 y.o. male who presents  with prostate cancer Lisset pattern score 6 and 7 status post 6-month androgen deprivation and radiation therapy with radiation ending 2021. PSA has risen slowly with hypogonadism stable. Only mild urinary complaints with AUA symptom score 16 the patient's PSA is stable at approximately 0.2.  Repeat again in 6 months.        History obtained from : patient  Review of Systems   Genitourinary:          See AUA symptom score   Musculoskeletal:  Positive for myalgias.   All other systems reviewed and are negative.    Pertinent Medical History          Medical History Reviewed by provider this encounter:  Tobacco  Allergies  Meds  Problems  Med Hx  Surg Hx  Fam Hx  Soc   Hx      Past Medical History  Past Medical History:   Diagnosis Date   • Back pain    • Benign prostatic hypertrophy    • Disease of thyroid gland    • Elevated PSA 3/3/2021   • Hyperlipidemia    • Hypertension    • Prostate cancer (HCC) 7/21/2021   • Restless leg      Past Surgical History:   Procedure Laterality Date   • ACHILLES TENDON SURGERY     • COLONOSCOPY     • EGD  09/03/2024    Dr Jones -   • HERNIA REPAIR     • KNEE ARTHROSCOPY Right     shoulder   • [place] PLMT INTERSTITIAL DEV RADIAT TX PROSTATE 1/MULT N/A 09/21/2021    Procedure: INSERTION OF FIDUCIAL MARKER (TRANSRECTAL ULTRASOUND GUIDANCE), SPACEOAR;  Surgeon: Daniel Bryant MD;  [facility];  Service: Urology   • [place] PROSTATE NEEDLE BIOPSY ANY APPROACH N/A 06/15/2021    Procedure: TRANSPERINEAL MRI FUSION PROSTATE BIOPSY;  Surgeon: Cruzito Yee MD;  Location[facility];  Service: Urology   • SKIN BIOPSY     • TONSILLECTOMY       Family History   Problem Relation Age of Onset   • Colon cancer Mother    • Cancer Mother    • Throat cancer Father    • Cancer Father         Esophagus   • Breast cancer Sister    • Breast cancer additional onset Sister    • Bone cancer Sister      Current Outpatient Medications on File Prior to Visit   Medication Sig Dispense Refill   • amLODIPine (NORVASC) 2.5 mg tablet 5 mg daily     • bimatoprost (LUMIGAN) 0.01 % ophthalmic drops Administer 1 drop into the left eye daily at bedtime     • Diclofenac Sodium (VOLTAREN) 1 % Apply 2 g topically 2 (two) times a day as needed (low back pain) 100 g 2   • Eliquis 5 MG TAKE 1 TABLET TWICE A  tablet 3   • fluticasone (FLONASE) 50 mcg/act nasal spray      • Fluticasone-Salmeterol (Advair  Diskus) 250-50 mcg/dose inhaler Inhale 1 puff 2 (two) times a day Rinse mouth after use. 60 blister 6   • ketorolac (ACULAR) 0.5 % ophthalmic solution      • Krill Oil 500 MG CAPS Take by mouth     • levothyroxine 25 mcg tablet Take 25 mcg by mouth daily     • Multiple Vitamins-Minerals (CENTRUM SILVER 50+MEN PO) Take by mouth in the morning     • pantoprazole (PROTONIX) 40 mg tablet Take 1 tablet (40 mg total) by mouth daily 90 tablet 0   • rosuvastatin (CRESTOR) 10 MG tablet 10 mg Pt takes med every other day--conversation with PCP (Dr. Alarcon)  November 2021 appt/Labs     • traZODone (DESYREL) 50 mg tablet        No current facility-administered medications on file prior to visit.   No Known Allergies   Current Outpatient Medications on File Prior to Visit   Medication Sig Dispense Refill   • amLODIPine (NORVASC) 2.5 mg tablet 5 mg daily     • bimatoprost (LUMIGAN) 0.01 % ophthalmic drops Administer 1 drop into the left eye daily at bedtime     • Diclofenac Sodium (VOLTAREN) 1 % Apply 2 g topically 2 (two) times a day as needed (low back pain) 100 g 2   • Eliquis 5 MG TAKE 1 TABLET TWICE A  tablet 3   • fluticasone (FLONASE) 50 mcg/act nasal spray      • Fluticasone-Salmeterol (Advair Diskus) 250-50 mcg/dose inhaler Inhale 1 puff 2 (two) times a day Rinse mouth after use. 60 blister 6   • ketorolac (ACULAR) 0.5 % ophthalmic solution      • Krill Oil 500 MG CAPS Take by mouth     • levothyroxine 25 mcg tablet Take 25 mcg by mouth daily     • Multiple Vitamins-Minerals (CENTRUM SILVER 50+MEN PO) Take by mouth in the morning     • pantoprazole (PROTONIX) 40 mg tablet Take 1 tablet (40 mg total) by mouth daily 90 tablet 0   • rosuvastatin (CRESTOR) 10 MG tablet 10 mg Pt takes med every other day--conversation with PCP (Dr. Alarcon)  November 2021 appt/Labs     • traZODone (DESYREL) 50 mg tablet        No current facility-administered medications on file prior to visit.      Social History     Tobacco Use   •  "Smoking status: Never   • Smokeless tobacco: Never   Vaping Use   • Vaping status: Never Used   Substance and Sexual Activity   • Alcohol use: Yes     Alcohol/week: 7.0 standard drinks of alcohol     Types: 7 Standard drinks or equivalent per week   • Drug use: No   • Sexual activity: Not Currently     Partners: Female     Birth control/protection: Coitus interruptus, Condom Male, Other     Comment: Wife took pill         AUA SYMPTOM SCORE      Flowsheet Row Most Recent Value   AUA SYMPTOM SCORE    How often have you had a sensation of not emptying your bladder completely after you finished urinating? 2 (P)     How often have you had to urinate again less than two hours after you finished urinating? 2 (P)     How often have you found you stopped and started again several times when you urinate? 4 (P)     How often have you found it difficult to postpone urination? 1 (P)     How often have you had a weak urinary stream? 2 (P)     How often have you had to push or strain to begin urination? 2 (P)     How many times did you most typically get up to urinate from the time you went to bed at night until the time you got up in the morning? 3 (P)     Quality of Life: If you were to spend the rest of your life with your urinary condition just the way it is now, how would you feel about that? 2 (P)     AUA SYMPTOM SCORE 16 (P)            Objective    /60   Pulse 79   Ht 5' 4\" (1.626 m)   Wt 59 kg (130 lb)   SpO2 98%   BMI 22.31 kg/m²   Physical Exam  Vitals reviewed.   Constitutional:       General: He is not in acute distress.     Appearance: Normal appearance. He is normal weight. He is not ill-appearing, toxic-appearing or diaphoretic.   HENT:      Head: Normocephalic and atraumatic.      Nose: Nose normal.   Pulmonary:      Effort: Pulmonary effort is normal. No respiratory distress.   Abdominal:      General: There is no distension.      Palpations: Abdomen is soft.   Skin:     General: Skin is dry. "   Neurological:      Mental Status: He is alert and oriented to person, place, and time.   Psychiatric:         Mood and Affect: Mood normal.         Behavior: Behavior normal.         Thought Content: Thought content normal.         Judgment: Judgment normal.       Results  Lab Results   Component Value Date    PSA 0.210 09/23/2024    PSA 0.19 02/19/2024    PSA 0.13 08/16/2023     Lab Results   Component Value Date    CALCIUM 9.4 07/03/2024    K 4.4 07/03/2024    CO2 28 07/03/2024     07/03/2024    BUN 23 07/03/2024    CREATININE 1.19 07/03/2024     Lab Results   Component Value Date    WBC 7.09 07/03/2024    HGB 13.2 07/03/2024    HCT 39.8 07/03/2024    MCV 90 07/03/2024     07/03/2024       Office Urine Dip  No results found for this or any previous visit (from the past 1 hour(s)).]    Administrative Statements

## 2024-09-27 NOTE — ASSESSMENT & PLAN NOTE
Prostate cancer Lisset pattern score 6 and 7 status post 6-month course of androgen deprivation and IMRT finishing 2021.  Testosterone is low normal with PSA 0..21.  Repeat 6 months.

## 2024-09-27 NOTE — ASSESSMENT & PLAN NOTE
AUA symptom score 16 with patient noting nocturia some weakening of the urinary stream and intermittency but notes that overall he feels that he is voiding well

## 2024-09-27 NOTE — PROGRESS NOTES
Ambulatory Visit  Name: Az Kern      : 1940      MRN: [id number]  Encounter Provider: Cruzito Yee MD  Encounter Date: 2024   Encounter department: [facility] [place]    Assessment & Plan  Prostate cancer (HCC)  Prostate cancer Hartley pattern score 6 and 7 status post 6-month course of androgen deprivation and IMRT finishing .  Testosterone is low normal with PSA 0..21.  Repeat 6 months.        Hypogonadism in male  Testosterone level slightly higher now within normal limits but low normal.  No further androgen deprivation at this time       History of radiation therapy  No side effects from radiation other than mild urinary frequency.       BPH associated with nocturia  AUA symptom score 16 with patient noting nocturia some weakening of the urinary stream and intermittency but notes that overall he feels that he is voiding well         History of Present Illness     Az Kern is a 83 y.o. male who presents  with prostate cancer Hartley pattern score 6 and 7 status post 6-month androgen deprivation and radiation therapy with radiation ending 2021. PSA has risen slowly with hypogonadism stable. Only mild urinary complaints with AUA symptom score 16 the patient's PSA is stable at approximately 0.2.  Repeat again in 6 months.        History obtained from : patient  Review of Systems   Genitourinary:         See AUA symptom score   Musculoskeletal:  Positive for myalgias.   All other systems reviewed and are negative.    Pertinent Medical History           Medical History Reviewed by provider this encounter:  Tobacco  Allergies  Meds  Problems  Med Hx  Surg Hx  Fam Hx  Soc   Hx      Past Medical History   Past Medical History:   Diagnosis Date    Back pain     Benign prostatic hypertrophy     Disease of thyroid gland     Elevated PSA 3/3/2021    Hyperlipidemia     Hypertension     Prostate cancer (HCC) 2021    Restless leg      Past Surgical  Outgoing call to patient at this time.  Patient is aware that blood work has been ordered by Dr. Kennedy and needs to be drawn prior to surgery.  Patient replied, \"I'm going to have my blood drawn tomorrow when I get my COVID test.\"  Writer will inform Dr. Mccord.    Patient also confirmed dates/times per previous message.  Patient is aware that he's expected to arrive by 6:45 am on 9/21/2021 for surgery.  Patient denies any questions and/or concerns at this time.     History:   Procedure Laterality Date    ACHILLES TENDON SURGERY      COLONOSCOPY      EGD  09/03/2024    Dr Jones -    HERNIA REPAIR      KNEE ARTHROSCOPY Right     shoulder    MT PLMT INTERSTITIAL DEV RADIAT TX PROSTATE 1/MULT N/A 09/21/2021    Procedure: INSERTION OF FIDUCIAL MARKER (TRANSRECTAL ULTRASOUND GUIDANCE), SPACEOAR;  Surgeon: Daniel Bryant MD;  Location: [facility];  Service: Urology    MT PROSTATE NEEDLE BIOPSY ANY APPROACH N/A 06/15/2021    Procedure: TRANSPERINEAL MRI FUSION PROSTATE BIOPSY;  Surgeon: Cruzito Yee MD;  Location: [facility];  Service: Urology    SKIN BIOPSY      TONSILLECTOMY       Family History   Problem Relation Age of Onset    Colon cancer Mother     Cancer Mother     Throat cancer Father     Cancer Father         Esophagus    Breast cancer Sister     Breast cancer additional onset Sister     Bone cancer Sister      Current Outpatient Medications on File Prior to Visit   Medication Sig Dispense Refill    amLODIPine (NORVASC) 2.5 mg tablet 5 mg daily      bimatoprost (LUMIGAN) 0.01 % ophthalmic drops Administer 1 drop into the left eye daily at bedtime      Diclofenac Sodium (VOLTAREN) 1 % Apply 2 g topically 2 (two) times a day as needed (low back pain) 100 g 2    Eliquis 5 MG TAKE 1 TABLET TWICE A  tablet 3    fluticasone (FLONASE) 50 mcg/act nasal spray       Fluticasone-Salmeterol (Advair Diskus) 250-50 mcg/dose inhaler Inhale 1 puff 2 (two) times a day Rinse mouth after use. 60 blister 6    ketorolac (ACULAR) 0.5 % ophthalmic solution       Krill Oil 500 MG CAPS Take by mouth      levothyroxine 25 mcg tablet Take 25 mcg by mouth daily      Multiple Vitamins-Minerals (CENTRUM SILVER 50+MEN PO) Take by mouth in the morning      pantoprazole (PROTONIX) 40 mg tablet Take 1 tablet (40 mg total) by mouth daily 90 tablet 0    rosuvastatin (CRESTOR) 10 MG tablet 10 mg Pt takes med every other day--conversation with PCP (Dr. Alarcon)  November 2021 appt/Labs      traZODone  (DESYREL) 50 mg tablet        No current facility-administered medications on file prior to visit.   No Known Allergies   Current Outpatient Medications on File Prior to Visit   Medication Sig Dispense Refill    amLODIPine (NORVASC) 2.5 mg tablet 5 mg daily      bimatoprost (LUMIGAN) 0.01 % ophthalmic drops Administer 1 drop into the left eye daily at bedtime      Diclofenac Sodium (VOLTAREN) 1 % Apply 2 g topically 2 (two) times a day as needed (low back pain) 100 g 2    Eliquis 5 MG TAKE 1 TABLET TWICE A  tablet 3    fluticasone (FLONASE) 50 mcg/act nasal spray       Fluticasone-Salmeterol (Advair Diskus) 250-50 mcg/dose inhaler Inhale 1 puff 2 (two) times a day Rinse mouth after use. 60 blister 6    ketorolac (ACULAR) 0.5 % ophthalmic solution       Krill Oil 500 MG CAPS Take by mouth      levothyroxine 25 mcg tablet Take 25 mcg by mouth daily      Multiple Vitamins-Minerals (CENTRUM SILVER 50+MEN PO) Take by mouth in the morning      pantoprazole (PROTONIX) 40 mg tablet Take 1 tablet (40 mg total) by mouth daily 90 tablet 0    rosuvastatin (CRESTOR) 10 MG tablet 10 mg Pt takes med every other day--conversation with PCP (Dr. Alarcon)  November 2021 appt/Labs      traZODone (DESYREL) 50 mg tablet        No current facility-administered medications on file prior to visit.      Social History     Tobacco Use    Smoking status: Never    Smokeless tobacco: Never   Vaping Use    Vaping status: Never Used   Substance and Sexual Activity    Alcohol use: Yes     Alcohol/week: 7.0 standard drinks of alcohol     Types: 7 Standard drinks or equivalent per week    Drug use: No    Sexual activity: Not Currently     Partners: Female     Birth control/protection: Coitus interruptus, Condom Male, Other     Comment: Wife took pill         AUA SYMPTOM SCORE      Flowsheet Row Most Recent Value   AUA SYMPTOM SCORE    How often have you had a sensation of not emptying your bladder completely after you finished urinating? 2 (P)   "   How often have you had to urinate again less than two hours after you finished urinating? 2 (P)     How often have you found you stopped and started again several times when you urinate? 4 (P)     How often have you found it difficult to postpone urination? 1 (P)     How often have you had a weak urinary stream? 2 (P)     How often have you had to push or strain to begin urination? 2 (P)     How many times did you most typically get up to urinate from the time you went to bed at night until the time you got up in the morning? 3 (P)     Quality of Life: If you were to spend the rest of your life with your urinary condition just the way it is now, how would you feel about that? 2 (P)     AUA SYMPTOM SCORE 16 (P)            Objective     /60   Pulse 79   Ht 5' 4\" (1.626 m)   Wt 59 kg (130 lb)   SpO2 98%   BMI 22.31 kg/m²   Physical Exam  Vitals reviewed.   Constitutional:       General: He is not in acute distress.     Appearance: Normal appearance. He is normal weight. He is not ill-appearing, toxic-appearing or diaphoretic.   HENT:      Head: Normocephalic and atraumatic.      Nose: Nose normal.   Pulmonary:      Effort: Pulmonary effort is normal. No respiratory distress.   Abdominal:      General: There is no distension.      Palpations: Abdomen is soft.   Skin:     General: Skin is dry.   Neurological:      Mental Status: He is alert and oriented to person, place, and time.   Psychiatric:         Mood and Affect: Mood normal.         Behavior: Behavior normal.         Thought Content: Thought content normal.         Judgment: Judgment normal.       Results  Lab Results   Component Value Date    PSA 0.210 09/23/2024    PSA 0.19 02/19/2024    PSA 0.13 08/16/2023     Lab Results   Component Value Date    CALCIUM 9.4 07/03/2024    K 4.4 07/03/2024    CO2 28 07/03/2024     07/03/2024    BUN 23 07/03/2024    CREATININE 1.19 07/03/2024     Lab Results   Component Value Date    WBC 7.09 07/03/2024    " HGB 13.2 07/03/2024    HCT 39.8 07/03/2024    MCV 90 07/03/2024     07/03/2024       Office Urine Dip  No results found for this or any previous visit (from the past 1 hour(s)).]    Administrative Statements

## 2024-09-27 NOTE — ASSESSMENT & PLAN NOTE
Testosterone level slightly higher now within normal limits but low normal.  No further androgen deprivation at this time

## 2024-10-04 ENCOUNTER — APPOINTMENT (EMERGENCY)
Dept: RADIOLOGY | Facility: HOSPITAL | Age: 84
DRG: 310 | End: 2024-10-04
Payer: MEDICARE

## 2024-10-04 ENCOUNTER — HOSPITAL ENCOUNTER (INPATIENT)
Facility: HOSPITAL | Age: 84
LOS: 1 days | Discharge: HOME | DRG: 310 | End: 2024-10-06
Attending: EMERGENCY MEDICINE
Payer: MEDICARE

## 2024-10-04 DIAGNOSIS — R00.2 PALPITATIONS: Primary | ICD-10-CM

## 2024-10-04 DIAGNOSIS — R07.89 OTHER CHEST PAIN: ICD-10-CM

## 2024-10-04 DIAGNOSIS — I48.0 PAF (PAROXYSMAL ATRIAL FIBRILLATION) (HCC): ICD-10-CM

## 2024-10-04 DIAGNOSIS — I48.91 ATRIAL FIBRILLATION (HCC): ICD-10-CM

## 2024-10-04 PROBLEM — G47.00 INSOMNIA: Status: ACTIVE | Noted: 2024-10-04

## 2024-10-04 PROBLEM — R07.9 CHEST PAIN: Status: ACTIVE | Noted: 2024-10-04

## 2024-10-04 LAB
2HR DELTA HS TROPONIN: -2 NG/L
4HR DELTA HS TROPONIN: -1 NG/L
ALBUMIN SERPL BCG-MCNC: 4.3 G/DL (ref 3.5–5)
ALP SERPL-CCNC: 49 U/L (ref 34–104)
ALT SERPL W P-5'-P-CCNC: 14 U/L (ref 7–52)
ANION GAP SERPL CALCULATED.3IONS-SCNC: 6 MMOL/L (ref 4–13)
AST SERPL W P-5'-P-CCNC: 29 U/L (ref 13–39)
BASOPHILS # BLD AUTO: 0.07 THOUSANDS/ΜL (ref 0–0.1)
BASOPHILS NFR BLD AUTO: 1 % (ref 0–1)
BILIRUB DIRECT SERPL-MCNC: 0.13 MG/DL (ref 0–0.2)
BILIRUB SERPL-MCNC: 0.56 MG/DL (ref 0.2–1)
BNP SERPL-MCNC: 547 PG/ML (ref 0–100)
BUN SERPL-MCNC: 23 MG/DL (ref 5–25)
CALCIUM SERPL-MCNC: 9.2 MG/DL (ref 8.4–10.2)
CARDIAC TROPONIN I PNL SERPL HS: 11 NG/L
CARDIAC TROPONIN I PNL SERPL HS: 12 NG/L
CARDIAC TROPONIN I PNL SERPL HS: 13 NG/L
CHLORIDE SERPL-SCNC: 103 MMOL/L (ref 96–108)
CO2 SERPL-SCNC: 26 MMOL/L (ref 21–32)
CREAT SERPL-MCNC: 1.23 MG/DL (ref 0.6–1.3)
EOSINOPHIL # BLD AUTO: 0.48 THOUSAND/ΜL (ref 0–0.61)
EOSINOPHIL NFR BLD AUTO: 6 % (ref 0–6)
ERYTHROCYTE [DISTWIDTH] IN BLOOD BY AUTOMATED COUNT: 13.7 % (ref 11.6–15.1)
FLUAV RNA RESP QL NAA+PROBE: NEGATIVE
FLUBV RNA RESP QL NAA+PROBE: NEGATIVE
GFR SERPL CREATININE-BSD FRML MDRD: 53 ML/MIN/1.73SQ M
GLUCOSE SERPL-MCNC: 99 MG/DL (ref 65–140)
HCT VFR BLD AUTO: 41.3 % (ref 36.5–49.3)
HGB BLD-MCNC: 13.8 G/DL (ref 12–17)
IMM GRANULOCYTES # BLD AUTO: 0.04 THOUSAND/UL (ref 0–0.2)
IMM GRANULOCYTES NFR BLD AUTO: 1 % (ref 0–2)
LYMPHOCYTES # BLD AUTO: 2.06 THOUSANDS/ΜL (ref 0.6–4.47)
LYMPHOCYTES NFR BLD AUTO: 24 % (ref 14–44)
MAGNESIUM SERPL-MCNC: 2.3 MG/DL (ref 1.9–2.7)
MCH RBC QN AUTO: 30.2 PG (ref 26.8–34.3)
MCHC RBC AUTO-ENTMCNC: 33.4 G/DL (ref 31.4–37.4)
MCV RBC AUTO: 90 FL (ref 82–98)
MONOCYTES # BLD AUTO: 1.11 THOUSAND/ΜL (ref 0.17–1.22)
MONOCYTES NFR BLD AUTO: 13 % (ref 4–12)
NEUTROPHILS # BLD AUTO: 4.91 THOUSANDS/ΜL (ref 1.85–7.62)
NEUTS SEG NFR BLD AUTO: 55 % (ref 43–75)
NRBC BLD AUTO-RTO: 0 /100 WBCS
PLATELET # BLD AUTO: 405 THOUSANDS/UL (ref 149–390)
PMV BLD AUTO: 9.7 FL (ref 8.9–12.7)
POTASSIUM SERPL-SCNC: 5.4 MMOL/L (ref 3.5–5.3)
PROT SERPL-MCNC: 7 G/DL (ref 6.4–8.4)
RBC # BLD AUTO: 4.57 MILLION/UL (ref 3.88–5.62)
RSV RNA RESP QL NAA+PROBE: NEGATIVE
SARS-COV-2 RNA RESP QL NAA+PROBE: NEGATIVE
SODIUM SERPL-SCNC: 135 MMOL/L (ref 135–147)
T4 FREE SERPL-MCNC: 1.01 NG/DL (ref 0.61–1.12)
TSH SERPL DL<=0.05 MIU/L-ACNC: 5.58 UIU/ML (ref 0.45–4.5)
WBC # BLD AUTO: 8.67 THOUSAND/UL (ref 4.31–10.16)

## 2024-10-04 PROCEDURE — 80048 BASIC METABOLIC PNL TOTAL CA: CPT | Performed by: EMERGENCY MEDICINE

## 2024-10-04 PROCEDURE — 85025 COMPLETE CBC W/AUTO DIFF WBC: CPT | Performed by: EMERGENCY MEDICINE

## 2024-10-04 PROCEDURE — 84484 ASSAY OF TROPONIN QUANT: CPT | Performed by: EMERGENCY MEDICINE

## 2024-10-04 PROCEDURE — 96366 THER/PROPH/DIAG IV INF ADDON: CPT

## 2024-10-04 PROCEDURE — 99285 EMERGENCY DEPT VISIT HI MDM: CPT | Performed by: EMERGENCY MEDICINE

## 2024-10-04 PROCEDURE — 93005 ELECTROCARDIOGRAM TRACING: CPT

## 2024-10-04 PROCEDURE — 83735 ASSAY OF MAGNESIUM: CPT | Performed by: EMERGENCY MEDICINE

## 2024-10-04 PROCEDURE — 83880 ASSAY OF NATRIURETIC PEPTIDE: CPT | Performed by: EMERGENCY MEDICINE

## 2024-10-04 PROCEDURE — 84443 ASSAY THYROID STIM HORMONE: CPT | Performed by: EMERGENCY MEDICINE

## 2024-10-04 PROCEDURE — 96361 HYDRATE IV INFUSION ADD-ON: CPT

## 2024-10-04 PROCEDURE — 36415 COLL VENOUS BLD VENIPUNCTURE: CPT | Performed by: EMERGENCY MEDICINE

## 2024-10-04 PROCEDURE — 99223 1ST HOSP IP/OBS HIGH 75: CPT | Performed by: PHYSICIAN ASSISTANT

## 2024-10-04 PROCEDURE — 84439 ASSAY OF FREE THYROXINE: CPT | Performed by: EMERGENCY MEDICINE

## 2024-10-04 PROCEDURE — 71046 X-RAY EXAM CHEST 2 VIEWS: CPT

## 2024-10-04 PROCEDURE — 99285 EMERGENCY DEPT VISIT HI MDM: CPT

## 2024-10-04 PROCEDURE — 80076 HEPATIC FUNCTION PANEL: CPT | Performed by: EMERGENCY MEDICINE

## 2024-10-04 PROCEDURE — 96365 THER/PROPH/DIAG IV INF INIT: CPT

## 2024-10-04 PROCEDURE — 0241U HB NFCT DS VIR RESP RNA 4 TRGT: CPT | Performed by: PHYSICIAN ASSISTANT

## 2024-10-04 PROCEDURE — 96376 TX/PRO/DX INJ SAME DRUG ADON: CPT

## 2024-10-04 RX ORDER — DILTIAZEM HYDROCHLORIDE 5 MG/ML
10 INJECTION INTRAVENOUS ONCE
Status: COMPLETED | OUTPATIENT
Start: 2024-10-04 | End: 2024-10-04

## 2024-10-04 RX ORDER — PRAVASTATIN SODIUM 80 MG/1
80 TABLET ORAL EVERY OTHER DAY
Status: DISCONTINUED | OUTPATIENT
Start: 2024-10-05 | End: 2024-10-06 | Stop reason: HOSPADM

## 2024-10-04 RX ORDER — LEVOTHYROXINE SODIUM 25 UG/1
25 TABLET ORAL
Status: DISCONTINUED | OUTPATIENT
Start: 2024-10-05 | End: 2024-10-06 | Stop reason: HOSPADM

## 2024-10-04 RX ORDER — FLUTICASONE FUROATE AND VILANTEROL 100; 25 UG/1; UG/1
1 POWDER RESPIRATORY (INHALATION)
Status: DISCONTINUED | OUTPATIENT
Start: 2024-10-05 | End: 2024-10-06 | Stop reason: HOSPADM

## 2024-10-04 RX ORDER — QUETIAPINE FUMARATE 25 MG/1
12.5 TABLET, FILM COATED ORAL
Status: DISCONTINUED | OUTPATIENT
Start: 2024-10-04 | End: 2024-10-06 | Stop reason: HOSPADM

## 2024-10-04 RX ORDER — PANTOPRAZOLE SODIUM 40 MG/1
40 TABLET, DELAYED RELEASE ORAL
Status: DISCONTINUED | OUTPATIENT
Start: 2024-10-05 | End: 2024-10-06 | Stop reason: HOSPADM

## 2024-10-04 RX ADMIN — SODIUM CHLORIDE 500 ML: 0.9 INJECTION, SOLUTION INTRAVENOUS at 18:23

## 2024-10-04 RX ADMIN — DILTIAZEM HYDROCHLORIDE 10 MG: 5 INJECTION, SOLUTION INTRAVENOUS at 18:23

## 2024-10-04 RX ADMIN — DILTIAZEM HYDROCHLORIDE 2.5 MG/HR: 5 INJECTION, SOLUTION INTRAVENOUS at 19:04

## 2024-10-04 NOTE — ED PROVIDER NOTES
Final diagnoses:   Palpitations   Atrial fibrillation (HCC)     ED Disposition       ED Disposition   Admit    Condition   Stable    Date/Time   Fri Oct 4, 2024  9:22 PM    Comment   Case was discussed with PAYAL and the patient's admission status was agreed to be Admission Status: observation status to the service of Dr. Trivedi .               Assessment & Plan       Medical Decision Making  1. Shortness of breath - Patient presents with atrial fibrillation, shortness of breath. He is not tachycardic and he is taking Eliquis. Will order ECG and troponin to rule out acute MI, CBC for anemia, metabolic panel for electrolyte abnormalities and dehydration, CXR and BNP for fluid overload. Will start on cardizem to try to convert to sinus rhythm as he is symptomatic.     Problems Addressed:  Atrial fibrillation (HCC): acute illness or injury  Palpitations: acute illness or injury    Amount and/or Complexity of Data Reviewed  Independent Historian: spouse  Labs: ordered.  Radiology: ordered.  ECG/medicine tests: ordered and independent interpretation performed.    Risk  Prescription drug management.  Decision regarding hospitalization.             Medications   diltiazem (CARDIZEM) 125 mg in sodium chloride 0.9 % 125 mL infusion (2.5 mg/hr Intravenous Rate/Dose Change 10/4/24 2141)   sodium chloride 0.9 % bolus 500 mL (0 mL Intravenous Stopped 10/4/24 1938)   diltiazem (CARDIZEM) injection 10 mg (10 mg Intravenous Given 10/4/24 1823)       ED Risk Strat Scores                           SBIRT 22yo+      Flowsheet Row Most Recent Value   Initial Alcohol Screen: US AUDIT-C     1. How often do you have a drink containing alcohol? 0 Filed at: 10/04/2024 1800   2. How many drinks containing alcohol do you have on a typical day you are drinking?  0 Filed at: 10/04/2024 1800   3b. FEMALE Any Age, or MALE 65+: How often do you have 4 or more drinks on one occassion? 0 Filed at: 10/04/2024 1800   Audit-C Score 0 Filed at: 10/04/2024  1800   MIN: How many times in the past year have you...    Used an illegal drug or used a prescription medication for non-medical reasons? Never Filed at: 10/04/2024 1800                            History of Present Illness       Chief Complaint   Patient presents with    Shortness of Breath     Pt reports hx of a-fib and believes since last night he has been in it. Reports sob, more tired, and feels like his heart is racing. Denies chest pain.     Fatigue       Past Medical History:   Diagnosis Date    Back pain     Benign prostatic hypertrophy     Disease of thyroid gland     Elevated PSA 3/3/2021    Hyperlipidemia     Hypertension     Prostate cancer (HCC) 7/21/2021    Restless leg       Past Surgical History:   Procedure Laterality Date    ACHILLES TENDON SURGERY      COLONOSCOPY      EGD  09/03/2024    Dr Jones -    HERNIA REPAIR      KNEE ARTHROSCOPY Right     shoulder    DE PLMT INTERSTITIAL DEV RADIAT TX PROSTATE 1/MULT N/A 09/21/2021    Procedure: INSERTION OF FIDUCIAL MARKER (TRANSRECTAL ULTRASOUND GUIDANCE), SPACEOAR;  Surgeon: Daniel Bryant MD;  Location: [facility];  Service: Urology    DE PROSTATE NEEDLE BIOPSY ANY APPROACH N/A 06/15/2021    Procedure: TRANSPERINEAL MRI FUSION PROSTATE BIOPSY;  Surgeon: Cruzito Yee MD;  Location: [facility];  Service: Urology    SKIN BIOPSY      TONSILLECTOMY        Family History   Problem Relation Age of Onset    Colon cancer Mother     Cancer Mother     Throat cancer Father     Cancer Father         Esophagus    Breast cancer Sister     Breast cancer additional onset Sister     Bone cancer Sister       Social History     Tobacco Use    Smoking status: Never    Smokeless tobacco: Never   Vaping Use    Vaping status: Never Used   Substance Use Topics    Alcohol use: Yes     Alcohol/week: 7.0 standard drinks of alcohol     Types: 7 Standard drinks or equivalent per week    Drug use: No      E-Cigarette/Vaping    E-Cigarette Use Never User       E-Cigarette/Vaping  Substances    Nicotine No     THC No     CBD No     Flavoring No     Other No     Unknown No       I have reviewed and agree with the history as documented.     85 YO male with history of paroxysmal atrial fibrillation presents with palpitations and shortness of breath that began last night. Patient states he has increased dyspnea on exertion and chest pressure. He takes metoprolol and Eliquis, states he has been compliant with this. He has had similar symptoms with previous episodes of atrial fibrillation, states this is his third. He feels well while resting. Pt denies F/C/N/V/D/C, no dysuria, burning on urination or blood in urine.       History provided by:  Patient, spouse and medical records   used: No    Fatigue  Associated symptoms: chest pain and shortness of breath    Associated symptoms: no abdominal pain, no dizziness, no dysuria, no fever, no frequency, no nausea and no vomiting        Review of Systems   Constitutional:  Positive for fatigue. Negative for fever.   HENT:  Negative for dental problem.    Eyes:  Negative for visual disturbance.   Respiratory:  Positive for shortness of breath.    Cardiovascular:  Positive for chest pain and palpitations.   Gastrointestinal:  Negative for abdominal pain, nausea and vomiting.   Genitourinary:  Negative for dysuria and frequency.   Musculoskeletal:  Negative for neck pain and neck stiffness.   Skin:  Negative for rash.   Neurological:  Negative for dizziness, weakness and light-headedness.   Psychiatric/Behavioral:  Negative for agitation, behavioral problems and confusion.    All other systems reviewed and are negative.          Objective       ED Triage Vitals   Temperature Pulse Blood Pressure Respirations SpO2 Patient Position - Orthostatic VS   10/04/24 1803 10/04/24 1757 10/04/24 1757 10/04/24 1757 10/04/24 1757 10/04/24 1757   97.5 °F (36.4 °C) 89 119/74 20 99 % Lying      Temp Source Heart Rate Source BP Location FiO2 (%) Pain  Score    10/04/24 1803 10/04/24 1757 10/04/24 1757 -- 10/04/24 2200    Oral Monitor Right arm  No Pain      Vitals      Date and Time Temp Pulse SpO2 Resp BP Pain Score FACES Pain Rating User   10/04/24 2200 -- -- -- -- -- No Pain -- RB   10/04/24 2200 98.1 °F (36.7 °C) 67 97 % 16 113/73 -- -- TH   10/04/24 2130 -- 80 98 % 18 122/74 -- -- SR   10/04/24 2045 -- 76 99 % 21 105/71 -- -- SR   10/04/24 2030 -- 78 99 % 18 103/55 -- -- SR   10/04/24 2015 -- 76 99 % 20 113/66 -- -- SR   10/04/24 1930 -- 80 97 % 22 123/72 -- -- SR   10/04/24 1915 -- 76 98 % 22 106/66 -- -- SR   10/04/24 1904 -- 73 -- -- 128/72 -- -- SR   10/04/24 1830 -- 73 97 % 20 101/61 -- -- SR   10/04/24 1815 -- 83 100 % 20 157/102 -- -- SR   10/04/24 1803 97.5 °F (36.4 °C) -- -- -- -- -- -- SR   10/04/24 1757 -- 89 99 % 20 119/74 -- -- AA            Physical Exam  Vitals and nursing note reviewed.   Constitutional:       Appearance: He is well-developed.   HENT:      Head: Normocephalic and atraumatic.   Eyes:      Extraocular Movements: Extraocular movements intact.   Cardiovascular:      Rate and Rhythm: Normal rate. Rhythm irregular.      Pulses: Normal pulses.      Heart sounds: Normal heart sounds.   Pulmonary:      Effort: Pulmonary effort is normal.      Breath sounds: Normal breath sounds.   Abdominal:      General: There is no distension.   Musculoskeletal:         General: Normal range of motion.      Cervical back: Normal range of motion.   Skin:     Findings: No rash.   Neurological:      Mental Status: He is alert and oriented to person, place, and time.   Psychiatric:         Behavior: Behavior normal.         Results Reviewed       Procedure Component Value Units Date/Time    HS Troponin I 4hr [[id number]]  (Normal) Collected: 10/04/24 2326    Lab Status: Final result Specimen: Blood from Arm, Right Updated: 10/04/24 2456     hs TnI 4hr 12 ng/L      Delta 4hr hsTnI -1 ng/L     T4, free [[id number]]  (Normal) Collected: 10/04/24 1819    Lab  "Status: Final result Specimen: Blood from Arm, Left Updated: 10/04/24 2305     Free T4 1.01 ng/dL     Narrative:        \"Therapeutic range for patients medicated with thyroid disorders: 0.61-1.24 ng/dL.\"    COVID/FLU/RSV [[id number]]  (Normal) Collected: 10/04/24 2129    Lab Status: Final result Specimen: Nares from Nose Updated: 10/04/24 2213     SARS-CoV-2 Negative     INFLUENZA A PCR Negative     INFLUENZA B PCR Negative     RSV PCR Negative    Narrative:      This test has been performed using the CoV-2/Flu/RSV plus assay on the [employer] platform. This test has been validated by the  and verified by the performing laboratory.     This test is designed to amplify and detect the following: nucleocapsid (N), envelope (E), and RNA-dependent RNA polymerase (RdRP) genes of the SARS-CoV-2 genome; matrix (M), basic polymerase (PB2), and acidic protein (PA) segments of the influenza A genome; matrix (M) and non-structural protein (NS) segments of the influenza B genome, and the nucleocapsid genes of RSV A and RSV B.     Positive results are indicative of the presence of Flu A, Flu B, RSV, and/or SARS-CoV-2 RNA. Positive results for SARS-CoV-2 or suspected novel influenza should be reported to state, local, or federal health departments according to local reporting requirements.      All results should be assessed in conjunction with clinical presentation and other laboratory markers for clinical management.     FOR PEDIATRIC PATIENTS - copy/paste COVID Guidelines URL to browser: [email or web address]       HS Troponin I 2hr [[id number]]  (Normal) Collected: 10/04/24 2022    Lab Status: Final result Specimen: Blood Updated: 10/04/24 2046     hs TnI 2hr 11 ng/L      Delta 2hr hsTnI -2 ng/L     Basic metabolic panel [[id number]]  (Abnormal) Collected: 10/04/24 1819    Lab Status: Final result Specimen: Blood from Arm, Left Updated: 10/04/24 1901     " Sodium 135 mmol/L      Potassium 5.4 mmol/L      Chloride 103 mmol/L      CO2 26 mmol/L      ANION GAP 6 mmol/L      BUN 23 mg/dL      Creatinine 1.23 mg/dL      Glucose 99 mg/dL      Calcium 9.2 mg/dL      eGFR 53 ml/min/1.73sq m     Narrative:      National Kid[employer] Disease Foundation guidelines for Chronic Kidney Disease (CKD):     Stage 1 with normal or high GFR (GFR > 90 mL/min/1.73 square meters)    Stage 2 Mild CKD (GFR = 60-89 mL/min/1.73 square meters)    Stage 3A Moderate CKD (GFR = 45-59 mL/min/1.73 square meters)    Stage 3B Moderate CKD (GFR = 30-44 mL/min/1.73 square meters)    Stage 4 Severe CKD (GFR = 15-29 mL/min/1.73 square meters)    Stage 5 End Stage CKD (GFR <15 mL/min/1.73 square meters)  Note: GFR calculation is accurate only with a steady state creatinine    Hepatic function panel [[id number]]  (Normal) Collected: 10/04/24 1819    Lab Status: Final result Specimen: Blood from Arm, Left Updated: 10/04/24 1901     Total Bilirubin 0.56 mg/dL      Bilirubin, Direct 0.13 mg/dL      Alkaline Phosphatase 49 U/L      AST 29 U/L      ALT 14 U/L      Total Protein 7.0 g/dL      Albumin 4.3 g/dL     Magnesium [[id number]]  (Normal) Collected: 10/04/24 1819    Lab Status: Final result Specimen: Blood from Arm, Left Updated: 10/04/24 1901     Magnesium 2.3 mg/dL     TSH, 3rd generation with Free T4 reflex [[id number]]  (Abnormal) Collected: 10/04/24 1819    Lab Status: Final result Specimen: Blood from Arm, Left Updated: 10/04/24 1854     TSH 3RD GENERATON 5.581 uIU/mL     HS Troponin 0hr (reflex protocol) [[id number]]  (Normal) Collected: 10/04/24 1819    Lab Status: Final result Specimen: Blood from Arm, Left Updated: 10/04/24 1847     hs TnI 0hr 13 ng/L     B-Type Natriuretic Peptide(BNP) [[id number]]  (Abnormal) Collected: 10/04/24 1819    Lab Status: Final result Specimen: Blood from Arm, Left Updated: 10/04/24 1845      pg/mL     CBC and differential [[id number]]  (Abnormal) Collected: 10/04/24 1819     Lab Status: Final result Specimen: Blood from Arm, Left Updated: 10/04/24 1825     WBC 8.67 Thousand/uL      RBC 4.57 Million/uL      Hemoglobin 13.8 g/dL      Hematocrit 41.3 %      MCV 90 fL      MCH 30.2 pg      MCHC 33.4 g/dL      RDW 13.7 %      MPV 9.7 fL      Platelets 405 Thousands/uL      nRBC 0 /100 WBCs      Segmented % 55 %      Immature Grans % 1 %      Lymphocytes % 24 %      Monocytes % 13 %      Eosinophils Relative 6 %      Basophils Relative 1 %      Absolute Neutrophils 4.91 Thousands/µL      Absolute Immature Grans 0.04 Thousand/uL      Absolute Lymphocytes 2.06 Thousands/µL      Absolute Monocytes 1.11 Thousand/µL      Eosinophils Absolute 0.48 Thousand/µL      Basophils Absolute 0.07 Thousands/µL             XR chest 2 views    (Results Pending)       ECG 12 Lead Documentation Only    Date/Time: 10/5/2024 12:19 AM    Performed by: Fan Stubbs MD  Authorized by: Fan Stubbs MD    ECG reviewed by me, the ED Provider: yes    Patient location:  ED  Interpretation:     Interpretation: normal    Rate:     ECG rate:  85    ECG rate assessment: normal    Rhythm:     Rhythm: atrial fibrillation    QRS:     QRS axis:  Normal    QRS intervals:  Normal  Conduction:     Conduction: normal    ST segments:     ST segments:  Normal  T waves:     T waves: normal        ED Medication and Procedure Management   Prior to Admission Medications   Prescriptions Last Dose Informant Patient Reported? Taking?   Diclofenac Sodium (VOLTAREN) 1 %  Self No No   Sig: Apply 2 g topically 2 (two) times a day as needed (low back pain)   Eliquis 5 MG  Self No No   Sig: TAKE 1 TABLET TWICE A DAY   Fluticasone-Salmeterol (Advair Diskus) 250-50 mcg/dose inhaler   No No   Sig: Inhale 1 puff 2 (two) times a day Rinse mouth after use.   Krill Oil 500 MG CAPS  Self Yes No   Sig: Take by mouth   Multiple Vitamins-Minerals (CENTRUM SILVER 50+MEN PO)  Self Yes No   Sig: Take by mouth in the morning   amLODIPine (NORVASC)  2.5 mg tablet  Self Yes No   Si mg daily   bimatoprost (LUMIGAN) 0.01 % ophthalmic drops  Self Yes No   Sig: Administer 1 drop into the left eye daily at bedtime   fluticasone (FLONASE) 50 mcg/act nasal spray  Self Yes No   ketorolac (ACULAR) 0.5 % ophthalmic solution   Yes No   levothyroxine 25 mcg tablet  Self Yes No   Sig: Take 25 mcg by mouth daily   pantoprazole (PROTONIX) 40 mg tablet   No No   Sig: Take 1 tablet (40 mg total) by mouth daily   rosuvastatin (CRESTOR) 10 MG tablet  Self Yes No   Sig: 10 mg Pt takes med every other day--conversation with PCP (Dr. Alarcon)  2021 appt/Labs   traZODone (DESYREL) 50 mg tablet  Self Yes No      Facility-Administered Medications: None     Current Discharge Medication List        CONTINUE these medications which have NOT CHANGED    Details   amLODIPine (NORVASC) 2.5 mg tablet 5 mg daily      bimatoprost (LUMIGAN) 0.01 % ophthalmic drops Administer 1 drop into the left eye daily at bedtime      Diclofenac Sodium (VOLTAREN) 1 % Apply 2 g topically 2 (two) times a day as needed (low back pain)  Qty: 100 g, Refills: 2    Associated Diagnoses: Lumbosacral spondylosis without myelopathy      Eliquis 5 MG TAKE 1 TABLET TWICE A DAY  Qty: 180 tablet, Refills: 3    Associated Diagnoses: Atrial fibrillation, unspecified type (HCC)      fluticasone (FLONASE) 50 mcg/act nasal spray       Fluticasone-Salmeterol (Advair Diskus) 250-50 mcg/dose inhaler Inhale 1 puff 2 (two) times a day Rinse mouth after use.  Qty: 60 blister, Refills: 6    Comments: Substitution to a formulary equivalent within the same pharmaceutical class is authorized.  Associated Diagnoses: Bronchiectasis without complication (HCC)      ketorolac (ACULAR) 0.5 % ophthalmic solution       Krill Oil 500 MG CAPS Take by mouth      levothyroxine 25 mcg tablet Take 25 mcg by mouth daily      Multiple Vitamins-Minerals (CENTRUM SILVER 50+MEN PO) Take by mouth in the morning      pantoprazole (PROTONIX) 40 mg  tablet Take 1 tablet (40 mg total) by mouth daily  Qty: 90 tablet, Refills: 0    Associated Diagnoses: Gastroesophageal reflux disease, unspecified whether esophagitis present      rosuvastatin (CRESTOR) 10 MG tablet 10 mg Pt takes med every other day--conversation with PCP (Dr. Alarcon)  November 2021 appt/Labs      traZODone (DESYREL) 50 mg tablet            No discharge procedures on file.  ED SEPSIS DOCUMENTATION   Time reflects when diagnosis was documented in both MDM as applicable and the Disposition within this note       Time User Action Codes Description Comment    10/4/2024  9:22 PM Fan Stubbs Add [R00.2] Palpitations     10/4/2024  9:22 PM Fan Stubbs [I48.91] Atrial fibrillation (HCC)     10/4/2024 11:33 PM Cary Tabares Add [R07.89] Other chest pain                  Fan Stubbs MD  10/05/24 0019

## 2024-10-05 PROBLEM — I48.0 PAF (PAROXYSMAL ATRIAL FIBRILLATION) (HCC): Status: ACTIVE | Noted: 2017-08-28

## 2024-10-05 PROBLEM — E78.5 HYPERLIPIDEMIA: Status: ACTIVE | Noted: 2024-10-05

## 2024-10-05 PROCEDURE — 99232 SBSQ HOSP IP/OBS MODERATE 35: CPT

## 2024-10-05 PROCEDURE — 99222 1ST HOSP IP/OBS MODERATE 55: CPT | Performed by: INTERNAL MEDICINE

## 2024-10-05 RX ADMIN — LEVOTHYROXINE SODIUM 25 MCG: 25 TABLET ORAL at 05:57

## 2024-10-05 RX ADMIN — APIXABAN 5 MG: 5 TABLET, FILM COATED ORAL at 00:41

## 2024-10-05 RX ADMIN — QUETIAPINE FUMARATE 12.5 MG: 25 TABLET ORAL at 00:41

## 2024-10-05 RX ADMIN — Medication 12.5 MG: at 08:44

## 2024-10-05 RX ADMIN — QUETIAPINE FUMARATE 12.5 MG: 25 TABLET ORAL at 22:22

## 2024-10-05 RX ADMIN — AMIODARONE HYDROCHLORIDE 1 MG/MIN: 50 INJECTION, SOLUTION INTRAVENOUS at 11:27

## 2024-10-05 RX ADMIN — FLUTICASONE FUROATE AND VILANTEROL TRIFENATATE 1 PUFF: 100; 25 POWDER RESPIRATORY (INHALATION) at 08:44

## 2024-10-05 RX ADMIN — APIXABAN 5 MG: 5 TABLET, FILM COATED ORAL at 16:24

## 2024-10-05 RX ADMIN — PANTOPRAZOLE SODIUM 40 MG: 40 TABLET, DELAYED RELEASE ORAL at 06:00

## 2024-10-05 RX ADMIN — APIXABAN 5 MG: 5 TABLET, FILM COATED ORAL at 08:45

## 2024-10-05 RX ADMIN — DEXTROSE 150 MG: 50 INJECTION, SOLUTION INTRAVENOUS at 11:17

## 2024-10-05 RX ADMIN — PRAVASTATIN SODIUM 80 MG: 80 TABLET ORAL at 08:44

## 2024-10-05 NOTE — NURSING NOTE
Assumed care of patient at 12:30.  Patient resting comfortably in bed, no needs at this time.  All belongings in reach.

## 2024-10-05 NOTE — PROGRESS NOTES
Progress Note - Hospitalist   Name: Az Kern 84 y.o. male I MRN: [id number]  Unit/Bed#: E4 -01 I Date of Admission: 10/4/2024   Date of Service: 10/5/2024 I Hospital Day: 0    Assessment & Plan  PAF (paroxysmal atrial fibrillation): Recurrence with controlled ventricular rate present on arrival  Patient has history of atrial fibrillation, anticoagulated with Eliquis but previously had not been on any rate control medication given bradycardia as a side effect.  Patient stated that for past 1 to 2 days he was having some mild fatigue along with some component of lightheadedness he is not sure about it.  Yesterday evening he noticed a pounding sensation in his chest and decided to come to the ED.  He was seen and examined in the ED blood workup and imaging was done patient was found to be in A-fib with heart rate in 100s.  Patient received IV Cardizem bolus along with Cardizem gtt. in the ED and heart rate stabilized between 80s to 90s    -Continue with anticoagulation with Eliquis  -Echo ordered results pending  -Continue monitoring via telemetry for heart rate and signs of pauses/bradycardia or potential tachybradycardia syndrome.  -Cardiology on board recommended IV amiodarone for chemical cardioversion before trying electrocardioversion.  -  Prostate cancer (HCC)  In remission s/p XRT and testosterone suppression followed by urology  Bronchiectasis without complication (HCC)  Cxr w/o acute pathology followed by pulm  Trialing advair will substitute for breo while ip  Chest pain  -Troponin negative  -Chest pain resolved  -Cardiology on board  -Same as above as paroxysmal A-fib.  Insomnia  -D/w pt given chronic insomnia w/o relief from trazodone/melatonin.  Qtc acceptable will trial low dose seroquel  Hyperlipidemia  -Continue with prior to admission regimen of pravastatin on alternate days.    VTE Pharmacologic Prophylaxis:   High Risk (Score >/= 5) - Pharmacological DVT Prophylaxis Ordered: apixaban  (Eliquis). Sequential Compression Devices Ordered.    Mobility:   Basic Mobility Inpatient Raw Score: 24  JH-HLM Goal: 8: Walk 250 feet or more  JH-HLM Achieved: 8: Walk 250 feet ot more      Discussions with Specialists or Other Care Team Provider: Cardiology.    Education and Discussions with Family / Patient: Updated  (son) via phone.    Current Length of Stay: 0 day(s)  Current Patient Status: Inpatient   Discharge Plan: Anticipate discharge in 24-48 hrs to home.    Code Status: Level 1 - Full Code    Subjective   Patient was seen and examined at bedside.  Patient stated that he is doing overall okay given that he feels tired because of his insomnia and was not able to sleep well in the hospital as well even after the medication.  Patient is currently denying any chest pain shortness of breath palpitations lightheadedness or dizziness.  Patient stated that he is generally feeling very fatigued and weakened by this episode.  Currently no acute complaints.  All 10 systems reviewed negative except as of above.    Objective :  Temp:  [97.5 °F (36.4 °C)-98.1 °F (36.7 °C)] 98 °F (36.7 °C)  HR:  [65-89] 65  BP: ()/() 101/62  Resp:  [16-22] 18  SpO2:  [95 %-100 %] 96 %  O2 Device: None (Room air)    Body mass index is 22.02 kg/m².     Input and Output Summary (last 24 hours):     Intake/Output Summary (Last 24 hours) at 10/5/2024 1149  Last data filed at 10/5/2024 0857  Gross per 24 hour   Intake 1140 ml   Output 800 ml   Net 340 ml       Physical Exam  Vitals and nursing note reviewed.   Constitutional:       General: He is not in acute distress.     Appearance: Normal appearance. He is well-developed.   HENT:      Head: Normocephalic and atraumatic.      Mouth/Throat:      Mouth: Mucous membranes are moist.   Eyes:      Conjunctiva/sclera: Conjunctivae normal.   Cardiovascular:      Rate and Rhythm: Normal rate.      Heart sounds: No murmur heard.  Pulmonary:      Effort: Pulmonary effort is  normal. No respiratory distress.      Breath sounds: Normal breath sounds.   Abdominal:      General: Abdomen is flat. Bowel sounds are normal.      Palpations: Abdomen is soft.      Tenderness: There is no abdominal tenderness.   Musculoskeletal:         General: No swelling.      Cervical back: Neck supple.   Skin:     General: Skin is warm and dry.      Capillary Refill: Capillary refill takes less than 2 seconds.   Neurological:      Mental Status: He is alert and oriented to person, place, and time. Mental status is at baseline.   Psychiatric:         Mood and Affect: Mood normal.         Lines/Drains:        Telemetry:  Telemetry Orders (From admission, onward)               24 Hour Telemetry Monitoring  Continuous x 24 Hours (Telem)        Question:  Reason for 24 Hour Telemetry  Answer:  Arrhythmias requiring acute medical intervention / PPM or ICD malfunction                                  Lab Results: I have reviewed the following results:   Results from last 7 days   Lab Units 10/04/24  1819   WBC Thousand/uL 8.67   HEMOGLOBIN g/dL 13.8   HEMATOCRIT % 41.3   PLATELETS Thousands/uL 405*   SEGS PCT % 55   LYMPHO PCT % 24   MONO PCT % 13*   EOS PCT % 6     Results from last 7 days   Lab Units 10/04/24  1819   SODIUM mmol/L 135   POTASSIUM mmol/L 5.4*   CHLORIDE mmol/L 103   CO2 mmol/L 26   BUN mg/dL 23   CREATININE mg/dL 1.23   ANION GAP mmol/L 6   CALCIUM mg/dL 9.2   ALBUMIN g/dL 4.3   TOTAL BILIRUBIN mg/dL 0.56   ALK PHOS U/L 49   ALT U/L 14   AST U/L 29   GLUCOSE RANDOM mg/dL 99                       Recent Cultures (last 7 days):         Imaging Results Review: I reviewed radiology reports from this admission including: EKG and chest xray.    XR chest 2 views    Result Date: 10/5/2024  Impression: No acute cardiopulmonary disease. Workstation performed: [id number]      Last 24 Hours Medication List:     Current Facility-Administered Medications:     amiodarone (CORDARONE) 900 mg in dextrose 5 % 500 mL  infusion, Continuous, Last Rate: 1 mg/min (10/05/24 1127) **FOLLOWED BY** amiodarone (CORDARONE) 900 mg in dextrose 5 % 500 mL infusion, Continuous    apixaban (ELIQUIS) tablet 5 mg, BID    diltiazem (CARDIZEM) 125 mg in sodium chloride 0.9 % 125 mL infusion, Titrated, Last Rate: Stopped (10/05/24 1121)    Fluticasone Furoate-Vilanterol 100-25 mcg/actuation 1 puff, Daily    levothyroxine tablet 25 mcg, Early Morning    pantoprazole (PROTONIX) EC tablet 40 mg, Daily Before Breakfast    pravastatin (PRAVACHOL) tablet 80 mg, Every Other Day    QUEtiapine (SEROquel) tablet 12.5 mg, HS    Administrative Statements   Today, Patient Was Seen By: Mis Billy MD  I have spent a total time of >35 minutes in caring for this patient on the day of the visit/encounter including Diagnostic results, Prognosis, Risks and benefits of tx options, Instructions for management, Patient and family education, Importance of tx compliance, Risk factor reductions, Impressions, Counseling / Coordination of care, Documenting in the medical record, Reviewing / ordering tests, medicine, procedures  , Obtaining or reviewing history  , and Communicating with other healthcare professionals .    **Please Note: This note may have been constructed using a voice recognition system.**

## 2024-10-05 NOTE — ASSESSMENT & PLAN NOTE
Dx 2017  Anticoagulation: Eliquis  AV blocking Rx: None -previously bradycardic on low-dose metoprolol

## 2024-10-05 NOTE — ASSESSMENT & PLAN NOTE
Hx of paroxysmal afib. Normally rate controlled off pharmacotherapy AC w/eliquis as prior HR was in 50s per last op cardiology note. No missed doses of AC per pt  S/p iv cardizem 10mg bolus and cardizem gtt 5mg/hr in ED for HR in 80s-90s  w/concern for symptomatic rate controlled a fib/flutter  Update echo  Decrease cardizem to 2.5mg/hr and continue to wean start lopressor 12.5mg po bid given prior bradycardia, monitor on telemetry, consult cardiology

## 2024-10-05 NOTE — ASSESSMENT & PLAN NOTE
-Troponin negative  -Chest pain resolved  -Cardiology on board  -Same as above as paroxysmal A-fib.

## 2024-10-05 NOTE — ASSESSMENT & PLAN NOTE
-D/w pt given chronic insomnia w/o relief from trazodone/melatonin.  Qtc acceptable will trial low dose seroquel

## 2024-10-05 NOTE — ASSESSMENT & PLAN NOTE
Patient has history of atrial fibrillation, anticoagulated with Eliquis but previously had not been on any rate control medication given bradycardia as a side effect.  Patient stated that for past 1 to 2 days he was having some mild fatigue along with some component of lightheadedness he is not sure about it.  Yesterday evening he noticed a pounding sensation in his chest and decided to come to the ED.  He was seen and examined in the ED blood workup and imaging was done patient was found to be in A-fib with heart rate in 100s.  Patient received IV Cardizem bolus along with Cardizem gtt. in the ED and heart rate stabilized between 80s to 90s    -Continue with anticoagulation with Eliquis  -Echo ordered results pending  -Continue monitoring via telemetry for heart rate and signs of pauses/bradycardia or potential tachybradycardia syndrome.  -Cardiology on board recommended IV amiodarone for chemical cardioversion before trying electrocardioversion.  -

## 2024-10-05 NOTE — ASSESSMENT & PLAN NOTE
Intermittent chronic left sided cp.  Mild and somewhat poorly described but with some atypical features (doesn't occur with raking leaves/necessarily exertional component  Trend troponins for acs r/o

## 2024-10-05 NOTE — PLAN OF CARE
Problem: PAIN - ADULT  Goal: Verbalizes/displays adequate comfort level or baseline comfort level  Description: Interventions:  - Encourage patient to monitor pain and request assistance  - Assess pain using appropriate pain scale  - Administer analgesics based on type and severity of pain and evaluate response  - Implement non-pharmacological measures as appropriate and evaluate response  - Consider cultural and social influences on pain and pain management  - Notify physician/advanced practitioner if interventions unsuccessful or patient reports new pain  Outcome: Progressing     Problem: INFECTION - ADULT  Goal: Absence or prevention of progression during hospitalization  Description: INTERVENTIONS:  - Assess and monitor for signs and symptoms of infection  - Monitor lab/diagnostic results  - Monitor all insertion sites, i.e. indwelling lines, tubes, and drains  - Monitor endotracheal if appropriate and nasal secretions for changes in amount and color  - [place] appropriate cooling/warming therapies per order  - Administer medications as ordered  - Instruct and encourage patient and family to use good hand hygiene technique  - Identify and instruct in appropriate isolation precautions for identified infection/condition  Outcome: Progressing  Goal: Absence of fever/infection during neutropenic period  Description: INTERVENTIONS:  - Monitor WBC    Outcome: Progressing     Problem: SAFETY ADULT  Goal: Patient will remain free of falls  Description: INTERVENTIONS:  - Educate patient/family on patient safety including physical limitations  - Instruct patient to call for assistance with activity   - Consult OT/PT to assist with strengthening/mobility   - Keep Call bell within reach  - Keep bed low and locked with side rails adjusted as appropriate  - Keep care items and personal belongings within reach  - Initiate and maintain comfort rounds  - Make Fall Risk Sign visible to staff  - Offer Toileting every 2 Hours,  in advance of need  - Initiate/Maintain bed/chair alarm  - Obtain necessary fall risk management equipment: bed/chair alarm  - Apply yellow socks and bracelet for high fall risk patients  - Consider moving patient to room near nurses station  Outcome: Progressing  Goal: Maintain or return to baseline ADL function  Description: INTERVENTIONS:  -  Assess patient's ability to carry out ADLs; assess patient's baseline for ADL function and identify physical deficits which impact ability to perform ADLs (bathing, care of mouth/teeth, toileting, grooming, dressing, etc.)  - Assess/evaluate cause of self-care deficits   - Assess range of motion  - Assess patient's mobility; develop plan if impaired  - Assess patient's need for assistive devices and provide as appropriate  - Encourage maximum independence but intervene and supervise when necessary  - Involve family in performance of ADLs  - Assess for home care needs following discharge   - Consider OT consult to assist with ADL evaluation and planning for discharge  - Provide patient education as appropriate  Outcome: Progressing  Goal: Maintains/Returns to pre admission functional level  Description: INTERVENTIONS:  - Perform AM-PAC 6 Click Basic Mobility/ Daily Activity assessment daily.  - Set and communicate daily mobility goal to care team and patient/family/caregiver.   - Collaborate with rehabilitation services on mobility goals if consulted  - Perform Range of Motion 4 times a day.  - Reposition patient every 2 hours.  - Dangle patient 3 times a day  - Stand patient 3 times a day  - Ambulate patient 3 times a day  - Out of bed to chair 3 times a day   - Out of bed for meals 3 times a day  - Out of bed for toileting  - Record patient progress and toleration of activity level   Outcome: Progressing     Problem: DISCHARGE PLANNING  Goal: Discharge to home or other facility with appropriate resources  Description: INTERVENTIONS:  - Identify barriers to discharge  w/patient and caregiver  - Arrange for needed discharge resources and transportation as appropriate  - Identify discharge learning needs (meds, wound care, etc.)  - Arrange for interpretive services to assist at discharge as needed  - Refer to Case Management Department for coordinating discharge planning if the patient needs post-hospital services based on physician/advanced practitioner order or complex needs related to functional status, cognitive ability, or social support system  Outcome: Progressing     Problem: Knowledge Deficit  Goal: Patient/family/caregiver demonstrates understanding of disease process, treatment plan, medications, and discharge instructions  Description: Complete learning assessment and assess knowledge base.  Interventions:  - Provide teaching at level of understanding  - Provide teaching via preferred learning methods  Outcome: Progressing     Problem: CARDIOVASCULAR - ADULT  Goal: Maintains optimal cardiac output and hemodynamic stability  Description: INTERVENTIONS:  - Monitor I/O, vital signs and rhythm  - Monitor for S/S and trends of decreased cardiac output  - Administer and titrate ordered vasoactive medications to optimize hemodynamic stability  - Assess quality of pulses, skin color and temperature  - Assess for signs of decreased coronary artery perfusion  - Instruct patient to report change in severity of symptoms  Outcome: Progressing  Goal: Absence of cardiac dysrhythmias or at baseline rhythm  Description: INTERVENTIONS:  - Continuous cardiac monitoring, vital signs, obtain 12 lead EKG if ordered  - Administer antiarrhythmic and heart rate control medications as ordered  - Monitor electrolytes and administer replacement therapy as ordered  Outcome: Progressing

## 2024-10-05 NOTE — H&P
H&P - Hospitalist   Name: Az Kern 84 y.o. male I MRN: [id number]  Unit/Bed#: E4 -01 I Date of Admission: 10/4/2024   Date of Service: 10/4/2024 I Hospital Day: 0     Assessment & Plan  A-fib (HCC)  Hx of paroxysmal afib. Normally rate controlled off pharmacotherapy AC w/eliquis as prior HR was in 50s per last op cardiology note. No missed doses of AC per pt  S/p iv cardizem 10mg bolus and cardizem gtt 5mg/hr in ED for HR in 80s-90s  w/concern for symptomatic rate controlled a fib/flutter  Update echo  Decrease cardizem to 2.5mg/hr and continue to wean start lopressor 12.5mg po bid given prior bradycardia, monitor on telemetry, consult cardiology      Prostate cancer ([facility])  In remission s/p XRT and testosterone suppression followed by urology  Bronchiectasis without complication ([facility])  Cxr w/o acute pathology followed by pulm  Trialing advair will substitute for breo while ip  Chest pain  Intermittent chronic left sided cp.  Mild and somewhat poorly described but with some atypical features (doesn't occur with raking leaves/necessarily exertional component  Trend troponins for acs r/o  Insomnia  D/w pt given chronic insomnia w/o relief from trazodone/melatonin.  Qtc acceptable will trial low dose seroquel      VTE Pharmacologic Prophylaxis:   High Risk (Score >/= 5) - Pharmacological DVT Prophylaxis Ordered: apixaban (Eliquis). Sequential Compression Devices Ordered.  Code Status: fc  Discussion with family:     Anticipated Length of Stay: Patient will be admitted on an observation basis with an anticipated length of stay of less than 2 midnights secondary to afib/lopes acs r/o.    History of Present Illness   Chief Complaint: sob palpitations/fatigue    Az Kern is a 84 y.o. male with a PMH of paroxysmal A-fib, bronchiectasis of lung, prostate cancer in remission status post XRT and testosterone suppression, insomnia who presents with fatigue shortness of breath low energy and palpitations x 1 day.   Patient reports he was in his normal state of health he was doing yard work with his elderly wife whom are both retired including some raking of leaves as well as sometimes being in the garden or climbing stairs without any exertional chest pain.  He does note that he had some chest discomfort at some point earlier in the day prior to admission hide left chest wall around third intercostal space around the left MCL he reports it was not worse with inspiration interested in doing with that chronically.  He also notes, however increasing fatigue that started in the afternoon of the day prior to admission after going to the dentist for routine cleaning.  He notes this progressed to some mild dizziness and low energy with standing and ambulation as well as some palpitation and mild dyspnea on exertion with ambulation.  He notes poor sleep overnight although this is not new for him with sleep initiation and maintenance issues.  He notes that he checked his pulse frequently which was irregular sometimes with heart rates upwards of 100s a few times upwards of 110s per his report with his home pulse oximeter.  He describes feeling sometimes short of breath overnight but was able to lay down on the floor some which she normally does due to his insomnia.    Review of Systems   Constitutional:  Positive for fatigue. Negative for chills and fever.   Cardiovascular:  Positive for chest pain and palpitations.   Gastrointestinal:  Negative for abdominal pain, nausea and vomiting.   Neurological:  Positive for light-headedness.   Psychiatric/Behavioral:  Positive for sleep disturbance.    All other systems reviewed and are negative.      Historical Information   Past Medical History:   Diagnosis Date    Back pain     Benign prostatic hypertrophy     Disease of thyroid gland     Elevated PSA 3/3/2021    Hyperlipidemia     Hypertension     Prostate cancer (HCC) 7/21/2021    Restless leg      Past Surgical History:   Procedure  Laterality Date    ACHILLES TENDON SURGERY      COLONOSCOPY      EGD  09/03/2024    Dr Jones -    HERNIA REPAIR      KNEE ARTHROSCOPY Right     shoulder    WA PLMT INTERSTITIAL DEV RADIAT TX PROSTATE 1/MULT N/A 09/21/2021    Procedure: INSERTION OF FIDUCIAL MARKER (TRANSRECTAL ULTRASOUND GUIDANCE), SPACEOAR;  Surgeon: Daniel Bryant MD;  Location: [facility];  Service: Urology    WA PROSTATE NEEDLE BIOPSY ANY APPROACH N/A 06/15/2021    Procedure: TRANSPERINEAL MRI FUSION PROSTATE BIOPSY;  Surgeon: Cruzito Yee MD;  Location: [facility];  Service: Urology    SKIN BIOPSY      TONSILLECTOMY       Social History     Tobacco Use    Smoking status: Never    Smokeless tobacco: Never   Vaping Use    Vaping status: Never Used   Substance and Sexual Activity    Alcohol use: Yes     Alcohol/week: 7.0 standard drinks of alcohol     Types: 7 Standard drinks or equivalent per week    Drug use: No    Sexual activity: Not Currently     Partners: Female     Birth control/protection: Coitus interruptus, Condom Male, Other     Comment: Wife took pill     E-Cigarette/Vaping    E-Cigarette Use Never User      E-Cigarette/Vaping Substances    Nicotine No     THC No     CBD No     Flavoring No     Other No     Unknown No        Social History:  Marital Status: /Civil Union   Occupation:   Patient Pre-hospital Living Situation:   Patient Pre-hospital Level of Mobility:   Patient Pre-hospital Diet Restrictions:     Objective :  Temp:  [97.5 °F (36.4 °C)-98.1 °F (36.7 °C)] 98.1 °F (36.7 °C)  HR:  [67-89] 67  BP: (101-157)/() 113/73  Resp:  [16-22] 16  SpO2:  [97 %-100 %] 97 %  O2 Device: None (Room air)    Physical Exam  Vitals reviewed.   Constitutional:       General: He is not in acute distress.     Appearance: He is not ill-appearing, toxic-appearing or diaphoretic.   HENT:      Head: Normocephalic and atraumatic.      Right Ear: External ear normal.      Left Ear: External ear normal.   Eyes:      Pupils: Pupils are  equal, round, and reactive to light.   Cardiovascular:      Rate and Rhythm: Normal rate. Rhythm irregular.      Heart sounds: No murmur heard.     No friction rub. No gallop.   Pulmonary:      Breath sounds: No wheezing, rhonchi or rales.   Abdominal:      General: There is no distension.      Palpations: Abdomen is soft. There is no mass.      Tenderness: There is no abdominal tenderness. There is no guarding or rebound.      Hernia: No hernia is present.   Musculoskeletal:      Right lower leg: No edema.      Left lower leg: No edema.   Skin:     General: Skin is warm and dry.   Neurological:      Mental Status: He is alert. Mental status is at baseline.   Psychiatric:         Mood and Affect: Mood normal.         Lines/Drains:            Lab Results: I have reviewed the following results:  Results from last 7 days   Lab Units 10/04/24  1819   WBC Thousand/uL 8.67   HEMOGLOBIN g/dL 13.8   HEMATOCRIT % 41.3   PLATELETS Thousands/uL 405*   SEGS PCT % 55   LYMPHO PCT % 24   MONO PCT % 13*   EOS PCT % 6     Results from last 7 days   Lab Units 10/04/24  1819   SODIUM mmol/L 135   POTASSIUM mmol/L 5.4*   CHLORIDE mmol/L 103   CO2 mmol/L 26   BUN mg/dL 23   CREATININE mg/dL 1.23   ANION GAP mmol/L 6   CALCIUM mg/dL 9.2   ALBUMIN g/dL 4.3   TOTAL BILIRUBIN mg/dL 0.56   ALK PHOS U/L 49   ALT U/L 14   AST U/L 29   GLUCOSE RANDOM mg/dL 99             Lab Results   Component Value Date    HGBA1C 5.6 09/27/2022    HGBA1C 5.4 07/03/2022    HGBA1C 5.7 (H) 03/22/2022           Imaging Results Review: I personally reviewed the following image studies in PACS and associated radiology reports: chest xray. My interpretation of the radiology images/reports is: cxr is w/o acute vascular congestion or infiltrates.  Other Study Results Review: EKG was personally reviewed and my interpretation is: Personally Reviewed. Atrial fibrillation. HR <100. ?component of aflutter as well..    Administrative Statements       ** Please Note: This  note has been constructed using a voice recognition system. **

## 2024-10-05 NOTE — ASSESSMENT & PLAN NOTE
D/w pt given chronic insomnia w/o relief from trazodone/melatonin.  Qtc acceptable will trial low dose seroquel

## 2024-10-05 NOTE — ASSESSMENT & PLAN NOTE
To me the patient does not endorse chest discomfort per se - though he does have recognition of his heart rate and rhythm noting that with activity this is more pronounced  HS Troponin normal x3

## 2024-10-05 NOTE — CONSULTS
Consultation - Cardiology   Name: Az Kern 84 y.o. male I MRN: [id number]  Unit/Bed#: E4 -01 I Date of Admission: 10/4/2024   Date of Service: 10/5/2024 I Hospital Day: 0   Inpatient consult to Cardiology  Consult performed by: Jonny Haji PA-C  Consult ordered by: Cary Tabares PA-C        Physician Requesting Evaluation: Mis Billy MD   Reason for Evaluation / Principal Problem: Palpitation, chest discomfort    Assessment & Plan  Chest pain  To me the patient does not endorse chest discomfort per se - though he does have recognition of his heart rate and rhythm noting that with activity this is more pronounced  HS Troponin normal x3  PAF (paroxysmal atrial fibrillation): Recurrence with controlled ventricular rate present on arrival  Dx 2017  Anticoagulation: Eliquis  AV blocking Rx: None -previously bradycardic on low-dose metoprolol  Hyperlipidemia  Rosuvastatin  Prostate cancer (HCC)  S/p XRT -end date 11/2021  Bronchiectasis without complication (HCC)    Discussion/plan  # Patient with prior diagnosis of paroxysmal atrial fibrillation that has been rather quiescent now presents with symptomatic recurrence as discussed in the HPI  Eliquis anticoagulation ongoing with no AV blocking medication prior to admission noting previous bradycardic rate trend on a low-dose beta-blocker with probable cardiac conduction disease    Continue Eliquis  Will initiate the patient on IV amiodarone hopeful for chemical cardioversion ~~> if not can consider electrocardioversion  Continue telemetry for monitoring of heart rate and for signs of pause, bradycardia, or other evidence of potential tachybradycardia syndrome for which pacemaker may be needed  Echocardiogram forthcoming          History Of Present Illness:  Az Kern is an 84-year-old with medical problems summarized in assessment above.  At his baseline he, despite his age, remains rather active noting that he regularly does yard work and  takes walks of up to a mile with typically good tolerance.    Two days ago the patient developed some mild decreased effort tolerance, fatigue, and vague lightheadedness.  He had some intermittent recognition of palpitations but denied any chest pain, near-syncope, or syncope.  Last evening, because of continued symptoms highlighted by lightheadedness he checked his vital signs noting a systolic pressure around 100 with a heart rate also around 100 and for these reasons he came to the hospital to be evaluated.    Upon arrival ECG showed recurrent atrial fibrillation with a controlled ventricular rate which was ongoing at the time of my visit        Past Medical History:        Past Medical History:   Diagnosis Date    Back pain     Benign prostatic hypertrophy     Disease of thyroid gland     Elevated PSA 3/3/2021    Hyperlipidemia     Hypertension     Prostate cancer (HCC) 7/21/2021    Restless leg       Past Surgical History:   Procedure Laterality Date    ACHILLES TENDON SURGERY      COLONOSCOPY      EGD  09/03/2024    Dr Jones -    HERNIA REPAIR      KNEE ARTHROSCOPY Right     shoulder    WI PLMT INTERSTITIAL DEV RADIAT TX PROSTATE 1/MULT N/A 09/21/2021    Procedure: INSERTION OF FIDUCIAL MARKER (TRANSRECTAL ULTRASOUND GUIDANCE), SPACEOAR;  Surgeon: Daniel Bryant MD;  Location: [facility];  Service: Urology    WI PROSTATE NEEDLE BIOPSY ANY APPROACH N/A 06/15/2021    Procedure: TRANSPERINEAL MRI FUSION PROSTATE BIOPSY;  Surgeon: Cruzito Yee MD;  Location: [facility];  Service: Urology    SKIN BIOPSY      TONSILLECTOMY          Allergy:        No Known Allergies    Medications:       Prior to Admission medications    Medication Sig Start Date End Date Taking? Authorizing Provider   amLODIPine (NORVASC) 2.5 mg tablet 5 mg daily 10/27/21   Historical Provider, MD   bimatoprost (LUMIGAN) 0.01 % ophthalmic drops Administer 1 drop into the left eye daily at bedtime    Historical Provider, MD   Diclofenac Sodium  (VOLTAREN) 1 % Apply 2 g topically 2 (two) times a day as needed (low back pain) 4/24/24   Will Jarek Bryson MD   Eliquis 5 MG TAKE 1 TABLET TWICE A DAY 11/2/23   Sarahy Mathur DO   fluticasone (FLONASE) 50 mcg/act nasal spray  12/6/20   Historical Provider, MD   Fluticasone-Salmeterol (Advair Diskus) 250-50 mcg/dose inhaler Inhale 1 puff 2 (two) times a day Rinse mouth after use. 8/15/24   Ernie Grande MD   ketorolac (ACULAR) 0.5 % ophthalmic solution  7/24/24   Historical Provider, MD   Krill Oil 500 MG CAPS Take by mouth    Historical Provider, MD   levothyroxine 25 mcg tablet Take 25 mcg by mouth daily    Historical Provider, MD   Multiple Vitamins-Minerals (CENTRUM SILVER 50+MEN PO) Take by mouth in the morning    Historical Provider, MD   pantoprazole (PROTONIX) 40 mg tablet Take 1 tablet (40 mg total) by mouth daily 8/26/24   Jyoti De Souza PA-C   rosuvastatin (CRESTOR) 10 MG tablet 10 mg Pt takes med every other day--conversation with PCP (Dr. Alarcon)  November 2021 appt/Labs 5/7/21   Historical Provider, MD   traZODone (DESYREL) 50 mg tablet  4/24/23   Historical Provider, MD       Family History:     Family History   Problem Relation Age of Onset    Colon cancer Mother     Cancer Mother     Throat cancer Father     Cancer Father         Esophagus    Breast cancer Sister     Breast cancer additional onset Sister     Bone cancer Sister         Social History:       Social History     Socioeconomic History    Marital status: /Civil Union     Spouse name: None    Number of children: None    Years of education: None    Highest education level: None   Occupational History    Occupation: Retired - [employer]   Tobacco Use    Smoking status: Never    Smokeless tobacco: Never   Vaping Use    Vaping status: Never Used   Substance and Sexual Activity    Alcohol use: Yes     Alcohol/week: 7.0 standard drinks of alcohol     Types: 7 Standard drinks or equivalent per week    Drug use: No     "Sexual activity: Not Currently     Partners: Female     Birth control/protection: Coitus interruptus, Condom Male, Other     Comment: Wife took pill   Other Topics Concern    None   Social History Narrative    None     Social Determinants of Health     Financial Resource Strain: Not on file   Food Insecurity: Not on file   Transportation Needs: Not on file   Physical Activity: Not on file   Stress: Not on file   Social Connections: Not on file   Intimate Partner Violence: Not on file   Housing Stability: Not on file       ROS:  Symptoms per HPI  Insomnia  The remainder of the review of systems is negative    Exam:  General:  Alert, normally conversant, comfortable appearing  Head: Normocephalic, atraumatic.  Eyes:  EOMI. Pupils - equal, round, reactive to accomodation.  No icterus.  Normal Conjunctiva.   Oropharynx: Moist without lesion  Neck:  No gross bruit, JVD, thyromegaly, or lymphadenopathy  Heart: Irregular with controlled rate.  No rub nor significant pathologic murmur  Lungs:  Clear without rales/rhonchi/wheeze  Abdomen:  Soft and nontender with normal bowel sounds. No organomegaly or mass  Lower Limbs:  No edema  Pulses:  RLE - DP:  2+                LLE - DP:  2+  Musculoskeletal: Independent movement of limbs observed, Formal ROM and strength eval not performed  Neurologic:    Oriented to: person, place, situation.     Cranial Nerves: grossly intact - vision, smell, taste, and hearing were not tested.     Motor function: grossly normal, symmetric   Sensation: Was not tested      Vitals:    10/04/24 2200 10/05/24 0213 10/05/24 0550 10/05/24 0734   BP: 113/73 98/71  121/77   BP Location: Right arm Right arm  Right arm   Pulse: 67 77  83   Resp: 16 16  18   Temp: 98.1 °F (36.7 °C) 97.9 °F (36.6 °C)  97.8 °F (36.6 °C)   TempSrc: Temporal Temporal  Temporal   SpO2: 97% 95%  97%   Weight: 58.2 kg (128 lb 4.9 oz)  58.2 kg (128 lb 4.9 oz)    Height: 5' 4\" (1.626 m)              DATA:      ECG:                  "       -----------------------------------------------------------------------------------------------------------------------------------------------  Telemetry:   Atrial fibrillation with ventricular rate trending approximately        Weights:    Wt Readings from Last 20 Encounters:   10/05/24 58.2 kg (128 lb 4.9 oz)   09/27/24 59 kg (130 lb)   08/15/24 58.5 kg (129 lb)   08/08/24 58.6 kg (129 lb 3.2 oz)   07/29/24 58.1 kg (128 lb)   07/03/24 58.2 kg (128 lb 4.9 oz)   06/19/24 60.8 kg (134 lb)   05/16/24 60.8 kg (134 lb)   05/07/24 60.3 kg (133 lb)   04/24/24 60.8 kg (134 lb)   04/03/24 60.8 kg (134 lb)   03/06/24 59.4 kg (131 lb)   02/14/24 60.8 kg (134 lb)   02/06/24 61.1 kg (134 lb 12.8 oz)   09/25/23 61.2 kg (135 lb)   08/29/23 61.2 kg (135 lb)   08/14/23 59.9 kg (132 lb)   08/01/23 60.3 kg (132 lb 15 oz)   07/20/23 60.1 kg (132 lb 9.6 oz)   07/17/23 60.8 kg (134 lb)   , Body mass index is 22.02 kg/m².         Lab Studies:               Results from last 7 days   Lab Units 10/04/24  1819   WBC Thousand/uL 8.67   HEMOGLOBIN g/dL 13.8   HEMATOCRIT % 41.3   PLATELETS Thousands/uL 405*   ,   Results from last 7 days   Lab Units 10/04/24  1819   POTASSIUM mmol/L 5.4*   CHLORIDE mmol/L 103   CO2 mmol/L 26   BUN mg/dL 23   CREATININE mg/dL 1.23   CALCIUM mg/dL 9.2   ALK PHOS U/L 49   ALT U/L 14   AST U/L 29

## 2024-10-06 ENCOUNTER — APPOINTMENT (INPATIENT)
Dept: NON INVASIVE DIAGNOSTICS | Facility: HOSPITAL | Age: 84
DRG: 310 | End: 2024-10-06
Payer: MEDICARE

## 2024-10-06 VITALS
TEMPERATURE: 98 F | SYSTOLIC BLOOD PRESSURE: 172 MMHG | BODY MASS INDEX: 21.85 KG/M2 | DIASTOLIC BLOOD PRESSURE: 81 MMHG | RESPIRATION RATE: 18 BRPM | WEIGHT: 128 LBS | HEART RATE: 71 BPM | OXYGEN SATURATION: 99 % | HEIGHT: 64 IN

## 2024-10-06 LAB
ANION GAP SERPL CALCULATED.3IONS-SCNC: 7 MMOL/L (ref 4–13)
AORTIC ROOT: 3.5 CM
AORTIC VALVE MEAN VELOCITY: 8.4 M/S
APICAL FOUR CHAMBER EJECTION FRACTION: 56 %
ASCENDING AORTA: 3.9 CM
ATRIAL RATE: 136 BPM
ATRIAL RATE: 220 BPM
AV AREA BY CONTINUOUS VTI: 3 CM2
AV AREA PEAK VELOCITY: 3.3 CM2
AV LVOT MEAN GRADIENT: 2 MMHG
AV LVOT PEAK GRADIENT: 4 MMHG
AV MEAN GRADIENT: 3 MMHG
AV PEAK GRADIENT: 6 MMHG
AV VALVE AREA: 3.03 CM2
AV VELOCITY RATIO: 0.8
BSA FOR ECHO PROCEDURE: 1.62 M2
BUN SERPL-MCNC: 22 MG/DL (ref 5–25)
CALCIUM SERPL-MCNC: 9.1 MG/DL (ref 8.4–10.2)
CHLORIDE SERPL-SCNC: 105 MMOL/L (ref 96–108)
CO2 SERPL-SCNC: 28 MMOL/L (ref 21–32)
CREAT SERPL-MCNC: 1.21 MG/DL (ref 0.6–1.3)
DOP CALC AO PEAK VEL: 1.22 M/S
DOP CALC AO VTI: 28.39 CM
DOP CALC LVOT AREA: 4.15 CM2
DOP CALC LVOT CARDIAC INDEX: 3.35 L/MIN/M2
DOP CALC LVOT CARDIAC OUTPUT: 5.43 L/MIN
DOP CALC LVOT DIAMETER: 2.3 CM
DOP CALC LVOT PEAK VEL VTI: 20.74 CM
DOP CALC LVOT PEAK VEL: 0.97 M/S
DOP CALC LVOT STROKE INDEX: 54.3 ML/M2
DOP CALC LVOT STROKE VOLUME: 86.13
E WAVE DECELERATION TIME: 152 MS
E/A RATIO: 0.76
ERYTHROCYTE [DISTWIDTH] IN BLOOD BY AUTOMATED COUNT: 13.4 % (ref 11.6–15.1)
FRACTIONAL SHORTENING: 20 (ref 28–44)
GFR SERPL CREATININE-BSD FRML MDRD: 55 ML/MIN/1.73SQ M
GLUCOSE SERPL-MCNC: 104 MG/DL (ref 65–140)
HCT VFR BLD AUTO: 38.5 % (ref 36.5–49.3)
HGB BLD-MCNC: 12.9 G/DL (ref 12–17)
INTERVENTRICULAR SEPTUM IN DIASTOLE (PARASTERNAL SHORT AXIS VIEW): 1.2 CM
INTERVENTRICULAR SEPTUM: 1.2 CM (ref 0.6–1.1)
LAAS-AP2: 25.5 CM2
LAAS-AP4: 23.9 CM2
LEFT ATRIUM SIZE: 4.3 CM
LEFT ATRIUM VOLUME (MOD BIPLANE): 84 ML
LEFT ATRIUM VOLUME INDEX (MOD BIPLANE): 51.9 ML/M2
LEFT INTERNAL DIMENSION IN SYSTOLE: 3.5 CM (ref 2.1–4)
LEFT VENTRICULAR INTERNAL DIMENSION IN DIASTOLE: 4.4 CM (ref 3.5–6)
LEFT VENTRICULAR POSTERIOR WALL IN END DIASTOLE: 1.2 CM
LEFT VENTRICULAR STROKE VOLUME: 37 ML
LVSV (TEICH): 37 ML
MCH RBC QN AUTO: 29.9 PG (ref 26.8–34.3)
MCHC RBC AUTO-ENTMCNC: 33.5 G/DL (ref 31.4–37.4)
MCV RBC AUTO: 89 FL (ref 82–98)
MV E'TISSUE VEL-LAT: 10 CM/S
MV E'TISSUE VEL-SEP: 7 CM/S
MV PEAK A VEL: 0.72 M/S
MV PEAK E VEL: 55 CM/S
PA SYSTOLIC PRESSURE: 39 MMHG
PLATELET # BLD AUTO: 311 THOUSANDS/UL (ref 149–390)
PMV BLD AUTO: 9.6 FL (ref 8.9–12.7)
POTASSIUM SERPL-SCNC: 4.7 MMOL/L (ref 3.5–5.3)
QRS AXIS: 16 DEGREES
QRS AXIS: 46 DEGREES
QRSD INTERVAL: 70 MS
QRSD INTERVAL: 72 MS
QT INTERVAL: 356 MS
QT INTERVAL: 400 MS
QTC INTERVAL: 423 MS
QTC INTERVAL: 432 MS
RBC # BLD AUTO: 4.31 MILLION/UL (ref 3.88–5.62)
RIGHT ATRIUM AREA SYSTOLE A4C: 19.4 CM2
RIGHT VENTRICLE ID DIMENSION: 4.2 CM
SINOTUBULAR JUNCTION: 3.1 CM
SL CV LEFT ATRIUM LENGTH A2C: 5.7 CM
SL CV LV EF: 55
SL CV PED ECHO LEFT VENTRICLE DIASTOLIC VOLUME (MOD BIPLANE) 2D: 90 ML
SL CV PED ECHO LEFT VENTRICLE SYSTOLIC VOLUME (MOD BIPLANE) 2D: 53 ML
SL CV SINUS OF VALSALVA 2D: 3.2 CM
SODIUM SERPL-SCNC: 140 MMOL/L (ref 135–147)
STJ: 3.1 CM
T WAVE AXIS: 59 DEGREES
T WAVE AXIS: 60 DEGREES
TR MAX PG: 37 MMHG
TR PEAK VELOCITY: 3 M/S
TRICUSPID ANNULAR PLANE SYSTOLIC EXCURSION: 1.9 CM
TRICUSPID VALVE PEAK REGURGITATION VELOCITY: 3.03 M/S
VENTRICULAR RATE: 70 BPM
VENTRICULAR RATE: 85 BPM
WBC # BLD AUTO: 7.55 THOUSAND/UL (ref 4.31–10.16)

## 2024-10-06 PROCEDURE — 93356 MYOCRD STRAIN IMG SPCKL TRCK: CPT | Performed by: INTERNAL MEDICINE

## 2024-10-06 PROCEDURE — 93306 TTE W/DOPPLER COMPLETE: CPT

## 2024-10-06 PROCEDURE — 85027 COMPLETE CBC AUTOMATED: CPT

## 2024-10-06 PROCEDURE — 99239 HOSP IP/OBS DSCHRG MGMT >30: CPT

## 2024-10-06 PROCEDURE — 99232 SBSQ HOSP IP/OBS MODERATE 35: CPT | Performed by: INTERNAL MEDICINE

## 2024-10-06 PROCEDURE — 93356 MYOCRD STRAIN IMG SPCKL TRCK: CPT

## 2024-10-06 PROCEDURE — 80048 BASIC METABOLIC PNL TOTAL CA: CPT

## 2024-10-06 PROCEDURE — 93306 TTE W/DOPPLER COMPLETE: CPT | Performed by: INTERNAL MEDICINE

## 2024-10-06 PROCEDURE — 93010 ELECTROCARDIOGRAM REPORT: CPT | Performed by: INTERNAL MEDICINE

## 2024-10-06 RX ORDER — AMIODARONE HYDROCHLORIDE 200 MG/1
200 TABLET ORAL 3 TIMES DAILY
Qty: 21 TABLET | Refills: 0 | Status: SHIPPED | OUTPATIENT
Start: 2024-10-06 | End: 2024-10-06

## 2024-10-06 RX ORDER — AMIODARONE HYDROCHLORIDE 200 MG/1
200 TABLET ORAL DAILY
Qty: 30 TABLET | Refills: 0 | Status: SHIPPED | OUTPATIENT
Start: 2024-10-14 | End: 2024-10-06

## 2024-10-06 RX ORDER — AMIODARONE HYDROCHLORIDE 200 MG/1
TABLET ORAL
Qty: 51 TABLET | Refills: 0 | Status: SHIPPED | OUTPATIENT
Start: 2024-10-06 | End: 2024-11-12

## 2024-10-06 RX ORDER — AMLODIPINE BESYLATE 5 MG/1
5 TABLET ORAL DAILY
Status: DISCONTINUED | OUTPATIENT
Start: 2024-10-06 | End: 2024-10-06 | Stop reason: HOSPADM

## 2024-10-06 RX ADMIN — AMLODIPINE BESYLATE 5 MG: 5 TABLET ORAL at 09:18

## 2024-10-06 RX ADMIN — APIXABAN 5 MG: 5 TABLET, FILM COATED ORAL at 09:18

## 2024-10-06 RX ADMIN — LEVOTHYROXINE SODIUM 25 MCG: 25 TABLET ORAL at 06:45

## 2024-10-06 RX ADMIN — AMIODARONE HYDROCHLORIDE 0.5 MG/MIN: 50 INJECTION, SOLUTION INTRAVENOUS at 10:41

## 2024-10-06 RX ADMIN — FLUTICASONE FUROATE AND VILANTEROL TRIFENATATE 1 PUFF: 100; 25 POWDER RESPIRATORY (INHALATION) at 09:18

## 2024-10-06 RX ADMIN — PANTOPRAZOLE SODIUM 40 MG: 40 TABLET, DELAYED RELEASE ORAL at 06:45

## 2024-10-06 NOTE — CASE MANAGEMENT
Case Management Assessment & Discharge Planning Note    Patient name Az Kern  Location [facility] /[facility]4 -* MRN [id number]  : 1940 Date 10/6/2024       Current Admission Date: 10/4/2024  Current Admission Diagnosis:PAF (paroxysmal atrial fibrillation): Recurrence with controlled ventricular rate present on arrival   Patient Active Problem List    Diagnosis Date Noted Date Diagnosed    Hyperlipidemia 10/05/2024     Chest pain 10/04/2024     Insomnia 10/04/2024     Bronchiectasis without complication (HCC) 2024     Shortness of breath 2024     Abnormal CT of the chest 2024     RUQ pain 2024     Epigastric pain 2024     History of radiation therapy 2024     Hypogonadism in male 2024     BPH associated with nocturia 2024     Lumbar spondylosis 2023     Lumbosacral spondylosis without myelopathy 10/26/2023     Lumbar radiculopathy      Primary osteoarthritis of right knee 2023     Vertigo 2022     Sleep apnea 2021     Prostate cancer (HCC) 2021     PAF (paroxysmal atrial fibrillation): Recurrence with controlled ventricular rate present on arrival 2017     Benign prostatic hypertrophy        LOS (days): 1  Geometric Mean LOS (GMLOS) (days):   Days to GMLOS:     OBJECTIVE:    Risk of Unplanned Readmission Score: 13.87         Current admission status: Inpatient       Preferred Pharmacy:   [facility]  for [place] [address]  Phone: [phone] Fax: [phone]    [facility] - [place] [address]  Phone: [phone] Fax: [phone]    [id number] - [place] - [address]  [address]  [place] [address]  Phone: [phone] Fax: [phone]    [id number] [place] [place] - Replaced by [facility]  [facility] [address]  Phone: [phone] Fax: [phone]    Primary Care Provider: Compa Alarcon DO    Primary Insurance: MEDICARE  Secondary Insurance:  FOR LIFE    ASSESSMENT:  Active Health Care Proxies       Leda Kern Health Care Representative - Spouse   Primary Phone: [phone] (Mobile)  Home Phone: [phone]                 Advance Directives  Does patient have a Health Care POA?: No  Was patient offered paperwork?: Yes (Pt declined)  Does patient currently have a Health Care decision maker?: Yes, please see Health Care Proxy section  Does patient have Advance Directives?: No  Was patient offered paperwork?: Yes (Pt declined)  Primary Contact: Leda Kern/Wife ([phone])    Readmission Root Cause  30 Day Readmission: No    Patient Information  Admitted from:: Home  Mental Status: Alert  During Assessment patient was accompanied by: Spouse  Assessment information provided by:: Patient  Primary Caregiver: Self  Support Systems: Spouse/significant other  County of Residence: [place]  What city do you live in?: [place]  Home entry access options. Select all that apply.: No steps to enter home  Type of Current Residence: [facility]  Living Arrangements: Lives w/ Spouse/significant other  Is patient a ?: Yes  Is patient active with [facility] ( Affairs)?: No    Activities of Daily Living Prior to Admission  Functional Status: Independent  Completes ADLs independently?: Yes  Ambulates independently?: Yes  Does patient use assisted devices?: No  Does patient currently own DME?: Yes  What DME does the patient currently own?: Straight [employer]  Does patient have a history of Outpatient Therapy (PT/OT)?: Yes  Does the patient have a history of Short-Term Rehab?: No  Does patient have a history of HHC?: No  Does patient currently have HHC?: No    Patient Information Continued  Income Source: SSI/SSD  Does patient have prescription coverage?: Yes  Does patient have a history of substance  abuse?: No  Does patient have a history of Mental Health Diagnosis?: No    Means of Transportation  Means of Transport to Appts:: Drives Self      Social Determinants of Health (SDOH)      Flowsheet Row Most Recent Value   Housing Stability    In the last 12 months, was there a time when you were not able to pay the mortgage or rent on time? N   In the past 12 months, how many times have you moved where you were living? 0   At any time in the past 12 months, were you homeless or living in a shelter (including now)? N   Transportation Needs    In the past 12 months, has lack of transportation kept you from medical appointments or from getting medications? no   In the past 12 months, has lack of transportation kept you from meetings, work, or from getting things needed for daily living? No   Food Insecurity    Within the past 12 months, you worried that your food would run out before you got the money to buy more. Never true   Within the past 12 months, the food you bought just didn't last and you didn't have money to get more. Never true   Utilities    In the past 12 months has the electric, gas, oil, or water company threatened to shut off services in your home? No            DISCHARGE DETAILS:    Discharge planning discussed with:: Patient     CM contacted family/caregiver?: Yes (Wife at bedside during assessment)  Were Treatment Team discharge recommendations reviewed with patient/caregiver?: Yes  Did patient/caregiver verbalize understanding of patient care needs?: Yes  Were patient/caregiver advised of the risks associated with not following Treatment Team discharge recommendations?: Yes    Contacts  Patient Contacts: Patient  Contact Method: In Person  Reason/Outcome: Continuity of Care, Emergency Contact, Discharge Planning    Other Referral/Resources/Interventions Provided:  Interventions: None Indicated    Treatment Team Recommendation: Home  Discharge Destination Plan:: Home  Transport at Discharge :  Family    Additional Comments: Patient reports residing with his wife in a ranch style home that has 0 ABRAN. Pt reports being independent with ADLs and ambulation, has a cane but does not use. Pt reports history of OPPT, no indicated history of HHC or STR. No discharge concerns or needs expressed or identified at this time, CM department to remain available.

## 2024-10-06 NOTE — PLAN OF CARE
Problem: PAIN - ADULT  Goal: Verbalizes/displays adequate comfort level or baseline comfort level  Description: Interventions:  - Encourage patient to monitor pain and request assistance  - Assess pain using appropriate pain scale  - Administer analgesics based on type and severity of pain and evaluate response  - Implement non-pharmacological measures as appropriate and evaluate response  - Consider cultural and social influences on pain and pain management  - Notify physician/advanced practitioner if interventions unsuccessful or patient reports new pain  10/6/2024 0302 by Alena Dewey RN  Outcome: Progressing  10/5/2024 2215 by Alena Dewey RN  Outcome: Progressing     Problem: INFECTION - ADULT  Goal: Absence or prevention of progression during hospitalization  Description: INTERVENTIONS:  - Assess and monitor for signs and symptoms of infection  - Monitor lab/diagnostic results  - Monitor all insertion sites, i.e. indwelling lines, tubes, and drains  - Monitor endotracheal if appropriate and nasal secretions for changes in amount and color  - [place] appropriate cooling/warming therapies per order  - Administer medications as ordered  - Instruct and encourage patient and family to use good hand hygiene technique  - Identify and instruct in appropriate isolation precautions for identified infection/condition  10/6/2024 0302 by Alena Dewey RN  Outcome: Progressing  10/5/2024 2215 by Alena Dewey RN  Outcome: Progressing  Goal: Absence of fever/infection during neutropenic period  Description: INTERVENTIONS:  - Monitor WBC    10/6/2024 0302 by Alena Dewey RN  Outcome: Progressing  10/5/2024 2215 by Alena Dewey RN  Outcome: Progressing     Problem: SAFETY ADULT  Goal: Patient will remain free of falls  Description: INTERVENTIONS:  - Educate patient/family on patient safety including physical limitations  - Instruct patient to call for assistance with activity   - Consult OT/PT to assist  with strengthening/mobility   - Keep Call bell within reach  - Keep bed low and locked with side rails adjusted as appropriate  - Keep care items and personal belongings within reach  - Initiate and maintain comfort rounds  - Make Fall Risk Sign visible to staff  - Offer Toileting every  Hours, in advance of need  - Initiate/Maintain alarm  - Obtain necessary fall risk management equipment:   - Apply yellow socks and bracelet for high fall risk patients  - Consider moving patient to room near nurses station  10/6/2024 0302 by lAena Dewey RN  Outcome: Progressing  10/5/2024 2215 by Alena Dewey RN  Outcome: Progressing  Goal: Maintain or return to baseline ADL function  Description: INTERVENTIONS:  -  Assess patient's ability to carry out ADLs; assess patient's baseline for ADL function and identify physical deficits which impact ability to perform ADLs (bathing, care of mouth/teeth, toileting, grooming, dressing, etc.)  - Assess/evaluate cause of self-care deficits   - Assess range of motion  - Assess patient's mobility; develop plan if impaired  - Assess patient's need for assistive devices and provide as appropriate  - Encourage maximum independence but intervene and supervise when necessary  - Involve family in performance of ADLs  - Assess for home care needs following discharge   - Consider OT consult to assist with ADL evaluation and planning for discharge  - Provide patient education as appropriate  10/6/2024 0302 by Alena Dewey RN  Outcome: Progressing  10/5/2024 2215 by Alena Dewey RN  Outcome: Progressing  Goal: Maintains/Returns to pre admission functional level  Description: INTERVENTIONS:  - Perform AM-PAC 6 Click Basic Mobility/ Daily Activity assessment daily.  - Set and communicate daily mobility goal to care team and patient/family/caregiver.   - Collaborate with rehabilitation services on mobility goals if consulted  - Perform Range of Motion  times a day.  - Reposition patient every   hours.  - Dangle patient  times a day  - Stand patient  times a day  - Ambulate patient times a day  - Out of bed to chair times a day   - Out of bed for meals  times a day  - Out of bed for toileting  - Record patient progress and toleration of activity level   10/6/2024 0302 by Alena Dewey RN  Outcome: Progressing  10/5/2024 2215 by Alena Dewey RN  Outcome: Progressing     Problem: DISCHARGE PLANNING  Goal: Discharge to home or other facility with appropriate resources  Description: INTERVENTIONS:  - Identify barriers to discharge w/patient and caregiver  - Arrange for needed discharge resources and transportation as appropriate  - Identify discharge learning needs (meds, wound care, etc.)  - Arrange for interpretive services to assist at discharge as needed  - Refer to Case Management Department for coordinating discharge planning if the patient needs post-hospital services based on physician/advanced practitioner order or complex needs related to functional status, cognitive ability, or social support system  10/6/2024 0302 by Alena Dewey RN  Outcome: Progressing  10/5/2024 2215 by Alena Dewey RN  Outcome: Progressing     Problem: Knowledge Deficit  Goal: Patient/family/caregiver demonstrates understanding of disease process, treatment plan, medications, and discharge instructions  Description: Complete learning assessment and assess knowledge base.  Interventions:  - Provide teaching at level of understanding  - Provide teaching via preferred learning methods  10/6/2024 0302 by Alena Dewey RN  Outcome: Progressing  10/5/2024 2215 by Alena Dewey RN  Outcome: Progressing     Problem: CARDIOVASCULAR - ADULT  Goal: Maintains optimal cardiac output and hemodynamic stability  Description: INTERVENTIONS:  - Monitor I/O, vital signs and rhythm  - Monitor for S/S and trends of decreased cardiac output  - Administer and titrate ordered vasoactive medications to optimize hemodynamic stability  -  Assess quality of pulses, skin color and temperature  - Assess for signs of decreased coronary artery perfusion  - Instruct patient to report change in severity of symptoms  10/6/2024 0302 by Alena Dewey RN  Outcome: Progressing  10/5/2024 2215 by Alena Dewey RN  Outcome: Progressing  Goal: Absence of cardiac dysrhythmias or at baseline rhythm  Description: INTERVENTIONS:  - Continuous cardiac monitoring, vital signs, obtain 12 lead EKG if ordered  - Administer antiarrhythmic and heart rate control medications as ordered  - Monitor electrolytes and administer replacement therapy as ordered  10/6/2024 0302 by Alena Dewey RN  Outcome: Progressing  10/5/2024 2215 by Alena Dewey RN  Outcome: Progressing

## 2024-10-06 NOTE — ASSESSMENT & PLAN NOTE
Patient has history of atrial fibrillation, anticoagulated with Eliquis but previously had not been on any rate control medication given bradycardia as a side effect.  Patient stated that for past 1 to 2 days he was having some mild fatigue along with some component of lightheadedness he is not sure about it.  Yesterday evening he noticed a pounding sensation in his chest and decided to come to the ED.  He was seen and examined in the ED blood workup and imaging was done patient was found to be in A-fib with heart rate in 100s.  Patient received IV Cardizem bolus along with Cardizem gtt. in the ED and heart rate stabilized between 80s to 90s.      -Continue with anticoagulation with Eliquis  -Echo ordered results pending  -Cardiology on board recommended transition to oral amiodarone 200 mg 3 times daily for 7 days and then titrating down to 200 mg daily.  -Continue with prior to admission amlodipine

## 2024-10-06 NOTE — DISCHARGE SUMMARY
Discharge Summary - Hospitalist   Name: Az Kern 84 y.o. male I MRN: [id number]  Unit/Bed#: E4 -01 I Date of Admission: 10/4/2024   Date of Service: 10/6/2024 I Hospital Day: 1     Assessment & Plan  PAF (paroxysmal atrial fibrillation): Recurrence with controlled ventricular rate present on arrival  Patient has history of atrial fibrillation, anticoagulated with Eliquis but previously had not been on any rate control medication given bradycardia as a side effect.  Patient stated that for past 1 to 2 days he was having some mild fatigue along with some component of lightheadedness he is not sure about it.  Yesterday evening he noticed a pounding sensation in his chest and decided to come to the ED.  He was seen and examined in the ED blood workup and imaging was done patient was found to be in A-fib with heart rate in 100s.  Patient received IV Cardizem bolus along with Cardizem gtt. in the ED and heart rate stabilized between 80s to 90s.      -Continue with anticoagulation with Eliquis  -Echo ordered results pending  -Cardiology on board recommended transition to oral amiodarone 200 mg 3 times daily for 7 days and then titrating down to 200 mg daily.  -Continue with prior to admission amlodipine  Prostate cancer (HCC)  In remission s/p XRT and testosterone suppression followed by urology  Bronchiectasis without complication (HCC)  Cxr w/o acute pathology followed by pulm  Trialing advair will substitute for breo while ip  Chest pain  -Troponin negative  -Chest pain resolved  -Cardiology on board  -Same as above as paroxysmal A-fib.  Insomnia  -D/w pt given chronic insomnia w/o relief from trazodone/melatonin.  Qtc acceptable will trial low dose seroquel  Hyperlipidemia  -Continue with prior to admission regimen of pravastatin on alternate days.     Medical Problems       Resolved Problems  Date Reviewed: 9/27/2024   None       Discharging Physician / Practitioner: Mis Billy MD  PCP: [facility]  "DO Dorian  Admission Date:   Admission Orders (From admission, onward)       Ordered        10/05/24 1149  INPATIENT ADMISSION  Once            10/04/24 2123  Place in Observation  Once                          Discharge Date: 10/06/24    Consultations During Hospital Stay:  Cardiology    Procedures Performed:   N/A    Significant Findings / Test Results:   N/A    Incidental Findings:   N/A    Test Results Pending at Discharge (will require follow up):   Echo     Outpatient Tests Requested:  N/A    Complications: N/A    Reason for Admission: N/A    Hospital Course:   Az Kern is a 84 y.o. male patient who originally presented to the hospital on 10/4/2024 due to lightheadedness.   Patient stated that for past 1 to 2 days he was having some mild fatigue along with some component of lightheadedness he is not sure about it.  Yesterday evening he noticed a pounding sensation in his chest and decided to come to the ED.  He was seen and examined in the ED blood workup and imaging was done patient was found to be in A-fib with heart rate in 100s.  Patient received IV Cardizem bolus along with Cardizem gtt. in the ED and heart rate stabilized between 80s to 90s.  Cardiology was consulted recommended cardioversion chemically while using IV amiodarone which was then transition to oral.   Patient has to follow-up PCP and cardiology after discharge.    Condition at Discharge: good    Discharge Day Visit / Exam:   Subjective: Patient was seen and examined at bedside.  Patient stated that he is feeling much better than before but slightly anxious and tired because of lack of sleep.  Patient had no new concerns for us today.  Vitals: Blood Pressure: (!) 172/81 (10/06/24 1122)  Pulse: 71 (10/06/24 1122)  Temperature: 98 °F (36.7 °C) (10/06/24 1122)  Temp Source: Temporal (10/06/24 1122)  Respirations: 18 (10/06/24 1122)  Height: 5' 4\" (162.6 cm) (10/06/24 0920)  Weight - Scale: 58.1 kg (128 lb) (10/06/24 0920)  SpO2: 99 % " (10/06/24 1122)  Physical Exam  Vitals and nursing note reviewed.   Constitutional:       General: He is not in acute distress.     Appearance: He is well-developed.      Comments: Patient is anxious and tired from not being able to sleep properly.   HENT:      Head: Normocephalic and atraumatic.   Eyes:      Conjunctiva/sclera: Conjunctivae normal.   Cardiovascular:      Rate and Rhythm: Normal rate and regular rhythm.      Heart sounds: No murmur heard.  Pulmonary:      Effort: Pulmonary effort is normal. No respiratory distress.      Breath sounds: Normal breath sounds.   Abdominal:      Palpations: Abdomen is soft.      Tenderness: There is no abdominal tenderness.   Musculoskeletal:         General: No swelling.      Cervical back: Neck supple.   Skin:     General: Skin is warm and dry.      Capillary Refill: Capillary refill takes less than 2 seconds.   Neurological:      Mental Status: He is alert and oriented to person, place, and time. Mental status is at baseline.   Psychiatric:         Mood and Affect: Mood normal.         Discussion with Family:  Called patient wife but she was at bedside and therefore patient stated that he will update about the discharge to his wife himself..     Discharge instructions/Information to patient and family:   See after visit summary for information provided to patient and family.      Provisions for Follow-Up Care:  See after visit summary for information related to follow-up care and any pertinent home health orders.      Mobility at time of Discharge:   Basic Mobility Inpatient Raw Score: 24  JH-HLM Goal: 8: Walk 250 feet or more  JH-HLM Achieved: 8: Walk 250 feet ot more       Disposition:   Home    Planned Readmission: N/A    Discharge Medications:  See after visit summary for reconciled discharge medications provided to patient and/or family.      Administrative Statements   Discharge Statement:  I have spent a total time of >40 minutes in caring for this patient on  the day of the visit/encounter. >30 minutes of time was spent on: Diagnostic results, Prognosis, Risks and benefits of tx options, Instructions for management, Patient and family education, Importance of tx compliance, Risk factor reductions, Impressions, Counseling / Coordination of care, Documenting in the medical record, Reviewing / ordering tests, medicine, procedures  , and Communicating with other healthcare professionals .    **Please Note: This note may have been constructed using a voice recognition system**

## 2024-10-06 NOTE — PROGRESS NOTES
"  Cardiology         Progress Note - Cardiology   Az Kern 84 y.o. male MRN: [id number]  Unit/Bed#: E4 -01 Encounter: [id number]          Assessment/Recommendations/Discussion:   Paroxysmal atrial fibrillation, now back in sinus rhythm  Dyslipidemia  Hypertension        Maintaining sinus rhythm.  Discontinue amiodarone infusion.  Start amiodarone 200 mg p.o. 3 times daily.  Okay to discharge today.  Transition to 200 mg once daily after 1 week.  Continue Eliquis anticoagulation.  Continue pravastatin.  Can resume amlodipine upon discharge.  Okay to discharge today from cardiac standpoint.  Will arrange outpatient follow-up.                Subjective: Patient seen and examined, feels well, admits to some fatigue as he did not get sleep last night.         Physical Exam:  GEN:  NAD  HEENT:  MMM, NCAT, pink conjunctiva, EOMI, nonicteric sclera  CV:  NO JVD/HJR, RR, NO M/R/G, +S1/S2, NO PARASTERNAL HEAVE/THRILL, NO LE EDEMA, NO HEPATIC SYSTOLIC PULSATION, WARM EXTREMITIES  RESP:  CTAB/L  ABD:  SOFT, NT, NO GROSS ORGANOMEGALY        Vitals:   BP (!) 172/81 (BP Location: Right arm)   Pulse 71   Temp 98 °F (36.7 °C) (Temporal)   Resp 18   Ht 5' 4\" (1.626 m)   Wt 58.1 kg (128 lb)   SpO2 99%   BMI 21.97 kg/m²   Vitals:    10/05/24 0550 10/06/24 0920   Weight: 58.2 kg (128 lb 4.9 oz) 58.1 kg (128 lb)       Intake/Output Summary (Last 24 hours) at 10/6/2024 1141  Last data filed at 10/6/2024 1040  Gross per 24 hour   Intake 848.71 ml   Output 700 ml   Net 148.71 ml       TELEMETRY: SR  Lab Results:  Results from last 7 days   Lab Units 10/06/24  0532   WBC Thousand/uL 7.55   HEMOGLOBIN g/dL 12.9   HEMATOCRIT % 38.5   PLATELETS Thousands/uL 311     Results from last 7 days   Lab Units 10/06/24  0532 10/04/24  1819   POTASSIUM mmol/L 4.7 5.4*   CHLORIDE mmol/L 105 103   CO2 mmol/L 28 26   BUN mg/dL 22 23   CREATININE mg/dL 1.21 1.23   CALCIUM mg/dL 9.1 9.2   ALK PHOS U/L  --  49   ALT U/L  --  14   AST U/L  -- "  29     Results from last 7 days   Lab Units 10/06/24  0532   POTASSIUM mmol/L 4.7   CHLORIDE mmol/L 105   CO2 mmol/L 28   BUN mg/dL 22   CREATININE mg/dL 1.21   CALCIUM mg/dL 9.1           Medications:    Current Facility-Administered Medications:     [] amiodarone (CORDARONE) 900 mg in dextrose 5 % 500 mL infusion, 1 mg/min, Intravenous, Continuous, Last Rate: 33.3 mL/hr at 10/05/24 1127, 1 mg/min at 10/05/24 1127 **FOLLOWED BY** amiodarone (CORDARONE) 900 mg in dextrose 5 % 500 mL infusion, 0.5 mg/min, Intravenous, Continuous, Jonny Haji PA-C, Last Rate: 16.7 mL/hr at 10/06/24 1041, 0.5 mg/min at 10/06/24 1041    amLODIPine (NORVASC) tablet 5 mg, 5 mg, Oral, Daily, Mis Billy MD, 5 mg at 10/06/24 0918    apixaban (ELIQUIS) tablet 5 mg, 5 mg, Oral, BID, Cary Tabares PA-C, 5 mg at 10/06/24 0918    Fluticasone Furoate-Vilanterol 100-25 mcg/actuation 1 puff, 1 puff, Inhalation, Daily, Cary Tabares PA-C, 1 puff at 10/06/24 0918    levothyroxine tablet 25 mcg, 25 mcg, Oral, Early Morning, Cary Tabares PA-C, 25 mcg at 10/06/24 0645    pantoprazole (PROTONIX) EC tablet 40 mg, 40 mg, Oral, Daily Before Breakfast, Cary Tabares PA-C, 40 mg at 10/06/24 0645    pravastatin (PRAVACHOL) tablet 80 mg, 80 mg, Oral, Every Other Day, Cary Tabares PA-C, 80 mg at 10/05/24 0844    QUEtiapine (SEROquel) tablet 12.5 mg, 12.5 mg, Oral, HS, Cary Tabares PA-C, 12.5 mg at 10/05/24 2222    This note was completed in part utilizing M-Modal Fluency Direct Software.  Grammatical errors, random word insertions, spelling mistakes, and incomplete sentences may be an occasional consequence of this system secondary to software limitations, ambient noise, and hardware issues.  If you have any questions or concerns about the content, text, or information contained within the body of this dictation, please contact the provider for clarification.

## 2024-10-06 NOTE — PLAN OF CARE
Problem: PAIN - ADULT  Goal: Verbalizes/displays adequate comfort level or baseline comfort level  Description: Interventions:  - Encourage patient to monitor pain and request assistance  - Assess pain using appropriate pain scale  - Administer analgesics based on type and severity of pain and evaluate response  - Implement non-pharmacological measures as appropriate and evaluate response  - Consider cultural and social influences on pain and pain management  - Notify physician/advanced practitioner if interventions unsuccessful or patient reports new pain  Outcome: Progressing     Problem: INFECTION - ADULT  Goal: Absence or prevention of progression during hospitalization  Description: INTERVENTIONS:  - Assess and monitor for signs and symptoms of infection  - Monitor lab/diagnostic results  - Monitor all insertion sites, i.e. indwelling lines, tubes, and drains  - Monitor endotracheal if appropriate and nasal secretions for changes in amount and color  - [place] appropriate cooling/warming therapies per order  - Administer medications as ordered  - Instruct and encourage patient and family to use good hand hygiene technique  - Identify and instruct in appropriate isolation precautions for identified infection/condition  Outcome: Progressing  Goal: Absence of fever/infection during neutropenic period  Description: INTERVENTIONS:  - Monitor WBC    Outcome: Progressing     Problem: SAFETY ADULT  Goal: Patient will remain free of falls  Description: INTERVENTIONS:  - Educate patient/family on patient safety including physical limitations  - Instruct patient to call for assistance with activity   - Consult OT/PT to assist with strengthening/mobility   - Keep Call bell within reach  - Keep bed low and locked with side rails adjusted as appropriate  - Keep care items and personal belongings within reach  - Initiate and maintain comfort rounds  - Make Fall Risk Sign visible to staff  - Offer Toileting every  Hours,  in advance of need  - Initiate/Maintain alarm  - Obtain necessary fall risk management equipment:   - Apply yellow socks and bracelet for high fall risk patients  - Consider moving patient to room near nurses station  Outcome: Progressing  Goal: Maintain or return to baseline ADL function  Description: INTERVENTIONS:  -  Assess patient's ability to carry out ADLs; assess patient's baseline for ADL function and identify physical deficits which impact ability to perform ADLs (bathing, care of mouth/teeth, toileting, grooming, dressing, etc.)  - Assess/evaluate cause of self-care deficits   - Assess range of motion  - Assess patient's mobility; develop plan if impaired  - Assess patient's need for assistive devices and provide as appropriate  - Encourage maximum independence but intervene and supervise when necessary  - Involve family in performance of ADLs  - Assess for home care needs following discharge   - Consider OT consult to assist with ADL evaluation and planning for discharge  - Provide patient education as appropriate  Outcome: Progressing  Goal: Maintains/Returns to pre admission functional level  Description: INTERVENTIONS:  - Perform AM-PAC 6 Click Basic Mobility/ Daily Activity assessment daily.  - Set and communicate daily mobility goal to care team and patient/family/caregiver.   - Collaborate with rehabilitation services on mobility goals if consulted  - Perform Range of Motion  times a day.  - Reposition patient every  hours.  - Dangle patient  times a day  - Stand patient  times a day  - Ambulate patient  times a day  - Out of bed to chair mes a day   - Out of bed for meals  times a day  - Out of bed for toileting  - Record patient progress and toleration of activity level   Outcome: Progressing     Problem: DISCHARGE PLANNING  Goal: Discharge to home or other facility with appropriate resources  Description: INTERVENTIONS:  - Identify barriers to discharge w/patient and caregiver  - Arrange for  needed discharge resources and transportation as appropriate  - Identify discharge learning needs (meds, wound care, etc.)  - Arrange for interpretive services to assist at discharge as needed  - Refer to Case Management Department for coordinating discharge planning if the patient needs post-hospital services based on physician/advanced practitioner order or complex needs related to functional status, cognitive ability, or social support system  Outcome: Progressing     Problem: Knowledge Deficit  Goal: Patient/family/caregiver demonstrates understanding of disease process, treatment plan, medications, and discharge instructions  Description: Complete learning assessment and assess knowledge base.  Interventions:  - Provide teaching at level of understanding  - Provide teaching via preferred learning methods  Outcome: Progressing     Problem: CARDIOVASCULAR - ADULT  Goal: Maintains optimal cardiac output and hemodynamic stability  Description: INTERVENTIONS:  - Monitor I/O, vital signs and rhythm  - Monitor for S/S and trends of decreased cardiac output  - Administer and titrate ordered vasoactive medications to optimize hemodynamic stability  - Assess quality of pulses, skin color and temperature  - Assess for signs of decreased coronary artery perfusion  - Instruct patient to report change in severity of symptoms  Outcome: Progressing  Goal: Absence of cardiac dysrhythmias or at baseline rhythm  Description: INTERVENTIONS:  - Continuous cardiac monitoring, vital signs, obtain 12 lead EKG if ordered  - Administer antiarrhythmic and heart rate control medications as ordered  - Monitor electrolytes and administer replacement therapy as ordered  Outcome: Progressing

## 2024-10-16 ENCOUNTER — OFFICE VISIT (OUTPATIENT)
Dept: PULMONOLOGY | Facility: CLINIC | Age: 84
End: 2024-10-16
Payer: MEDICARE

## 2024-10-16 VITALS
WEIGHT: 134 LBS | SYSTOLIC BLOOD PRESSURE: 130 MMHG | TEMPERATURE: 97.7 F | DIASTOLIC BLOOD PRESSURE: 70 MMHG | HEIGHT: 64 IN | BODY MASS INDEX: 22.88 KG/M2 | HEART RATE: 83 BPM | OXYGEN SATURATION: 100 %

## 2024-10-16 DIAGNOSIS — R06.02 SHORTNESS OF BREATH: ICD-10-CM

## 2024-10-16 DIAGNOSIS — R93.89 ABNORMAL CT OF THE CHEST: Primary | ICD-10-CM

## 2024-10-16 PROCEDURE — G2211 COMPLEX E/M VISIT ADD ON: HCPCS | Performed by: NURSE PRACTITIONER

## 2024-10-16 PROCEDURE — 99214 OFFICE O/P EST MOD 30 MIN: CPT | Performed by: NURSE PRACTITIONER

## 2024-10-16 NOTE — PROGRESS NOTES
Pulmonary Follow-Up Note   Az Kern 84 y.o. male MRN: [id number]  10/16/2024      Assessment/Plan:    Problem List Items Addressed This Visit       Shortness of breath     PFT reviewed with Kimo today - reassuringly normal spirometry, lung volumes, DLCO and flow volume loop. No BD response.  Reviewing recent ECHO results, PASP is mildly elevated with mild regurgitation. LVEF 55%, with Grade I abnormal relaxation.   Recent hospital visit for Afib - amiodarone added to usual regimen.    No clear pulmonary etiology influencing his sensation of shortness of breath. Following with Cardiology next month.    On Advair he feels no different. OK to stop Advair.         Abnormal CT of the chest - Primary     I have ordered a repeat CT of his chest, following prior peripheral infiltrate of the lingula.  I will call with results.          Vaccines: Up to date    Return if symptoms worsen or fail to improve.    All of Kimo's questions were answered prior to leaving the office today.  He is aware to call our office with any further questions or concerns.    History of Present Illness   Reason for Visit: Follow up  Chief Complaint: SOB  HPI: Az Kern is a 84 y.o. male who presents to the office today for follow up; at last visit he was asked to start a trial of Advair 250/50. On 10/4/24 he was admitted for 2 night hospital stay after symptoms presented including fatigue, lightheadedness, and pounding heart rate. He was found to be in Afib wit HR in the 100's and he was given Cardizem drip; he was discharged to home on a new rx for amiodarone in addition to amlodipine.    Today he feels his breathing is slightly improved from prior.   Advair of no clear benefit to patient. He has no wheezing. Does endorse a cough at times that can be vigorous and he credits that with clearing his lungs out. This cough has been his whole life, not new.    Review of Systems  Please note that a 14-point review of systems was performed to  include Constitutional, HEENT, Respiratory, CVS, GI, , Musculoskeletal, Integumentary, Neurologic, Rheumatologic, Endocrinologic, Psychiatric, Lymphatic, and Hematologic/Oncologic systems were reviewed and are negative unless otherwise stated in HPI. Positive and negative findings pertinent to this evaluation are incorporated into the history of present illness.       Historical Information   Past Medical History:   Diagnosis Date    Back pain     Benign prostatic hypertrophy     Disease of thyroid gland     Elevated PSA 3/3/2021    Hyperlipidemia     Hypertension     Prostate cancer (HCC) 7/21/2021    Restless leg      Past Surgical History:   Procedure Laterality Date    ACHILLES TENDON SURGERY      COLONOSCOPY      EGD  09/03/2024    Dr Jones -    HERNIA REPAIR      KNEE ARTHROSCOPY Right     shoulder    AZ PLMT INTERSTITIAL DEV RADIAT TX PROSTATE 1/MULT N/A 09/21/2021    Procedure: INSERTION OF FIDUCIAL MARKER (TRANSRECTAL ULTRASOUND GUIDANCE), SPACEOAR;  Surgeon: Daniel Bryant MD;  Location: [facility];  Service: Urology    AZ PROSTATE NEEDLE BIOPSY ANY APPROACH N/A 06/15/2021    Procedure: TRANSPERINEAL MRI FUSION PROSTATE BIOPSY;  Surgeon: Cruzito Yee MD;  Location: [facility];  Service: Urology    SKIN BIOPSY      TONSILLECTOMY       Family History   Problem Relation Age of Onset    Colon cancer Mother     Cancer Mother     Throat cancer Father     Cancer Father         Esophagus    Breast cancer Sister     Breast cancer additional onset Sister     Bone cancer Sister      Social History   Social History     Substance and Sexual Activity   Alcohol Use Yes    Alcohol/week: 7.0 standard drinks of alcohol    Types: 7 Standard drinks or equivalent per week     Social History     Substance and Sexual Activity   Drug Use No     Social History     Tobacco Use   Smoking Status Never   Smokeless Tobacco Never     E-Cigarette/Vaping    E-Cigarette Use Never User      E-Cigarette/Vaping Substances    Nicotine No   "   THC No     CBD No     Flavoring No     Other No     Unknown No        Meds/Allergies     Current Outpatient Medications:     amiodarone 200 mg tablet, Take 1 tablet (200 mg total) by mouth 3 (three) times a day for 7 days, THEN 1 tablet (200 mg total) daily., Disp: 51 tablet, Rfl: 0    amLODIPine (NORVASC) 2.5 mg tablet, 5 mg daily, Disp: , Rfl:     bimatoprost (LUMIGAN) 0.01 % ophthalmic drops, Administer 1 drop into the left eye daily at bedtime, Disp: , Rfl:     Eliquis 5 MG, TAKE 1 TABLET TWICE A DAY, Disp: 180 tablet, Rfl: 3    fluticasone (FLONASE) 50 mcg/act nasal spray, , Disp: , Rfl:     ketorolac (ACULAR) 0.5 % ophthalmic solution, , Disp: , Rfl:     Krill Oil 500 MG CAPS, Take by mouth, Disp: , Rfl:     levothyroxine 25 mcg tablet, Take 25 mcg by mouth daily, Disp: , Rfl:     Multiple Vitamins-Minerals (CENTRUM SILVER 50+MEN PO), Take by mouth in the morning, Disp: , Rfl:     pantoprazole (PROTONIX) 40 mg tablet, Take 1 tablet (40 mg total) by mouth daily, Disp: 90 tablet, Rfl: 0    rosuvastatin (CRESTOR) 10 MG tablet, 10 mg Pt takes med every other day--conversation with PCP (Dr. Alarcon) November 2021 appt/Labs, Disp: , Rfl:     traZODone (DESYREL) 50 mg tablet, , Disp: , Rfl:     Diclofenac Sodium (VOLTAREN) 1 %, Apply 2 g topically 2 (two) times a day as needed (low back pain) (Patient not taking: Reported on 10/16/2024), Disp: 100 g, Rfl: 2  No Known Allergies    Vitals: Blood pressure 130/70, pulse 83, temperature 97.7 °F (36.5 °C), temperature source Tympanic, height 5' 4\" (1.626 m), weight 60.8 kg (134 lb), SpO2 100%. Body mass index is 23 kg/m². Oxygen Therapy  SpO2: 100 %  Oxygen Therapy: None (Room air)      Physical Exam  Vitals reviewed.   Constitutional:       Appearance: Normal appearance.   HENT:      Head: Normocephalic.      Nose: Nose normal.      Mouth/Throat:      Mouth: Mucous membranes are moist.      Pharynx: Oropharynx is clear.   Cardiovascular:      Rate and Rhythm: Normal " "rate and regular rhythm.      Pulses: Normal pulses.      Heart sounds: Normal heart sounds.   Pulmonary:      Effort: Pulmonary effort is normal.      Breath sounds: Normal breath sounds.   Musculoskeletal:         General: Normal range of motion.   Skin:     General: Skin is warm and dry.      Capillary Refill: Capillary refill takes less than 2 seconds.   Neurological:      General: No focal deficit present.      Mental Status: He is alert and oriented to person, place, and time.   Psychiatric:         Mood and Affect: Mood normal.         Behavior: Behavior normal.         Labs:   Lab Results   Component Value Date    WBC 7.55 10/06/2024    HGB 12.9 10/06/2024    HCT 38.5 10/06/2024    MCV 89 10/06/2024     10/06/2024    EOSPCT 6 10/04/2024    EOSABS 0.48 10/04/2024    MONOPCT 13 (H) 10/04/2024     Lab Results   Component Value Date    CALCIUM 9.1 10/06/2024    K 4.7 10/06/2024    CO2 28 10/06/2024     10/06/2024    BUN 22 10/06/2024    CREATININE 1.21 10/06/2024     No results found for: \"IGE\", \"RAST\"  Lab Results   Component Value Date    ALT 14 10/04/2024    AST 29 10/04/2024    ALKPHOS 49 10/04/2024         Imaging and other studies: Results Review Statement: I reviewed radiology reports from this admission including: chest xray and Echocardiogram.  CXR 10/4/24  No acute cardiopulmonary disease.     ECHO 10/6/24    Left Ventricle: Left ventricular cavity size is normal. Wall thickness is normal. The left ventricular ejection fraction is 55%. Systolic function is normal. Global longitudinal strain is borderline at -17.6%. Wall motion is normal. Diastolic function is mildly abnormal, consistent with grade I (abnormal) relaxation.    Left Atrium: The atrium is moderately dilated.    Right Atrium: The atrium is moderately dilated.    Aortic Valve: There is mild regurgitation.    Mitral Valve: There is mild regurgitation.    Tricuspid Valve: There is mild regurgitation.    Pulmonic Valve: There is " "mild regurgitation.    Aorta: The aortic root is normal in size. The ascending aorta is mildly dilated. The ascending aorta is 3.9 cm.    Pulmonary Artery: The estimated pulmonary artery systolic pressure is 39.0 mmHg. The pulmonary artery systolic pressure is mildly increased.    Pulmonary Results (PFTs, PSG):   8/27/24  Spirometry:     FVC:   4.16 L      146% predicted              Z-score: 2.80                   FEV1: 3.09 L     143% predicted              Z-score: 2.48                                             FEV1/FVC Ratio:                                     Z-score: 0                                        After administration of bronchodilator:              No change      Lung volumes by body plethysmography:       Total Lung Capacity:    6.6 L     Z-score: 1.06      Residual volume:          2.55 L   107% predicted                              RV/TLC Ratio:               38%                                                   DLCO was corrected for patients hemoglobin level: 104% predicted                                                    Interpretation:     Normal spirometry.     No post bronchodilator response.     Normal lung volumes.     Normal corrected diffusion capacity.     Normal flow-volume loop      TUNDE Arzola  [facility]        Portions of the record may have been created with voice recognition software.  Occasional wrong word or \"sound a like\" substitutions may have occurred due to the inherent limitations of voice recognition software.  Read the chart carefully and recognize, using context, where substitutions have occurred or contact the dictating provider.  "

## 2024-10-16 NOTE — ASSESSMENT & PLAN NOTE
PFT reviewed with Kimo today - reassuringly normal spirometry, lung volumes, DLCO and flow volume loop. No BD response.  Reviewing recent ECHO results, PASP is mildly elevated with mild regurgitation. LVEF 55%, with Grade I abnormal relaxation.   Recent hospital visit for Afib - amiodarone added to usual regimen.    No clear pulmonary etiology influencing his sensation of shortness of breath. Following with Cardiology next month.    On Advair he feels no different. OK to stop Advair.

## 2024-10-16 NOTE — ASSESSMENT & PLAN NOTE
I have ordered a repeat CT of his chest, following prior peripheral infiltrate of the lingula.  I will call with results.

## 2024-11-18 ENCOUNTER — OFFICE VISIT (OUTPATIENT)
Dept: CARDIOLOGY CLINIC | Facility: CLINIC | Age: 84
End: 2024-11-18
Payer: MEDICARE

## 2024-11-18 VITALS
BODY MASS INDEX: 22.77 KG/M2 | SYSTOLIC BLOOD PRESSURE: 140 MMHG | HEIGHT: 64 IN | HEART RATE: 49 BPM | WEIGHT: 133.4 LBS | DIASTOLIC BLOOD PRESSURE: 70 MMHG

## 2024-11-18 DIAGNOSIS — I48.0 PAF (PAROXYSMAL ATRIAL FIBRILLATION) (HCC): Primary | ICD-10-CM

## 2024-11-18 DIAGNOSIS — I48.91 ATRIAL FIBRILLATION, UNSPECIFIED TYPE (HCC): ICD-10-CM

## 2024-11-18 DIAGNOSIS — R00.2 PALPITATIONS: ICD-10-CM

## 2024-11-18 DIAGNOSIS — I48.91 ATRIAL FIBRILLATION (HCC): ICD-10-CM

## 2024-11-18 PROCEDURE — 99214 OFFICE O/P EST MOD 30 MIN: CPT | Performed by: INTERNAL MEDICINE

## 2024-11-18 PROCEDURE — 93000 ELECTROCARDIOGRAM COMPLETE: CPT | Performed by: INTERNAL MEDICINE

## 2024-11-18 RX ORDER — AMIODARONE HYDROCHLORIDE 100 MG/1
100 TABLET ORAL DAILY
Qty: 90 TABLET | Refills: 3 | Status: SHIPPED | OUTPATIENT
Start: 2024-11-18

## 2024-11-18 NOTE — PROGRESS NOTES
"      Cardiology             Az Kern  1940  [id number]                 Discussion/Summary:     Paroxysmal atrial fibrillation  Dyslipidemia  Hypertension        Sinus bradycardia at 49 bpm on ECG today.  Will lower amiodarone to 100 mg po daily.  Continue Eliquis anticoagulation  Blood pressure controlled, continue amlodipine  Continue rosuvastatin for dyslipidemia        Follow-up in 6 months          Interval History:      This is a 83-year-old male with a history of atrial fibrillation diagnosed 08/2017, maintained on low-dose metoprolol up until May 2018.  He presented to his PCPs office with symptoms of intermittent lightheadedness with bradycardia in the 40s.  At that time his metoprolol was discontinued.  He has been maintained on Eliquis anticoagulation.     He underwent PFTs 02/07/2020 which was within normal limits.  CT chest 02/19/2020 revealed a few scattered small lung nodules.     He was diagnosed with prostate cancer, and has undergone radiation therapy.    He was hospitalized with rapid atrial fibrillation 10/2024.  He was initiated on amiodarone and went back into sinus rhythm.  He was transition to oral dosing and continue on Eliquis, pravastatin, and amlodipine.  Baseline PFTs 8/15/2024 demonstrated normal diffusion capacity.  TSH 10/4/2024 was elevated at 5.5 and he was maintained on levothyroxine.     He presents today for follow-up with no complaints.            Vitals:  Vitals:    11/18/24 1120   Weight: 60.5 kg (133 lb 6.4 oz)   Height: 5' 4\" (1.626 m)         Past Medical History:   Diagnosis Date    Atrial fibrillation (HCC)     Back pain     Benign prostatic hypertrophy     Disease of thyroid gland     Elevated PSA 03/03/2021    Hyperlipidemia     Hypertension     Prostate cancer (HCC) 07/21/2021    Restless leg      Social History     Socioeconomic History    Marital status: /Civil Union     Spouse name: Not on file    Number of children: Not on file    Years of " education: Not on file    Highest education level: Not on file   Occupational History    Occupation: Retired - [employer] [place]   Tobacco Use    Smoking status: Never    Smokeless tobacco: Never   Vaping Use    Vaping status: Never Used   Substance and Sexual Activity    Alcohol use: Yes     Alcohol/week: 7.0 standard drinks of alcohol     Types: 7 Standard drinks or equivalent per week    Drug use: Never    Sexual activity: Not Currently     Partners: Female     Birth control/protection: None     Comment: Wife took pill   Other Topics Concern    Not on file   Social History Narrative    Not on file     Social Drivers of Health     Financial Resource Strain: Not on file   Food Insecurity: No Food Insecurity (10/6/2024)    Nursing - Inadequate Food Risk Classification     Worried About Running Out of Food in the Last Year: Never true     Ran Out of Food in the Last Year: Never true     Ran Out of Food in the Last Year: Not on file   Transportation Needs: No Transportation Needs (10/6/2024)    PRAPARE - Transportation     Lack of Transportation (Medical): No     Lack of Transportation (Non-Medical): No   Physical Activity: Not on file   Stress: Not on file   Social Connections: Not on file   Intimate Partner Violence: Not on file   Housing Stability: Low Risk  (10/6/2024)    Housing Stability Vital Sign     Unable to Pay for Housing in the Last Year: No     Number of Times Moved in the Last Year: 0     Homeless in the Last Year: No      Family History   Problem Relation Age of Onset    Colon cancer Mother     Cancer Mother     Throat cancer Father     Cancer Father         Esophagus    Breast cancer Sister     Breast cancer additional onset Sister     Bone cancer Sister      Past Surgical History:   Procedure Laterality Date    ACHILLES TENDON SURGERY      COLONOSCOPY      EGD  09/03/2024    Dr Jones -    HERNIA REPAIR      KNEE ARTHROSCOPY Right     shoulder    NE PLMT INTERSTITIAL DEV RADIAT TX PROSTATE  1/MULT N/A 09/21/2021    Procedure: INSERTION OF FIDUCIAL MARKER (TRANSRECTAL ULTRASOUND GUIDANCE), SPACEOAR;  Surgeon: Daniel Bryant MD;  Location: [facility];  Service: Urology    HI PROSTATE NEEDLE BIOPSY ANY APPROACH N/A 06/15/2021    Procedure: TRANSPERINEAL MRI FUSION PROSTATE BIOPSY;  Surgeon: Cruzito Yee MD;  Location: [facility];  Service: Urology    SKIN BIOPSY      TONSILLECTOMY         Current Outpatient Medications:     amiodarone 200 mg tablet, Take 1 tablet (200 mg total) by mouth 3 (three) times a day for 7 days, THEN 1 tablet (200 mg total) daily., Disp: 51 tablet, Rfl: 0    amLODIPine (NORVASC) 2.5 mg tablet, 5 mg daily, Disp: , Rfl:     bimatoprost (LUMIGAN) 0.01 % ophthalmic drops, Administer 1 drop into the left eye daily at bedtime, Disp: , Rfl:     Diclofenac Sodium (VOLTAREN) 1 %, Apply 2 g topically 2 (two) times a day as needed (low back pain) (Patient not taking: Reported on 10/16/2024), Disp: 100 g, Rfl: 2    Eliquis 5 MG, TAKE 1 TABLET TWICE A DAY, Disp: 180 tablet, Rfl: 3    fluticasone (FLONASE) 50 mcg/act nasal spray, , Disp: , Rfl:     ketorolac (ACULAR) 0.5 % ophthalmic solution, , Disp: , Rfl:     Krill Oil 500 MG CAPS, Take by mouth, Disp: , Rfl:     levothyroxine 25 mcg tablet, Take 25 mcg by mouth daily, Disp: , Rfl:     Multiple Vitamins-Minerals (CENTRUM SILVER 50+MEN PO), Take by mouth in the morning, Disp: , Rfl:     pantoprazole (PROTONIX) 40 mg tablet, Take 1 tablet (40 mg total) by mouth daily, Disp: 90 tablet, Rfl: 0    rosuvastatin (CRESTOR) 10 MG tablet, 10 mg Pt takes med every other day--conversation with PCP (Dr. Alarcon) November 2021 appt/Labs, Disp: , Rfl:     traZODone (DESYREL) 50 mg tablet, , Disp: , Rfl:         Review of Systems:  Review of Systems   Constitutional:  Negative for fatigue.   Respiratory: Negative.  Negative for chest tightness and shortness of breath.    Cardiovascular: Negative.    Musculoskeletal:  Positive for back pain.   All other  systems reviewed and are negative.        Physical Exam:  Physical Exam  Constitutional:       General: He is not in acute distress.     Appearance: He is well-developed. He is not diaphoretic.   HENT:      Head: Normocephalic and atraumatic.   Eyes:      General: No scleral icterus.        Right eye: No discharge.      Pupils: Pupils are equal, round, and reactive to light.   Neck:      Thyroid: No thyromegaly.      Vascular: No carotid bruit.   Cardiovascular:      Rate and Rhythm: Normal rate and regular rhythm.      Heart sounds: Normal heart sounds. No murmur heard.     No friction rub. No gallop.   Pulmonary:      Effort: Pulmonary effort is normal.      Breath sounds: Normal breath sounds.   Abdominal:      General: There is no distension.      Tenderness: There is no abdominal tenderness. There is no guarding or rebound.   Musculoskeletal:         General: Normal range of motion.      Cervical back: Normal range of motion and neck supple.      Right lower leg: No edema.      Left lower leg: No edema.   Skin:     General: Skin is warm and dry.      Coloration: Skin is not pale.      Findings: No erythema or rash.   Neurological:      Mental Status: He is alert and oriented to person, place, and time.      Coordination: Coordination normal.   Psychiatric:         Behavior: Behavior normal.         Thought Content: Thought content normal.         Judgment: Judgment normal.       This note was completed in part utilizing [employer] Software.  Grammatical errors, random word insertions, spelling mistakes, and incomplete sentences can be an occasional consequence of this system secondary to software limitations, ambient noise, and hardware issues.  If you have any questions or concerns about the content, text, or information contained within the body of this dictation, please contact the provider for clarification.

## 2024-12-16 ENCOUNTER — HOSPITAL ENCOUNTER (OUTPATIENT)
Dept: CT IMAGING | Facility: HOSPITAL | Age: 84
Discharge: HOME | End: 2024-12-16
Payer: MEDICARE

## 2024-12-16 DIAGNOSIS — R93.89 ABNORMAL CT OF THE CHEST: ICD-10-CM

## 2024-12-16 PROCEDURE — 71250 CT THORAX DX C-: CPT

## 2024-12-23 ENCOUNTER — RESULTS FOLLOW-UP (OUTPATIENT)
Age: 84
End: 2024-12-23

## 2025-03-13 ENCOUNTER — APPOINTMENT (OUTPATIENT)
Dept: LAB | Facility: CLINIC | Age: 85
End: 2025-03-13
Payer: MEDICARE

## 2025-03-13 ENCOUNTER — TRANSCRIBE ORDERS (OUTPATIENT)
Dept: LAB | Facility: CLINIC | Age: 85
End: 2025-03-13

## 2025-03-13 DIAGNOSIS — C61 MALIGNANT NEOPLASM OF PROSTATE (HCC): Primary | ICD-10-CM

## 2025-03-13 DIAGNOSIS — I10 ESSENTIAL HYPERTENSION, MALIGNANT: ICD-10-CM

## 2025-03-13 DIAGNOSIS — I48.91 ATRIAL FIBRILLATION, UNSPECIFIED TYPE (HCC): ICD-10-CM

## 2025-03-13 DIAGNOSIS — Z00.01 ENCOUNTER FOR GENERAL ADULT MEDICAL EXAMINATION WITH ABNORMAL FINDINGS: ICD-10-CM

## 2025-03-13 DIAGNOSIS — C61 MALIGNANT NEOPLASM OF PROSTATE (HCC): ICD-10-CM

## 2025-03-13 DIAGNOSIS — C61 PROSTATE CANCER (HCC): ICD-10-CM

## 2025-03-13 DIAGNOSIS — E29.1 HYPOGONADISM IN MALE: ICD-10-CM

## 2025-03-13 LAB
ALBUMIN SERPL BCG-MCNC: 4.1 G/DL (ref 3.5–5)
ALP SERPL-CCNC: 55 U/L (ref 34–104)
ALT SERPL W P-5'-P-CCNC: 17 U/L (ref 7–52)
ANION GAP SERPL CALCULATED.3IONS-SCNC: 4 MMOL/L (ref 4–13)
AST SERPL W P-5'-P-CCNC: 22 U/L (ref 13–39)
BASOPHILS # BLD AUTO: 0.1 THOUSANDS/ÂΜL (ref 0–0.1)
BASOPHILS NFR BLD AUTO: 1 % (ref 0–1)
BILIRUB SERPL-MCNC: 0.7 MG/DL (ref 0.2–1)
BUN SERPL-MCNC: 28 MG/DL (ref 5–25)
CALCIUM SERPL-MCNC: 8.8 MG/DL (ref 8.4–10.2)
CHLORIDE SERPL-SCNC: 103 MMOL/L (ref 96–108)
CHOLEST SERPL-MCNC: 161 MG/DL (ref ?–200)
CK SERPL-CCNC: 268 U/L (ref 39–308)
CO2 SERPL-SCNC: 29 MMOL/L (ref 21–32)
CREAT SERPL-MCNC: 1.17 MG/DL (ref 0.6–1.3)
EOSINOPHIL # BLD AUTO: 0.32 THOUSAND/ÂΜL (ref 0–0.61)
EOSINOPHIL NFR BLD AUTO: 5 % (ref 0–6)
ERYTHROCYTE [DISTWIDTH] IN BLOOD BY AUTOMATED COUNT: 13.9 % (ref 11.6–15.1)
GFR SERPL CREATININE-BSD FRML MDRD: 57 ML/MIN/1.73SQ M
GLUCOSE P FAST SERPL-MCNC: 104 MG/DL (ref 65–99)
HCT VFR BLD AUTO: 39 % (ref 36.5–49.3)
HDLC SERPL-MCNC: 65 MG/DL
HGB BLD-MCNC: 12.7 G/DL (ref 12–17)
IMM GRANULOCYTES # BLD AUTO: 0.1 THOUSAND/UL (ref 0–0.2)
IMM GRANULOCYTES NFR BLD AUTO: 1 % (ref 0–2)
LDLC SERPL CALC-MCNC: 84 MG/DL (ref 0–100)
LYMPHOCYTES # BLD AUTO: 1.15 THOUSANDS/ÂΜL (ref 0.6–4.47)
LYMPHOCYTES NFR BLD AUTO: 16 % (ref 14–44)
MCH RBC QN AUTO: 30.7 PG (ref 26.8–34.3)
MCHC RBC AUTO-ENTMCNC: 32.6 G/DL (ref 31.4–37.4)
MCV RBC AUTO: 94 FL (ref 82–98)
MONOCYTES # BLD AUTO: 0.89 THOUSAND/ÂΜL (ref 0.17–1.22)
MONOCYTES NFR BLD AUTO: 13 % (ref 4–12)
NEUTROPHILS # BLD AUTO: 4.54 THOUSANDS/ÂΜL (ref 1.85–7.62)
NEUTS SEG NFR BLD AUTO: 64 % (ref 43–75)
NONHDLC SERPL-MCNC: 96 MG/DL
NRBC BLD AUTO-RTO: 0 /100 WBCS
PLATELET # BLD AUTO: 355 THOUSANDS/UL (ref 149–390)
PMV BLD AUTO: 9.5 FL (ref 8.9–12.7)
POTASSIUM SERPL-SCNC: 4.5 MMOL/L (ref 3.5–5.3)
PROT SERPL-MCNC: 6.8 G/DL (ref 6.4–8.4)
PSA SERPL-MCNC: 0.12 NG/ML (ref 0–4)
RBC # BLD AUTO: 4.14 MILLION/UL (ref 3.88–5.62)
SODIUM SERPL-SCNC: 136 MMOL/L (ref 135–147)
T4 FREE SERPL-MCNC: 0.85 NG/DL (ref 0.61–1.12)
TESTOST SERPL-MSCNC: 101 NG/DL (ref 175–781)
TRIGL SERPL-MCNC: 62 MG/DL (ref ?–150)
TSH SERPL DL<=0.05 MIU/L-ACNC: 11.35 UIU/ML (ref 0.45–4.5)
WBC # BLD AUTO: 7.1 THOUSAND/UL (ref 4.31–10.16)

## 2025-03-13 PROCEDURE — 84443 ASSAY THYROID STIM HORMONE: CPT

## 2025-03-13 PROCEDURE — 84403 ASSAY OF TOTAL TESTOSTERONE: CPT

## 2025-03-13 PROCEDURE — 84153 ASSAY OF PSA TOTAL: CPT

## 2025-03-13 PROCEDURE — 82550 ASSAY OF CK (CPK): CPT

## 2025-03-13 PROCEDURE — 84439 ASSAY OF FREE THYROXINE: CPT

## 2025-03-13 PROCEDURE — 85025 COMPLETE CBC W/AUTO DIFF WBC: CPT

## 2025-03-13 PROCEDURE — 36415 COLL VENOUS BLD VENIPUNCTURE: CPT

## 2025-03-13 PROCEDURE — 80061 LIPID PANEL: CPT

## 2025-03-13 PROCEDURE — 80053 COMPREHEN METABOLIC PANEL: CPT

## 2025-03-27 NOTE — PROGRESS NOTES
3/28/2025      Chief Complaint   Patient presents with   • Prostate cancer (HCC)     Assessment and Plan    84 y.o. male managed by Dr. Yee     1. Prostate cancer (HCC)  Assessment & Plan:  Prostate cancer Lisset 7  status post 6-month course of androgen deprivation and IMRT finishing 2021.   Testosterone is low normal  PSA levels have been stable   Patient has been having PSA checks every 6 months with testosterone level   Plan to follow up in 6 months with lab work prior   Would like to follow back up with Dr. Yee       Lab Results   Component Value Date    PSA 0.122 03/13/2025    PSA 0.210 09/23/2024    PSA 0.19 02/19/2024     PSA trend:   8/2013-0.13  2/2023-0.2  8/2022-0.3  Orders:  -     PSA Total, Diagnostic; Future; Expected date: 09/28/2025  -     Testosterone; Future; Expected date: 09/28/2025  2. Benign prostatic hyperplasia with nocturia  Assessment & Plan:  AUA score 17   Continues with nocturia and some weakened stream at times  Feels that it is partially related to poor sleep   Reviewed bladder irritants and adequate hydration  Feels that he adequately empties  Plan to continue to follow      History of Present Illness  Az Kern is a 84 y.o. male here for evaluation of with prostate cancer Lisset pattern score 6 and 7 status post 6-month androgen deprivation and radiation therapy with radiation ending November 2021. PSA level stable.  Only mild urinary complaints with AUA symptom score 17, Previous was 16. Reports being content with urination. No other issues at this time. Denies dysuria or gross hematuria.     Reports being a poor sleeper and feels it is contributing to his nocturia.         Review of Systems   Constitutional:  Negative for chills and fever.   HENT:  Negative for ear pain and sore throat.    Eyes:  Negative for pain and visual disturbance.   Respiratory:  Negative for cough and shortness of breath.    Cardiovascular:  Negative for chest pain and palpitations.  "  Gastrointestinal:  Negative for abdominal pain and vomiting.   Genitourinary:  Positive for frequency (nocturia). Negative for decreased urine volume, difficulty urinating, dysuria, hematuria and urgency.   Musculoskeletal:  Negative for arthralgias and back pain.   Skin:  Negative for color change and rash.   Neurological:  Negative for seizures and syncope.   All other systems reviewed and are negative.          AUA SYMPTOM SCORE      Flowsheet Row Most Recent Value   AUA SYMPTOM SCORE    How often have you had a sensation of not emptying your bladder completely after you finished urinating? 2 (P)     How often have you had to urinate again less than two hours after you finished urinating? 3 (P)     How often have you found you stopped and started again several times when you urinate? 3 (P)     How often have you found it difficult to postpone urination? 2 (P)     How often have you had a weak urinary stream? 2 (P)     How often have you had to push or strain to begin urination? 1 (P)     How many times did you most typically get up to urinate from the time you went to bed at night until the time you got up in the morning? 4 (P)     Quality of Life: If you were to spend the rest of your life with your urinary condition just the way it is now, how would you feel about that? 4 (P)     AUA SYMPTOM SCORE 17 (P)               Vitals  Vitals:    03/28/25 1304   BP: 140/72   BP Location: Left arm   Patient Position: Sitting   Cuff Size: Standard   Pulse: 67   SpO2: 99%   Weight: 61.3 kg (135 lb 3.2 oz)   Height: 5' 4\" (1.626 m)       Physical Exam  Vitals reviewed.   Constitutional:       Appearance: Normal appearance.   HENT:      Head: Normocephalic and atraumatic.   Eyes:      Conjunctiva/sclera: Conjunctivae normal.   Pulmonary:      Effort: Pulmonary effort is normal.   Abdominal:      General: Abdomen is flat. There is no distension.      Palpations: Abdomen is soft.      Tenderness: There is no abdominal " tenderness.   Musculoskeletal:         General: Normal range of motion.      Cervical back: Normal range of motion.   Skin:     General: Skin is warm and dry.   Neurological:      General: No focal deficit present.      Mental Status: He is alert and oriented to person, place, and time.   Psychiatric:         Mood and Affect: Mood normal.         Behavior: Behavior normal.         Thought Content: Thought content normal.         Judgment: Judgment normal.           Past History  Past Medical History:   Diagnosis Date   • Atrial fibrillation (HCC)    • Back pain    • Benign prostatic hypertrophy    • Disease of thyroid gland    • Elevated PSA 03/03/2021   • GERD (gastroesophageal reflux disease)    • History of radiation therapy 03/06/2024   • Hyperlipidemia    • Hypertension    • Prostate cancer (HCC) 07/21/2021   • Restless leg    • Sleep apnea 09/21/2021     Social History     Socioeconomic History   • Marital status: /Civil Union     Spouse name: None   • Number of children: None   • Years of education: None   • Highest education level: None   Occupational History   • Occupation: Retired - [employer]   Tobacco Use   • Smoking status: Never   • Smokeless tobacco: Never   Vaping Use   • Vaping status: Never Used   Substance and Sexual Activity   • Alcohol use: Yes     Alcohol/week: 7.0 standard drinks of alcohol     Types: 7 Standard drinks or equivalent per week   • Drug use: Never   • Sexual activity: Not Currently     Partners: Female     Birth control/protection: None     Comment: Wife took pill   Other Topics Concern   • None   Social History Narrative   • None     Social Drivers of Health     Financial Resource Strain: Not on file   Food Insecurity: No Food Insecurity (10/6/2024)    Nursing - Inadequate Food Risk Classification    • Worried About Running Out of Food in the Last Year: Never true    • Ran Out of Food in the Last Year: Never true    • Ran Out of Food in the Last Year:  Not on file   Transportation Needs: No Transportation Needs (10/6/2024)    PRAPARE - Transportation    • Lack of Transportation (Medical): No    • Lack of Transportation (Non-Medical): No   Physical Activity: Not on file   Stress: Not on file   Social Connections: Not on file   Intimate Partner Violence: Not on file   Housing Stability: Low Risk  (10/6/2024)    Housing Stability Vital Sign    • Unable to Pay for Housing in the Last Year: No    • Number of Times Moved in the Last Year: 0    • Homeless in the Last Year: No     Social History     Tobacco Use   Smoking Status Never   Smokeless Tobacco Never     Family History   Problem Relation Age of Onset   • Colon cancer Mother    • Throat cancer Father    • Cancer Father         Esophagus   • Breast cancer Sister    • Breast cancer additional onset Sister    • Bone cancer Sister        The following portions of the patient's history were reviewed and updated as appropriate: allergies, current medications, past medical history, past social history, past surgical history and problem list.    Results  No results found for this or any previous visit (from the past hour).]  Lab Results   Component Value Date    PSA 0.122 03/13/2025    PSA 0.210 09/23/2024    PSA 0.19 02/19/2024    PSA 0.13 08/16/2023     Lab Results   Component Value Date    CALCIUM 8.8 03/13/2025    K 4.5 03/13/2025    CO2 29 03/13/2025     03/13/2025    BUN 28 (H) 03/13/2025    CREATININE 1.17 03/13/2025     Lab Results   Component Value Date    WBC 7.10 03/13/2025    HGB 12.7 03/13/2025    HCT 39.0 03/13/2025    MCV 94 03/13/2025     03/13/2025

## 2025-03-28 ENCOUNTER — OFFICE VISIT (OUTPATIENT)
Dept: UROLOGY | Facility: MEDICAL CENTER | Age: 85
End: 2025-03-28

## 2025-03-28 VITALS
WEIGHT: 135.2 LBS | DIASTOLIC BLOOD PRESSURE: 72 MMHG | SYSTOLIC BLOOD PRESSURE: 140 MMHG | HEIGHT: 64 IN | BODY MASS INDEX: 23.08 KG/M2 | OXYGEN SATURATION: 99 % | HEART RATE: 67 BPM

## 2025-03-28 DIAGNOSIS — N40.1 BENIGN PROSTATIC HYPERPLASIA WITH NOCTURIA: ICD-10-CM

## 2025-03-28 DIAGNOSIS — R35.1 BENIGN PROSTATIC HYPERPLASIA WITH NOCTURIA: ICD-10-CM

## 2025-03-28 DIAGNOSIS — C61 PROSTATE CANCER (HCC): Primary | ICD-10-CM

## 2025-03-28 NOTE — ASSESSMENT & PLAN NOTE
AUA score 17   Continues with nocturia and some weakened stream at times  Feels that it is partially related to poor sleep   Reviewed bladder irritants and adequate hydration  Feels that he adequately empties  Plan to continue to follow

## 2025-03-28 NOTE — ASSESSMENT & PLAN NOTE
Prostate cancer Lisset 7  status post 6-month course of androgen deprivation and IMRT finishing 2021.   Testosterone is low normal  PSA levels have been stable   Patient has been having PSA checks every 6 months with testosterone level   Plan to follow up in 6 months with lab work prior   Would like to follow back up with Dr. Yee       Lab Results   Component Value Date    PSA 0.122 03/13/2025    PSA 0.210 09/23/2024    PSA 0.19 02/19/2024     PSA trend:   8/2013-0.13  2/2023-0.2  8/2022-0.3

## 2025-06-09 ENCOUNTER — OFFICE VISIT (OUTPATIENT)
Dept: CARDIOLOGY CLINIC | Facility: CLINIC | Age: 85
End: 2025-06-09
Payer: MEDICARE

## 2025-06-09 VITALS
BODY MASS INDEX: 22.88 KG/M2 | HEART RATE: 57 BPM | WEIGHT: 134 LBS | SYSTOLIC BLOOD PRESSURE: 130 MMHG | HEIGHT: 64 IN | DIASTOLIC BLOOD PRESSURE: 70 MMHG

## 2025-06-09 DIAGNOSIS — I48.0 PAF (PAROXYSMAL ATRIAL FIBRILLATION) (HCC): Primary | ICD-10-CM

## 2025-06-09 PROCEDURE — 99214 OFFICE O/P EST MOD 30 MIN: CPT | Performed by: INTERNAL MEDICINE

## 2025-06-09 PROCEDURE — 93000 ELECTROCARDIOGRAM COMPLETE: CPT | Performed by: INTERNAL MEDICINE

## 2025-06-09 NOTE — PROGRESS NOTES
"      Cardiology             Az Kern  1940  [id number]                 Discussion/Summary:     Paroxysmal atrial fibrillation  Dyslipidemia  Hypertension        Sinus rhythm on today's ECG.  Continue amiodarone.continue Eliquis anticoagulation  Blood pressure controlled, continue amlodipine  Continue rosuvastatin for dyslipidemia, prior lipid panel reviewed, well-controlled        Follow-up in 1 year        Interval History:      This is a 84-year-old male with a history of atrial fibrillation diagnosed 08/2017, maintained on low-dose metoprolol up until May 2018.  He presented to his PCPs office with symptoms of intermittent lightheadedness with bradycardia in the 40s.  At that time his metoprolol was discontinued.  He has been maintained on Eliquis anticoagulation.     He underwent PFTs 02/07/2020 which was within normal limits.  CT chest 02/19/2020 revealed a few scattered small lung nodules.     He was diagnosed with prostate cancer, and has undergone radiation therapy.    He was hospitalized with rapid atrial fibrillation 10/2024.  He was initiated on amiodarone and went back into sinus rhythm.  He was transition to oral dosing and continue on Eliquis, pravastatin, and amlodipine.  Baseline PFTs 8/15/2024 demonstrated normal diffusion capacity.  TSH 10/4/2024 was elevated at 5.5 and he was maintained on levothyroxine.     He presents today for follow-up with no complaints other than fatigue and poor sleep.  Denies chest pain, shortness of breath, dizziness, palpitations, edema.            Vitals:  Vitals:    06/09/25 1057   BP: 130/70   Pulse: 57   Weight: 60.8 kg (134 lb)   Height: 5' 4\" (1.626 m)         Past Medical History:   Diagnosis Date   • Atrial fibrillation (HCC)    • Back pain    • Benign prostatic hypertrophy    • Disease of thyroid gland    • Elevated PSA 03/03/2021   • GERD (gastroesophageal reflux disease)    • History of radiation therapy 03/06/2024   • Hyperlipidemia    • " Hypertension    • Prostate cancer (HCC) 07/21/2021   • Restless leg    • Sleep apnea 09/21/2021     Social History     Socioeconomic History   • Marital status: /Civil Union     Spouse name: Not on file   • Number of children: Not on file   • Years of education: Not on file   • Highest education level: Not on file   Occupational History   • Occupation: Retired - [employer]   Tobacco Use   • Smoking status: Never   • Smokeless tobacco: Never   Vaping Use   • Vaping status: Never Used   Substance and Sexual Activity   • Alcohol use: Yes     Alcohol/week: 7.0 standard drinks of alcohol     Types: 7 Standard drinks or equivalent per week   • Drug use: Never   • Sexual activity: Not Currently     Partners: Female     Birth control/protection: None     Comment: Wife took pill   Other Topics Concern   • Not on file   Social History Narrative   • Not on file     Social Drivers of Health     Financial Resource Strain: Not on file   Food Insecurity: No Food Insecurity (10/6/2024)    Nursing - Inadequate Food Risk Classification    • Worried About Running Out of Food in the Last Year: Never true    • Ran Out of Food in the Last Year: Never true    • Ran Out of Food in the Last Year: Not on file   Transportation Needs: No Transportation Needs (10/6/2024)    PRAPARE - Transportation    • Lack of Transportation (Medical): No    • Lack of Transportation (Non-Medical): No   Physical Activity: Not on file   Stress: Not on file   Social Connections: Not on file   Intimate Partner Violence: Not on file   Housing Stability: Low Risk  (10/6/2024)    Housing Stability Vital Sign    • Unable to Pay for Housing in the Last Year: No    • Number of Times Moved in the Last Year: 0    • Homeless in the Last Year: No      Family History   Problem Relation Name Age of Onset   • Colon cancer Mother Dari    • Throat cancer Father Ian    • Cancer Father Ian         Esophagus   • Breast cancer Sister Roxi    •  Breast cancer additional onset Sister Roxi    • Bone cancer Sister Roxi      Past Surgical History:   Procedure Laterality Date   • ACHILLES TENDON SURGERY     • COLONOSCOPY  2016    polyp   • EGD  09/03/2024    Dr Jones. Incomplete Schatzki ring without symptoms not dilated, normal esophagus and duodenum, mild gastritis with biopsy negative H. pylori and very mild chronic inactive gastritis.   • HERNIA REPAIR     • KNEE ARTHROSCOPY Right     shoulder   • W[place] PLMT INTERSTITIAL DEV RADIAT TX PROSTATE 1/MULT N/A 09/21/2021    Procedure: INSERTION OF FIDUCIAL MARKER (TRANSRECTAL ULTRASOUND GUIDANCE), SPACEOAR;  Surgeon: Daniel Bryant MD;  Location: [facility];  Service: Urology   • [place] PROSTATE NEEDLE BIOPSY ANY APPROACH N/A 06/15/2021    Procedure: TRANSPERINEAL MRI FUSION PROSTATE BIOPSY;  Surgeon: Cruzito Yee MD;  Location: [facility];  Service: Urology   • SKIN BIOPSY     • TONSILLECTOMY         Current Outpatient Medications:   •  amiodarone 100 mg tablet, Take 1 tablet (100 mg total) by mouth daily, Disp: 90 tablet, Rfl: 3  •  amLODIPine (NORVASC) 2.5 mg tablet, Take 2.5 mg by mouth in the morning and 2.5 mg before bedtime., Disp: , Rfl:   •  apixaban (Eliquis) 5 mg, Take 1 tablet (5 mg total) by mouth 2 (two) times a day, Disp: 180 tablet, Rfl: 3  •  bimatoprost (LUMIGAN) 0.01 % ophthalmic drops, Administer 1 drop into the left eye daily at bedtime, Disp: , Rfl:   •  Diclofenac Sodium (VOLTAREN) 1 %, Apply 2 g topically 2 (two) times a day as needed (low back pain), Disp: 100 g, Rfl: 2  •  fluticasone (FLONASE) 50 mcg/act nasal spray, 1 spray into each nostril daily as needed for rhinitis or allergies, Disp: , Rfl:   •  ketorolac (ACULAR) 0.5 % ophthalmic solution, Administer 1 drop to both eyes in the morning., Disp: , Rfl:   •  Krill Oil 500 MG CAPS, Take 500 mg by mouth in the morning., Disp: , Rfl:   •  levothyroxine 25 mcg tablet, Take 25 mcg by mouth in the morning., Disp: , Rfl:   •  Multiple  Vitamins-Minerals (CENTRUM SILVER 50+MEN PO), Take 1 tablet by mouth in the morning, Disp: , Rfl:   •  pantoprazole (PROTONIX) 20 mg tablet, Take 1 tablet (20 mg total) by mouth daily, Disp: 90 tablet, Rfl: 3  •  rosuvastatin (CRESTOR) 10 MG tablet, Take 10 mg by mouth every other day Per PCP (Dr. Alarcon) - November 2021 appt/Labs, Disp: , Rfl:   •  traZODone (DESYREL) 50 mg tablet, , Disp: , Rfl:         Review of Systems:  Review of Systems   Constitutional:  Negative for fatigue.   Respiratory: Negative.  Negative for chest tightness and shortness of breath.    Cardiovascular: Negative.    Musculoskeletal:  Positive for back pain.   All other systems reviewed and are negative.        Physical Exam:  Physical Exam  Constitutional:       General: He is not in acute distress.     Appearance: He is well-developed. He is not diaphoretic.   HENT:      Head: Normocephalic and atraumatic.     Eyes:      General: No scleral icterus.        Right eye: No discharge.      Pupils: Pupils are equal, round, and reactive to light.     Neck:      Thyroid: No thyromegaly.      Vascular: No carotid bruit.     Cardiovascular:      Rate and Rhythm: Normal rate and regular rhythm.      Heart sounds: Normal heart sounds. No murmur heard.     No friction rub. No gallop.   Pulmonary:      Effort: Pulmonary effort is normal.      Breath sounds: Normal breath sounds.   Abdominal:      General: There is no distension.      Tenderness: There is no abdominal tenderness. There is no guarding or rebound.     Musculoskeletal:         General: Normal range of motion.      Cervical back: Normal range of motion and neck supple.      Right lower leg: No edema.      Left lower leg: No edema.     Skin:     General: Skin is warm and dry.      Coloration: Skin is not pale.      Findings: No erythema or rash.     Neurological:      Mental Status: He is alert and oriented to person, place, and time.      Coordination: Coordination normal.     Psychiatric:          Behavior: Behavior normal.         Thought Content: Thought content normal.         Judgment: Judgment normal.         This note was completed in part utilizing M-Modal Fluency Direct Software.  Grammatical errors, random word insertions, spelling mistakes, and incomplete sentences can be an occasional consequence of this system secondary to software limitations, ambient noise, and hardware issues.  If you have any questions or concerns about the content, text, or information contained within the body of this dictation, please contact the provider for clarification.

## 2025-06-17 ENCOUNTER — APPOINTMENT (OUTPATIENT)
Dept: LAB | Facility: CLINIC | Age: 85
End: 2025-06-17
Payer: MEDICARE

## 2025-06-17 ENCOUNTER — TRANSCRIBE ORDERS (OUTPATIENT)
Dept: LAB | Facility: CLINIC | Age: 85
End: 2025-06-17

## 2025-06-17 DIAGNOSIS — E07.9 DISEASE OF THYROID GLAND: Primary | ICD-10-CM

## 2025-06-17 DIAGNOSIS — E07.9 DISEASE OF THYROID GLAND: ICD-10-CM

## 2025-06-17 LAB
T4 FREE SERPL-MCNC: 0.85 NG/DL (ref 0.61–1.12)
TSH SERPL DL<=0.05 MIU/L-ACNC: 8.86 UIU/ML (ref 0.45–4.5)

## 2025-06-17 PROCEDURE — 84439 ASSAY OF FREE THYROXINE: CPT

## 2025-06-17 PROCEDURE — 36415 COLL VENOUS BLD VENIPUNCTURE: CPT

## 2025-06-17 PROCEDURE — 84443 ASSAY THYROID STIM HORMONE: CPT

## 2025-06-18 ENCOUNTER — TELEPHONE (OUTPATIENT)
Age: 85
End: 2025-06-18

## 2025-06-18 NOTE — TELEPHONE ENCOUNTER
06/18/25    Miss. Mckeon from [facility], called office today with patient on the background to schedule a NP appt with Neurology for DIZZINESS.    Offer Next Available, to be place on the waiting list, and Miss. Mckeon kindly requested the [place] office.    Accommodated patient to Miss. Mckeon Request.    Appt scheduled for 10/13/25, 9:00 AM With Dr. Adrian at the [facility].  Appt placed on the waiting list.    Any questions, please contact [facility] at [phone] or Patient.  Thank You.       NOTE:  Per Miss. Mckeon, Patient is going to be scheduled for an MRI of the Brain with [facility]        PER DECISION TREE:  Results - Continue Scheduling   Visit: URGENT NEW PATIENT PG   Replace the original visit type.  Provider/Subgroup    Schedule with subgroup NEUROLOGY VERTIGO, BALANCE ISSUES, FORGETFULNESS,   CONFUSION, MEMORY ISSUES, DEMENTIA, ALZHEIMERS ATTENDINGS, APS      Schedule Instructions    Please change calendar date to 1 WEEK from now, and then search for openings within 1-3 weeks.   If nothing within that timeframe, please schedule to first available after that and edit waitlist to   HIGH PRIORITY.

## 2025-06-20 DIAGNOSIS — R00.2 PALPITATIONS: ICD-10-CM

## 2025-06-20 DIAGNOSIS — I48.91 ATRIAL FIBRILLATION (HCC): ICD-10-CM

## 2025-06-20 DIAGNOSIS — I48.0 PAF (PAROXYSMAL ATRIAL FIBRILLATION) (HCC): ICD-10-CM

## 2025-06-20 RX ORDER — AMIODARONE HYDROCHLORIDE 100 MG/1
100 TABLET ORAL DAILY
Qty: 30 TABLET | Refills: 0 | Status: SHIPPED | OUTPATIENT
Start: 2025-06-20

## 2025-06-20 NOTE — TELEPHONE ENCOUNTER
Medication: amiodarone     Dose/Frequency: 100mg     Quantity: 30    Pharmacy: [id number] - [place] - [address] [[id number]]     Office:   [] PCP/Provider -   [x] Speciality/Provider -     Does the patient have enough for 3 days?   [] Yes   [x] No - Send as HP to POD

## 2025-07-19 ENCOUNTER — HOSPITAL ENCOUNTER (OUTPATIENT)
Facility: MEDICAL CENTER | Age: 85
Discharge: HOME | End: 2025-07-19
Attending: PHYSICIAN ASSISTANT
Payer: MEDICARE

## 2025-07-19 DIAGNOSIS — R42 DIZZINESS AND GIDDINESS: ICD-10-CM

## 2025-07-19 DIAGNOSIS — R51.9 HEADACHE, UNSPECIFIED: ICD-10-CM

## 2025-07-19 PROCEDURE — 70551 MRI BRAIN STEM W/O DYE: CPT

## 2025-07-22 ENCOUNTER — APPOINTMENT (OUTPATIENT)
Dept: LAB | Facility: CLINIC | Age: 85
End: 2025-07-22
Payer: MEDICARE

## 2025-07-22 ENCOUNTER — TRANSCRIBE ORDERS (OUTPATIENT)
Dept: LAB | Facility: CLINIC | Age: 85
End: 2025-07-22

## 2025-07-22 DIAGNOSIS — E03.9 MYXEDEMA HEART DISEASE: Primary | ICD-10-CM

## 2025-07-22 DIAGNOSIS — E03.9 MYXEDEMA HEART DISEASE: ICD-10-CM

## 2025-07-22 DIAGNOSIS — I51.9 MYXEDEMA HEART DISEASE: ICD-10-CM

## 2025-07-22 DIAGNOSIS — I51.9 MYXEDEMA HEART DISEASE: Primary | ICD-10-CM

## 2025-07-22 LAB
ALBUMIN SERPL BCG-MCNC: 4 G/DL (ref 3.5–5)
ALP SERPL-CCNC: 57 U/L (ref 34–104)
ALT SERPL W P-5'-P-CCNC: 17 U/L (ref 7–52)
ANION GAP SERPL CALCULATED.3IONS-SCNC: 6 MMOL/L (ref 4–13)
AST SERPL W P-5'-P-CCNC: 23 U/L (ref 13–39)
BASOPHILS # BLD AUTO: 0.08 THOUSANDS/ÂΜL (ref 0–0.1)
BASOPHILS NFR BLD AUTO: 1 % (ref 0–1)
BILIRUB SERPL-MCNC: 0.48 MG/DL (ref 0.2–1)
BUN SERPL-MCNC: 21 MG/DL (ref 5–25)
CALCIUM SERPL-MCNC: 9 MG/DL (ref 8.4–10.2)
CHLORIDE SERPL-SCNC: 103 MMOL/L (ref 96–108)
CO2 SERPL-SCNC: 31 MMOL/L (ref 21–32)
CREAT SERPL-MCNC: 1.09 MG/DL (ref 0.6–1.3)
EOSINOPHIL # BLD AUTO: 0.38 THOUSAND/ÂΜL (ref 0–0.61)
EOSINOPHIL NFR BLD AUTO: 6 % (ref 0–6)
ERYTHROCYTE [DISTWIDTH] IN BLOOD BY AUTOMATED COUNT: 13.9 % (ref 11.6–15.1)
GFR SERPL CREATININE-BSD FRML MDRD: 62 ML/MIN/1.73SQ M
GLUCOSE P FAST SERPL-MCNC: 101 MG/DL (ref 65–99)
HCT VFR BLD AUTO: 39 % (ref 36.5–49.3)
HGB BLD-MCNC: 12.7 G/DL (ref 12–17)
IMM GRANULOCYTES # BLD AUTO: 0.04 THOUSAND/UL (ref 0–0.2)
IMM GRANULOCYTES NFR BLD AUTO: 1 % (ref 0–2)
LYMPHOCYTES # BLD AUTO: 1.43 THOUSANDS/ÂΜL (ref 0.6–4.47)
LYMPHOCYTES NFR BLD AUTO: 24 % (ref 14–44)
MCH RBC QN AUTO: 30.5 PG (ref 26.8–34.3)
MCHC RBC AUTO-ENTMCNC: 32.6 G/DL (ref 31.4–37.4)
MCV RBC AUTO: 94 FL (ref 82–98)
MONOCYTES # BLD AUTO: 0.76 THOUSAND/ÂΜL (ref 0.17–1.22)
MONOCYTES NFR BLD AUTO: 13 % (ref 4–12)
NEUTROPHILS # BLD AUTO: 3.28 THOUSANDS/ÂΜL (ref 1.85–7.62)
NEUTS SEG NFR BLD AUTO: 55 % (ref 43–75)
NRBC BLD AUTO-RTO: 0 /100 WBCS
PLATELET # BLD AUTO: 377 THOUSANDS/UL (ref 149–390)
PMV BLD AUTO: 9.7 FL (ref 8.9–12.7)
POTASSIUM SERPL-SCNC: 4.3 MMOL/L (ref 3.5–5.3)
PROT SERPL-MCNC: 6.4 G/DL (ref 6.4–8.4)
RBC # BLD AUTO: 4.17 MILLION/UL (ref 3.88–5.62)
SODIUM SERPL-SCNC: 140 MMOL/L (ref 135–147)
T4 FREE SERPL-MCNC: 0.99 NG/DL (ref 0.61–1.12)
TSH SERPL DL<=0.05 MIU/L-ACNC: 6.29 UIU/ML (ref 0.45–4.5)
WBC # BLD AUTO: 5.97 THOUSAND/UL (ref 4.31–10.16)

## 2025-07-22 PROCEDURE — 80053 COMPREHEN METABOLIC PANEL: CPT

## 2025-07-22 PROCEDURE — 85025 COMPLETE CBC W/AUTO DIFF WBC: CPT

## 2025-07-22 PROCEDURE — 84439 ASSAY OF FREE THYROXINE: CPT

## 2025-07-22 PROCEDURE — 36415 COLL VENOUS BLD VENIPUNCTURE: CPT

## 2025-07-22 PROCEDURE — 84443 ASSAY THYROID STIM HORMONE: CPT

## 2025-08-18 ENCOUNTER — OFFICE VISIT (OUTPATIENT)
Dept: RADIATION ONCOLOGY | Facility: CLINIC | Age: 85
End: 2025-08-18
Attending: INTERNAL MEDICINE
Payer: MEDICARE

## 2025-08-18 VITALS
WEIGHT: 134 LBS | HEART RATE: 58 BPM | SYSTOLIC BLOOD PRESSURE: 126 MMHG | HEIGHT: 64 IN | RESPIRATION RATE: 18 BRPM | TEMPERATURE: 97.2 F | BODY MASS INDEX: 22.88 KG/M2 | OXYGEN SATURATION: 98 % | DIASTOLIC BLOOD PRESSURE: 74 MMHG

## 2025-08-18 DIAGNOSIS — C61 PROSTATE CANCER (HCC): Primary | ICD-10-CM

## 2025-08-18 PROCEDURE — 99213 OFFICE O/P EST LOW 20 MIN: CPT | Performed by: INTERNAL MEDICINE

## 2025-08-18 PROCEDURE — 99211 OFF/OP EST MAY X REQ PHY/QHP: CPT | Performed by: INTERNAL MEDICINE

## 2025-08-20 ENCOUNTER — EVALUATION (OUTPATIENT)
Facility: REHABILITATION | Age: 85
End: 2025-08-20
Payer: MEDICARE

## 2025-08-20 DIAGNOSIS — R26.9 GAIT ABNORMALITY: Primary | ICD-10-CM

## 2025-08-20 PROCEDURE — 97161 PT EVAL LOW COMPLEX 20 MIN: CPT | Performed by: PHYSICAL THERAPIST

## (undated) DEVICE — SPACEOAR SYSTEMS: Brand: SPACEOAR VUE™ SYSTEM - 10ML

## (undated) DEVICE — SYSTEM TRANSPERINEAL ACCESS PRECISIONPOINT